# Patient Record
Sex: MALE | Race: WHITE | NOT HISPANIC OR LATINO
[De-identification: names, ages, dates, MRNs, and addresses within clinical notes are randomized per-mention and may not be internally consistent; named-entity substitution may affect disease eponyms.]

---

## 2017-01-23 ENCOUNTER — APPOINTMENT (OUTPATIENT)
Dept: SURGERY | Facility: CLINIC | Age: 82
End: 2017-01-23

## 2017-01-23 VITALS
BODY MASS INDEX: 26.07 KG/M2 | SYSTOLIC BLOOD PRESSURE: 163 MMHG | WEIGHT: 172 LBS | DIASTOLIC BLOOD PRESSURE: 80 MMHG | TEMPERATURE: 97.5 F | HEIGHT: 68 IN | HEART RATE: 51 BPM

## 2018-01-26 ENCOUNTER — APPOINTMENT (OUTPATIENT)
Dept: UROLOGY | Facility: CLINIC | Age: 83
End: 2018-01-26
Payer: MEDICARE

## 2018-01-26 PROCEDURE — 99213 OFFICE O/P EST LOW 20 MIN: CPT

## 2018-01-31 ENCOUNTER — APPOINTMENT (OUTPATIENT)
Dept: UROLOGY | Facility: CLINIC | Age: 83
End: 2018-01-31
Payer: MEDICARE

## 2018-01-31 ENCOUNTER — OUTPATIENT (OUTPATIENT)
Dept: OUTPATIENT SERVICES | Facility: HOSPITAL | Age: 83
LOS: 1 days | End: 2018-01-31
Payer: MEDICARE

## 2018-01-31 VITALS
HEART RATE: 46 BPM | DIASTOLIC BLOOD PRESSURE: 76 MMHG | TEMPERATURE: 97.7 F | RESPIRATION RATE: 16 BRPM | SYSTOLIC BLOOD PRESSURE: 156 MMHG

## 2018-01-31 DIAGNOSIS — Z95.828 PRESENCE OF OTHER VASCULAR IMPLANTS AND GRAFTS: Chronic | ICD-10-CM

## 2018-01-31 DIAGNOSIS — Z95.5 PRESENCE OF CORONARY ANGIOPLASTY IMPLANT AND GRAFT: Chronic | ICD-10-CM

## 2018-01-31 DIAGNOSIS — R35.0 FREQUENCY OF MICTURITION: ICD-10-CM

## 2018-01-31 DIAGNOSIS — Z98.89 OTHER SPECIFIED POSTPROCEDURAL STATES: Chronic | ICD-10-CM

## 2018-01-31 PROCEDURE — 52000 CYSTOURETHROSCOPY: CPT

## 2018-02-01 DIAGNOSIS — N32.0 BLADDER-NECK OBSTRUCTION: ICD-10-CM

## 2018-08-14 ENCOUNTER — INPATIENT (INPATIENT)
Facility: HOSPITAL | Age: 83
LOS: 2 days | Discharge: NURSING, REHAB OR LONG-TERM CARE | DRG: 563 | End: 2018-08-17
Attending: STUDENT IN AN ORGANIZED HEALTH CARE EDUCATION/TRAINING PROGRAM | Admitting: STUDENT IN AN ORGANIZED HEALTH CARE EDUCATION/TRAINING PROGRAM
Payer: MEDICARE

## 2018-08-14 VITALS
HEART RATE: 92 BPM | OXYGEN SATURATION: 98 % | DIASTOLIC BLOOD PRESSURE: 92 MMHG | SYSTOLIC BLOOD PRESSURE: 162 MMHG | WEIGHT: 173.94 LBS | HEIGHT: 68 IN | RESPIRATION RATE: 18 BRPM

## 2018-08-14 DIAGNOSIS — E78.5 HYPERLIPIDEMIA, UNSPECIFIED: ICD-10-CM

## 2018-08-14 DIAGNOSIS — S42.211A UNSPECIFIED DISPLACED FRACTURE OF SURGICAL NECK OF RIGHT HUMERUS, INITIAL ENCOUNTER FOR CLOSED FRACTURE: ICD-10-CM

## 2018-08-14 DIAGNOSIS — Z98.89 OTHER SPECIFIED POSTPROCEDURAL STATES: Chronic | ICD-10-CM

## 2018-08-14 DIAGNOSIS — Z29.9 ENCOUNTER FOR PROPHYLACTIC MEASURES, UNSPECIFIED: ICD-10-CM
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
## 2018-08-14 DIAGNOSIS — Z95.828 PRESENCE OF OTHER VASCULAR IMPLANTS AND GRAFTS: Chronic | ICD-10-CM

## 2018-08-14 DIAGNOSIS — G20 PARKINSON'S DISEASE: ICD-10-CM

## 2018-08-14 DIAGNOSIS — I25.10 ATHEROSCLEROTIC HEART DISEASE OF NATIVE CORONARY ARTERY WITHOUT ANGINA PECTORIS: ICD-10-CM

## 2018-08-14 DIAGNOSIS — I10 ESSENTIAL (PRIMARY) HYPERTENSION: ICD-10-CM

## 2018-08-14 DIAGNOSIS — Z95.5 PRESENCE OF CORONARY ANGIOPLASTY IMPLANT AND GRAFT: Chronic | ICD-10-CM

## 2018-08-14 DIAGNOSIS — N18.9 CHRONIC KIDNEY DISEASE, UNSPECIFIED: ICD-10-CM

## 2018-08-14 LAB
ALBUMIN SERPL ELPH-MCNC: 3.9 G/DL — SIGNIFICANT CHANGE UP (ref 3.3–5)
ALP SERPL-CCNC: 61 U/L — SIGNIFICANT CHANGE UP (ref 40–120)
ALT FLD-CCNC: 5 U/L — LOW (ref 10–45)
ANION GAP SERPL CALC-SCNC: 14 MMOL/L — SIGNIFICANT CHANGE UP (ref 5–17)
APTT BLD: 29.1 SEC — SIGNIFICANT CHANGE UP (ref 27.5–37.4)
AST SERPL-CCNC: 19 U/L — SIGNIFICANT CHANGE UP (ref 10–40)
BASOPHILS # BLD AUTO: 0 K/UL — SIGNIFICANT CHANGE UP (ref 0–0.2)
BASOPHILS NFR BLD AUTO: 0.1 % — SIGNIFICANT CHANGE UP (ref 0–2)
BILIRUB SERPL-MCNC: 0.7 MG/DL — SIGNIFICANT CHANGE UP (ref 0.2–1.2)
BUN SERPL-MCNC: 33 MG/DL — HIGH (ref 7–23)
CALCIUM SERPL-MCNC: 8.7 MG/DL — SIGNIFICANT CHANGE UP (ref 8.4–10.5)
CHLORIDE SERPL-SCNC: 100 MMOL/L — SIGNIFICANT CHANGE UP (ref 96–108)
CO2 SERPL-SCNC: 23 MMOL/L — SIGNIFICANT CHANGE UP (ref 22–31)
CREAT SERPL-MCNC: 1.9 MG/DL — HIGH (ref 0.5–1.3)
EOSINOPHIL # BLD AUTO: 0.1 K/UL — SIGNIFICANT CHANGE UP (ref 0–0.5)
EOSINOPHIL NFR BLD AUTO: 2.2 % — SIGNIFICANT CHANGE UP (ref 0–6)
GLUCOSE SERPL-MCNC: 144 MG/DL — HIGH (ref 70–99)
HCT VFR BLD CALC: 32.9 % — LOW (ref 39–50)
HGB BLD-MCNC: 10.7 G/DL — LOW (ref 13–17)
INR BLD: 1.22 RATIO — HIGH (ref 0.88–1.16)
LYMPHOCYTES # BLD AUTO: 1.1 K/UL — SIGNIFICANT CHANGE UP (ref 1–3.3)
LYMPHOCYTES # BLD AUTO: 17.7 % — SIGNIFICANT CHANGE UP (ref 13–44)
MCHC RBC-ENTMCNC: 28.9 PG — SIGNIFICANT CHANGE UP (ref 27–34)
MCHC RBC-ENTMCNC: 32.6 GM/DL — SIGNIFICANT CHANGE UP (ref 32–36)
MCV RBC AUTO: 88.9 FL — SIGNIFICANT CHANGE UP (ref 80–100)
MONOCYTES # BLD AUTO: 0.4 K/UL — SIGNIFICANT CHANGE UP (ref 0–0.9)
MONOCYTES NFR BLD AUTO: 6.4 % — SIGNIFICANT CHANGE UP (ref 2–14)
NEUTROPHILS # BLD AUTO: 4.4 K/UL — SIGNIFICANT CHANGE UP (ref 1.8–7.4)
NEUTROPHILS NFR BLD AUTO: 73.7 % — SIGNIFICANT CHANGE UP (ref 43–77)
PLATELET # BLD AUTO: 116 K/UL — LOW (ref 150–400)
POTASSIUM SERPL-MCNC: 4.1 MMOL/L — SIGNIFICANT CHANGE UP (ref 3.5–5.3)
POTASSIUM SERPL-SCNC: 4.1 MMOL/L — SIGNIFICANT CHANGE UP (ref 3.5–5.3)
PROT SERPL-MCNC: 7.4 G/DL — SIGNIFICANT CHANGE UP (ref 6–8.3)
PROTHROM AB SERPL-ACNC: 13.4 SEC — HIGH (ref 9.8–12.7)
RBC # BLD: 3.7 M/UL — LOW (ref 4.2–5.8)
RBC # FLD: 14.3 % — SIGNIFICANT CHANGE UP (ref 10.3–14.5)
SODIUM SERPL-SCNC: 137 MMOL/L — SIGNIFICANT CHANGE UP (ref 135–145)
TROPONIN T, HIGH SENSITIVITY RESULT: 30 NG/L — SIGNIFICANT CHANGE UP (ref 0–51)
TROPONIN T, HIGH SENSITIVITY RESULT: 32 NG/L — SIGNIFICANT CHANGE UP (ref 0–51)
WBC # BLD: 6 K/UL — SIGNIFICANT CHANGE UP (ref 3.8–10.5)
WBC # FLD AUTO: 6 K/UL — SIGNIFICANT CHANGE UP (ref 3.8–10.5)

## 2018-08-14 PROCEDURE — 72125 CT NECK SPINE W/O DYE: CPT | Mod: 26

## 2018-08-14 PROCEDURE — 99223 1ST HOSP IP/OBS HIGH 75: CPT

## 2018-08-14 PROCEDURE — 70450 CT HEAD/BRAIN W/O DYE: CPT | Mod: 26

## 2018-08-14 PROCEDURE — 73020 X-RAY EXAM OF SHOULDER: CPT | Mod: 26,RT,59

## 2018-08-14 PROCEDURE — 71045 X-RAY EXAM CHEST 1 VIEW: CPT | Mod: 26

## 2018-08-14 PROCEDURE — 73200 CT UPPER EXTREMITY W/O DYE: CPT | Mod: 26,RT

## 2018-08-14 PROCEDURE — 99285 EMERGENCY DEPT VISIT HI MDM: CPT | Mod: 25,GC

## 2018-08-14 PROCEDURE — 12013 RPR F/E/E/N/L/M 2.6-5.0 CM: CPT | Mod: GC

## 2018-08-14 PROCEDURE — 23600 CLTX PROX HUMRL FX W/O MNPJ: CPT | Mod: RT

## 2018-08-14 PROCEDURE — 99221 1ST HOSP IP/OBS SF/LOW 40: CPT | Mod: 57

## 2018-08-14 PROCEDURE — 93010 ELECTROCARDIOGRAM REPORT: CPT | Mod: 59,GC

## 2018-08-14 PROCEDURE — 76377 3D RENDER W/INTRP POSTPROCES: CPT | Mod: 26

## 2018-08-14 PROCEDURE — 73030 X-RAY EXAM OF SHOULDER: CPT | Mod: 26,RT

## 2018-08-14 PROCEDURE — 73060 X-RAY EXAM OF HUMERUS: CPT | Mod: 26,RT

## 2018-08-14 RX ORDER — HEPARIN SODIUM 5000 [USP'U]/ML
5000 INJECTION INTRAVENOUS; SUBCUTANEOUS EVERY 8 HOURS
Qty: 0 | Refills: 0 | Status: DISCONTINUED | OUTPATIENT
Start: 2018-08-14 | End: 2018-08-17

## 2018-08-14 RX ORDER — ASPIRIN/CALCIUM CARB/MAGNESIUM 324 MG
81 TABLET ORAL DAILY
Qty: 0 | Refills: 0 | Status: DISCONTINUED | OUTPATIENT
Start: 2018-08-14 | End: 2018-08-17

## 2018-08-14 RX ORDER — DOCUSATE SODIUM 100 MG
100 CAPSULE ORAL
Qty: 0 | Refills: 0 | Status: DISCONTINUED | OUTPATIENT
Start: 2018-08-14 | End: 2018-08-17

## 2018-08-14 RX ORDER — ACETAMINOPHEN 500 MG
500 TABLET ORAL
Qty: 0 | Refills: 0 | Status: DISCONTINUED | OUTPATIENT
Start: 2018-08-14 | End: 2018-08-17

## 2018-08-14 RX ORDER — FUROSEMIDE 40 MG
20 TABLET ORAL
Qty: 0 | Refills: 0 | Status: DISCONTINUED | OUTPATIENT
Start: 2018-08-14 | End: 2018-08-14

## 2018-08-14 RX ORDER — RANOLAZINE 500 MG/1
1000 TABLET, FILM COATED, EXTENDED RELEASE ORAL
Qty: 0 | Refills: 0 | Status: DISCONTINUED | OUTPATIENT
Start: 2018-08-14 | End: 2018-08-17

## 2018-08-14 RX ORDER — ISOSORBIDE MONONITRATE 60 MG/1
60 TABLET, EXTENDED RELEASE ORAL DAILY
Qty: 0 | Refills: 0 | Status: DISCONTINUED | OUTPATIENT
Start: 2018-08-14 | End: 2018-08-17

## 2018-08-14 RX ORDER — ACETAMINOPHEN 500 MG
1000 TABLET ORAL ONCE
Qty: 0 | Refills: 0 | Status: COMPLETED | OUTPATIENT
Start: 2018-08-14 | End: 2018-08-14

## 2018-08-14 RX ORDER — SODIUM CHLORIDE 9 MG/ML
500 INJECTION INTRAMUSCULAR; INTRAVENOUS; SUBCUTANEOUS ONCE
Qty: 0 | Refills: 0 | Status: COMPLETED | OUTPATIENT
Start: 2018-08-14 | End: 2018-08-14

## 2018-08-14 RX ORDER — SENNA PLUS 8.6 MG/1
1 TABLET ORAL DAILY
Qty: 0 | Refills: 0 | Status: DISCONTINUED | OUTPATIENT
Start: 2018-08-14 | End: 2018-08-17

## 2018-08-14 RX ORDER — CARBIDOPA, LEVODOPA, AND ENTACAPONE 50; 200; 200 MG/1; MG/1; MG/1
1 TABLET, FILM COATED ORAL THREE TIMES A DAY
Qty: 0 | Refills: 0 | Status: DISCONTINUED | OUTPATIENT
Start: 2018-08-14 | End: 2018-08-17

## 2018-08-14 RX ORDER — METOPROLOL TARTRATE 50 MG
50 TABLET ORAL
Qty: 0 | Refills: 0 | Status: DISCONTINUED | OUTPATIENT
Start: 2018-08-14 | End: 2018-08-15

## 2018-08-14 RX ORDER — ATORVASTATIN CALCIUM 80 MG/1
80 TABLET, FILM COATED ORAL AT BEDTIME
Qty: 0 | Refills: 0 | Status: DISCONTINUED | OUTPATIENT
Start: 2018-08-14 | End: 2018-08-17

## 2018-08-14 RX ORDER — TETANUS TOXOID, REDUCED DIPHTHERIA TOXOID AND ACELLULAR PERTUSSIS VACCINE, ADSORBED 5; 2.5; 8; 8; 2.5 [IU]/.5ML; [IU]/.5ML; UG/.5ML; UG/.5ML; UG/.5ML
0.5 SUSPENSION INTRAMUSCULAR ONCE
Qty: 0 | Refills: 0 | Status: COMPLETED | OUTPATIENT
Start: 2018-08-14 | End: 2018-08-14

## 2018-08-14 RX ORDER — ENTACAPONE 200 MG/1
200 TABLET, FILM COATED ORAL THREE TIMES A DAY
Qty: 0 | Refills: 0 | Status: DISCONTINUED | OUTPATIENT
Start: 2018-08-14 | End: 2018-08-14

## 2018-08-14 RX ADMIN — Medication 100 MILLIGRAM(S): at 23:45

## 2018-08-14 RX ADMIN — Medication 400 MILLIGRAM(S): at 23:44

## 2018-08-14 RX ADMIN — HEPARIN SODIUM 5000 UNIT(S): 5000 INJECTION INTRAVENOUS; SUBCUTANEOUS at 23:45

## 2018-08-14 RX ADMIN — CARBIDOPA, LEVODOPA, AND ENTACAPONE 1 TABLET(S): 50; 200; 200 TABLET, FILM COATED ORAL at 23:45

## 2018-08-14 RX ADMIN — RANOLAZINE 1000 MILLIGRAM(S): 500 TABLET, FILM COATED, EXTENDED RELEASE ORAL at 23:45

## 2018-08-14 RX ADMIN — SENNA PLUS 1 TABLET(S): 8.6 TABLET ORAL at 23:45

## 2018-08-14 RX ADMIN — ISOSORBIDE MONONITRATE 60 MILLIGRAM(S): 60 TABLET, EXTENDED RELEASE ORAL at 23:45

## 2018-08-14 RX ADMIN — SODIUM CHLORIDE 500 MILLILITER(S): 9 INJECTION INTRAMUSCULAR; INTRAVENOUS; SUBCUTANEOUS at 19:39

## 2018-08-14 RX ADMIN — TETANUS TOXOID, REDUCED DIPHTHERIA TOXOID AND ACELLULAR PERTUSSIS VACCINE, ADSORBED 0.5 MILLILITER(S): 5; 2.5; 8; 8; 2.5 SUSPENSION INTRAMUSCULAR at 17:02

## 2018-08-14 RX ADMIN — ATORVASTATIN CALCIUM 80 MILLIGRAM(S): 80 TABLET, FILM COATED ORAL at 23:45

## 2018-08-14 NOTE — ED PROVIDER NOTE - MEDICAL DECISION MAKING DETAILS
88M pmh Parkinsons, CABG on ASA with mechanical trip and fall with head trauma, no LOC. On floor for 1 hour. CT head and c-spine. Tdap. Labs with trop, EKG, CK. Xray chest and right shoulder, humerus Dr. Reid (Attending Physician)  88M pmh Parkinsons, CABG on ASA with mechanical trip and fall with head trauma, no LOC. On floor for 1 hour. CT head and c-spine. Tdap. Labs with trop, EKG, CK. Xray chest and right shoulder, humerus

## 2018-08-14 NOTE — H&P ADULT - ASSESSMENT
87 yo male with PMH of parkinsons disease, CAD, CABG, s/p stents, s/p umbrella stent, DVT, HTN, UGIB, CKD, HLD, presents here after a mechanical fall

## 2018-08-14 NOTE — ED PROVIDER NOTE - PRINCIPAL DIAGNOSIS
Closed displaced fracture of surgical neck of right humerus, unspecified fracture morphology, initial encounter

## 2018-08-14 NOTE — ED PROVIDER NOTE - PROGRESS NOTE DETAILS
Right humerus fracture. Orthopedics aware. Pending axillary view. Stellate forehead lac sutured (see procedure note). Will admit for rehab placement.

## 2018-08-14 NOTE — ED PROVIDER NOTE - FAMILY HISTORY
Mother  Still living? Unknown  Family history of breast cancer, Age at diagnosis: Age Unknown     Father  Still living? Unknown  Family history of coronary artery disease, Age at diagnosis: Age Unknown

## 2018-08-14 NOTE — ED PROVIDER NOTE - NEUROLOGICAL, MLM
Alert and oriented, no focal deficits, no motor or sensory deficits. Moving all extremities. Right shoulder movement limited 2/2 pain

## 2018-08-14 NOTE — ED PROVIDER NOTE - SECONDARY DIAGNOSIS.
Minor head injury, initial encounter Coronary artery disease involving native heart without angina pectoris, unspecified vessel or lesion type

## 2018-08-14 NOTE — ED ADULT NURSE NOTE - OBJECTIVE STATEMENT
88 year old male A&OX4 PMHX of Parkinson's presents s/p trip and fall. Patient states he was ambulating when he tripped hitting the anterior aspect of the head on pavement. EMS was called and found patient to have a large laceration to the middle of the forehead. Patient on arrival was still actively bleeding. Bleeding controlled by direct pressure. Patient's lung sounds are clear and equal bilaterally. Patient has equal strength and sensation bilaterally. Pupils PERRLA. Patient is currently on aspirin. Patient denies nausea, vomiting, headache, vision changes, chest pain, shortness of breath, dizziness, weakness, palpitations.

## 2018-08-14 NOTE — ED PROVIDER NOTE - CARE PLAN
Principal Discharge DX:	Closed displaced fracture of surgical neck of right humerus, unspecified fracture morphology, initial encounter Principal Discharge DX:	Closed displaced fracture of surgical neck of right humerus, unspecified fracture morphology, initial encounter  Secondary Diagnosis:	Minor head injury, initial encounter  Secondary Diagnosis:	Coronary artery disease involving native heart without angina pectoris, unspecified vessel or lesion type

## 2018-08-14 NOTE — ED PROVIDER NOTE - OBJECTIVE STATEMENT
89yo male pmh Parkinson's disease, CAD s/p CABG, stents on aspirin, DVT s/p umbrella stent, HTN p/w mechanical fall while walking to bathroom. Pt denies chest pain, dizziness, palpitations, dyspnea prior to falling. Hit head on ground with no LOC, vomiting or confusion. Also c/o right shoulder pain. Unknown last tetanus. Pt states he has shuffling unsteady gait at baseline. No other anticoagulation besides ASA. Denies fever, chills, chest pain, dyspnea, palpitations, dizziness, recent cough, nausea, vomiting, diarrhea, abdominal pain, bladder and bowel problems, back pain, leg swelling, sick contact, recent long travel.    Significant past medical hx/surgical hx/social hx and review of systems can be found above in the history of present illness.

## 2018-08-14 NOTE — H&P ADULT - HISTORY OF PRESENT ILLNESS
89 yo male with PMH of parkinsons disease, CAD, CABG, s/p stents, s/p umbrella stent, DVT, HTN, UGIB, CKD, HLD, presents here after a mechanical fall.  Patient states that usually he has shuffling gait and is unstable and therefore uses walker at home.  Today he walked a few steps to get medication without any assistance.  He thinks he was walking fast, but dragging his leg.  He tripped and fell face down on a wooden floor.  He extended his right arm for protection.  He had trauma to the forehead and started bleeding profusely.  He had no LOC.  He was also unable to move his arms.  He was on the floor for >1hr until his wife came and called the ambulance and brought him here.  Patient otherwise denies any chest pain, blurry vision, headache, dizziness, palpitation prior to the fall.  He had no urinary or bowel incontinence.

## 2018-08-14 NOTE — ED PROCEDURE NOTE - PROCEDURE ADDITIONAL DETAILS
Stellate laceration closed with 5 interrupted sutures followed by a stellate closure suture. Bacitracin applied, covered with gauze

## 2018-08-14 NOTE — H&P ADULT - PROBLEM SELECTOR PLAN 2
mechanical fall, likely due to underlying unsteady gait from parkinsons  PT evaluation  check B12, RPR, vit D level mechanical fall, likely due to underlying unsteady gait from parkinsons  PT evaluation  check B12, RPR, vit D level  right periorbital echymosis, no pain with occular movement; Will check CT orbital if worsens

## 2018-08-14 NOTE — ED ADULT NURSE NOTE - PMH
Aspiration pneumonia    BPH (benign prostatic hyperplasia)    CAD (Coronary Artery Disease)    Clostridium difficile colitis    HTN (Hypertension)    Hyperlipidemia    MI, old    Parkinsons Disease    Pneumonia    Unilateral inguinal hernia, with gangrene, not specified as recurrent    Upper GI bleed  [facility] admission 2015, EGD w/ gastric ulcer/visible vessel which was cauterized  UTI (urinary tract infection)

## 2018-08-14 NOTE — CONSULT NOTE ADULT - SUBJECTIVE AND OBJECTIVE BOX
88y Male presents c/o R shoulder pain sp mechanical fall.+ Headstrike/ neg LOC. Denies numbness, tingling paresthesias in affected extremity. Able to ambulate after fall. No other orthopedic injuries at this time    PAST MEDICAL & SURGICAL HISTORY:  Unilateral inguinal hernia, with gangrene, not specified as recurrent  Upper GI bleed: [facility] admission , EGD w/ gastric ulcer/visible vessel which was cauterized  Aspiration pneumonia  Clostridium difficile colitis  Pneumonia  MI, old  UTI (urinary tract infection)  BPH (benign prostatic hyperplasia)  Parkinsons Disease  CAD (Coronary Artery Disease)  HTN (Hypertension)  Hyperlipidemia  Presence of IVC filter: right upper arm placed last year   History of prostate surgery: 2016  Varicose veins of legs: h/o Vein stripping  S/P appendectomy  Stented coronary artery: cardiac stent x 2 - placement between  &amp; possible   Hx of CAB    MEDICATIONS  (STANDING):  acetaminophen  IVPB. 1000 milliGRAM(s) IV Intermittent once    Allergies    Cipro (Rash)    Intolerances                            10.7   6.0   )-----------( 116      ( 14 Aug 2018 16:16 )             32.9     14 Aug 2018 16:16    137    |  100    |  33     ----------------------------<  144    4.1     |  23     |  1.90     Ca    8.7        14 Aug 2018 16:16    TPro  7.4    /  Alb  3.9    /  TBili  0.7    /  DBili  x      /  AST  19     /  ALT  5      /  AlkPhos  61     14 Aug 2018 16:16    PT/INR - ( 14 Aug 2018 16:16 )   PT: 13.4 sec;   INR: 1.22 ratio         PTT - ( 14 Aug 2018 16:16 )  PTT:29.1 sec    Imaging: XR was personally reviewed and demonstates R proximal humerus fracture, GHJ reduced    Vital Signs Last 24 Hrs  T(C): 36.8 (18 @ 17:06), Max: 36.8 (18 @ 17:06)  T(F): 98.2 (18 @ 17:06), Max: 98.2 (18 @ 17:06)  HR: 67 (18 @ 19:40) (52 - 92)  BP: 142/67 (18 @ 19:40) (142/67 - 174/87)  BP(mean): --  RR: 17 (18 @ 19:40) (16 - 18)  SpO2: 96% (18 @ 19:40) (96% - 98%)    Gen: NAD  RUE: skin tear proximal forearm, otherwise Skin intact, + shoulder fullness, unable to range shoulder 2/2 pain, +ain/pin/m/r/u function, SILT C5-T1, radial pulse intact, compartments soft, brisk cap refill     Secondary Survey: Full ROM of unaffected extremities, SILT globally, compartments soft, no bony TTP over bony prominences, no calf TTP, able to SLR with bilateral LE, no TTP along axial spine, multiple abrasions scalp and neck, anterior knee abrasion      A/P: 88y Male w R proximal humerus fracture  Pain control  NWB RUE in sling, keep c/d/i  Ice  Active movement of fingers/wrist/elbow encouraged  Follow up with Dr. Cohn as outpatient, call office for appointment  Ortho stable  no acute ortho interventions needed at this time

## 2018-08-14 NOTE — ED ADULT NURSE NOTE - NSIMPLEMENTINTERV_GEN_ALL_ED
Implemented All Universal Safety Interventions:  [place] to call system. Call bell, personal items and telephone within reach. Instruct patient to call for assistance. Room bathroom lighting operational. Non-slip footwear when patient is off stretcher. Physically safe environment: no spills, clutter or unnecessary equipment. Stretcher in lowest position, wheels locked, appropriate side rails in place.

## 2018-08-14 NOTE — ED ADULT NURSE REASSESSMENT NOTE - NS ED NURSE REASSESS COMMENT FT1
Report received from        Weeks            ,RN  Pt resting comfortably with family at bedside.   MD at bedside suturing pts laceration  Safety maintained at all times, bed in lowest position, call bell in hand  Will continue to monitor closely.   pending delta trop result

## 2018-08-14 NOTE — ED PROVIDER NOTE - CONSTITUTIONAL APPEARANCE HYGIENE, MLM
Initially presented with bloody gauze wrap around head and face. In no acute distress./well appearing

## 2018-08-14 NOTE — H&P ADULT - NSHPPHYSICALEXAM_GEN_ALL_CORE
PHYSICAL EXAM:  Vital Signs Last 24 Hrs  T(C): 36.8 (08-14-18 @ 17:06)  T(F): 98.2 (08-14-18 @ 17:06), Max: 98.2 (08-14-18 @ 17:06)  HR: 67 (08-14-18 @ 19:40) (52 - 92)  BP: 142/67 (08-14-18 @ 19:40)  BP(mean): --  RR: 17 (08-14-18 @ 19:40) (16 - 18)  SpO2: 96% (08-14-18 @ 19:40) (96% - 98%)  Wt(kg): --    Constitutional: s/p laceration repair of forehead, +bandaged  EYES: EOMI; right periorbital hematoma; no pain with eye movement  ENT:  Normal Hearing, no tonsillar exudates   Neck: Soft and supple, No JVD  Lungs: Breath sounds are clear bilaterally, No wheezing, rales or rhonchi  Heart: S1 and S2, regular rate and rhythm, no Murmurs, gallops or rubs  Abdomen: Bowel Sounds present, soft, nontender, nondistended, no guarding, no rebound  Extremities: No cyanosis or clubbing; warm to touch; right arm sling present  Vascular: 2+ peripheral pulses lower ex  Neurological: A/O x 3, no focal deficits  Musculoskeletal: 5/5 strength left upper and b/l lower extremities;   Skin: No rashes  Psych: no depression or anhedonia  HEME: no bruises, no nose bleeds

## 2018-08-14 NOTE — ED PROVIDER NOTE - SKIN WOUND DESCRIPTION
large stellate laceration right forehead, actively bleeding, slows with direct pressure. No arterial bleeding.

## 2018-08-14 NOTE — H&P ADULT - PROBLEM SELECTOR PLAN 1
patient with fracture of right humerus, s/p sling  orthopedic evaluation appreciated  ice  pain control with tylenol for now  PT evaluation for possible rehab placement

## 2018-08-14 NOTE — ED PROVIDER NOTE - ATTENDING CONTRIBUTION TO CARE
Dr. Reid (Attending Physician)  I performed a history and physical exam of the patient and discussed their management with the resident. I reviewed the resident's note and agree with the documented findings and plan of care. My medical decision making and observations are found above.

## 2018-08-14 NOTE — ED PROCEDURE NOTE - ATTENDING CONTRIBUTION TO CARE
Dr. Reid (Attending Physician)  I supervised the above procedure and agree with the documented note.

## 2018-08-14 NOTE — H&P ADULT - PROBLEM SELECTOR PLAN 4
c/w home medication, carbidopa/levodopa/entacapone  ?has extra prescription for entacapone per record  will need to confirm meds in AM

## 2018-08-14 NOTE — ED PROVIDER NOTE - PSH
History of prostate surgery  8/2016  Hx of CABG  1995  Presence of IVC filter  right upper arm placed last year 2015  S/P appendectomy    Stented coronary artery  cardiac stent x 2 - placement between 2000 & possible 2005  Varicose veins of legs  h/o Vein stripping

## 2018-08-14 NOTE — H&P ADULT - NSHPREVIEWOFSYSTEMS_GEN_ALL_CORE
CONSTITUTIONAL: No weakness, fevers or chills  EYES/ENT: No visual changes;  No dysphagia  NECK: No pain or stiffness  RESPIRATORY: No cough, wheezing, hemoptysis; No shortness of breath  CARDIOVASCULAR: No chest pain or palpitations; No lower extremity edema  EXTREMITIES: no le edema, cyanosis, clubbing  MUSCULOSKELETAL: no joint pain, swelling  GASTROINTESTINAL: No abdominal or epigastric pain. No nausea, vomiting, or hematemesis; No diarrhea or constipation. No melena or hematochezia.  BACK: No back pain  GENITOURINARY: No dysuria, frequency or hematuria  NEUROLOGICAL: unsteady gait  SKIN: No itching, burning, rashes, or lesions   PSYCH: no agitation  All other review of systems is negative unless indicated above.

## 2018-08-14 NOTE — H&P ADULT - NSHPLABSRESULTS_GEN_ALL_CORE
Labs personally reviewed:                          10.7   6.0   )-----------( 116      ( 14 Aug 2018 16:16 )             32.9     08-14    137  |  100  |  33<H>  ----------------------------<  144<H>  4.1   |  23  |  1.90<H>    Ca    8.7      14 Aug 2018 16:16    TPro  7.4  /  Alb  3.9  /  TBili  0.7  /  DBili  x   /  AST  19  /  ALT  5<L>  /  AlkPhos  61  08-14    CARDIAC MARKERS ( 14 Aug 2018 16:16 )  x     / x     / 116 U/L / x     / x          LIVER FUNCTIONS - ( 14 Aug 2018 16:16 )  Alb: 3.9 g/dL / Pro: 7.4 g/dL / ALK PHOS: 61 U/L / ALT: 5 U/L / AST: 19 U/L / GGT: x           PT/INR - ( 14 Aug 2018 16:16 )   PT: 13.4 sec;   INR: 1.22 ratio         PTT - ( 14 Aug 2018 16:16 )  PTT:29.1 sec    CAPILLARY BLOOD GLUCOSE          Imaging:  CT head/cervical spine: no acute fractures  CXR/XR shoulder: acute transversely oriented fracture through surgical neck of right humerus; 6cm overriding.     EKG personally reviewed: LBBB
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
## 2018-08-15 DIAGNOSIS — R00.1 BRADYCARDIA, UNSPECIFIED: ICD-10-CM

## 2018-08-15 DIAGNOSIS — I25.10 ATHEROSCLEROTIC HEART DISEASE OF NATIVE CORONARY ARTERY WITHOUT ANGINA PECTORIS: ICD-10-CM

## 2018-08-15 LAB
24R-OH-CALCIDIOL SERPL-MCNC: 32.3 NG/ML — SIGNIFICANT CHANGE UP (ref 30–80)
ALBUMIN SERPL ELPH-MCNC: 3.6 G/DL — SIGNIFICANT CHANGE UP (ref 3.3–5)
ALP SERPL-CCNC: 43 U/L — SIGNIFICANT CHANGE UP (ref 40–120)
ALT FLD-CCNC: <5 U/L — LOW (ref 10–45)
ANION GAP SERPL CALC-SCNC: 11 MMOL/L — SIGNIFICANT CHANGE UP (ref 5–17)
AST SERPL-CCNC: 17 U/L — SIGNIFICANT CHANGE UP (ref 10–40)
BASOPHILS # BLD AUTO: 0.01 K/UL — SIGNIFICANT CHANGE UP (ref 0–0.2)
BASOPHILS NFR BLD AUTO: 0.2 % — SIGNIFICANT CHANGE UP (ref 0–2)
BILIRUB SERPL-MCNC: 0.8 MG/DL — SIGNIFICANT CHANGE UP (ref 0.2–1.2)
BUN SERPL-MCNC: 30 MG/DL — HIGH (ref 7–23)
CALCIUM SERPL-MCNC: 8.4 MG/DL — SIGNIFICANT CHANGE UP (ref 8.4–10.5)
CHLORIDE SERPL-SCNC: 103 MMOL/L — SIGNIFICANT CHANGE UP (ref 96–108)
CO2 SERPL-SCNC: 23 MMOL/L — SIGNIFICANT CHANGE UP (ref 22–31)
CREAT SERPL-MCNC: 1.73 MG/DL — HIGH (ref 0.5–1.3)
EOSINOPHIL # BLD AUTO: 0.06 K/UL — SIGNIFICANT CHANGE UP (ref 0–0.5)
EOSINOPHIL NFR BLD AUTO: 1 % — SIGNIFICANT CHANGE UP (ref 0–6)
GLUCOSE SERPL-MCNC: 107 MG/DL — HIGH (ref 70–99)
HCT VFR BLD CALC: 29.2 % — LOW (ref 39–50)
HGB BLD-MCNC: 9.3 G/DL — LOW (ref 13–17)
IMM GRANULOCYTES NFR BLD AUTO: 0.3 % — SIGNIFICANT CHANGE UP (ref 0–1.5)
LYMPHOCYTES # BLD AUTO: 1.31 K/UL — SIGNIFICANT CHANGE UP (ref 1–3.3)
LYMPHOCYTES # BLD AUTO: 22.7 % — SIGNIFICANT CHANGE UP (ref 13–44)
MAGNESIUM SERPL-MCNC: 2.2 MG/DL — SIGNIFICANT CHANGE UP (ref 1.6–2.6)
MCHC RBC-ENTMCNC: 27.7 PG — SIGNIFICANT CHANGE UP (ref 27–34)
MCHC RBC-ENTMCNC: 31.8 GM/DL — LOW (ref 32–36)
MCV RBC AUTO: 86.9 FL — SIGNIFICANT CHANGE UP (ref 80–100)
MONOCYTES # BLD AUTO: 0.8 K/UL — SIGNIFICANT CHANGE UP (ref 0–0.9)
MONOCYTES NFR BLD AUTO: 13.9 % — SIGNIFICANT CHANGE UP (ref 2–14)
NEUTROPHILS # BLD AUTO: 3.57 K/UL — SIGNIFICANT CHANGE UP (ref 1.8–7.4)
NEUTROPHILS NFR BLD AUTO: 61.9 % — SIGNIFICANT CHANGE UP (ref 43–77)
PHOSPHATE SERPL-MCNC: 3.6 MG/DL — SIGNIFICANT CHANGE UP (ref 2.5–4.5)
PLATELET # BLD AUTO: 108 K/UL — LOW (ref 150–400)
POTASSIUM SERPL-MCNC: 4.1 MMOL/L — SIGNIFICANT CHANGE UP (ref 3.5–5.3)
POTASSIUM SERPL-SCNC: 4.1 MMOL/L — SIGNIFICANT CHANGE UP (ref 3.5–5.3)
PROT SERPL-MCNC: 6.3 G/DL — SIGNIFICANT CHANGE UP (ref 6–8.3)
RBC # BLD: 3.36 M/UL — LOW (ref 4.2–5.8)
RBC # FLD: 15.1 % — HIGH (ref 10.3–14.5)
SODIUM SERPL-SCNC: 137 MMOL/L — SIGNIFICANT CHANGE UP (ref 135–145)
VIT B12 SERPL-MCNC: 335 PG/ML — SIGNIFICANT CHANGE UP (ref 232–1245)
WBC # BLD: 5.77 K/UL — SIGNIFICANT CHANGE UP (ref 3.8–10.5)
WBC # FLD AUTO: 5.77 K/UL — SIGNIFICANT CHANGE UP (ref 3.8–10.5)

## 2018-08-15 PROCEDURE — 93010 ELECTROCARDIOGRAM REPORT: CPT | Mod: 77,76

## 2018-08-15 RX ORDER — POLYETHYLENE GLYCOL 3350 17 G/17G
17 POWDER, FOR SOLUTION ORAL DAILY
Qty: 0 | Refills: 0 | Status: DISCONTINUED | OUTPATIENT
Start: 2018-08-15 | End: 2018-08-17

## 2018-08-15 RX ORDER — METOPROLOL TARTRATE 50 MG
50 TABLET ORAL
Qty: 0 | Refills: 0 | Status: DISCONTINUED | OUTPATIENT
Start: 2018-08-15 | End: 2018-08-17

## 2018-08-15 RX ADMIN — SENNA PLUS 1 TABLET(S): 8.6 TABLET ORAL at 12:01

## 2018-08-15 RX ADMIN — HEPARIN SODIUM 5000 UNIT(S): 5000 INJECTION INTRAVENOUS; SUBCUTANEOUS at 13:56

## 2018-08-15 RX ADMIN — Medication 500 MILLIGRAM(S): at 05:46

## 2018-08-15 RX ADMIN — CARBIDOPA, LEVODOPA, AND ENTACAPONE 1 TABLET(S): 50; 200; 200 TABLET, FILM COATED ORAL at 22:54

## 2018-08-15 RX ADMIN — HEPARIN SODIUM 5000 UNIT(S): 5000 INJECTION INTRAVENOUS; SUBCUTANEOUS at 05:46

## 2018-08-15 RX ADMIN — HEPARIN SODIUM 5000 UNIT(S): 5000 INJECTION INTRAVENOUS; SUBCUTANEOUS at 22:54

## 2018-08-15 RX ADMIN — Medication 100 MILLIGRAM(S): at 17:30

## 2018-08-15 RX ADMIN — ISOSORBIDE MONONITRATE 60 MILLIGRAM(S): 60 TABLET, EXTENDED RELEASE ORAL at 12:01

## 2018-08-15 RX ADMIN — Medication 81 MILLIGRAM(S): at 12:01

## 2018-08-15 RX ADMIN — RANOLAZINE 1000 MILLIGRAM(S): 500 TABLET, FILM COATED, EXTENDED RELEASE ORAL at 22:54

## 2018-08-15 RX ADMIN — CARBIDOPA, LEVODOPA, AND ENTACAPONE 1 TABLET(S): 50; 200; 200 TABLET, FILM COATED ORAL at 06:44

## 2018-08-15 RX ADMIN — Medication 100 MILLIGRAM(S): at 05:46

## 2018-08-15 RX ADMIN — Medication 50 MILLIGRAM(S): at 17:30

## 2018-08-15 RX ADMIN — ATORVASTATIN CALCIUM 80 MILLIGRAM(S): 80 TABLET, FILM COATED ORAL at 22:53

## 2018-08-15 RX ADMIN — RANOLAZINE 1000 MILLIGRAM(S): 500 TABLET, FILM COATED, EXTENDED RELEASE ORAL at 12:01

## 2018-08-15 RX ADMIN — Medication 1000 MILLIGRAM(S): at 00:40

## 2018-08-15 RX ADMIN — CARBIDOPA, LEVODOPA, AND ENTACAPONE 1 TABLET(S): 50; 200; 200 TABLET, FILM COATED ORAL at 13:56

## 2018-08-15 NOTE — CONSULT NOTE ADULT - PROBLEM SELECTOR RECOMMENDATION 3
-  - cont home cardiac meds: ASA, high intensity statin, bb with hold parameters.    will follow.    Luis Roper M.D., [facility]  [phone] -  - cont home cardiac meds: ASA, high intensity statin, bb with hold parameters.  - cont antianginal meds - imdur and ranexa.    will follow.    Luis Roper M.D., [facility]  [phone]

## 2018-08-15 NOTE — PHYSICAL THERAPY INITIAL EVALUATION ADULT - LIVES WITH, PROFILE
spouse Pt lives with spouse in apartment with 5 stairs to entrance +HR.  Prior to admission pt independent with functional mobility with difficulty, using rollator for ambulation and  receiving home PT services to address impairments related to Parkinson's Disease./spouse

## 2018-08-15 NOTE — PHYSICAL THERAPY INITIAL EVALUATION ADULT - ADDITIONAL COMMENTS
HEAD CT: Soft tissue contusion anterior to the right frontal sinus without an underlying displaced skull fracture. No intracranial hemorrhage or mass effect.    CERVICAL SPINE CT: No acute cervical spine fracture or traumatic vertebral subluxation. Degenerative changes, detailed above.    CXR/XR shoulder: acute transversely oriented fracture through surgical neck of right humerus; 6cm overriding.

## 2018-08-15 NOTE — PROGRESS NOTE ADULT - PROBLEM SELECTOR PLAN 4
c/w home medication, carbidopa/levodopa/entacapone  ?has extra prescription for entacapone per record  med rec

## 2018-08-15 NOTE — CONSULT NOTE ADULT - ASSESSMENT
88 M h/o parkinsons disease, CAD s/p CABG and subsequent PCI, DVT s/p IVC filter, HTN, UGIB/peptic ulcer disease, CKD, HLD, LBBB, who was admited after a mechanical fall. We are consulted for bradycardia noted on admission.

## 2018-08-15 NOTE — PROGRESS NOTE ADULT - PROBLEM SELECTOR PLAN 1
patient with fracture of right humerus, s/p sling, WBAT  orthopedic evaluation appreciated  pain control with tylenol for now  PT evaluation for possible rehab placement

## 2018-08-15 NOTE — PHYSICAL THERAPY INITIAL EVALUATION ADULT - GAIT DEVIATIONS NOTED, PT EVAL
decreased step length/Narrow ALEX, +retropulsion/decreased sonja/decreased stride length/decreased weight-shifting ability

## 2018-08-15 NOTE — PHYSICAL THERAPY INITIAL EVALUATION ADULT - PERTINENT HX OF CURRENT PROBLEM, REHAB EVAL
87 yo male with PMH of parkinsons disease, CAD, CABG, s/p stents, s/p umbrella stent, DVT, HTN, UGIB, CKD, HLD, presents s/p mechanical fall, he extended his right arm for protection.  He had trauma to the forehead and started bleeding profusely, no LOC, also c/o R shoulder pain. A/w Closed displaced fracture of surgical neck of right humerus.

## 2018-08-15 NOTE — PROGRESS NOTE ADULT - PROBLEM SELECTOR PLAN 2
mechanical fall, likely due to underlying unsteady gait from parkinsons  PT evaluation  check B12, RPR, vit D level  right periorbital echymosis, no pain with occular movement; Will check CT orbital if worsens mechanical fall, likely due to underlying unsteady gait from parkinsons  PT evaluation  check B12, RPR, vit D level  right periorbital echymosis, no pain with occular movement; Will check CT orbital if worsens  CT shoulder shows periosseous soft tissue edema and hemorrhage. Hemorrhage   is also noted in the axillary region along the course of the axillary vessels. Markedly comminuted and impacted four-part right humeral head and neck fracture without dislocation of the head from the glenoid.  trend h/h while on HSQ

## 2018-08-15 NOTE — PHYSICAL THERAPY INITIAL EVALUATION ADULT - BALANCE TRAINING, PT EVAL
GOAL: Pt will demonstrate improved static/dynamic standing balance to good-, in order to improve stability, decrease fall risk and increase independence with transfers & ambulation within 2 weeks.

## 2018-08-15 NOTE — CONSULT NOTE ADULT - PROBLEM SELECTOR RECOMMENDATION 9
-  - it is difficult to make out the p waves on his admission EKG but it likely shows sinus bradycardia with first degree AVB and LBBB.  - repeat EKG.  - will put hold parameters on his metoprolol.  - reviewed last echo report from 2015.

## 2018-08-15 NOTE — CONSULT NOTE ADULT - PROBLEM SELECTOR RECOMMENDATION 2
-   - fall seems mechanical by nature.  - pt denies any syncope.    will follow.    Luis Roper M.D., [facility]  [phone] -   - fall seems mechanical by nature.  - pt denies any syncope.

## 2018-08-15 NOTE — PHYSICAL THERAPY INITIAL EVALUATION ADULT - DIAGNOSIS, PT EVAL
decreased functional mobility secondary to decreased strength, impaired balance, impaired postural & motor control,

## 2018-08-15 NOTE — CHART NOTE - NSCHARTNOTEFT_GEN_A_CORE
MEDICINE PA     Notified by RN pt bradycardic. Asymptomatic. EKG showing sinus bradycardia with first degree block at 48 bpm.   - Hold AM metoprolol. Consider decreasing dosage   - Ranexa rescheduled   - R2 pads if HR <40    - EP eval if symptomatic, HR <40  - Continue close monitoring of clinical status and vital signs    - Endorsed to primary team in AM. Attending to follow.     CATIE WongC   Department of Medicine   Spectra [phone] MEDICINE PA     Notified by RN pt bradycardic. Asymptomatic. EKG showing sinus bradycardia with first degree block at 48 bpm.   - Hold AM metoprolol. Consider decreasing dosage   - Ranexa rescheduled   - R2 pads if HR <40    - EP eval if symptomatic, HR <40 (Pt s/p fall at home)  - Continue close monitoring of clinical status and vital signs    - Endorsed to primary team in AM. Attending to follow.     CATIE WongC   Department of Medicine   Spectra [phone] MEDICINE [facility]     Notified by RN pt bradycardic. Asymptomatic. EKG showing sinus bradycardia with first degree block at 48 bpm.     1. Bradycardia 2/2? chronic   - EKG from 2016 demonstrating bradycardia, 2015 demonstrates first degree AV block  - Hold AM metoprolol. Consider decreasing dosage   - Ranexa rescheduled   - R2 pads if HR <40    - EP eval if symptomatic, HR <40 (Pt s/p fall at home)  - Continue close monitoring of clinical status and vital signs    - Endorsed to primary team in AM. Attending to follow.     CATIE WongC   Department of Medicine   Spectra [phone]

## 2018-08-15 NOTE — CONSULT NOTE ADULT - SUBJECTIVE AND OBJECTIVE BOX
[facility]  _________________________    CC: "I fell" - pt      HPI:  88 M h/o parkinsons disease, CAD s/p CABG and subsequent PCI, DVT s/p IVC filter, HTN, UGIB/peptic ulcer disease, CKD, HLD, LBBB, who was admited after a mechanical fall.  He walked a few steps to to get his medication when tripped and fell face down on a wooden floor.  R arm broke his fall.  He had trauma to the forehead but no LOC. No palpitations, dizziness, lightheadedness or syncope.  He was also unable to move his arms.  He was on the floor for over an hour until his wife arrived home and called EMS. We are consulted for bradycardia.      PAST MEDICAL & SURGICAL HISTORY:  Unilateral inguinal hernia, with gangrene, not specified as recurrent  Upper GI bleed: MICU admission , EGD w/ gastric ulcer/visible vessel which was cauterized  Aspiration pneumonia  Clostridium difficile colitis  Pneumonia  MI, old  UTI (urinary tract infection)  BPH (benign prostatic hyperplasia)  Parkinsons Disease  CAD (Coronary Artery Disease)  HTN (Hypertension)  Hyperlipidemia  Presence of IVC filter: right upper arm placed last year   History of prostate surgery: 2016  Varicose veins of legs: h/o Vein stripping  S/P appendectomy  Stented coronary artery: cardiac stent x 2 - placement between  &amp; possible   Hx of CAB      MEDICATIONS  (STANDING):  aspirin  chewable 81 milliGRAM(s) Oral daily  atorvastatin 80 milliGRAM(s) Oral at bedtime  carbidopa/levodopa/entacapone 50/200/200 1 Tablet(s) Oral three times a day  docusate sodium 100 milliGRAM(s) Oral two times a day  heparin  Injectable 5000 Unit(s) SubCutaneous every 8 hours  isosorbide   mononitrate ER Tablet (IMDUR) 60 milliGRAM(s) Oral daily  metoprolol tartrate 50 milliGRAM(s) Oral two times a day  ranolazine 1000 milliGRAM(s) Oral two times a day  senna 1 Tablet(s) Oral daily    MEDICATIONS  (PRN):  acetaminophen   Tablet. 500 milliGRAM(s) Oral four times a day PRN pain      Allergies    Cipro (Rash)    Intolerances        Social Histroy: Tobacco- , ETOH-, Illicit Drugs-    T(C): 36.6 (08-15-18 @ 05:28), Max: 36.8 (18 @ 17:06)  HR: 47 (08-15-18 @ 05:28) (47 - 92)  BP: 141/72 (08-15-18 @ 05:28) (138/64 - 184/85)  RR: 16 (08-15-18 @ 05:28) (16 - 18)  SpO2: 96% (08-15-18 @ 05:28) (96% - 98%)  I&O's Summary    14 Aug 2018 07:01  -  15 Aug 2018 07:00  --------------------------------------------------------  IN: 0 mL / OUT: 350 mL / NET: -350 mL        Review of Systems:  Constitutional: [ ] Fever [ ] Chills [ ] Fatigue [ ] Weight change   HEENT: [ ] Blurred vision [ ] Eye Pain [ ] Headache [ ] Runny nose [ ] Sore Throat   Respiratory: [ ] Cough [ ] Wheezing [ ] Shortness of breath  Cardiovascular: [ ] Chest Pain [ ] Palpitations [ ] [place] [ ] PND [ ] Orthopnea  Gastrointestinal: [ ] Abdominal Pain [ ] Diarrhea [ ] Constipation [ ] Hemorrhoids [ ] Nausea [ ] Vomiting  Genitourinary: [ ] Nocturia [ ] Dysuria [ ] Incontinence  Extremities: [ ] Swelling [ ] Joint Pain  Neurologic: [ ] Focal deficit [ ] Paresthesias [ ] Syncope  Lymphatic: [ ] Swelling [ ] Lymphadenopathy   Skin: [ ] Rash [ ] Ecchymoses [ ] Wounds [ ] Lesions  Psychiatry: [ ] Depression [ ] Suicidal/Homicidal Ideation [ ] Anxiety [ ] Sleep Disturbances  [ x] 10 point review of systems is otherwise negative except as mentioned above            [ ]Unable to obtain    PHYSICAL EXAM:  GENERAL: Alert, NAD  NECK: Supple, No JVD, No carotid bruit.  CHEST/LUNG: Clear to auscultation bilaterally; No wheezes, rales, or rhonchi  HEART: S1 S2 normal, RRR,  No murmurs, rubs, or gallops  ABDOMEN: Soft, Nontender, Nondistended; Bowel sounds present  EXTREMITIES:  No LE edema.      LABS:                        9.3    5.77  )-----------( 108      ( 15 Aug 2018 07:11 )             29.2     08-15    137  |  103  |  30<H>  ----------------------------<  107<H>  4.1   |  23  |  1.73<H>    Ca    8.4      15 Aug 2018 06:57  Phos  3.6     08-15  Mg     2.2     -15    TPro  6.3  /  Alb  3.6  /  TBili  0.8  /  DBili  x   /  AST  17  /  ALT  <5<L>  /  AlkPhos  43  08-15    PT/INR - ( 14 Aug 2018 16:16 )   PT: 13.4 sec;   INR: 1.22 ratio         PTT - ( 14 Aug 2018 16:16 )  PTT:29.1 sec  CARDIAC MARKERS ( 14 Aug 2018 16:16 )  x     / x     / 116 U/L / x     / x                  MEDICATIONS  (STANDING):  aspirin  chewable 81 milliGRAM(s) Oral daily  atorvastatin 80 milliGRAM(s) Oral at bedtime  carbidopa/levodopa/entacapone 50/200/200 1 Tablet(s) Oral three times a day  docusate sodium 100 milliGRAM(s) Oral two times a day  heparin  Injectable 5000 Unit(s) SubCutaneous every 8 hours  isosorbide   mononitrate ER Tablet (IMDUR) 60 milliGRAM(s) Oral daily  metoprolol tartrate 50 milliGRAM(s) Oral two times a day  ranolazine 1000 milliGRAM(s) Oral two times a day  senna 1 Tablet(s) Oral daily    MEDICATIONS  (PRN):  acetaminophen   Tablet. 500 milliGRAM(s) Oral four times a day PRN pain      RADIOLOGY & ADDITIONAL TESTS:    Cardiology testing:  EKG: possibly sinus bradycardia with first degree AV block, LBBB.    Echo:  < from: Transthoracic Echocardiogram (10.29.15 @ 11:35) >  Patient name: RYAN FITZPATRICK  YOB: 1930   Age: 85 (M)   MR#: [id number]  Study Date: 10/29/2015  [id number]: Sneha Beckford JARON  Study quality: Technically good  Referring Physician: Freddie Hopkins  Blood Pressure: 122/66 mmHg  Height: 5ft 8in  Weight: 167 lb  BSA: 1.9 m2  ------------------------------------------------------------------------  PROCEDURE: Transthoracic echocardiogram with 2-D, M-Mode  and complete spectral and color flow Doppler.  INDICATION: Heart failure, unspecified (I50.9)  ------------------------------------------------------------------------  Dimensions:    Normal Values:  LA:     4.4    2.0 - 4.0 cm  Ao:     3.8    2.0 - 3.8 cm  SEPTUM: 1.0    0.6 - 1.2 cm  PWT:    0.9    0.6 - 1.1 cm  LVIDd:  5.3    3.0 - 5.6 cm  LVIDs:  4.4    1.8 - 4.0 cm  Derived variables:  LVMI: 99 g/m2  RWT: 0.33  EF (Visual Estimate): 45 %  Doppler Peak Velocity (m/sec): AoV=2.0  ------------------------------------------------------------------------  Observations:  Mitral Valve: Normal mitral valve. Mild mitral  regurgitation.  Aortic Valve/Aorta: Calcified trileaflet aortic valve with  normal opening. Peak left ventricular outflow tract  gradient equals 7 mm Hg, mean gradient is equal to 3mm Hg.  Aortic Root: 3.8 cm.  Left Atrium: Normal left atrium.  Left Ventricle: Mild segmental left ventricular systolic  dysfunction.  Hypokinesis of the inferolateral wall. Normal  left ventricular internal dimensions and wall thicknesses.  Right Heart: Normal right atrium. Normal right ventricular  size and function. Normal tricuspid valve. Mild tricuspid  regurgitation. Normal pulmonic valve. Minimal pulmonic  regurgitation.  Pericardium/Pleura: Normal pericardium with no pericardial  effusion.  Hemodynamic: Estimated right atrial pressure is 8 mm Hg.  Estimated right ventricular systolic pressure equals 36 mm  Hg, assuming right atrial pressure equals 8 mm Hg,  consistent with borderline pulmonary hypertension.  ------------------------------------------------------------------------  Conclusions:  1. Normal left ventricular internal dimensions and wall  thicknesses.  2. Mild segmental left ventricular systolic dysfunction.  Hypokinesis of the inferolateral wall.  ------------------------------------------------------------------------  Confirmed on  10/29/2015 - 15:52:52 by Luciano Gandhi M.D.  ------------------------------------------------------------------------    < end of copied text >    Telemetry: not on tele. [facility]  _________________________    CC: "I fell" - pt      HPI:  88 M h/o parkinsons disease, CAD s/p CABG and subsequent PCI, DVT s/p IVC filter, HTN, UGIB/peptic ulcer disease, CKD, HLD, LBBB, who was admited after a mechanical fall.  He walked a few steps to to get his medication when tripped and fell face down on a wooden floor.  R arm broke his fall.  He had trauma to the forehead but no LOC. No palpitations, dizziness, lightheadedness or syncope.  He was also unable to move his arms.  He was on the floor for over an hour until his wife arrived home and called EMS. We are consulted for bradycardia.      PAST MEDICAL & SURGICAL HISTORY:  Unilateral inguinal hernia, with gangrene, not specified as recurrent  Upper GI bleed: MICU admission , EGD w/ gastric ulcer/visible vessel which was cauterized  Aspiration pneumonia  Clostridium difficile colitis  Pneumonia  MI, old  UTI (urinary tract infection)  BPH (benign prostatic hyperplasia)  Parkinsons Disease  CAD (Coronary Artery Disease)  HTN (Hypertension)  Hyperlipidemia  Presence of IVC filter: right upper arm placed last year   History of prostate surgery: 2016  Varicose veins of legs: h/o Vein stripping  S/P appendectomy  Stented coronary artery: cardiac stent x 2 - placement between  &amp; possible   Hx of CAB      MEDICATIONS  (STANDING):  aspirin  chewable 81 milliGRAM(s) Oral daily  atorvastatin 80 milliGRAM(s) Oral at bedtime  carbidopa/levodopa/entacapone 50/200/200 1 Tablet(s) Oral three times a day  docusate sodium 100 milliGRAM(s) Oral two times a day  heparin  Injectable 5000 Unit(s) SubCutaneous every 8 hours  isosorbide   mononitrate ER Tablet (IMDUR) 60 milliGRAM(s) Oral daily  metoprolol tartrate 50 milliGRAM(s) Oral two times a day  ranolazine 1000 milliGRAM(s) Oral two times a day  senna 1 Tablet(s) Oral daily    MEDICATIONS  (PRN):  acetaminophen   Tablet. 500 milliGRAM(s) Oral four times a day PRN pain      Allergies    Cipro (Rash)    Intolerances        Social Histroy: Tobacco- , ETOH-, Illicit Drugs-    T(C): 36.6 (08-15-18 @ 05:28), Max: 36.8 (18 @ 17:06)  HR: 47 (08-15-18 @ 05:28) (47 - 92)  BP: 141/72 (08-15-18 @ 05:28) (138/64 - 184/85)  RR: 16 (08-15-18 @ 05:28) (16 - 18)  SpO2: 96% (08-15-18 @ 05:28) (96% - 98%)  I&O's Summary    14 Aug 2018 07:01  -  15 Aug 2018 07:00  --------------------------------------------------------  IN: 0 mL / OUT: 350 mL / NET: -350 mL        Review of Systems:  Constitutional: [ ] Fever [ ] Chills [ ] Fatigue [ ] Weight change   HEENT: [ ] Blurred vision [ ] Eye Pain [ ] Headache [ ] Runny nose [ ] Sore Throat   Respiratory: [ ] Cough [ ] Wheezing [ ] Shortness of breath  Cardiovascular: [ ] Chest Pain [ ] Palpitations [ ] [place] [ ] PND [ ] Orthopnea  Gastrointestinal: [ ] Abdominal Pain [ ] Diarrhea [ ] Constipation [ ] Hemorrhoids [ ] Nausea [ ] Vomiting  Genitourinary: [ ] Nocturia [ ] Dysuria [ ] Incontinence  Extremities: [ ] Swelling [ ] Joint Pain  Neurologic: [ ] Focal deficit [ ] Paresthesias [ ] Syncope  Lymphatic: [ ] Swelling [ ] Lymphadenopathy   Skin: [ ] Rash [ ] Ecchymoses [ ] Wounds [ ] Lesions  Psychiatry: [ ] Depression [ ] Suicidal/Homicidal Ideation [ ] Anxiety [ ] Sleep Disturbances  [ x] 10 point review of systems is otherwise negative except as mentioned above            [ ]Unable to obtain    PHYSICAL EXAM:  GENERAL: Alert. + masked facies and tremor.  NECK: Supple, No JVD, No carotid bruit.  CHEST/LUNG: Clear to auscultation bilaterally; No wheezes, rales, or rhonchi  HEART: S1 S2 normal, RRR,  3/6 MANA at the base.  ABDOMEN: Soft, Nontender, Nondistended; Bowel sounds present  EXTREMITIES:  1+ pitting LE edema b/l.      LABS:                        9.3    5.77  )-----------( 108      ( 15 Aug 2018 07:11 )             29.2     08-15    137  |  103  |  30<H>  ----------------------------<  107<H>  4.1   |  23  |  1.73<H>    Ca    8.4      15 Aug 2018 06:57  Phos  3.6     -15  Mg     2.2     08-15    TPro  6.3  /  Alb  3.6  /  TBili  0.8  /  DBili  x   /  AST  17  /  ALT  <5<L>  /  AlkPhos  43  08-15    PT/INR - ( 14 Aug 2018 16:16 )   PT: 13.4 sec;   INR: 1.22 ratio         PTT - ( 14 Aug 2018 16:16 )  PTT:29.1 sec  CARDIAC MARKERS ( 14 Aug 2018 16:16 )  x     / x     / 116 U/L / x     / x              MEDICATIONS  (STANDING):  aspirin  chewable 81 milliGRAM(s) Oral daily  atorvastatin 80 milliGRAM(s) Oral at bedtime  carbidopa/levodopa/entacapone 50/200/200 1 Tablet(s) Oral three times a day  docusate sodium 100 milliGRAM(s) Oral two times a day  heparin  Injectable 5000 Unit(s) SubCutaneous every 8 hours  isosorbide   mononitrate ER Tablet (IMDUR) 60 milliGRAM(s) Oral daily  metoprolol tartrate 50 milliGRAM(s) Oral two times a day  ranolazine 1000 milliGRAM(s) Oral two times a day  senna 1 Tablet(s) Oral daily    MEDICATIONS  (PRN):  acetaminophen   Tablet. 500 milliGRAM(s) Oral four times a day PRN pain      RADIOLOGY & ADDITIONAL TESTS:    Cardiology testing:  EKG: possibly sinus bradycardia with first degree AV block, LBBB.    Echo:  < from: Transthoracic Echocardiogram (10.29.15 @ 11:35) >  Patient name: RYAN FITZPATRICK  YOB: 1930   Age: 85 (M)   MR#: [id number]  Study Date: 10/29/2015  [id number]: Sneha Beckford RDCS  Study quality: Technically good  Referring Physician: Freddie Hopkins  Blood Pressure: 122/66 mmHg  Height: 5ft 8in  Weight: 167 lb  BSA: 1.9 m2  ------------------------------------------------------------------------  PROCEDURE: Transthoracic echocardiogram with 2-D, M-Mode  and complete spectral and color flow Doppler.  INDICATION: Heart failure, unspecified (I50.9)  ------------------------------------------------------------------------  Dimensions:    Normal Values:  LA:     4.4    2.0 - 4.0 cm  Ao:     3.8    2.0 - 3.8 cm  SEPTUM: 1.0    0.6 - 1.2 cm  PWT:    0.9    0.6 - 1.1 cm  LVIDd:  5.3    3.0 - 5.6 cm  LVIDs:  4.4    1.8 - 4.0 cm  Derived variables:  LVMI: 99 g/m2  RWT: 0.33  EF (Visual Estimate): 45 %  Doppler Peak Velocity (m/sec): AoV=2.0  ------------------------------------------------------------------------  Observations:  Mitral Valve: Normal mitral valve. Mild mitral  regurgitation.  Aortic Valve/Aorta: Calcified trileaflet aortic valve with  normal opening. Peak left ventricular outflow tract  gradient equals 7 mm Hg, mean gradient is equal to 3mm Hg.  Aortic Root: 3.8 cm.  Left Atrium: Normal left atrium.  Left Ventricle: Mild segmental left ventricular systolic  dysfunction.  Hypokinesis of the inferolateral wall. Normal  left ventricular internal dimensions and wall thicknesses.  Right Heart: Normal right atrium. Normal right ventricular  size and function. Normal tricuspid valve. Mild tricuspid  regurgitation. Normal pulmonic valve. Minimal pulmonic  regurgitation.  Pericardium/Pleura: Normal pericardium with no pericardial  effusion.  Hemodynamic: Estimated right atrial pressure is 8 mm Hg.  Estimated right ventricular systolic pressure equals 36 mm  Hg, assuming right atrial pressure equals 8 mm Hg,  consistent with borderline pulmonary hypertension.  ------------------------------------------------------------------------  Conclusions:  1. Normal left ventricular internal dimensions and wall  thicknesses.  2. Mild segmental left ventricular systolic dysfunction.  Hypokinesis of the inferolateral wall.  ------------------------------------------------------------------------  Confirmed on  10/29/2015 - 15:52:52 by Luciano Gandhi M.D.  ------------------------------------------------------------------------    < end of copied text >    Telemetry: not on tele.

## 2018-08-15 NOTE — PROGRESS NOTE ADULT - SUBJECTIVE AND OBJECTIVE BOX
Patient is a 88y old  Male who presents with a chief complaint of s/p fall (14 Aug 2018 22:18)      SUBJECTIVE / OVERNIGHT EVENTS:    Patient seen and examined. co right shoulder pain. denies cp sob. co right forehead pain only when touched.      Vital Signs Last 24 Hrs  T(C): 36.6 (15 Aug 2018 05:28), Max: 36.8 (14 Aug 2018 17:06)  T(F): 97.9 (15 Aug 2018 05:28), Max: 98.3 (14 Aug 2018 22:26)  HR: 53 (15 Aug 2018 09:46) (47 - 92)  BP: 151/66 (15 Aug 2018 09:46) (138/64 - 184/85)  BP(mean): --  RR: 16 (15 Aug 2018 05:28) (16 - 18)  SpO2: 96% (15 Aug 2018 05:28) (96% - 98%)  I&O's Summary    14 Aug 2018 07:01  -  15 Aug 2018 07:00  --------------------------------------------------------  IN: 0 mL / OUT: 350 mL / NET: -350 mL        PE:  GENERAL: NAD, AAOx2  HEAD:  right forehead laceration  EYES: EOMI, PERRLA, conjunctiva and sclera clear  NECK: Supple, No JVD  CHEST/LUNG: CTABL, No wheeze  HEART: Regular rate and rhythm; + murmur  ABDOMEN: Soft, Nontender, Nondistended; Bowel sounds present  EXTREMITIES:  2+ Peripheral Pulses, right shoulder TTP, no echymosis, no le edema, sling  SKIN: No rashes or lesions  NEURO: No focal deficits    LABS:                        9.3    5.77  )-----------( 108      ( 15 Aug 2018 07:11 )             29.2     08-15    137  |  103  |  30<H>  ----------------------------<  107<H>  4.1   |  23  |  1.73<H>    Ca    8.4      15 Aug 2018 06:57  Phos  3.6     08-15  Mg     2.2     08-15    TPro  6.3  /  Alb  3.6  /  TBili  0.8  /  DBili  x   /  AST  17  /  ALT  <5<L>  /  AlkPhos  43  08-15    PT/INR - ( 14 Aug 2018 16:16 )   PT: 13.4 sec;   INR: 1.22 ratio         PTT - ( 14 Aug 2018 16:16 )  PTT:29.1 sec  CAPILLARY BLOOD GLUCOSE        CARDIAC MARKERS ( 14 Aug 2018 16:16 )  x     / x     / 116 U/L / x     / x              RADIOLOGY & ADDITIONAL TESTS:    Imaging Personally Reviewed:  [x] YES  [ ] NO    Consultant(s) Notes Reviewed:  [x] YES  [ ] NO    MEDICATIONS  (STANDING):  aspirin  chewable 81 milliGRAM(s) Oral daily  atorvastatin 80 milliGRAM(s) Oral at bedtime  carbidopa/levodopa/entacapone 50/200/200 1 Tablet(s) Oral three times a day  docusate sodium 100 milliGRAM(s) Oral two times a day  heparin  Injectable 5000 Unit(s) SubCutaneous every 8 hours  isosorbide   mononitrate ER Tablet (IMDUR) 60 milliGRAM(s) Oral daily  metoprolol tartrate 50 milliGRAM(s) Oral two times a day  ranolazine 1000 milliGRAM(s) Oral two times a day  senna 1 Tablet(s) Oral daily    MEDICATIONS  (PRN):  acetaminophen   Tablet. 500 milliGRAM(s) Oral four times a day PRN pain      Care Discussed with Consultants/Other Providers [x] YES  [ ] NO    HEALTH ISSUES - PROBLEM Dx:  Coronary artery disease involving native coronary artery of native heart without angina pectoris: Coronary artery disease involving native coronary artery of native heart without angina pectoris  Fall, initial encounter: Fall, initial encounter  Bradycardia: Bradycardia  Prophylactic measure: Prophylactic measure  Hyperlipidemia: Hyperlipidemia  Hypertension: Hypertension  CAD (Coronary Artery Disease): CAD (Coronary Artery Disease)  Parkinsons Disease: Parkinsons Disease  CKD (chronic kidney disease): CKD (chronic kidney disease)  Fall: Fall  Closed displaced fracture of surgical neck of right humerus, unspecified fracture morphology, initial encounter: Closed displaced fracture of surgical neck of right humerus, unspecified fracture morphology, initial encounter

## 2018-08-15 NOTE — PHYSICAL THERAPY INITIAL EVALUATION ADULT - ACTIVE RANGE OF MOTION EXAMINATION, REHAB EVAL
RUE hand/wrist WFL/Left UE Active ROM was WFL (within functional limits)/bilateral  lower extremity Active ROM was WFL (within functional limits)

## 2018-08-15 NOTE — PHYSICAL THERAPY INITIAL EVALUATION ADULT - GAIT TRAINING, PT EVAL
GOAL: Pt will ambulate 75 feet w/contact guard assist, w/use of appropriate assistive device in 2 weeks

## 2018-08-15 NOTE — PROGRESS NOTE ADULT - PROBLEM SELECTOR PLAN 6
c/w metoprolol, isosorbide mononitrate c/w metoprolol, isosorbide mononitrate  appreciate cardio recs, first degree av block, holding parameters on BB

## 2018-08-15 NOTE — PHYSICAL THERAPY INITIAL EVALUATION ADULT - CRITERIA FOR SKILLED THERAPEUTIC INTERVENTIONS
rehab potential/risk reduction/prevention/anticipated discharge recommendation/functional limitations in following categories/impairments found/therapy frequency/predicted duration of therapy intervention

## 2018-08-15 NOTE — PHYSICAL THERAPY INITIAL EVALUATION ADULT - TRANSFER TRAINING, PT EVAL
GOAL: Pt will perform ALL transfers with supervision, w/use of appropriate assistive device as needed, in 2 weeks.

## 2018-08-16 ENCOUNTER — TRANSCRIPTION ENCOUNTER (OUTPATIENT)
Age: 83
End: 2018-08-16

## 2018-08-16 LAB
ANION GAP SERPL CALC-SCNC: 11 MMOL/L — SIGNIFICANT CHANGE UP (ref 5–17)
BUN SERPL-MCNC: 27 MG/DL — HIGH (ref 7–23)
CALCIUM SERPL-MCNC: 8.4 MG/DL — SIGNIFICANT CHANGE UP (ref 8.4–10.5)
CHLORIDE SERPL-SCNC: 104 MMOL/L — SIGNIFICANT CHANGE UP (ref 96–108)
CO2 SERPL-SCNC: 23 MMOL/L — SIGNIFICANT CHANGE UP (ref 22–31)
CREAT SERPL-MCNC: 1.46 MG/DL — HIGH (ref 0.5–1.3)
GLUCOSE SERPL-MCNC: 111 MG/DL — HIGH (ref 70–99)
HCT VFR BLD CALC: 27.8 % — LOW (ref 39–50)
HGB BLD-MCNC: 8.9 G/DL — LOW (ref 13–17)
MCHC RBC-ENTMCNC: 27.3 PG — SIGNIFICANT CHANGE UP (ref 27–34)
MCHC RBC-ENTMCNC: 32 GM/DL — SIGNIFICANT CHANGE UP (ref 32–36)
MCV RBC AUTO: 85.3 FL — SIGNIFICANT CHANGE UP (ref 80–100)
PLATELET # BLD AUTO: 123 K/UL — LOW (ref 150–400)
POTASSIUM SERPL-MCNC: 4.3 MMOL/L — SIGNIFICANT CHANGE UP (ref 3.5–5.3)
POTASSIUM SERPL-SCNC: 4.3 MMOL/L — SIGNIFICANT CHANGE UP (ref 3.5–5.3)
RBC # BLD: 3.26 M/UL — LOW (ref 4.2–5.8)
RBC # FLD: 14.9 % — HIGH (ref 10.3–14.5)
SODIUM SERPL-SCNC: 138 MMOL/L — SIGNIFICANT CHANGE UP (ref 135–145)
T PALLIDUM AB TITR SER: NEGATIVE — SIGNIFICANT CHANGE UP
WBC # BLD: 5.73 K/UL — SIGNIFICANT CHANGE UP (ref 3.8–10.5)
WBC # FLD AUTO: 5.73 K/UL — SIGNIFICANT CHANGE UP (ref 3.8–10.5)

## 2018-08-16 RX ORDER — AMLODIPINE BESYLATE 2.5 MG/1
10 TABLET ORAL DAILY
Qty: 0 | Refills: 0 | Status: DISCONTINUED | OUTPATIENT
Start: 2018-08-16 | End: 2018-08-17

## 2018-08-16 RX ADMIN — RANOLAZINE 1000 MILLIGRAM(S): 500 TABLET, FILM COATED, EXTENDED RELEASE ORAL at 06:02

## 2018-08-16 RX ADMIN — AMLODIPINE BESYLATE 10 MILLIGRAM(S): 2.5 TABLET ORAL at 19:24

## 2018-08-16 RX ADMIN — CARBIDOPA, LEVODOPA, AND ENTACAPONE 1 TABLET(S): 50; 200; 200 TABLET, FILM COATED ORAL at 06:02

## 2018-08-16 RX ADMIN — CARBIDOPA, LEVODOPA, AND ENTACAPONE 1 TABLET(S): 50; 200; 200 TABLET, FILM COATED ORAL at 15:17

## 2018-08-16 RX ADMIN — CARBIDOPA, LEVODOPA, AND ENTACAPONE 1 TABLET(S): 50; 200; 200 TABLET, FILM COATED ORAL at 21:12

## 2018-08-16 RX ADMIN — Medication 500 MILLIGRAM(S): at 12:51

## 2018-08-16 RX ADMIN — Medication 100 MILLIGRAM(S): at 06:01

## 2018-08-16 RX ADMIN — RANOLAZINE 1000 MILLIGRAM(S): 500 TABLET, FILM COATED, EXTENDED RELEASE ORAL at 17:44

## 2018-08-16 RX ADMIN — ISOSORBIDE MONONITRATE 60 MILLIGRAM(S): 60 TABLET, EXTENDED RELEASE ORAL at 11:51

## 2018-08-16 RX ADMIN — SENNA PLUS 1 TABLET(S): 8.6 TABLET ORAL at 11:49

## 2018-08-16 RX ADMIN — POLYETHYLENE GLYCOL 3350 17 GRAM(S): 17 POWDER, FOR SOLUTION ORAL at 11:52

## 2018-08-16 RX ADMIN — Medication 500 MILLIGRAM(S): at 11:49

## 2018-08-16 RX ADMIN — HEPARIN SODIUM 5000 UNIT(S): 5000 INJECTION INTRAVENOUS; SUBCUTANEOUS at 21:12

## 2018-08-16 RX ADMIN — HEPARIN SODIUM 5000 UNIT(S): 5000 INJECTION INTRAVENOUS; SUBCUTANEOUS at 06:02

## 2018-08-16 RX ADMIN — ATORVASTATIN CALCIUM 80 MILLIGRAM(S): 80 TABLET, FILM COATED ORAL at 21:12

## 2018-08-16 RX ADMIN — Medication 100 MILLIGRAM(S): at 17:44

## 2018-08-16 RX ADMIN — Medication 81 MILLIGRAM(S): at 11:49

## 2018-08-16 RX ADMIN — HEPARIN SODIUM 5000 UNIT(S): 5000 INJECTION INTRAVENOUS; SUBCUTANEOUS at 15:17

## 2018-08-16 RX ADMIN — Medication 50 MILLIGRAM(S): at 06:01

## 2018-08-16 NOTE — CHART NOTE - NSCHARTNOTEFT_GEN_A_CORE
Reported by RN on pt with HR 54 and /72. pt seen at bedside, no acute distress noted, denies head ache, dizziness, blurred vision, SOB, chest pain.  HR been 40s -50s today, BP fluctuate 110 to 160s. Discussed with Cardiologist Dr. Mi covering Dr. Roper.   -Will start Amlodipine 10 mg daily,   - will hold Lopressor now per holding parameter  -Lopressor dose may need to be adjusted- to be discussed with Dr. Roper tomorrow     Vital Signs Last 24 Hrs  T(C): 36.6 (16 Aug 2018 14:01), Max: 37.1 (15 Aug 2018 21:05)  T(F): 97.9 (16 Aug 2018 14:01), Max: 98.8 (15 Aug 2018 21:05)  HR: 54 (16 Aug 2018 17:47) (46 - 54)  BP: 167/82 (16 Aug 2018 17:47) (108/85 - 167/82)  BP(mean): --  RR: 18 (16 Aug 2018 14:01) (18 - 20)  SpO2: 98% (16 Aug 2018 14:01) (96% - 98%)    Adelita Guerra NP-C  [id number]

## 2018-08-16 NOTE — DISCHARGE NOTE ADULT - CARE PLAN
Principal Discharge DX:	Closed displaced fracture of surgical neck of right humerus, unspecified fracture morphology, initial encounter  Goal:	healing  Assessment and plan of treatment:	wear sling at all times; no weight bearing RUE; f/u with ortho  Secondary Diagnosis:	Parkinsons Disease  Assessment and plan of treatment:	take medication as directed; f/u with private neurologist  Secondary Diagnosis:	Laceration of face, initial encounter  Assessment and plan of treatment:	keep wound clean and dry; suture removal 2 weeks

## 2018-08-16 NOTE — DISCHARGE NOTE ADULT - CARE PROVIDER_API CALL
Alvarez Cohn), Orthopaedic Surgery  [address] [place] [address]  Phone: [phone]  Fax: [phone]

## 2018-08-16 NOTE — DISCHARGE NOTE ADULT - HOSPITAL COURSE
87 yo male with PMH of parkinsons disease, CAD, CABG, s/p stents, s/p umbrella stent, DVT, HTN, UGIB, CKD, HLD, presents here after a mechanical fall     Problem/Plan - 1:  ·  Problem: Closed displaced fracture of surgical neck of right humerus, unspecified fracture morphology, initial encounter.  Plan: patient with fracture of right humerus, s/p sling, WBAT  orthopedic evaluation appreciated  pain control with tylenol for now  CT shoulder shows periosseous soft tissue edema and hemorrhage. Hemorrhage   is also noted in the axillary region along the course of the axillary vessels. Markedly comminuted and impacted four-part right humeral head and neck fracture without dislocation of the head from the glenoid.  trend h/h while on HSQ  PT evaluation for possible rehab placement.      Problem/Plan - 2:  ·  Problem: Fall.  Plan: mechanical fall, likely due to underlying unsteady gait from Parkinson's  PT evaluation.      Problem/Plan - 3:  ·  Problem: CKD (chronic kidney disease).  Plan: at baseline  avoid nephrotoxic agents  holding lasix, BP fluctuates, but today on lower side.      Problem/Plan - 4:  ·  Problem: Parkinsons Disease.  Plan: c/w home medication, carbidopa/levodopa/entacapone.      Problem/Plan - 5:  ·  Problem: CAD (Coronary Artery Disease).  Plan: c/w ranolazine  aspirin statin  c/w metoprolol.      Problem/Plan - 6:  Problem: Hypertension. Plan: c/w metoprolol, isosorbide mononitrate  appreciate cardio recs, first degree av block, holding parameters on BB.     Problem/Plan - 7:  ·  Problem: Hyperlipidemia.  Plan: c/w statin.

## 2018-08-16 NOTE — DISCHARGE NOTE ADULT - MEDICATION SUMMARY - MEDICATIONS TO STOP TAKING
I will STOP taking the medications listed below when I get home from the hospital:    furosemide 20 mg oral tablet  -- 1 tab(s) by mouth 2 times a day    Percocet 5/325  -- 1 tab(s) by mouth every 6 hours, As Needed -for moderate pain MDD:6    acetaminophen-oxyCODONE 325 mg-5 mg oral tablet  -- 1 tab(s) by mouth every 6 hours MDD:4  -- Caution federal law prohibits the transfer of this drug to any person other  than the person for whom it was prescribed.  May cause drowsiness.  Alcohol may intensify this effect.  Use care when operating dangerous machinery.  This prescription cannot be refilled.  This product contains acetaminophen.  Do not use  with any other product containing acetaminophen to prevent possible liver damage.  Using more of this medication than prescribed may cause serious breathing problems.

## 2018-08-16 NOTE — DISCHARGE NOTE ADULT - MEDICATION SUMMARY - MEDICATIONS TO TAKE
I will START or STAY ON the medications listed below when I get home from the hospital:    aspirin 81 mg oral tablet, chewable  -- 1 tab(s) by mouth once a day in pm  -- Indication: For CAD (Coronary Artery Disease)    acetaminophen 500 mg oral tablet  -- 1 tab(s) by mouth 4 times a day, As needed, pain  -- Indication: For Pain    isosorbide mononitrate 60 mg oral tablet, extended release  -- 1 tab(s) by mouth once a day (in the morning)  -- Indication: For Hypertension    ranolazine 1000 mg oral tablet, extended release  -- 1 tab(s) by mouth 2 times a day  -- Indication: For Coronary artery disease involving native coronary artery of native heart without angina pectoris    atorvastatin 80 mg oral tablet  -- 1 tab(s) by mouth once a day (at bedtime)  -- Indication: For Hyperlipidemia    carbidopa/levodopa/entacapone 50 mg-200 mg-200 mg oral tablet  -- 1 tab(s) by mouth 3 times a day  -- Indication: For Parkinsons Disease    metoprolol tartrate 25 mg oral tablet  -- 1 tab(s) by mouth 2 times a day  -- Indication: For Hypertension    amLODIPine 10 mg oral tablet  -- 1 tab(s) by mouth once a day  -- Indication: For Hypertension    docusate sodium 100 mg oral tablet  -- 1 tab(s) by mouth 2 times a day  -- Indication: For Constipation    senna oral tablet  -- 1 tab(s) by mouth once a day in pm  -- Indication: For Constipation    MiraLax oral powder for reconstitution  --  by mouth once a day in pm  -- Indication: For Constipation

## 2018-08-16 NOTE — PROGRESS NOTE ADULT - SUBJECTIVE AND OBJECTIVE BOX
[facility] Cardiology Progress Note  _______________________________    Pt. seen and examined. No new cardiac-related complaints.      T(C): 36.6 (08-16-18 @ 04:25), Max: 37.1 (08-15-18 @ 21:05)  HR: 54 (08-16-18 @ 04:25) (50 - 59)  BP: 111/66 (08-16-18 @ 04:25) (108/85 - 177/80)  RR: 20 (08-16-18 @ 04:25) (18 - 20)  SpO2: 98% (08-16-18 @ 04:25) (96% - 98%)  I&O's Summary    15 Aug 2018 07:01  -  16 Aug 2018 07:00  --------------------------------------------------------  IN: 660 mL / OUT: 700 mL / NET: -40 mL        PHYSICAL EXAM:  GENERAL: Alert. + masked facies and tremor.  NECK: Supple, No JVD, No carotid bruit.  CHEST/LUNG: Clear to auscultation bilaterally; No wheezes, rales, or rhonchi  HEART: S1 S2 normal, RRR,  3/6 MANA at the base.  ABDOMEN: Soft, Nontender, Nondistended; Bowel sounds present  EXTREMITIES:  1+ pitting LE edema b/l.      LABS:                        9.3    5.77  )-----------( 108      ( 15 Aug 2018 07:11 )             29.2     08-16    138  |  104  |  27<H>  ----------------------------<  111<H>  4.3   |  23  |  1.46<H>    Ca    8.4      16 Aug 2018 07:30  Phos  3.6     08-15  Mg     2.2     08-15    TPro  6.3  /  Alb  3.6  /  TBili  0.8  /  DBili  x   /  AST  17  /  ALT  <5<L>  /  AlkPhos  43  08-15    PT/INR - ( 14 Aug 2018 16:16 )   PT: 13.4 sec;   INR: 1.22 ratio         PTT - ( 14 Aug 2018 16:16 )  PTT:29.1 sec  CARDIAC MARKERS ( 14 Aug 2018 16:16 )  x     / x     / 116 U/L / x     / x                  MEDICATIONS  (STANDING):  aspirin  chewable 81 milliGRAM(s) Oral daily  atorvastatin 80 milliGRAM(s) Oral at bedtime  carbidopa/levodopa/entacapone 50/200/200 1 Tablet(s) Oral three times a day  docusate sodium 100 milliGRAM(s) Oral two times a day  heparin  Injectable 5000 Unit(s) SubCutaneous every 8 hours  isosorbide   mononitrate ER Tablet (IMDUR) 60 milliGRAM(s) Oral daily  metoprolol tartrate 50 milliGRAM(s) Oral two times a day  ranolazine 1000 milliGRAM(s) Oral two times a day  senna 1 Tablet(s) Oral daily    MEDICATIONS  (PRN):  acetaminophen   Tablet. 500 milliGRAM(s) Oral four times a day PRN pain  polyethylene glycol 3350 17 Gram(s) Oral daily PRN Constipation      RADIOLOGY & ADDITIONAL TESTS:

## 2018-08-16 NOTE — DISCHARGE NOTE ADULT - PLAN OF CARE
healing wear sling at all times; no weight bearing RUE; f/u with ortho take medication as directed; f/u with private neurologist keep wound clean and dry; suture removal 2 weeks

## 2018-08-16 NOTE — PROGRESS NOTE ADULT - PROBLEM SELECTOR PLAN 3
-  - cont home cardiac meds: ASA, high intensity statin, bb with hold parameters.  - cont antianginal meds - imdur and ranexa.    Luis Roper M.D., [facility]  [phone]

## 2018-08-16 NOTE — DISCHARGE NOTE ADULT - PATIENT PORTAL LINK FT
You can access the [facility] Patient Portal, offered by [facility], by registering with the following website: [email or web address]

## 2018-08-16 NOTE — PROGRESS NOTE ADULT - ASSESSMENT
89 yo male with PMH of parkinsons disease, CAD, CABG, s/p stents, s/p umbrella stent, DVT, HTN, UGIB, CKD, HLD, presents here after a mechanical fall

## 2018-08-16 NOTE — PROGRESS NOTE ADULT - PROBLEM SELECTOR PLAN 1
patient with fracture of right humerus, s/p sling, WBAT  orthopedic evaluation appreciated  pain control with tylenol for now  CT shoulder shows periosseous soft tissue edema and hemorrhage. Hemorrhage   is also noted in the axillary region along the course of the axillary vessels. Markedly comminuted and impacted four-part right humeral head and neck fracture without dislocation of the head from the glenoid.  trend h/h while on HSQ  PT evaluation for possible rehab placement

## 2018-08-16 NOTE — PROGRESS NOTE ADULT - PROBLEM SELECTOR PLAN 6
c/w metoprolol, isosorbide mononitrate  appreciate cardio recs, first degree av block, holding parameters on BB

## 2018-08-16 NOTE — DISCHARGE NOTE ADULT - ADDITIONAL INSTRUCTIONS
Follow up with cardiology and orthopedic with 7 days of discharge  > keep wound clean and dry; suture removal 2 weeks  >Closed displaced fracture of surgical neck of right humerus- wear sling at all times; no weight bearing RUE; f/u with ortho

## 2018-08-16 NOTE — PROGRESS NOTE ADULT - PROBLEM SELECTOR PLAN 1
-  - repeat EKG confirms sinus bradycardia with first degree AVB and LBBB.  - cont hold parameters on the beta blocker.

## 2018-08-16 NOTE — PROGRESS NOTE ADULT - SUBJECTIVE AND OBJECTIVE BOX
Patient is a 88y old  Male who presents with a chief complaint of s/p fall (14 Aug 2018 22:18)      SUBJECTIVE / OVERNIGHT EVENTS:    Patient seen and examined. denies cp sob. excited for rehab. shoulder pain improved.      Vital Signs Last 24 Hrs  T(C): 36.6 (16 Aug 2018 04:25), Max: 37.1 (15 Aug 2018 21:05)  T(F): 97.9 (16 Aug 2018 04:25), Max: 98.8 (15 Aug 2018 21:05)  HR: 54 (16 Aug 2018 04:25) (50 - 59)  BP: 111/66 (16 Aug 2018 04:25) (108/85 - 177/80)  BP(mean): --  RR: 20 (16 Aug 2018 04:25) (18 - 20)  SpO2: 98% (16 Aug 2018 04:25) (96% - 98%)  I&O's Summary    15 Aug 2018 07:01  -  16 Aug 2018 07:00  --------------------------------------------------------  IN: 660 mL / OUT: 700 mL / NET: -40 mL        PE:  GENERAL: NAD, AAOx2  HEAD:  right forehead laceration sp stiches  EYES: EOMI, PERRLA, conjunctiva and sclera clear, right periorbital echymosis  NECK: Supple, No JVD  CHEST/LUNG: CTABL, No wheeze  HEART: Regular rate and rhythm; + murmur  ABDOMEN: Soft, Nontender, Nondistended; Bowel sounds present  EXTREMITIES:  2+ Peripheral Pulses, right shoulder TTP, no ecchymosis no le edema, sling  SKIN: No rashes or lesions  NEURO: No focal deficits    LABS:                        8.9    5.73  )-----------( 123      ( 16 Aug 2018 08:23 )             27.8     08-16    138  |  104  |  27<H>  ----------------------------<  111<H>  4.3   |  23  |  1.46<H>    Ca    8.4      16 Aug 2018 07:30  Phos  3.6     08-15  Mg     2.2     08-15    TPro  6.3  /  Alb  3.6  /  TBili  0.8  /  DBili  x   /  AST  17  /  ALT  <5<L>  /  AlkPhos  43  08-15    PT/INR - ( 14 Aug 2018 16:16 )   PT: 13.4 sec;   INR: 1.22 ratio         PTT - ( 14 Aug 2018 16:16 )  PTT:29.1 sec  CAPILLARY BLOOD GLUCOSE        CARDIAC MARKERS ( 14 Aug 2018 16:16 )  x     / x     / 116 U/L / x     / x              RADIOLOGY & ADDITIONAL TESTS:    Imaging Personally Reviewed:  [x] YES  [ ] NO    Consultant(s) Notes Reviewed:  [x] YES  [ ] NO    MEDICATIONS  (STANDING):  aspirin  chewable 81 milliGRAM(s) Oral daily  atorvastatin 80 milliGRAM(s) Oral at bedtime  carbidopa/levodopa/entacapone 50/200/200 1 Tablet(s) Oral three times a day  docusate sodium 100 milliGRAM(s) Oral two times a day  heparin  Injectable 5000 Unit(s) SubCutaneous every 8 hours  isosorbide   mononitrate ER Tablet (IMDUR) 60 milliGRAM(s) Oral daily  metoprolol tartrate 50 milliGRAM(s) Oral two times a day  ranolazine 1000 milliGRAM(s) Oral two times a day  senna 1 Tablet(s) Oral daily    MEDICATIONS  (PRN):  acetaminophen   Tablet. 500 milliGRAM(s) Oral four times a day PRN pain  polyethylene glycol 3350 17 Gram(s) Oral daily PRN Constipation      Care Discussed with Consultants/Other Providers [x] YES  [ ] NO    HEALTH ISSUES - PROBLEM Dx:  Coronary artery disease involving native coronary artery of native heart without angina pectoris: Coronary artery disease involving native coronary artery of native heart without angina pectoris  Fall, initial encounter: Fall, initial encounter  Bradycardia: Bradycardia  Prophylactic measure: Prophylactic measure  Hyperlipidemia: Hyperlipidemia  Hypertension: Hypertension  CAD (Coronary Artery Disease): CAD (Coronary Artery Disease)  Parkinsons Disease: Parkinsons Disease  CKD (chronic kidney disease): CKD (chronic kidney disease)  Fall: Fall  Closed displaced fracture of surgical neck of right humerus, unspecified fracture morphology, initial encounter: Closed displaced fracture of surgical neck of right humerus, unspecified fracture morphology, initial encounter

## 2018-08-17 VITALS — SYSTOLIC BLOOD PRESSURE: 160 MMHG | HEART RATE: 62 BPM | DIASTOLIC BLOOD PRESSURE: 64 MMHG

## 2018-08-17 LAB
ANION GAP SERPL CALC-SCNC: 11 MMOL/L — SIGNIFICANT CHANGE UP (ref 5–17)
BUN SERPL-MCNC: 26 MG/DL — HIGH (ref 7–23)
CALCIUM SERPL-MCNC: 8.6 MG/DL — SIGNIFICANT CHANGE UP (ref 8.4–10.5)
CHLORIDE SERPL-SCNC: 102 MMOL/L — SIGNIFICANT CHANGE UP (ref 96–108)
CO2 SERPL-SCNC: 24 MMOL/L — SIGNIFICANT CHANGE UP (ref 22–31)
CREAT SERPL-MCNC: 1.48 MG/DL — HIGH (ref 0.5–1.3)
GLUCOSE SERPL-MCNC: 110 MG/DL — HIGH (ref 70–99)
HCT VFR BLD CALC: 27.9 % — LOW (ref 39–50)
HGB BLD-MCNC: 9 G/DL — LOW (ref 13–17)
MCHC RBC-ENTMCNC: 27.4 PG — SIGNIFICANT CHANGE UP (ref 27–34)
MCHC RBC-ENTMCNC: 32.3 GM/DL — SIGNIFICANT CHANGE UP (ref 32–36)
MCV RBC AUTO: 85.1 FL — SIGNIFICANT CHANGE UP (ref 80–100)
PLATELET # BLD AUTO: 113 K/UL — LOW (ref 150–400)
POTASSIUM SERPL-MCNC: 4.1 MMOL/L — SIGNIFICANT CHANGE UP (ref 3.5–5.3)
POTASSIUM SERPL-SCNC: 4.1 MMOL/L — SIGNIFICANT CHANGE UP (ref 3.5–5.3)
RBC # BLD: 3.28 M/UL — LOW (ref 4.2–5.8)
RBC # FLD: 15.1 % — HIGH (ref 10.3–14.5)
SODIUM SERPL-SCNC: 137 MMOL/L — SIGNIFICANT CHANGE UP (ref 135–145)
WBC # BLD: 6.03 K/UL — SIGNIFICANT CHANGE UP (ref 3.8–10.5)
WBC # FLD AUTO: 6.03 K/UL — SIGNIFICANT CHANGE UP (ref 3.8–10.5)

## 2018-08-17 PROCEDURE — 70450 CT HEAD/BRAIN W/O DYE: CPT

## 2018-08-17 PROCEDURE — 76377 3D RENDER W/INTRP POSTPROCES: CPT

## 2018-08-17 PROCEDURE — 73200 CT UPPER EXTREMITY W/O DYE: CPT

## 2018-08-17 PROCEDURE — 93005 ELECTROCARDIOGRAM TRACING: CPT

## 2018-08-17 PROCEDURE — 73020 X-RAY EXAM OF SHOULDER: CPT

## 2018-08-17 PROCEDURE — 86780 TREPONEMA PALLIDUM: CPT

## 2018-08-17 PROCEDURE — 83735 ASSAY OF MAGNESIUM: CPT

## 2018-08-17 PROCEDURE — 73060 X-RAY EXAM OF HUMERUS: CPT

## 2018-08-17 PROCEDURE — 73030 X-RAY EXAM OF SHOULDER: CPT

## 2018-08-17 PROCEDURE — 84484 ASSAY OF TROPONIN QUANT: CPT

## 2018-08-17 PROCEDURE — 80048 BASIC METABOLIC PNL TOTAL CA: CPT

## 2018-08-17 PROCEDURE — 85610 PROTHROMBIN TIME: CPT

## 2018-08-17 PROCEDURE — 71045 X-RAY EXAM CHEST 1 VIEW: CPT

## 2018-08-17 PROCEDURE — 12013 RPR F/E/E/N/L/M 2.6-5.0 CM: CPT

## 2018-08-17 PROCEDURE — 80053 COMPREHEN METABOLIC PANEL: CPT

## 2018-08-17 PROCEDURE — 82607 VITAMIN B-12: CPT

## 2018-08-17 PROCEDURE — 97162 PT EVAL MOD COMPLEX 30 MIN: CPT

## 2018-08-17 PROCEDURE — 72125 CT NECK SPINE W/O DYE: CPT

## 2018-08-17 PROCEDURE — 82550 ASSAY OF CK (CPK): CPT

## 2018-08-17 PROCEDURE — 85730 THROMBOPLASTIN TIME PARTIAL: CPT

## 2018-08-17 PROCEDURE — 90715 TDAP VACCINE 7 YRS/> IM: CPT

## 2018-08-17 PROCEDURE — 85027 COMPLETE CBC AUTOMATED: CPT

## 2018-08-17 PROCEDURE — 90471 IMMUNIZATION ADMIN: CPT

## 2018-08-17 PROCEDURE — 84100 ASSAY OF PHOSPHORUS: CPT

## 2018-08-17 PROCEDURE — 99285 EMERGENCY DEPT VISIT HI MDM: CPT | Mod: 25

## 2018-08-17 PROCEDURE — 82306 VITAMIN D 25 HYDROXY: CPT

## 2018-08-17 RX ORDER — CARBIDOPA, LEVODOPA, AND ENTACAPONE 50; 200; 200 MG/1; MG/1; MG/1
1 TABLET, FILM COATED ORAL
Qty: 0 | Refills: 0 | COMMUNITY
Start: 2018-08-17

## 2018-08-17 RX ORDER — ACETAMINOPHEN 500 MG
1 TABLET ORAL
Qty: 0 | Refills: 0 | COMMUNITY
Start: 2018-08-17

## 2018-08-17 RX ORDER — AMLODIPINE BESYLATE 2.5 MG/1
1 TABLET ORAL
Qty: 0 | Refills: 0 | COMMUNITY
Start: 2018-08-17

## 2018-08-17 RX ORDER — METOPROLOL TARTRATE 50 MG
1 TABLET ORAL
Qty: 0 | Refills: 0 | COMMUNITY
Start: 2018-08-17

## 2018-08-17 RX ORDER — METOPROLOL TARTRATE 50 MG
25 TABLET ORAL
Qty: 0 | Refills: 0 | Status: DISCONTINUED | OUTPATIENT
Start: 2018-08-17 | End: 2018-08-17

## 2018-08-17 RX ORDER — AMLODIPINE BESYLATE 2.5 MG/1
5 TABLET ORAL ONCE
Qty: 0 | Refills: 0 | Status: DISCONTINUED | OUTPATIENT
Start: 2018-08-17 | End: 2018-08-17

## 2018-08-17 RX ADMIN — Medication 100 MILLIGRAM(S): at 05:03

## 2018-08-17 RX ADMIN — RANOLAZINE 1000 MILLIGRAM(S): 500 TABLET, FILM COATED, EXTENDED RELEASE ORAL at 05:03

## 2018-08-17 RX ADMIN — AMLODIPINE BESYLATE 10 MILLIGRAM(S): 2.5 TABLET ORAL at 05:02

## 2018-08-17 RX ADMIN — Medication 500 MILLIGRAM(S): at 05:03

## 2018-08-17 RX ADMIN — SENNA PLUS 1 TABLET(S): 8.6 TABLET ORAL at 14:45

## 2018-08-17 RX ADMIN — ISOSORBIDE MONONITRATE 60 MILLIGRAM(S): 60 TABLET, EXTENDED RELEASE ORAL at 14:44

## 2018-08-17 RX ADMIN — HEPARIN SODIUM 5000 UNIT(S): 5000 INJECTION INTRAVENOUS; SUBCUTANEOUS at 05:03

## 2018-08-17 RX ADMIN — HEPARIN SODIUM 5000 UNIT(S): 5000 INJECTION INTRAVENOUS; SUBCUTANEOUS at 14:51

## 2018-08-17 RX ADMIN — Medication 500 MILLIGRAM(S): at 06:02

## 2018-08-17 RX ADMIN — CARBIDOPA, LEVODOPA, AND ENTACAPONE 1 TABLET(S): 50; 200; 200 TABLET, FILM COATED ORAL at 05:03

## 2018-08-17 RX ADMIN — Medication 81 MILLIGRAM(S): at 14:45

## 2018-08-17 RX ADMIN — CARBIDOPA, LEVODOPA, AND ENTACAPONE 1 TABLET(S): 50; 200; 200 TABLET, FILM COATED ORAL at 14:46

## 2018-08-17 NOTE — CHART NOTE - NSCHARTNOTEFT_GEN_A_CORE
Spoke with Cardiology Judson Dawkins  regarding bradycardia while pt on Lopressor 50 mg BId and HTN.  -Lopressor decreased to 25 mg BID with holding parameter ( to hold if HR < 55 ) and continue Amlodipine 10 mg daily, monitor vital signs  -No objection for discharge today to rehab    Adelita Guerra NP-C  #[id number]

## 2018-08-17 NOTE — PROGRESS NOTE ADULT - SUBJECTIVE AND OBJECTIVE BOX
[facility] Cardiology Progress Note  _______________________________    Pt. seen and examined. Chart reviewed. Metoprolol held last night for bradycardia and norvasc started for elevated BP.      T(C): 36.4 (08-17-18 @ 04:53), Max: 36.7 (08-16-18 @ 21:29)  HR: 57 (08-17-18 @ 04:53) (46 - 57)  BP: 157/77 (08-17-18 @ 04:53) (113/69 - 167/82)  RR: 18 (08-17-18 @ 04:53) (18 - 18)  SpO2: 96% (08-17-18 @ 04:53) (96% - 98%)  I&O's Summary    16 Aug 2018 07:01  -  17 Aug 2018 07:00  --------------------------------------------------------  IN: 850 mL / OUT: 250 mL / NET: 600 mL        PHYSICAL EXAM:  GENERAL: Alert. + masked facies and tremor.  NECK: Supple, No JVD, No carotid bruit.  CHEST/LUNG: Clear to auscultation bilaterally; No wheezes, rales, or rhonchi  HEART: S1 S2 normal, RRR,  3/6 MANA at the base.  ABDOMEN: Soft, Nontender, Nondistended; Bowel sounds present  EXTREMITIES:  1+ pitting LE edema b/l.    LABS:                        8.9    5.73  )-----------( 123      ( 16 Aug 2018 08:23 )             27.8     08-17    137  |  102  |  26<H>  ----------------------------<  110<H>  4.1   |  24  |  1.48<H>    Ca    8.6      17 Aug 2018 06:21        CARDIAC MARKERS ( 14 Aug 2018 16:16 )  x     / x     / 116 U/L / x     / x                  MEDICATIONS  (STANDING):  amLODIPine   Tablet 10 milliGRAM(s) Oral daily  aspirin  chewable 81 milliGRAM(s) Oral daily  atorvastatin 80 milliGRAM(s) Oral at bedtime  carbidopa/levodopa/entacapone 50/200/200 1 Tablet(s) Oral three times a day  docusate sodium 100 milliGRAM(s) Oral two times a day  heparin  Injectable 5000 Unit(s) SubCutaneous every 8 hours  isosorbide   mononitrate ER Tablet (IMDUR) 60 milliGRAM(s) Oral daily  metoprolol tartrate 25 milliGRAM(s) Oral two times a day  ranolazine 1000 milliGRAM(s) Oral two times a day  senna 1 Tablet(s) Oral daily    MEDICATIONS  (PRN):  acetaminophen   Tablet. 500 milliGRAM(s) Oral four times a day PRN pain  polyethylene glycol 3350 17 Gram(s) Oral daily PRN Constipation      RADIOLOGY & ADDITIONAL TESTS: [facility] Cardiology Progress Note  _______________________________    Pt. seen and examined. Chart reviewed. Metoprolol held last night for bradycardia and norvasc started for elevated BP.    Telemetry- sinus matthew 45 to 60.    T(C): 36.4 (08-17-18 @ 04:53), Max: 36.7 (08-16-18 @ 21:29)  HR: 57 (08-17-18 @ 04:53) (46 - 57)  BP: 157/77 (08-17-18 @ 04:53) (113/69 - 167/82)  RR: 18 (08-17-18 @ 04:53) (18 - 18)  SpO2: 96% (08-17-18 @ 04:53) (96% - 98%)  I&O's Summary    16 Aug 2018 07:01  -  17 Aug 2018 07:00  --------------------------------------------------------  IN: 850 mL / OUT: 250 mL / NET: 600 mL        PHYSICAL EXAM:  GENERAL: Alert. + masked facies and tremor.  NECK: Supple, No JVD, No carotid bruit.  CHEST/LUNG: Clear to auscultation bilaterally; No wheezes, rales, or rhonchi  HEART: S1 S2 normal, RRR,  3/6 MANA at the base.  ABDOMEN: Soft, Nontender, Nondistended; Bowel sounds present  EXTREMITIES:  1+ pitting LE edema b/l.    LABS:                        8.9    5.73  )-----------( 123      ( 16 Aug 2018 08:23 )             27.8     08-17    137  |  102  |  26<H>  ----------------------------<  110<H>  4.1   |  24  |  1.48<H>    Ca    8.6      17 Aug 2018 06:21        CARDIAC MARKERS ( 14 Aug 2018 16:16 )  x     / x     / 116 U/L / x     / x                  MEDICATIONS  (STANDING):  amLODIPine   Tablet 10 milliGRAM(s) Oral daily  aspirin  chewable 81 milliGRAM(s) Oral daily  atorvastatin 80 milliGRAM(s) Oral at bedtime  carbidopa/levodopa/entacapone 50/200/200 1 Tablet(s) Oral three times a day  docusate sodium 100 milliGRAM(s) Oral two times a day  heparin  Injectable 5000 Unit(s) SubCutaneous every 8 hours  isosorbide   mononitrate ER Tablet (IMDUR) 60 milliGRAM(s) Oral daily  metoprolol tartrate 25 milliGRAM(s) Oral two times a day  ranolazine 1000 milliGRAM(s) Oral two times a day  senna 1 Tablet(s) Oral daily    MEDICATIONS  (PRN):  acetaminophen   Tablet. 500 milliGRAM(s) Oral four times a day PRN pain  polyethylene glycol 3350 17 Gram(s) Oral daily PRN Constipation      RADIOLOGY & ADDITIONAL TESTS:

## 2018-08-17 NOTE — PROGRESS NOTE ADULT - PROBLEM SELECTOR PLAN 3
-  - cont home cardiac meds: ASA, high intensity statin, bb with hold parameters.  - cont antianginal meds - imdur and ranexa.  - started on norvasc 10 mg last night for elevated BP.  - call with questions.    Luis Roper M.D., [facility]  [phone]

## 2018-09-06 ENCOUNTER — FORM ENCOUNTER (OUTPATIENT)
Age: 83
End: 2018-09-06

## 2018-09-20 ENCOUNTER — FORM ENCOUNTER (OUTPATIENT)
Age: 83
End: 2018-09-20

## 2018-09-23 ENCOUNTER — FORM ENCOUNTER (OUTPATIENT)
Age: 83
End: 2018-09-23

## 2018-10-02 ENCOUNTER — FORM ENCOUNTER (OUTPATIENT)
Age: 83
End: 2018-10-02

## 2018-10-11 ENCOUNTER — APPOINTMENT (OUTPATIENT)
Dept: ORTHOPEDIC SURGERY | Facility: CLINIC | Age: 83
End: 2018-10-11
Payer: MEDICARE

## 2018-10-11 VITALS
BODY MASS INDEX: 27.31 KG/M2 | SYSTOLIC BLOOD PRESSURE: 148 MMHG | HEART RATE: 77 BPM | HEIGHT: 67 IN | WEIGHT: 174 LBS | DIASTOLIC BLOOD PRESSURE: 85 MMHG

## 2018-10-11 DIAGNOSIS — S42.291A OTHER DISPLACED FRACTURE OF UPPER END OF RIGHT HUMERUS, INITIAL ENCOUNTER FOR CLOSED FRACTURE: ICD-10-CM

## 2018-10-11 DIAGNOSIS — G20 PARKINSON'S DISEASE: ICD-10-CM

## 2018-10-11 PROCEDURE — 99024 POSTOP FOLLOW-UP VISIT: CPT

## 2018-10-25 ENCOUNTER — FORM ENCOUNTER (OUTPATIENT)
Age: 83
End: 2018-10-25

## 2018-10-26 ENCOUNTER — OUTPATIENT (OUTPATIENT)
Dept: OUTPATIENT SERVICES | Facility: HOSPITAL | Age: 83
LOS: 1 days | End: 2018-10-26
Payer: MEDICARE

## 2018-10-26 DIAGNOSIS — Z98.89 OTHER SPECIFIED POSTPROCEDURAL STATES: Chronic | ICD-10-CM

## 2018-10-26 DIAGNOSIS — I82.409 ACUTE EMBOLISM AND THROMBOSIS OF UNSPECIFIED DEEP VEINS OF UNSPECIFIED LOWER EXTREMITY: ICD-10-CM

## 2018-10-26 DIAGNOSIS — Z95.828 PRESENCE OF OTHER VASCULAR IMPLANTS AND GRAFTS: Chronic | ICD-10-CM

## 2018-10-26 DIAGNOSIS — Z95.5 PRESENCE OF CORONARY ANGIOPLASTY IMPLANT AND GRAFT: Chronic | ICD-10-CM

## 2018-10-26 PROCEDURE — 93970 EXTREMITY STUDY: CPT

## 2018-10-26 PROCEDURE — 93970 EXTREMITY STUDY: CPT | Mod: 26

## 2018-12-26 ENCOUNTER — INPATIENT (INPATIENT)
Facility: HOSPITAL | Age: 83
LOS: 7 days | Discharge: HOME | DRG: 641 | End: 2019-01-03
Attending: INTERNAL MEDICINE | Admitting: INTERNAL MEDICINE
Payer: MEDICARE

## 2018-12-26 VITALS
SYSTOLIC BLOOD PRESSURE: 169 MMHG | WEIGHT: 169.98 LBS | OXYGEN SATURATION: 97 % | RESPIRATION RATE: 20 BRPM | HEART RATE: 58 BPM | DIASTOLIC BLOOD PRESSURE: 104 MMHG

## 2018-12-26 DIAGNOSIS — I25.10 ATHEROSCLEROTIC HEART DISEASE OF NATIVE CORONARY ARTERY WITHOUT ANGINA PECTORIS: ICD-10-CM

## 2018-12-26 DIAGNOSIS — G20 PARKINSON'S DISEASE: ICD-10-CM

## 2018-12-26 DIAGNOSIS — I10 ESSENTIAL (PRIMARY) HYPERTENSION: ICD-10-CM

## 2018-12-26 DIAGNOSIS — R26.9 UNSPECIFIED ABNORMALITIES OF GAIT AND MOBILITY: ICD-10-CM

## 2018-12-26 DIAGNOSIS — Z95.828 PRESENCE OF OTHER VASCULAR IMPLANTS AND GRAFTS: Chronic | ICD-10-CM

## 2018-12-26 DIAGNOSIS — E78.5 HYPERLIPIDEMIA, UNSPECIFIED: ICD-10-CM

## 2018-12-26 DIAGNOSIS — R53.1 WEAKNESS: ICD-10-CM

## 2018-12-26 DIAGNOSIS — Z95.5 PRESENCE OF CORONARY ANGIOPLASTY IMPLANT AND GRAFT: Chronic | ICD-10-CM

## 2018-12-26 DIAGNOSIS — R62.7 ADULT FAILURE TO THRIVE: ICD-10-CM

## 2018-12-26 DIAGNOSIS — Z29.9 ENCOUNTER FOR PROPHYLACTIC MEASURES, UNSPECIFIED: ICD-10-CM

## 2018-12-26 DIAGNOSIS — Z98.89 OTHER SPECIFIED POSTPROCEDURAL STATES: Chronic | ICD-10-CM

## 2018-12-26 LAB
ALBUMIN SERPL ELPH-MCNC: 3.3 G/DL — SIGNIFICANT CHANGE UP (ref 3.3–5)
ALP SERPL-CCNC: 61 U/L — SIGNIFICANT CHANGE UP (ref 40–120)
ALT FLD-CCNC: 5 U/L — LOW (ref 10–45)
ANION GAP SERPL CALC-SCNC: 11 MMOL/L — SIGNIFICANT CHANGE UP (ref 5–17)
APPEARANCE UR: CLEAR — SIGNIFICANT CHANGE UP
APTT BLD: 32.3 SEC — SIGNIFICANT CHANGE UP (ref 27.5–36.3)
AST SERPL-CCNC: 19 U/L — SIGNIFICANT CHANGE UP (ref 10–40)
BACTERIA # UR AUTO: NEGATIVE — SIGNIFICANT CHANGE UP
BASE EXCESS BLDV CALC-SCNC: 3.4 MMOL/L — HIGH (ref -2–2)
BASOPHILS # BLD AUTO: 0 K/UL — SIGNIFICANT CHANGE UP (ref 0–0.2)
BASOPHILS NFR BLD AUTO: 0.1 % — SIGNIFICANT CHANGE UP (ref 0–2)
BILIRUB SERPL-MCNC: 0.6 MG/DL — SIGNIFICANT CHANGE UP (ref 0.2–1.2)
BILIRUB UR-MCNC: NEGATIVE — SIGNIFICANT CHANGE UP
BUN SERPL-MCNC: 20 MG/DL — SIGNIFICANT CHANGE UP (ref 7–23)
CA-I SERPL-SCNC: 1.11 MMOL/L — LOW (ref 1.12–1.3)
CALCIUM SERPL-MCNC: 7.8 MG/DL — LOW (ref 8.4–10.5)
CHLORIDE BLDV-SCNC: 107 MMOL/L — SIGNIFICANT CHANGE UP (ref 96–108)
CHLORIDE SERPL-SCNC: 103 MMOL/L — SIGNIFICANT CHANGE UP (ref 96–108)
CO2 BLDV-SCNC: 31 MMOL/L — HIGH (ref 22–30)
CO2 SERPL-SCNC: 22 MMOL/L — SIGNIFICANT CHANGE UP (ref 22–31)
COLOR SPEC: YELLOW — SIGNIFICANT CHANGE UP
CREAT SERPL-MCNC: 1.29 MG/DL — SIGNIFICANT CHANGE UP (ref 0.5–1.3)
DIFF PNL FLD: NEGATIVE — SIGNIFICANT CHANGE UP
EOSINOPHIL # BLD AUTO: 0.1 K/UL — SIGNIFICANT CHANGE UP (ref 0–0.5)
EOSINOPHIL NFR BLD AUTO: 2.4 % — SIGNIFICANT CHANGE UP (ref 0–6)
EPI CELLS # UR: 0 /HPF — SIGNIFICANT CHANGE UP
GAS PNL BLDV: 133 MMOL/L — LOW (ref 136–145)
GAS PNL BLDV: SIGNIFICANT CHANGE UP
GAS PNL BLDV: SIGNIFICANT CHANGE UP
GLUCOSE BLDV-MCNC: 104 MG/DL — HIGH (ref 70–99)
GLUCOSE SERPL-MCNC: 100 MG/DL — HIGH (ref 70–99)
GLUCOSE UR QL: NEGATIVE — SIGNIFICANT CHANGE UP
HCO3 BLDV-SCNC: 29 MMOL/L — SIGNIFICANT CHANGE UP (ref 21–29)
HCT VFR BLD CALC: 34.7 % — LOW (ref 39–50)
HCT VFR BLDA CALC: 38 % — LOW (ref 39–50)
HGB BLD CALC-MCNC: 12.2 G/DL — LOW (ref 13–17)
HGB BLD-MCNC: 11.5 G/DL — LOW (ref 13–17)
HYALINE CASTS # UR AUTO: 1 /LPF — SIGNIFICANT CHANGE UP (ref 0–2)
INR BLD: 1.32 RATIO — HIGH (ref 0.88–1.16)
KETONES UR-MCNC: NEGATIVE — SIGNIFICANT CHANGE UP
LACTATE BLDV-MCNC: 1.3 MMOL/L — SIGNIFICANT CHANGE UP (ref 0.7–2)
LEUKOCYTE ESTERASE UR-ACNC: NEGATIVE — SIGNIFICANT CHANGE UP
LYMPHOCYTES # BLD AUTO: 1 K/UL — SIGNIFICANT CHANGE UP (ref 1–3.3)
LYMPHOCYTES # BLD AUTO: 21.1 % — SIGNIFICANT CHANGE UP (ref 13–44)
MCHC RBC-ENTMCNC: 28.7 PG — SIGNIFICANT CHANGE UP (ref 27–34)
MCHC RBC-ENTMCNC: 33.3 GM/DL — SIGNIFICANT CHANGE UP (ref 32–36)
MCV RBC AUTO: 86.2 FL — SIGNIFICANT CHANGE UP (ref 80–100)
MONOCYTES # BLD AUTO: 0.5 K/UL — SIGNIFICANT CHANGE UP (ref 0–0.9)
MONOCYTES NFR BLD AUTO: 9.6 % — SIGNIFICANT CHANGE UP (ref 2–14)
NEUTROPHILS # BLD AUTO: 3.2 K/UL — SIGNIFICANT CHANGE UP (ref 1.8–7.4)
NEUTROPHILS NFR BLD AUTO: 66.8 % — SIGNIFICANT CHANGE UP (ref 43–77)
NITRITE UR-MCNC: NEGATIVE — SIGNIFICANT CHANGE UP
OB PNL STL: NEGATIVE — SIGNIFICANT CHANGE UP
PCO2 BLDV: 52 MMHG — HIGH (ref 35–50)
PH BLDV: 7.37 — SIGNIFICANT CHANGE UP (ref 7.35–7.45)
PH UR: 6.5 — SIGNIFICANT CHANGE UP (ref 5–8)
PLATELET # BLD AUTO: 131 K/UL — LOW (ref 150–400)
PO2 BLDV: 24 MMHG — LOW (ref 25–45)
POTASSIUM BLDV-SCNC: 3.7 MMOL/L — SIGNIFICANT CHANGE UP (ref 3.5–5.3)
POTASSIUM SERPL-MCNC: 3.7 MMOL/L — SIGNIFICANT CHANGE UP (ref 3.5–5.3)
POTASSIUM SERPL-SCNC: 3.7 MMOL/L — SIGNIFICANT CHANGE UP (ref 3.5–5.3)
PROT SERPL-MCNC: 6.5 G/DL — SIGNIFICANT CHANGE UP (ref 6–8.3)
PROT UR-MCNC: NEGATIVE — SIGNIFICANT CHANGE UP
PROTHROM AB SERPL-ACNC: 15.3 SEC — HIGH (ref 10–12.9)
RAPID RVP RESULT: SIGNIFICANT CHANGE UP
RBC # BLD: 4.02 M/UL — LOW (ref 4.2–5.8)
RBC # FLD: 14.4 % — SIGNIFICANT CHANGE UP (ref 10.3–14.5)
RBC CASTS # UR COMP ASSIST: 4 /HPF — SIGNIFICANT CHANGE UP (ref 0–4)
SAO2 % BLDV: 31 % — LOW (ref 67–88)
SODIUM SERPL-SCNC: 136 MMOL/L — SIGNIFICANT CHANGE UP (ref 135–145)
SP GR SPEC: 1.01 — SIGNIFICANT CHANGE UP (ref 1.01–1.02)
UROBILINOGEN FLD QL: NEGATIVE — SIGNIFICANT CHANGE UP
WBC # BLD: 4.7 K/UL — SIGNIFICANT CHANGE UP (ref 3.8–10.5)
WBC # FLD AUTO: 4.7 K/UL — SIGNIFICANT CHANGE UP (ref 3.8–10.5)
WBC UR QL: 3 /HPF — SIGNIFICANT CHANGE UP (ref 0–5)

## 2018-12-26 PROCEDURE — 72125 CT NECK SPINE W/O DYE: CPT | Mod: 26

## 2018-12-26 PROCEDURE — 71045 X-RAY EXAM CHEST 1 VIEW: CPT | Mod: 26

## 2018-12-26 PROCEDURE — 70450 CT HEAD/BRAIN W/O DYE: CPT | Mod: 26

## 2018-12-26 PROCEDURE — 99223 1ST HOSP IP/OBS HIGH 75: CPT

## 2018-12-26 PROCEDURE — 93010 ELECTROCARDIOGRAM REPORT: CPT | Mod: GC

## 2018-12-26 PROCEDURE — 99285 EMERGENCY DEPT VISIT HI MDM: CPT | Mod: GC,25

## 2018-12-26 PROCEDURE — 72170 X-RAY EXAM OF PELVIS: CPT | Mod: 26

## 2018-12-26 RX ORDER — ISOSORBIDE MONONITRATE 60 MG/1
60 TABLET, EXTENDED RELEASE ORAL DAILY
Qty: 0 | Refills: 0 | Status: DISCONTINUED | OUTPATIENT
Start: 2018-12-26 | End: 2019-01-03

## 2018-12-26 RX ORDER — RANOLAZINE 500 MG/1
1000 TABLET, FILM COATED, EXTENDED RELEASE ORAL
Qty: 0 | Refills: 0 | Status: DISCONTINUED | OUTPATIENT
Start: 2018-12-26 | End: 2019-01-03

## 2018-12-26 RX ORDER — ASPIRIN/CALCIUM CARB/MAGNESIUM 324 MG
81 TABLET ORAL DAILY
Qty: 0 | Refills: 0 | Status: DISCONTINUED | OUTPATIENT
Start: 2018-12-26 | End: 2019-01-03

## 2018-12-26 RX ORDER — SENNA PLUS 8.6 MG/1
1 TABLET ORAL DAILY
Qty: 0 | Refills: 0 | Status: DISCONTINUED | OUTPATIENT
Start: 2018-12-26 | End: 2019-01-03

## 2018-12-26 RX ORDER — DOCUSATE SODIUM 100 MG
100 CAPSULE ORAL
Qty: 0 | Refills: 0 | Status: DISCONTINUED | OUTPATIENT
Start: 2018-12-26 | End: 2019-01-03

## 2018-12-26 RX ORDER — CARBIDOPA, LEVODOPA, AND ENTACAPONE 50; 200; 200 MG/1; MG/1; MG/1
1 TABLET, FILM COATED ORAL
Qty: 0 | Refills: 0 | Status: DISCONTINUED | OUTPATIENT
Start: 2018-12-26 | End: 2019-01-03

## 2018-12-26 RX ORDER — FUROSEMIDE 40 MG
40 TABLET ORAL DAILY
Qty: 0 | Refills: 0 | Status: DISCONTINUED | OUTPATIENT
Start: 2018-12-26 | End: 2019-01-03

## 2018-12-26 RX ORDER — ATORVASTATIN CALCIUM 80 MG/1
80 TABLET, FILM COATED ORAL AT BEDTIME
Qty: 0 | Refills: 0 | Status: DISCONTINUED | OUTPATIENT
Start: 2018-12-26 | End: 2019-01-03

## 2018-12-26 RX ORDER — ENOXAPARIN SODIUM 100 MG/ML
40 INJECTION SUBCUTANEOUS EVERY 24 HOURS
Qty: 0 | Refills: 0 | Status: DISCONTINUED | OUTPATIENT
Start: 2018-12-26 | End: 2019-01-03

## 2018-12-26 RX ORDER — POLYETHYLENE GLYCOL 3350 17 G/17G
17 POWDER, FOR SOLUTION ORAL AT BEDTIME
Qty: 0 | Refills: 0 | Status: DISCONTINUED | OUTPATIENT
Start: 2018-12-26 | End: 2018-12-29

## 2018-12-26 RX ORDER — CHOLECALCIFEROL (VITAMIN D3) 125 MCG
1000 CAPSULE ORAL DAILY
Qty: 0 | Refills: 0 | Status: DISCONTINUED | OUTPATIENT
Start: 2018-12-26 | End: 2019-01-03

## 2018-12-26 RX ORDER — METOPROLOL TARTRATE 50 MG
50 TABLET ORAL
Qty: 0 | Refills: 0 | Status: DISCONTINUED | OUTPATIENT
Start: 2018-12-26 | End: 2018-12-28

## 2018-12-26 RX ADMIN — SENNA PLUS 1 TABLET(S): 8.6 TABLET ORAL at 21:37

## 2018-12-26 RX ADMIN — CARBIDOPA, LEVODOPA, AND ENTACAPONE 1 TABLET(S): 50; 200; 200 TABLET, FILM COATED ORAL at 23:33

## 2018-12-26 RX ADMIN — POLYETHYLENE GLYCOL 3350 17 GRAM(S): 17 POWDER, FOR SOLUTION ORAL at 21:37

## 2018-12-26 RX ADMIN — Medication 100 MILLIGRAM(S): at 21:38

## 2018-12-26 RX ADMIN — ENOXAPARIN SODIUM 40 MILLIGRAM(S): 100 INJECTION SUBCUTANEOUS at 22:42

## 2018-12-26 RX ADMIN — ATORVASTATIN CALCIUM 80 MILLIGRAM(S): 80 TABLET, FILM COATED ORAL at 21:38

## 2018-12-26 NOTE — H&P ADULT - PROBLEM SELECTOR PLAN 3
--c/w home carbidopa/levodopa/entecapone   --home Nuplazid not available--will have to be brought in from home.

## 2018-12-26 NOTE — H&P ADULT - NSHPPHYSICALEXAM_GEN_ALL_CORE
Vital Signs Last 24 Hrs  T(C): 36.9 (12-26-18 @ 15:30)  T(F): 98.4 (12-26-18 @ 15:30), Max: 98.4 (12-26-18 @ 15:30)  HR: 60 (12-26-18 @ 18:36) (56 - 60)  BP: 150/80 (12-26-18 @ 18:36)  BP(mean): --  RR: 18 (12-26-18 @ 18:36) (17 - 20)  SpO2: 97% (12-26-18 @ 18:36) (97% - 100%)  Wt(kg): --    PHYSICAL EXAM:  GENERAL: NAD, well-developed  HEAD:  Atraumatic, Normocephalic  EYES: EOMI, PERRLA, conjunctiva and sclera clear  NECK: Supple, No JVD  CHEST/LUNG: Clear to auscultation bilaterally; No wheeze  HEART: Regular rate and rhythm; No murmurs, rubs, or gallops  ABDOMEN: Soft, Nontender, Nondistended; Bowel sounds present  EXTREMITIES:  2+ Peripheral Pulses, No clubbing, cyanosis, or edema  PSYCH: AAOx3  NEUROLOGY: non-focal  SKIN: No rashes or lesions Vital Signs Last 24 Hrs  T(C): 36.9 (12-26-18 @ 15:30)  T(F): 98.4 (12-26-18 @ 15:30), Max: 98.4 (12-26-18 @ 15:30)  HR: 60 (12-26-18 @ 18:36) (56 - 60)  BP: 150/80 (12-26-18 @ 18:36)  BP(mean): --  RR: 18 (12-26-18 @ 18:36) (17 - 20)  SpO2: 97% (12-26-18 @ 18:36) (97% - 100%)  Wt(kg): --    PHYSICAL EXAM:  GENERAL: NAD, well-developed, well appearing.   HEAD:  Atraumatic, Normocephalic  EYES: EOMI, PERRLA, conjunctiva and sclera clear  NECK: Supple, No JVD  CHEST/LUNG: Clear to auscultation bilaterally; No wheeze  HEART: Regular rate and rhythm, systolic murmur; No rubs, or gallops  ABDOMEN: Soft, Nontender, Nondistended; Bowel sounds present  EXTREMITIES:  2+ Peripheral Pulses, No clubbing, cyanosis, or edema  PSYCH: AAOx3  NEUROLOGY: non-focal  SKIN: stage 2 decubitus ulcer

## 2018-12-26 NOTE — H&P ADULT - NSHPSOCIALHISTORY_GEN_ALL_CORE
spent about 100 days in [facility] earlier this year, d/c 11/2018, lives with wife has VNS and home PT, no toxic habits.

## 2018-12-26 NOTE — ED PROVIDER NOTE - ATTENDING CONTRIBUTION TO CARE
Private Physician Radha Hernandez, PCP, Arya Francis. Cards  88y male Pna, BPH, Cad,C Diff, HTN, HLD, MI, Parkinsons Dz,Inguinale Hernia, UTI, Upper gi Bleed. IVC filter, Cabg 2v, SP appy, Sp ptca w stent, PT comes to ed complains of HTN, Pt had fallen with rt shoulder /arm fx in rehab dc home11/2018. Has been getting pt and Vns at home today and had eleveated bp with 180/90 Pt has been complains of multiple falls with dizzyness last night room spinning, with weakness. Last fall 10d ago with injuery to buttox, NCAT chest clear anterior & posterior abd soft +bs rth shoulder with tenderness. CV no rubs, gallops or murmurs, abd soft Stage 2 small wound superior buttox clevage, neuro gcs 15 speech fluent power 5.;5 all extr  Bowen Gonsalez MD, [facility]

## 2018-12-26 NOTE — H&P ADULT - PROBLEM SELECTOR PLAN 5
--c/w home metoprolol and imdur. Avoid aggressive BP goal in Parkinson disease due to autonomic instability.

## 2018-12-26 NOTE — H&P ADULT - FAMILY HISTORY
Family history of breast cancer, Mother     Father  Still living? Unknown  Family history of coronary artery disease, Age at diagnosis: Age Unknown

## 2018-12-26 NOTE — H&P ADULT - PROBLEM SELECTOR PLAN 1
--generalized weakness, likely in the setting of deconditioning, Parkinson disease, and upper respiratory infection  --well appearing  --work up negative for infection, UA, RVP, CXR negative. VSS.   --screening labs unremarkable  --PT consult  --will need to discuss with patient and family whether he should return to [facility] vs increased assistance at home such as 24hour care vs alternative option such as assisted living.

## 2018-12-26 NOTE — ED PROVIDER NOTE - MUSCULOSKELETAL, MLM
Spine appears normal, range of motion is not limited, no muscle or joint tenderness. Stage II sacral ulcer

## 2018-12-26 NOTE — ED ADULT NURSE NOTE - OBJECTIVE STATEMENT
88 y.o male arrived via EMS c c/o weakness for 1 week and dizziness since yesterday. Pt has hx of HTN. Physical therapist coming into home for R shoulder fx. Pt was in [facility] approx. 100 days from complications from a fall d/c on 11/17 as per wife. stage 2 sacral ulcer noted. No fever chills. recent URI pt completed course of antibiotics 2 days ago- still reports productive cough "but feeling much better". Denies dysuria/ hematuria. Weakness causing pt not to be able to get out of bed. No lower extremity edema noted. Currently on Lasix as per wife

## 2018-12-26 NOTE — ED PROVIDER NOTE - OBJECTIVE STATEMENT
88-year-old male presenting with generalized weakness, a sacral ulcer, falls over the last several weeks. Patient was recently discharged from rehabilitation facility one month ago. Patient has been having intermittent frequent falls over that time, generalized weakness. Patient being followed by home nursing and physical therapy. Discussion with home nursing manager as well as physical therapist revealed that they feel that the wife is unable to care for the patient's at this time and the patient is 24 hour care. Family reports cough with dark sputum over the last one to 2 weeks. Denies fever, nausea, vomiting, abdominal pain, urinary symptoms. Patient has not been able to wear over the last week due to generalized weakness. PMD: Radha Perales

## 2018-12-26 NOTE — ED PROVIDER NOTE - MEDICAL DECISION MAKING DETAILS
80-year-old male presenting with generalized weakness and frequent falls over the last month. Patient nonambulatory for the last week. Home health nurse and physical therapist feel that patient wife is unable to take care of the patient at home. Suspect possible infection versus deconditioning. We'll obtain lab work, chest x-ray, pelvic x-ray, CT head and cervical spine given frequent falls and, urinalysis, admit.

## 2018-12-26 NOTE — H&P ADULT - ASSESSMENT
88M PMH CAD/CABG/remote stents, Parkinson disease, HTN, HLD, prior DVT s/p IVCF, prior GIB, CKD III presents with multiple falls and lightheadedness, admitted with gait abnormality, failure to thrive, and an unsafe living situation.

## 2018-12-26 NOTE — H&P ADULT - NSHPREVIEWOFSYSTEMS_GEN_ALL_CORE
Review of Systems:   CONSTITUTIONAL: No fever, weight loss, or fatigue  EYES: No eye pain, visual disturbances, or discharge  ENMT:  No difficulty hearing, tinnitus, vertigo; No sinus or throat pain  NECK: No pain or stiffness  RESPIRATORY: No cough, wheezing, chills or hemoptysis; No shortness of breath  CARDIOVASCULAR: +chronic LE swelling; No chest pain, palpitations  GASTROINTESTINAL: No abdominal or epigastric pain. No nausea, vomiting, or hematemesis; No diarrhea or constipation. No melena or hematochezia.  GENITOURINARY: No dysuria, frequency, hematuria, or incontinence  NEUROLOGICAL: +difficulty ambulating, lightheadedness; No headaches, memory loss, loss of strength, numbness, or tremors  SKIN: No itching, burning, rashes, or lesions   LYMPH NODES: No enlarged glands  ENDOCRINE: No heat or cold intolerance; No hair loss  MUSCULOSKELETAL: No joint pain or swelling; No muscle, back, or extremity pain  PSYCHIATRIC: No depression, anxiety, mood swings, or difficulty sleeping  HEME/LYMPH: No easy bruising, or bleeding gums  ALLERY AND IMMUNOLOGIC: No hives or eczema  [X] all other systems negative or as per HPI

## 2018-12-26 NOTE — H&P ADULT - HISTORY OF PRESENT ILLNESS
88M PMH CAD/CABG/remote stents, Parkinson disease, HTN, HLD, prior DVT s/p IVCF, prior GIB, CKD III presents with multiple falls and lightheadedness. Of note, patient admitted earlier this year for R humerus fx after a mechanical fall, subsequently spent about 100 days in [facility], discharged home around 11/2018. 88M PMH CAD/CABG/remote stents, Parkinson disease, HTN, HLD, prior DVT s/p IVCF, prior GIB, CKD III presents with multiple falls and lightheadedness. Of note, patient admitted earlier this year for R humerus fx after a mechanical fall, subsequently spent about 100 days in [facility], discharged home around 11/2018. Since being home has had generalized weakness and numerous falls (tripping over things). The past few days, weakness has been worse and he has had difficulty getting out of bed. Intermittently, also feels dizzy and lightheaded. Occasionally with room spinning. Reports that his appetite and fluid intake are good. He had a URI for which he completed a course of antibiotics about 2 days ago, couldn't recall name. Still with mild cough with some dark sputum, but reports feeling much better overall. No fevers, chills, chest pain. SOB, orthopnea, syncope, NVD, abdominal pain, constipation, diarrhea, dysuria. Has chronic LE edema, but has been minimal since lasix dose increased to 40mg daily. 88M PMH CAD/CABG/remote stents, Parkinson disease, HTN, HLD, prior DVT s/p IVCF, prior GIB, CKD III presents with multiple falls and lightheadedness. Of note, patient admitted earlier this year for R humerus fx after a mechanical fall, subsequently spent about 100 days in [facility], discharged home around 11/2018. Since being home has had generalized weakness and numerous falls (tripping over things). The past few days, weakness has been worse and he has had difficulty getting out of bed. Intermittently, also feels dizzy and lightheaded. Occasionally with room spinning. Reports that his appetite and fluid intake are good. He had a URI for which he completed a course of antibiotics about 2 days ago, couldn't recall name. Still with mild cough with some dark sputum, but reports feeling much better overall. No fevers, chills, chest pain. SOB, orthopnea, syncope, NVD, abdominal pain, constipation, diarrhea, dysuria. Has chronic LE edema, but has been minimal since lasix dose increased to 40mg daily. Home PT and the visiting nurse service expressed concerns that the patient's elderly wife was having difficulty caring for him.

## 2018-12-26 NOTE — H&P ADULT - NSHPLABSRESULTS_GEN_ALL_CORE
EKG, labs, and imaging personally reviewed and interpreted.     EKG: sinus bradycardia with 1st degree AV block, LVF, QTc 477    LABS:                        11.5   4.7   )-----------( 131      ( 26 Dec 2018 17:02 )             34.7     136  |  103  |  20  ----------------------------<  100<H>  3.7   |  22  |  1.29    Ca    7.8<L>      26 Dec 2018 17:02    TPro  6.5  /  Alb  3.3  /  TBili  0.6  /  DBili  x   /  AST  19  /  ALT  5<L>  /  AlkPhos  61      PT/INR - ( 26 Dec 2018 17:02 )   PT: 15.3 sec;   INR: 1.32 ratio    PTT - ( 26 Dec 2018 17:02 )  PTT:32.3 sec    Urinalysis Basic - ( 26 Dec 2018 17:02 )    Color: Yellow / Appearance: Clear / S.012 / pH: x  Gluc: x / Ketone: Negative  / Bili: Negative / Urobili: Negative   Blood: x / Protein: Negative / Nitrite: Negative   Leuk Esterase: Negative / RBC: 4 /hpf / WBC 3 /hpf   Sq Epi: x / Non Sq Epi: 0 /hpf / Bacteria: Negative    RADIOLOGY & ADDITIONAL TESTS:  Imaging Personally Reviewed:    CT head/neck:  IMPRESSION:  Head CT: No evidence for intracranial hemorrhage, mass effect, or   displaced calvarial fracture.   Cervical spine CT: No evidence for acute displaced fracture or traumatic   malalignment. Cervical degenerative spondylosis, as described above.  Stable exam.    CXR:  EXAM:  XR CHEST PORTABLE URGENT 1V                        PROCEDURE DATE:  2018    ******PRELIMINARY REPORT******     INTERPRETATION:  no emergent finding  follow up official report    Xray pelvis:      EXAM:  PELVIS AP ONLY                        PROCEDURE DATE:  2018    ******PRELIMINARY REPORT******        INTERPRETATION:  no acute fracture or dislocation  Bilateral CAM type proximal femoral deformity.  Bilteral hip osteoarthrosis.    Consultant(s) Notes Reviewed:  Yes  Care Discussed with Consultants/Other Providers: Yes

## 2018-12-27 LAB
ANION GAP SERPL CALC-SCNC: 11 MMOL/L — SIGNIFICANT CHANGE UP (ref 5–17)
BUN SERPL-MCNC: 24 MG/DL — HIGH (ref 7–23)
CALCIUM SERPL-MCNC: 8.3 MG/DL — LOW (ref 8.4–10.5)
CHLORIDE SERPL-SCNC: 99 MMOL/L — SIGNIFICANT CHANGE UP (ref 96–108)
CO2 SERPL-SCNC: 25 MMOL/L — SIGNIFICANT CHANGE UP (ref 22–31)
CREAT SERPL-MCNC: 1.4 MG/DL — HIGH (ref 0.5–1.3)
GLUCOSE SERPL-MCNC: 98 MG/DL — SIGNIFICANT CHANGE UP (ref 70–99)
HCT VFR BLD CALC: 34.2 % — LOW (ref 39–50)
HGB BLD-MCNC: 11.2 G/DL — LOW (ref 13–17)
MCHC RBC-ENTMCNC: 27.4 PG — SIGNIFICANT CHANGE UP (ref 27–34)
MCHC RBC-ENTMCNC: 32.7 GM/DL — SIGNIFICANT CHANGE UP (ref 32–36)
MCV RBC AUTO: 83.6 FL — SIGNIFICANT CHANGE UP (ref 80–100)
PLATELET # BLD AUTO: 134 K/UL — LOW (ref 150–400)
POTASSIUM SERPL-MCNC: 3.7 MMOL/L — SIGNIFICANT CHANGE UP (ref 3.5–5.3)
POTASSIUM SERPL-SCNC: 3.7 MMOL/L — SIGNIFICANT CHANGE UP (ref 3.5–5.3)
RBC # BLD: 4.09 M/UL — LOW (ref 4.2–5.8)
RBC # FLD: 15.5 % — HIGH (ref 10.3–14.5)
SODIUM SERPL-SCNC: 135 MMOL/L — SIGNIFICANT CHANGE UP (ref 135–145)
WBC # BLD: 4.43 K/UL — SIGNIFICANT CHANGE UP (ref 3.8–10.5)
WBC # FLD AUTO: 4.43 K/UL — SIGNIFICANT CHANGE UP (ref 3.8–10.5)

## 2018-12-27 RX ADMIN — CARBIDOPA, LEVODOPA, AND ENTACAPONE 1 TABLET(S): 50; 200; 200 TABLET, FILM COATED ORAL at 05:51

## 2018-12-27 RX ADMIN — Medication 1000 UNIT(S): at 08:35

## 2018-12-27 RX ADMIN — RANOLAZINE 1000 MILLIGRAM(S): 500 TABLET, FILM COATED, EXTENDED RELEASE ORAL at 05:51

## 2018-12-27 RX ADMIN — ENOXAPARIN SODIUM 40 MILLIGRAM(S): 100 INJECTION SUBCUTANEOUS at 21:44

## 2018-12-27 RX ADMIN — ATORVASTATIN CALCIUM 80 MILLIGRAM(S): 80 TABLET, FILM COATED ORAL at 21:44

## 2018-12-27 RX ADMIN — POLYETHYLENE GLYCOL 3350 17 GRAM(S): 17 POWDER, FOR SOLUTION ORAL at 21:43

## 2018-12-27 RX ADMIN — Medication 50 MILLIGRAM(S): at 17:20

## 2018-12-27 RX ADMIN — RANOLAZINE 1000 MILLIGRAM(S): 500 TABLET, FILM COATED, EXTENDED RELEASE ORAL at 17:20

## 2018-12-27 RX ADMIN — Medication 100 MILLIGRAM(S): at 17:20

## 2018-12-27 RX ADMIN — CARBIDOPA, LEVODOPA, AND ENTACAPONE 1 TABLET(S): 50; 200; 200 TABLET, FILM COATED ORAL at 12:00

## 2018-12-27 RX ADMIN — CARBIDOPA, LEVODOPA, AND ENTACAPONE 1 TABLET(S): 50; 200; 200 TABLET, FILM COATED ORAL at 23:11

## 2018-12-27 RX ADMIN — Medication 40 MILLIGRAM(S): at 05:51

## 2018-12-27 RX ADMIN — Medication 81 MILLIGRAM(S): at 12:00

## 2018-12-27 RX ADMIN — Medication 100 MILLIGRAM(S): at 05:51

## 2018-12-27 RX ADMIN — SENNA PLUS 1 TABLET(S): 8.6 TABLET ORAL at 21:43

## 2018-12-27 RX ADMIN — CARBIDOPA, LEVODOPA, AND ENTACAPONE 1 TABLET(S): 50; 200; 200 TABLET, FILM COATED ORAL at 17:20

## 2018-12-27 RX ADMIN — Medication 50 MILLIGRAM(S): at 08:35

## 2018-12-27 RX ADMIN — ISOSORBIDE MONONITRATE 60 MILLIGRAM(S): 60 TABLET, EXTENDED RELEASE ORAL at 12:00

## 2018-12-27 NOTE — CONSULT NOTE ADULT - SUBJECTIVE AND OBJECTIVE BOX
Neurology Consult    Reason for consult: BLE weakness    HPI:  88M PMH CAD/CABG/remote stents, Parkinson disease, HTN, HLD, prior DVT s/p IVCF, prior GIB, CKD III presents with multiple falls and lightheadedness. Of note, patient admitted earlier this year for R humerus fx after a mechanical fall, subsequently spent about 100 days in [facility], discharged home around 2018. Since being home has had generalized weakness and numerous falls (tripping over things). The past few days, weakness has been worse and he has had difficulty getting out of bed. Intermittently, also feels dizzy and lightheaded. Occasionally with room spinning. Reports that his appetite and fluid intake are good. He had a URI for which he completed a course of antibiotics about 2 days ago, couldn't recall name. Still with mild cough with some dark sputum, but reports feeling much better overall. No fevers, chills, chest pain. SOB, orthopnea, syncope, NVD, abdominal pain, constipation, diarrhea, dysuria. Has chronic LE edema, but has been minimal since lasix dose increased to 40mg daily. Home PT and the visiting nurse service expressed concerns that the patient's elderly wife was having difficulty caring for him. (26 Dec 2018 20:39)    88M PMH CAD/CABG/remote stents, Parkinson disease, HTN, HLD, prior DVT s/p IVCF, prior GIB, CKD III p/w falls, lightheadedness. Neurology called for bilateral lower extremity weakness.     REVIEW OF SYSTEMS:  As above    MEDICATIONS  aspirin enteric coated 81 milliGRAM(s) Oral daily  atorvastatin 80 milliGRAM(s) Oral at bedtime  carbidopa/levodopa/entacapone 50/200/200 1 Tablet(s) Oral four times a day  cholecalciferol 1000 Unit(s) Oral daily  docusate sodium 100 milliGRAM(s) Oral two times a day  enoxaparin Injectable 40 milliGRAM(s) SubCutaneous every 24 hours  furosemide    Tablet 40 milliGRAM(s) Oral daily  isosorbide   mononitrate ER Tablet (IMDUR) 60 milliGRAM(s) Oral daily  metoprolol tartrate 50 milliGRAM(s) Oral two times a day  polyethylene glycol 3350 17 Gram(s) Oral at bedtime  ranolazine 1000 milliGRAM(s) Oral two times a day  senna 1 Tablet(s) Oral daily      PMH: Unilateral inguinal hernia, with gangrene, not specified as recurrent  Upper GI bleed  Aspiration pneumonia  Clostridium difficile colitis  Pneumonia  MI, old  UTI (urinary tract infection)  BPH (benign prostatic hyperplasia)  Parkinsons Disease  CAD (Coronary Artery Disease)  HTN (Hypertension)  Hyperlipidemia       PSH: Presence of IVC filter  History of prostate surgery  Varicose veins of legs  S/P appendectomy  Stented coronary artery  Hx of CABG  Hyperlipidemia      FAMILY HISTORY:  Family history of coronary artery disease (Father): Father,  of MI age 55  Family history of breast cancer: Mother    No history of dementia, strokes, or seizures     SOCIAL HISTORY:  No history of tobacco or alcohol use     Allergies    Cipro (Rash)    Intolerances    Vital Signs Last 24 Hrs  T(C): 36.3 (27 Dec 2018 15:37), Max: 36.9 (26 Dec 2018 21:00)  T(F): 97.3 (27 Dec 2018 15:37), Max: 98.4 (26 Dec 2018 21:00)  HR: 61 (27 Dec 2018 15:37) (50 - 61)  BP: 160/84 (27 Dec 2018 15:37) (128/73 - 161/85)  BP(mean): 8 (27 Dec 2018 08:34) (8 - 8)  RR: 17 (27 Dec 2018 15:37) (16 - 18)  SpO2: 95% (27 Dec 2018 15:37) (95% - 99%)    Neurological Examination:    Mental Status: Patient is alert, awake, oriented X3. Patient is fluent, no dysarthria, no aphasia. Follows commands well and able to answer questions appropriately. Mood and affect normal.    Cranial Nerves: PERRL, EOMI, visual field intact, V1-V3 intact, no gross facial asymmetry, tongue/uvula midline    Motor Exam: No drift  Right upper extremity: 5/5  Left upper extremity: 5/5  Right lower extremity: 5/5  Left lower extremity: 5/5    Normal bulk/tone    Sensory: Intact to light touch and pinprick bilaterally. No extinction    Coordination: Finger to nose intact bilaterally     Gait: Normal      Reflexes: Bilateral 2+ Biceps, Brachial, Patellar, Ankle  Plantar: Down bilateral    GENERAL: No acute distress  HEENT:  Normocephalic, atraumatic  EXTREMITIES: No edema, clubbing, cyanosis  MUSCULOSKELETAL: Normal range of motion  SKIN: No rashes    LABS:  CBC Full  -  ( 27 Dec 2018 08:13 )  WBC Count : 4.43 K/uL  Hemoglobin : 11.2 g/dL  Hematocrit : 34.2 %  Platelet Count - Automated : 134 K/uL  Mean Cell Volume : 83.6 fl  Mean Cell Hemoglobin : 27.4 pg  Mean Cell Hemoglobin Concentration : 32.7 gm/dL  Auto Neutrophil # : x  Auto Lymphocyte # : x  Auto Monocyte # : x  Auto Eosinophil # : x  Auto Basophil # : x  Auto Neutrophil % : x  Auto Lymphocyte % : x  Auto Monocyte % : x  Auto Eosinophil % : x  Auto Basophil % : x    Urinalysis Basic - ( 26 Dec 2018 17:02 )    Color: Yellow / Appearance: Clear / S.012 / pH: x  Gluc: x / Ketone: Negative  / Bili: Negative / Urobili: Negative   Blood: x / Protein: Negative / Nitrite: Negative   Leuk Esterase: Negative / RBC: 4 /hpf / WBC 3 /hpf   Sq Epi: x / Non Sq Epi: 0 /hpf / Bacteria: Negative          135  |  99  |  24<H>  ----------------------------<  98  3.7   |  25  |  1.40<H>    Ca    8.3<L>      27 Dec 2018 05:09    TPro  6.5  /  Alb  3.3  /  TBili  0.6  /  DBili  x   /  AST  19  /  ALT  5<L>  /  AlkPhos  61      LIVER FUNCTIONS - ( 26 Dec 2018 17:02 )  Alb: 3.3 g/dL / Pro: 6.5 g/dL / ALK PHOS: 61 U/L / ALT: 5 U/L / AST: 19 U/L / GGT: x           Hemoglobin A1C:       PT/INR - ( 26 Dec 2018 17:02 )   PT: 15.3 sec;   INR: 1.32 ratio         PTT - ( 26 Dec 2018 17:02 )  PTT:32.3 sec    RADIOLOGY:  CT head:  MRI:

## 2018-12-27 NOTE — CHART NOTE - NSCHARTNOTEFT_GEN_A_CORE
[facility] called for consultation, spoke with Dr Eason    88M [facility] CAD/CABG/remote stents, Parkinson disease, HTN, HLD, prior DVT s/p IVCF, prior GIB, CKD III presents with multiple falls and lightheadedness, admitted with gait abnormality, failure to thrive, and an unsafe living situation.     Consult called 2/2 worsening gait status and Parkinson disease      GUADALUPE Ridley [id number]

## 2018-12-27 NOTE — CONSULT NOTE ADULT - ASSESSMENT
88M PMH CAD/CABG/remote stents, Parkinson disease, HTN, HLD, prior DVT s/p IVCF, prior GIB, CKD III p/w falls, lightheadedness. Neurology called for bilateral lower extremity weakness.     Plan:

## 2018-12-27 NOTE — PROGRESS NOTE ADULT - ASSESSMENT
· Assessment      88M PMH CAD/CABG/remote stents, Parkinson disease, HTN, HLD, prior DVT s/p IVCF, prior GIB, CKD III presents with multiple falls and lightheadedness, admitted with gait abnormality, failure to thrive, and an unsafe living situation.     Gait abnormality --generalized weakness, likely in the setting of deconditioning, Parkinson disease, and upper respiratory infection  --PT consult    Failure to thrive in adult --PT consult    Parkinsons Disease.  --c/w home carbidopa/levodopa/entecapone   --home Nuplazid not available--will have to be brought in from home.   --Neurology evaluation.  --B12, Folate, TSH    CAD (Coronary Artery Disease). --c/w aspirin and statin.     HTN (Hypertension). --c/w home metoprolol and imdur. Avoid aggressive BP goal in Parkinson disease due to autonomic instability.     Hyperlipidemia.--c/w statin.    Need for prophylactic measure.--VTE PPX lovenox.

## 2018-12-28 LAB
ANION GAP SERPL CALC-SCNC: 12 MMOL/L — SIGNIFICANT CHANGE UP (ref 5–17)
BUN SERPL-MCNC: 29 MG/DL — HIGH (ref 7–23)
CALCIUM SERPL-MCNC: 8.5 MG/DL — SIGNIFICANT CHANGE UP (ref 8.4–10.5)
CHLORIDE SERPL-SCNC: 97 MMOL/L — SIGNIFICANT CHANGE UP (ref 96–108)
CO2 SERPL-SCNC: 26 MMOL/L — SIGNIFICANT CHANGE UP (ref 22–31)
CREAT SERPL-MCNC: 1.72 MG/DL — HIGH (ref 0.5–1.3)
FOLATE SERPL-MCNC: 19.3 NG/ML — SIGNIFICANT CHANGE UP
GLUCOSE SERPL-MCNC: 113 MG/DL — HIGH (ref 70–99)
POTASSIUM SERPL-MCNC: 3.8 MMOL/L — SIGNIFICANT CHANGE UP (ref 3.5–5.3)
POTASSIUM SERPL-SCNC: 3.8 MMOL/L — SIGNIFICANT CHANGE UP (ref 3.5–5.3)
SODIUM SERPL-SCNC: 135 MMOL/L — SIGNIFICANT CHANGE UP (ref 135–145)
TSH SERPL-MCNC: 6.18 UIU/ML — HIGH (ref 0.27–4.2)
VIT B12 SERPL-MCNC: 628 PG/ML — SIGNIFICANT CHANGE UP (ref 232–1245)

## 2018-12-28 RX ADMIN — Medication 81 MILLIGRAM(S): at 12:20

## 2018-12-28 RX ADMIN — CARBIDOPA, LEVODOPA, AND ENTACAPONE 1 TABLET(S): 50; 200; 200 TABLET, FILM COATED ORAL at 05:52

## 2018-12-28 RX ADMIN — Medication 1000 UNIT(S): at 12:20

## 2018-12-28 RX ADMIN — CARBIDOPA, LEVODOPA, AND ENTACAPONE 1 TABLET(S): 50; 200; 200 TABLET, FILM COATED ORAL at 12:20

## 2018-12-28 RX ADMIN — Medication 40 MILLIGRAM(S): at 05:53

## 2018-12-28 RX ADMIN — Medication 100 MILLIGRAM(S): at 17:46

## 2018-12-28 RX ADMIN — ENOXAPARIN SODIUM 40 MILLIGRAM(S): 100 INJECTION SUBCUTANEOUS at 22:51

## 2018-12-28 RX ADMIN — CARBIDOPA, LEVODOPA, AND ENTACAPONE 1 TABLET(S): 50; 200; 200 TABLET, FILM COATED ORAL at 23:00

## 2018-12-28 RX ADMIN — RANOLAZINE 1000 MILLIGRAM(S): 500 TABLET, FILM COATED, EXTENDED RELEASE ORAL at 17:46

## 2018-12-28 RX ADMIN — ISOSORBIDE MONONITRATE 60 MILLIGRAM(S): 60 TABLET, EXTENDED RELEASE ORAL at 12:20

## 2018-12-28 RX ADMIN — SENNA PLUS 1 TABLET(S): 8.6 TABLET ORAL at 22:51

## 2018-12-28 RX ADMIN — RANOLAZINE 1000 MILLIGRAM(S): 500 TABLET, FILM COATED, EXTENDED RELEASE ORAL at 05:52

## 2018-12-28 RX ADMIN — POLYETHYLENE GLYCOL 3350 17 GRAM(S): 17 POWDER, FOR SOLUTION ORAL at 22:51

## 2018-12-28 RX ADMIN — ATORVASTATIN CALCIUM 80 MILLIGRAM(S): 80 TABLET, FILM COATED ORAL at 22:51

## 2018-12-28 RX ADMIN — Medication 100 MILLIGRAM(S): at 05:52

## 2018-12-28 RX ADMIN — CARBIDOPA, LEVODOPA, AND ENTACAPONE 1 TABLET(S): 50; 200; 200 TABLET, FILM COATED ORAL at 17:46

## 2018-12-28 NOTE — PHYSICAL THERAPY INITIAL EVALUATION ADULT - IMPAIRMENTS CONTRIBUTING IMPAIRED BED MOBILITY, REHAB EVAL
impaired postural control/abnormal muscle tone/impaired motor control/decreased flexibility/decreased strength

## 2018-12-28 NOTE — CHART NOTE - NSCHARTNOTEFT_GEN_A_CORE
Was called to consult on pt for Parkinson's progression. On further inspection pt follows with the [facility] for his Parkinson's, for further medication management please call their office at [phone].

## 2018-12-28 NOTE — PHYSICAL THERAPY INITIAL EVALUATION ADULT - DIAGNOSIS, PT EVAL
Impaired mobility/function secondary to generalized weakness, decreased balance, poor safety awareness.

## 2018-12-28 NOTE — PHYSICAL THERAPY INITIAL EVALUATION ADULT - PERTINENT HX OF CURRENT PROBLEM, REHAB EVAL
88M PMH CAD/CABG/remote stents, Parkinson disease, HTN, HLD, prior DVT s/p IVCF, prior GIB, CKD III presents with multiple falls and lightheadedness. Of note, patient admitted earlier this year for R humerus fx after a mechanical fall, subsequently spent about 100 days in [facility], discharged home around 11/2018. Since being home has had generalized weakness and numerous falls (tripping over things).  Continued...

## 2018-12-28 NOTE — CONSULT NOTE ADULT - CONSULT REASON
hypotension     hx CAD/CABG/remote stents, Parkinson disease, HTN, HLD, prior DVT s/p IVCF, prior GIB, CKD III

## 2018-12-28 NOTE — PHYSICAL THERAPY INITIAL EVALUATION ADULT - PRECAUTIONS/LIMITATIONS, REHAB EVAL
pertinent history continued... pertinent history continued... . The past few days, weakness has been worse and he has had difficulty getting out of bed. Intermittently, also feels dizzy and lightheaded. Occasionally with room spinning. Reports that his appetite and fluid intake are good. He had a URI for which he completed a course of antibiotics about 2 days ago, couldn't recall name. Still with mild cough with some dark sputum, but reports feeling much better overall. No fevers, chills, chest pain. SOB, orthopnea, syncope, NVD, abdominal pain, constipation, diarrhea, dysuria. Has chronic LE edema, but has been minimal since lasix dose increased to 40mg daily. Home PT and the visiting nurse service expressed concerns that the patient's elderly wife was having difficulty caring for him. pertinent history continued... . The past few days, weakness has been worse and he has had difficulty getting out of bed. Intermittently, also feels dizzy and lightheaded. Occasionally with room spinning. Reports that his appetite and fluid intake are good. He had a URI for which he completed a course of antibiotics about 2 days ago, couldn't recall name. Still with mild cough with some dark sputum, but reports feeling much better overall. No fevers, chills, chest pain. SOB, orthopnea, syncope, NVD, abdominal pain, constipation, diarrhea, dysuria. Has chronic LE edema, but has been minimal since lasix dose increased to 40mg daily. Home PT and the visiting nurse service expressed concerns that the patient's elderly wife was having difficulty caring for him.   admitted with gait abnormality, failure to thrive, and an unsafe living situation. pertinent history continued... . The past few days, weakness has been worse and he has had difficulty getting out of bed. Intermittently, also feels dizzy and lightheaded. Occasionally with room spinning. Reports that his appetite and fluid intake are good. He had a URI for which he completed a course of antibiotics about 2 days ago, couldn't recall name. Still with mild cough with some dark sputum, but reports feeling much better overall. No fevers, chills, chest pain. SOB, orthopnea, syncope, NVD, abdominal pain, constipation, diarrhea, dysuria. Has chronic LE edema, but has been minimal since lasix dose increased to 40mg daily. Home PT and the visiting nurse service expressed concerns that the patient's elderly wife was having difficulty caring for him.   admitted with gait abnormality, failure to thrive, and an unsafe living situation./fall precautions/hearing precautions

## 2018-12-28 NOTE — PHYSICAL THERAPY INITIAL EVALUATION ADULT - IMPAIRMENTS CONTRIBUTING TO GAIT DEVIATIONS, PT EVAL
Pt required verbal cuing to stay within boundaries of walker and assist of PT to maneuver walker at times. Pt ambulated with (+)chair follow for safety./decreased strength/impaired balance/narrow base of support/impaired postural control

## 2018-12-28 NOTE — CONSULT NOTE ADULT - SUBJECTIVE AND OBJECTIVE BOX
CARDIOLOGY CONSULT - Dr. Washington     CHIEF COMPLAINT: hypotension     HPI:  88M PMH CAD/CABG/remote stents, Parkinson disease, HTN, HLD, prior DVT s/p IVCF, prior GIB, CKD III presents with multiple falls and lightheadedness. Of note, patient admitted earlier this year for R humerus fx after a mechanical fall, subsequently spent about 100 days in [facility], discharged home around 2018. Since being home has had generalized weakness and numerous falls (tripping over things). The past few days, weakness has been worse and he has had difficulty getting out of bed. Intermittently, also feels dizzy and lightheaded. Occasionally with room spinning. Reports that his appetite and fluid intake are good. He had a URI for which he completed a course of antibiotics about 2 days ago, couldn't recall name. Still with mild cough with some dark sputum, but reports feeling much better overall. No fevers, chills, chest pain. SOB, orthopnea, syncope, NVD, abdominal pain, constipation, diarrhea, dysuria. Has chronic LE edema, but has been minimal since lasix dose increased to 40mg daily. Home PT and the visiting nurse service expressed concerns that the patient's elderly wife was having difficulty caring for him. (26 Dec 2018 20:39)      PAST MEDICAL & SURGICAL HISTORY:  Unilateral inguinal hernia, with gangrene, not specified as recurrent  Upper GI bleed: MICU admission , EGD w/ gastric ulcer/visible vessel which was cauterized  Aspiration pneumonia  Clostridium difficile colitis  Pneumonia  MI, old  UTI (urinary tract infection)  BPH (benign prostatic hyperplasia)  Parkinsons Disease  CAD (Coronary Artery Disease)  HTN (Hypertension)  Hyperlipidemia  Presence of IVC filter: right upper arm placed last year   History of prostate surgery: 2016  Varicose veins of legs: h/o Vein stripping  S/P appendectomy  Stented coronary artery: cardiac stent x 2 - placement between  &amp; possible   Hx of CAB          PREVIOUS DIAGNOSTIC TESTING:    [ ] Echocardiogram: < from: Transthoracic Echocardiogram (10.29.15 @ 11:35) >  Conclusions:  1. Normal left ventricular internal dimensions and wall  thicknesses.  2. Mild segmental left ventricular systolic dysfunction.  Hypokinesis of the inferolateral wal    < end of copied text >    [ ]  Catheterization: < from: Cardiac Cath Lab (10.05.09 @ 15:45) >    [facility]  Department of Cardiology  [address] [place] [id number] [phone]  Cath Lab Report -- Comprehensive Report  Patient: RYAN FITZPATRICK  Study date: 10/05/2009  Account number: [id number]  MR number: [id number]  : 1930  Gender: Male  Race: W  Case Physician(s):  SHARRON Curtis M.D.  Fellow:  Martín Washington M.D.  Referring Physician:  Stu Diggs M.D.  Indications: Angina/MI: unstable angina, CCS class III, with onset more  than 7 days before admission. Coronary artery disease: suspected. Cardiac:  chest pain.  History: The patient has a history of coronary artery disease. The patient  has hypertension and medication-treated hypercholesterolemia. Prior  cardiovascular procedures: Stent of the 1st diagonal. Coronary bypass.  Procedure:  Left coronary angiography.  Right coronary angiography.  Left heart catheterization.  Bypass graft angiography.  Sheath Exchange for Intervention.  Intervention on SVG (distal anastomosis) from the aorta to RPLS: rotational  atherectomy, stent.  Technique: The risks and alternatives of the procedures and conscious  sedation were explained to the patient and informed consent was obtained.  Cardiac catheterization performed urgently. Coronary intervention  performed urgently. Cath lab room 3.  Right femoral artery access. A 5FR SHEATH PINNACLE was inserted in the  vessel. Left coronary artery angiography. The vessel was injected  utilizing a 5FR FL4.0 EXPO catheter. Right coronary artery angiography.  The vessel was injected utilizing a 5FR FR4.0 EXPO catheter. Left heart  catheterization. A 5FR  EXPO catheter was utilized. After recording  ascending aortic pressure, the catheter was advanced across the aortic  valve and left ventricular pressure was recorded. Graft angiography. The  vessel was injected utilizing a 5FR FR4.0 EXPO catheter. For proper  position, 4FR SRC catheter(s) were also used. Contrast given: Visipaque  116 ml. Visipaque 167 ml. Fluoroscopy time: 40 min.  A rotational atherectomy with stent was performed on the 90 % lesion in the  distal anastomosis of the saphenous vein graft from the aorta to the right  posterolateral segment. Following intervention there was an improved  angiographic appearance with a 5 % residual stenosis. According to the  ACC/AHA classification system, this lesion was a type C lesion. The  residual lesion was tubular. There was EDWIN 3 flow before the procedure  and EDWIN 3 flow after the procedure. Therewas no dissection. Vessel setup  was performed. A 6FR MB1 ZUMA GUIDE guiding catheter was used to intubate  the vessel. Vessel setup was performed. A PROWATER 180CM WIRE wire was  used to cross the lesion. Vessel setup was performed. A BMW 300CM WIRE  wire was used to cross the lesion. Vessel setup was performed. A PROWATER  180CM WIRE wire was used to cross the lesion. Balloon angioplasty was  performed, with a 3.0 X 15 OTW VOYAGER balloon and 1 inflations. Vessel  setup was performed. A COUGAR 300 WIRE wire was used to cross the lesion.  Vessel setup was performed. A COUGAR 190 WIRE wire was used to cross the  lesion. Balloon angioplasty was performed, with a 3.5 X 15 RX VOYAGER  balloon, 6 inflations, and a maximum inflation pressure of 12atm. A 3.00  X 15 ENDEAVOR SPRINT RX drug-eluting stent was placed across the lesion  and deployed with one inflation at a maximum inflation pressure of 14 lynn.  Balloon angioplasty was performed, with 1 inflations and a maximum  inflation pressure of 20 lynn. Balloon angioplasty was performed, with a  4.00 X 8 NC VOYAGER balloon, 1 inflations, and a maximum inflation   pressure of 28 lynn. Sheath Exchange for Intervention.  Medications given: Fentanyl, 25 mcg, IV. Nitroglycerin, 200 mcg,  intracoronary. Nicardipine (Cardene), 250 mcg, intracoronary. Bivalirudin  (Angiomax), 14.3 mg, IV. Lidocaine 1 percent, SC.  Ventricles: EF was not assessed.  Coronary vessels: The coronary circulation is right dominant.  LM:      Distal left main: There wasa 40 % stenosis.  LAD:      Proximal LAD: There was a diffuse 100 % stenosis. There was good  blood supply to the distal myocardium from a graft.  D1: There was a tubular 20 % stenosis at the site of a prior stent,  in-stent.  CX:      Circumflex: Angiography showed moderate atherosclerosis with no  clinical lesions appreciated.  OM1: Angiography showed minor luminal irregularities with no flow limiting  lesions.  RI:  Ramus intermedius: Angiography showed minor luminal irregularities with no  flow limiting lesions.  RCA:      Proximal RCA: There was a 100 % stenosis. There was good blood  supply to the distal myocardium from a graft.  RPLS: There was a tubular 70 % stenosis at the ostium of the vessel  segment.  GRAFTS:      Graft to the midLAD: The graft was a LIMA. It was poorly  visualized.  Graft to the right posterolateral segment: The graft was a very large sized  saphenous vein graft from the aorta. There was a tubular 90 % stenosis at  the distal anastomosis. The lesion was hazy. There was EDWIN grade 3 flow  through the graft (brisk flow).  Complications: There were no complications. Vessel closure (SVG-RPL)  occurred during the cath lab visit during an episode of no-reflow  successfully treated with IC nicardipine and stentplacement.  Summary:  Summary: Poorly visulaized LIMA (patent). Patent D1 stent. Severe distal  SVG-RPLS lesion. There is also a lesion the ostium of the RPL that  partially impedes retrofilling of the RPDA. No LV - gram was performed.  Addendum 09: The 8FR IABP on Attachment A was opened but not used in  this case.  Recommendations:  Aspirin and Plavix INDEFNITELY (Diffuse CAD, s/p DAKOTA).  Consideration for PCI on the RPL lesion should medical therapy not fully  treat patients angina.    < end of copied text >    [ ] Stress Test:  	    MEDICATIONS:  MEDICATIONS  (STANDING):  aspirin enteric coated 81 milliGRAM(s) Oral daily  atorvastatin 80 milliGRAM(s) Oral at bedtime  carbidopa/levodopa/entacapone 50/200/200 1 Tablet(s) Oral four times a day  cholecalciferol 1000 Unit(s) Oral daily  docusate sodium 100 milliGRAM(s) Oral two times a day  enoxaparin Injectable 40 milliGRAM(s) SubCutaneous every 24 hours  furosemide    Tablet 40 milliGRAM(s) Oral daily  isosorbide   mononitrate ER Tablet (IMDUR) 60 milliGRAM(s) Oral daily  polyethylene glycol 3350 17 Gram(s) Oral at bedtime  ranolazine 1000 milliGRAM(s) Oral two times a day  senna 1 Tablet(s) Oral daily      FAMILY HISTORY:  Family history of coronary artery disease (Father): Father,  of MI age 55  Family history of breast cancer: Mother      SOCIAL HISTORY:    [ ] Non-smoker  [ ] Smoker  [ ] Alcohol    Allergies    Cipro (Rash)    Intolerances    	    REVIEW OF SYSTEMS:  CONSTITUTIONAL: No fever, weight loss, or fatigue  EYES: No eye pain, visual disturbances, or discharge  ENMT:  No difficulty hearing, tinnitus, vertigo; No sinus or throat pain  NECK: No pain or stiffness  RESPIRATORY: No cough, wheezing, chills or hemoptysis; No Shortness of Breath  CARDIOVASCULAR: No chest pain, palpitations, passing out, dizziness, or leg swelling  GASTROINTESTINAL: No abdominal or epigastric pain. No nausea, vomiting, or hematemesis; No diarrhea or constipation. No melena or hematochezia.  GENITOURINARY: No dysuria, frequency, hematuria, or incontinence  NEUROLOGICAL: No headaches, memory loss, loss of strength, numbness, or tremors  SKIN: No itching, burning, rashes, or lesions   	    [ ] All others negative	  [ ] Unable to obtain    PHYSICAL EXAM:  T(C): 36.3 (18 @ 16:57), Max: 36.7 (18 @ 04:32)  HR: 61 (18 @ 16:57) (44 - 68)  BP: 146/72 (18 @ 16:57) (70/45 - 152/73)  RR: 18 (18 @ 16:57) (18 - 18)  SpO2: 97% (18 @ 16:57) (93% - 98%)  Wt(kg): --  I&O's Summary    27 Dec 2018 07:01  -  28 Dec 2018 07:00  --------------------------------------------------------  IN: 490 mL / OUT: 800 mL / NET: -310 mL        Appearance: Normal	  Psychiatry: A & O x 3, Mood & affect appropriate  HEENT:   Normal oral mucosa, PERRL, EOMI	  Lymphatic: No lymphadenopathy  Cardiovascular: Normal S1 S2,RRR, No JVD, No murmurs  Respiratory: Lungs clear to auscultation	  Gastrointestinal:  Soft, Non-tender, + BS	  Skin: No rashes, No ecchymoses, No cyanosis	  Neurologic: Non-focal  Extremities: Normal range of motion, No clubbing, cyanosis or edema  Vascular: Peripheral pulses palpable 2+ bilaterally    TELEMETRY: 	    ECG:  	  RADIOLOGY: < from: Xray Chest 1 View- PORTABLE-Urgent (18 @ 18:03) >  IMPRESSION:     Eft lower lobe pneumonia and/or atelectasis. Platelike atelectasis right   lower lobe. Status post sternotomy.    < end of copied text >    OTHER: 	  < from: CT Head No Cont (18 @ 17:37) >  IMPRESSION:  Head CT: No evidence for intracranial hemorrhage, mass effect, or   displaced calvarial fracture.     Cervical spine CT: No evidence for acute displaced fracture or traumatic   malalignment. Cervical degenerative spondylosis, as described above.    Stable exam.    < end of copied text >  	  LABS:	 	    CARDIAC MARKERS:                                  11.2   4.43  )-----------( 134      ( 27 Dec 2018 08:13 )             34.2         135  |  97  |  29<H>  ----------------------------<  113<H>  3.8   |  26  |  1.72<H>    Ca    8.5      28 Dec 2018 06:57        proBNP:   Lipid Profile:   HgA1c:   TSH: Thyroid Stimulating Hormone, Serum: 6.18 uIU/mL ( @ 08:34)

## 2018-12-29 LAB
ANION GAP SERPL CALC-SCNC: 13 MMOL/L — SIGNIFICANT CHANGE UP (ref 5–17)
BUN SERPL-MCNC: 33 MG/DL — HIGH (ref 7–23)
CALCIUM SERPL-MCNC: 8.2 MG/DL — LOW (ref 8.4–10.5)
CHLORIDE SERPL-SCNC: 97 MMOL/L — SIGNIFICANT CHANGE UP (ref 96–108)
CO2 SERPL-SCNC: 26 MMOL/L — SIGNIFICANT CHANGE UP (ref 22–31)
CREAT SERPL-MCNC: 1.72 MG/DL — HIGH (ref 0.5–1.3)
GLUCOSE SERPL-MCNC: 107 MG/DL — HIGH (ref 70–99)
POTASSIUM SERPL-MCNC: 4 MMOL/L — SIGNIFICANT CHANGE UP (ref 3.5–5.3)
POTASSIUM SERPL-SCNC: 4 MMOL/L — SIGNIFICANT CHANGE UP (ref 3.5–5.3)
SODIUM SERPL-SCNC: 136 MMOL/L — SIGNIFICANT CHANGE UP (ref 135–145)

## 2018-12-29 RX ORDER — POLYETHYLENE GLYCOL 3350 17 G/17G
17 POWDER, FOR SOLUTION ORAL DAILY
Qty: 0 | Refills: 0 | Status: DISCONTINUED | OUTPATIENT
Start: 2018-12-29 | End: 2019-01-03

## 2018-12-29 RX ADMIN — ENOXAPARIN SODIUM 40 MILLIGRAM(S): 100 INJECTION SUBCUTANEOUS at 22:43

## 2018-12-29 RX ADMIN — RANOLAZINE 1000 MILLIGRAM(S): 500 TABLET, FILM COATED, EXTENDED RELEASE ORAL at 17:41

## 2018-12-29 RX ADMIN — ISOSORBIDE MONONITRATE 60 MILLIGRAM(S): 60 TABLET, EXTENDED RELEASE ORAL at 12:00

## 2018-12-29 RX ADMIN — CARBIDOPA, LEVODOPA, AND ENTACAPONE 1 TABLET(S): 50; 200; 200 TABLET, FILM COATED ORAL at 17:41

## 2018-12-29 RX ADMIN — Medication 100 MILLIGRAM(S): at 05:28

## 2018-12-29 RX ADMIN — Medication 100 MILLIGRAM(S): at 17:41

## 2018-12-29 RX ADMIN — ATORVASTATIN CALCIUM 80 MILLIGRAM(S): 80 TABLET, FILM COATED ORAL at 22:43

## 2018-12-29 RX ADMIN — Medication 81 MILLIGRAM(S): at 11:59

## 2018-12-29 RX ADMIN — Medication 40 MILLIGRAM(S): at 05:28

## 2018-12-29 RX ADMIN — RANOLAZINE 1000 MILLIGRAM(S): 500 TABLET, FILM COATED, EXTENDED RELEASE ORAL at 05:28

## 2018-12-29 RX ADMIN — POLYETHYLENE GLYCOL 3350 17 GRAM(S): 17 POWDER, FOR SOLUTION ORAL at 11:59

## 2018-12-29 RX ADMIN — CARBIDOPA, LEVODOPA, AND ENTACAPONE 1 TABLET(S): 50; 200; 200 TABLET, FILM COATED ORAL at 11:59

## 2018-12-29 RX ADMIN — Medication 1000 UNIT(S): at 12:01

## 2018-12-29 RX ADMIN — SENNA PLUS 1 TABLET(S): 8.6 TABLET ORAL at 22:43

## 2018-12-29 RX ADMIN — CARBIDOPA, LEVODOPA, AND ENTACAPONE 1 TABLET(S): 50; 200; 200 TABLET, FILM COATED ORAL at 05:28

## 2018-12-29 NOTE — PROGRESS NOTE ADULT - ASSESSMENT
· Assessment		  88M PMH CAD/CABG/remote stents, Parkinson disease, HTN, HLD, prior DVT s/p IVCF, prior GIB, CKD III presents with multiple falls and lightheadedness, admitted with gait abnormality, failure to thrive, and an unsafe living situation.     Gait abnormality --generalized weakness, likely in the setting of deconditioning, Parkinson disease, and upper respiratory infection  --PT consult    Failure to thrive in adult --PT consult    Parkinsons Disease.  --c/w home carbidopa/levodopa/entecapone   --home Nuplazid not available--will have to be brought in from home.   --Neurology evaluation pending .  --B12, Folate, TSH    CAD (Coronary Artery Disease). --c/w aspirin and statin.  Cardiology evaluation Dr. Francis.    HTN (Hypertension). --c/w home metoprolol and imdur. Avoid aggressive BP goal in Parkinson disease due to autonomic instability.     Hyperlipidemia.--c/w statin.    Need for prophylactic measure.--VTE PPX lovenox. · Assessment		  88M PMH CAD/CABG/remote stents, Parkinson disease, HTN, HLD, prior DVT s/p IVCF, prior GIB, CKD III presents with multiple falls and lightheadedness, admitted with gait abnormality, failure to thrive, and an unsafe living situation.     Gait abnormality --generalized weakness, likely in the setting of deconditioning, Parkinson disease, and upper respiratory infection  --PT consult    Failure to thrive in adult --PT consult    Parkinsons Disease.  --c/w home carbidopa/levodopa/entecapone   --home Nuplazid not available--will have to be brought in from home.   --Neurology evaluation pending . Will recall.  --B12, Folate, TSH    CAD (Coronary Artery Disease). --c/w aspirin and statin.  Cardiology evaluation Dr. Francis.    HTN (Hypertension). --c/w home metoprolol and imdur. Avoid aggressive BP goal in Parkinson disease due to autonomic instability.     Hyperlipidemia.--c/w statin.    Need for prophylactic measure.--VTE PPX lovenox.

## 2018-12-30 PROCEDURE — 73030 X-RAY EXAM OF SHOULDER: CPT | Mod: 26,RT

## 2018-12-30 RX ADMIN — Medication 40 MILLIGRAM(S): at 05:19

## 2018-12-30 RX ADMIN — RANOLAZINE 1000 MILLIGRAM(S): 500 TABLET, FILM COATED, EXTENDED RELEASE ORAL at 17:47

## 2018-12-30 RX ADMIN — ISOSORBIDE MONONITRATE 60 MILLIGRAM(S): 60 TABLET, EXTENDED RELEASE ORAL at 17:47

## 2018-12-30 RX ADMIN — Medication 1000 UNIT(S): at 11:54

## 2018-12-30 RX ADMIN — CARBIDOPA, LEVODOPA, AND ENTACAPONE 1 TABLET(S): 50; 200; 200 TABLET, FILM COATED ORAL at 05:19

## 2018-12-30 RX ADMIN — CARBIDOPA, LEVODOPA, AND ENTACAPONE 1 TABLET(S): 50; 200; 200 TABLET, FILM COATED ORAL at 00:57

## 2018-12-30 RX ADMIN — RANOLAZINE 1000 MILLIGRAM(S): 500 TABLET, FILM COATED, EXTENDED RELEASE ORAL at 05:19

## 2018-12-30 RX ADMIN — CARBIDOPA, LEVODOPA, AND ENTACAPONE 1 TABLET(S): 50; 200; 200 TABLET, FILM COATED ORAL at 17:47

## 2018-12-30 RX ADMIN — POLYETHYLENE GLYCOL 3350 17 GRAM(S): 17 POWDER, FOR SOLUTION ORAL at 11:54

## 2018-12-30 RX ADMIN — Medication 100 MILLIGRAM(S): at 05:19

## 2018-12-30 RX ADMIN — ATORVASTATIN CALCIUM 80 MILLIGRAM(S): 80 TABLET, FILM COATED ORAL at 23:41

## 2018-12-30 RX ADMIN — CARBIDOPA, LEVODOPA, AND ENTACAPONE 1 TABLET(S): 50; 200; 200 TABLET, FILM COATED ORAL at 23:41

## 2018-12-30 RX ADMIN — Medication 81 MILLIGRAM(S): at 11:54

## 2018-12-30 RX ADMIN — CARBIDOPA, LEVODOPA, AND ENTACAPONE 1 TABLET(S): 50; 200; 200 TABLET, FILM COATED ORAL at 11:54

## 2018-12-30 RX ADMIN — ENOXAPARIN SODIUM 40 MILLIGRAM(S): 100 INJECTION SUBCUTANEOUS at 23:41

## 2018-12-30 RX ADMIN — Medication 100 MILLIGRAM(S): at 17:47

## 2018-12-30 RX ADMIN — SENNA PLUS 1 TABLET(S): 8.6 TABLET ORAL at 23:41

## 2018-12-30 NOTE — CONSULT NOTE ADULT - SUBJECTIVE AND OBJECTIVE BOX
Admitting Diagnosis:  Weakness (R53.1): WEAKNESS      HPI:  This is a 88y year old Male with the below past medical history significant for Parkinson's disease, HTN, HLD, CVT s/p IVC filter, GIB, CKD III, was recently hospitalized by fall about three months ago, sent to rehab and was discharged from rehab after 100 days. On return home suffered three falls. One in which he suddenly stood and began moving too quickly with his walker to get to a ringing phone. The walker rolled out in front of him and he fell forward to the ground suffering multiple fractures in his right shoulder and arm including the clavicle. Then suffered two more fall backwards while holding onto the walker. No neck or back pain. Reports no headaches. Also noted to be orthostatic on this admission    Past Medical History:  Unilateral inguinal hernia, with gangrene, not specified as recurrent (K40.40)  Upper GI bleed (K92.2): MICU admission , EGD w/ gastric ulcer/visible vessel which was cauterized  Aspiration pneumonia (J69.0)  Clostridium difficile colitis (A04.7)  Pneumonia (486)  MI, old (412)  UTI (urinary tract infection) (599.0)  BPH (benign prostatic hyperplasia) (600.00)  Parkinsons Disease (332.0)  CAD (Coronary Artery Disease) (414.00)  HTN (Hypertension) (401.9)  Hyperlipidemia (272.4)      Past Surgical History:  Presence of IVC filter (Z95.828): right upper arm placed last year   History of prostate surgery (Z98.89): 2016  Varicose veins of legs (454.9): h/o Vein stripping  S/P appendectomy (V45.89)  Stented coronary artery (V45.82): cardiac stent x 2 - placement between  &amp; possible   Hx of CABG (V45.81):   Hyperlipidemia (272.4)      Social History:  No toxic habits    Family History:  FAMILY HISTORY:  Family history of coronary artery disease (Father): Father,  of MI age 55  Family history of breast cancer: Mother      Allergies:  Cipro (Rash)      ROS:  Constitutional: Patient offers no complaints of fevers or significant weight loss  Ears, Nose, Mouth and Throat: The patient presents with no abnormalities of the head, ears, eyes, nose or throat  Skin: Patient offers no concerns of new rashes or lesions  Respiratory: The patient presents with no abnormalities of the respiratory tract  Cardiovascular: The patient presents with no cardiac abnormalities  Gastrointestinal: The patient presents with no abnormalities of the GI system  Genitourinary: The patient presents with no dysuria, hematuria or frequent urination  Neurological: See HPI  Endocrine: Patient offers no complaints of excessive thirst, urination, or heat/cold intolerance    Advanced care planning reviewed and noted in the chart.    Medications:  aspirin enteric coated 81 milliGRAM(s) Oral daily  atorvastatin 80 milliGRAM(s) Oral at bedtime  carbidopa/levodopa/entacapone 50/200/200 1 Tablet(s) Oral four times a day  cholecalciferol 1000 Unit(s) Oral daily  docusate sodium 100 milliGRAM(s) Oral two times a day  enoxaparin Injectable 40 milliGRAM(s) SubCutaneous every 24 hours  furosemide    Tablet 40 milliGRAM(s) Oral daily  isosorbide   mononitrate ER Tablet (IMDUR) 60 milliGRAM(s) Oral daily  polyethylene glycol 3350 17 Gram(s) Oral daily  ranolazine 1000 milliGRAM(s) Oral two times a day  senna 1 Tablet(s) Oral daily      Labs:        136  |  97  |  33<H>  ----------------------------<  107<H>  4.0   |  26  |  1.72<H>    Ca    8.2<L>      29 Dec 2018 06:48      CAPILLARY BLOOD GLUCOSE              Male    Vitals:  Vital Signs Last 24 Hrs  T(C): 36.3 (30 Dec 2018 05:04), Max: 37.3 (29 Dec 2018 23:37)  T(F): 97.4 (30 Dec 2018 05:04), Max: 99.1 (29 Dec 2018 23:37)  HR: 60 (30 Dec 2018 05:04) (60 - 74)  BP: 149/76 (30 Dec 2018 05:04) (127/75 - 155/70)  BP(mean): --  RR: 16 (30 Dec 2018 05:04) (16 - 18)  SpO2: 97% (30 Dec 2018 05:04) (97% - 99%)    NEUROLOGICAL EXAM:    Mental status: Awake, alert, and in no apparent distress. Oriented to person, place and time. Language function is normal. +Bradyphrenia.     Cranial Nerves: Pupils were equal, round, reactive to light. Extraocular movements were intact. Visual field were full. Fundoscopic exam was deferred. Facial sensation was intact to light touch. There was no facial asymmetry. The palate was upgoing symmetrically and tongue was midline. Hearing acuity was intact to finger rub AU. Shoulder shrug was full bilaterally  +Decreased blink rate  +Saccadic pursuits  + Hypophonic speech    Motor exam: Bulk was decreased throughout. Cogwheeling rigidity bilaterally, mild. +Bradykinesis.  Strength was 5/5 in all four extremities. Fine finger movements were symmetric and normal.     Reflexes: 1+ in the bilateral upper extremities. 1+ in the bilateral lower extremities. Toes were mute    Sensation: Intact to light touch    Coordination: Finger-nose-finger and heel-to-shin was without dysmetria.     Gait: Shuffles, unable to stand without assistance, kyphotic posture/captocormia.     < from: CT Cervical Spine No Cont (12.26.18 @ 17:37) >    EXAM:  CT CERVICAL SPINE                          EXAM:  CT BRAIN                            PROCEDURE DATE:  2018            INTERPRETATION:  CLINICAL INFORMATION: Status post fall. Question head   trauma.    TECHNIQUE: Noncontrast CT scan of the head and cervical spine was   performed on 2018. The head CT portion was performed with 5 mm   axial images. The cervical spine CT was performed with thin section axial   images with sagittal and coronal reformations.    COMPARISON: CT head and cervical spine from 2018..    FINDINGS:    HEAD:    There is no CT evidence of acute intracranial hemorrhage, extra-axial   collection, vasogenic edema, mass effect, midline shift, central   herniation, or hydrocephalus.     Prominent bilateral sulci and ventricles consistent with age-appropriate   cerebral volume loss. There is moderate patchy white matter   hypoattenuation which is nonspecific in etiology but likely related to   chronic microvascular ischemic disease.    The visualized paranasal sinuses are clear. Under pneumatization and   sclerotic changes of left mastoid air cells representing sequelae of   prior mastoiditis.    The soft tissues of the scalp are unremarkable. The calvarium is intact.   Ocular lens implants.    CERVICAL SPINE:    VERTEBRAL BODIES:  Normal in height. No fracture.    ALIGNMENT:  Mild anterolisthesis of C3 on C4, C4 on C5.  Straightening of   cervical lordosis.    INTERVERTEBRAL DISC SPACES: Multilevel disc space narrowing and facet   joint space narrowing.  Multilevel facet joint fusion involving C2, C3,   C4, and C5.    MISCELLANEOUS:  No prevertebral body soft tissue swelling.   Status post sternotomy. Right shoulder deformity compatible with changes   from prior proximal humerus fracture. Bilateral hearing aid device is   seen external ear cavity. A subcentimeter hypodense left thyroid nodule   with peripheral calcification (4:179).    IMPRESSION:  Head CT: No evidence for intracranial hemorrhage, mass effect, or   displaced calvarial fracture.     Cervical spine CT: No evidence for acute displaced fracture or traumatic   malalignment. Cervical degenerative spondylosis, as described above.    Stable exam.                KARLY CLEMENS M.D., RADIOLOGY RESIDENT  This document has been electronically signed.  BASIA DOWD M.D., ATTENDING RADIOLOGIST  This document has been electronically signed. Dec 26 2018  6:20PM                < from: CT Head No Cont (18 @ 17:37) >    EXAM:  CT CERVICAL SPINE                          EXAM:  CT BRAIN                            PROCEDURE DATE:  2018            INTERPRETATION:  CLINICAL INFORMATION: Status post fall. Question head   trauma.    TECHNIQUE: Noncontrast CT scan of the head and cervical spine was   performed on 2018. The head CT portion was performed with 5 mm   axial images. The cervical spine CT was performed with thin section axial   images with sagittal and coronal reformations.    COMPARISON: CT head and cervical spine from 2018..    FINDINGS:    HEAD:    There is no CT evidence of acute intracranial hemorrhage, extra-axial   collection, vasogenic edema, mass effect, midline shift, central   herniation, or hydrocephalus.     Prominent bilateral sulci and ventricles consistent with age-appropriate   cerebral volume loss. There is moderate patchy white matter   hypoattenuation which is nonspecific in etiology but likely related to   chronic microvascular ischemic disease.    The visualized paranasal sinuses are clear. Under pneumatization and   sclerotic changes of left mastoid air cells representing sequelae of   prior mastoiditis.    The soft tissues of the scalp are unremarkable. The calvarium is intact.   Ocular lens implants.    CERVICAL SPINE:    VERTEBRAL BODIES:  Normal in height. No fracture.    ALIGNMENT:  Mild anterolisthesis of C3 on C4, C4 on C5.  Straightening of   cervical lordosis.    INTERVERTEBRAL DISC SPACES: Multilevel disc space narrowing and facet   joint space narrowing.  Multilevel facet joint fusion involving C2, C3,   C4, and C5.    MISCELLANEOUS:  No prevertebral body soft tissue swelling.   Status post sternotomy. Right shoulder deformity compatible with changes   from prior proximal humerus fracture. Bilateral hearing aid device is   seen external ear cavity. A subcentimeter hypodense left thyroid nodule   with peripheral calcification (4:179).    IMPRESSION:  Head CT: No evidence for intracranial hemorrhage, mass effect, or   displaced calvarial fracture.     Cervical spine CT: No evidence for acute displaced fracture or traumatic   malalignment. Cervical degenerative spondylosis, as described above.    Stable exam.      KARLY CLEMENS M.D., RADIOLOGY RESIDENT  This document has been electronically signed.  BASIA DOWD M.D., ATTENDING RADIOLOGIST  This document has been electronically signed. Dec 26 2018  6:20PM

## 2018-12-30 NOTE — CONSULT NOTE ADULT - ASSESSMENT
Impression:    88M HTN, HLD, CAD, MI, DVT s/p IVC filter, CKD 3, advanced parkinson's disease with fall and humerous fracture about three months ago, discharged to rehab for 100 days, then sent home where he reports experiencing three more falls.     1. Falls mostly due to postural instability associated with PD. Needs to use walker at all times. If using rolling walker, needs model which has brakes engaged as default. More likely, needs to ambulate only with supervision.     2. PT    3. Continue Stalevo at home dose. No need to change dose. Rigidity is well controlled.     4. Continue nuplazid at home dose.     5. Fall precautions    6. By prior notes with "failure to thrive" but eating a steak when I entered the room and does not appear cachectic.     7. Avoid volume depletion as likely has some autonomic dysfunction related to PD    8. Avoid delirium provoking agents    9. Dispo planning. No further inpatient neurologic intervention at this time. Please call back with any questions. He has appointment to follow up with Dr. Jo as outpatient. Impression:    88M HTN, HLD, CAD, MI, DVT s/p IVC filter, CKD 3, advanced parkinson's disease with fall and humerous fracture about three months ago, discharged to rehab for 100 days, then sent home where he reports experiencing three more falls.     1. Falls mostly due to postural instability associated with PD. Needs to use walker at all times. If using rolling walker, needs model which has brakes engaged as default. More likely, needs to ambulate only with supervision.     2. PT    3. Continue Stalevo at home dose. No need to change dose. Rigidity is well controlled.     4. Continue nuplazid at home dose.     5. Fall precautions    6. By prior notes with "failure to thrive" but eating a steak when I entered the room and does not appear cachectic.     7. Avoid volume depletion as likely has some autonomic dysfunction related to PD    8. Avoid delirium provoking agents    9. TSH 6.28 - as per primary team    10. B12 : 628    11. Dispo planning. No further inpatient neurologic intervention at this time. Please call back with any questions. He has appointment to follow up with Dr. Jo as outpatient.

## 2018-12-30 NOTE — PROGRESS NOTE ADULT - ASSESSMENT
· Assessment		  88M PMH CAD/CABG/remote stents, Parkinson disease, HTN, HLD, prior DVT s/p IVCF, prior GIB, CKD III presents with multiple falls and lightheadedness, admitted with gait abnormality, failure to thrive, and an unsafe living situation.     Gait abnormality --generalized weakness, likely in the setting of deconditioning, Parkinson disease, and upper respiratory infection  --PT consult    Parkinsons Disease.  --c/w home carbidopa/levodopa/entecapone   --home Nuplazid not available--will have to be brought in from home.   --Neurology evaluation noted  .  -- Elevated TSH. Will repeat for confirmation.    CAD (Coronary Artery Disease). --c/w aspirin and statin.  Cardiology evaluation Dr. Francis.    HTN (Hypertension). --c/w home metoprolol and imdur. Avoid aggressive BP goal in Parkinson disease due to autonomic instability.     Hyperlipidemia.--c/w statin.    Need for prophylactic measure.--VTE PPX lovenox.     Sacral wound- wound care.    DCP in progress.  Tavares Hall MD pager [phone] · Assessment		  88M PMH CAD/CABG/remote stents, Parkinson disease, HTN, HLD, prior DVT s/p IVCF, prior GIB, CKD III presents with multiple falls and lightheadedness, admitted with gait abnormality, failure to thrive, and an unsafe living situation.     Gait abnormality --generalized weakness, likely in the setting of deconditioning, Parkinson disease, and upper respiratory infection  --PT consult    Parkinsons Disease.  --c/w home carbidopa/levodopa/entecapone   --home Nuplazid not available--will have to be brought in from home.   --Neurology evaluation noted  .  -- Elevated TSH. Will repeat for confirmation.    CAD (Coronary Artery Disease). --c/w aspirin and statin.  Cardiology evaluation Dr. Francis.    HTN (Hypertension). --c/w home metoprolol and imdur. Avoid aggressive BP goal in Parkinson disease due to autonomic instability.     Hyperlipidemia.--c/w statin.    Need for prophylactic measure.--VTE PPX lovenox.     Sacral wound- wound care.    R shoulder pain- will get Xray R shoulder.    DCP in progress.  Tavares Hall MD pager [phone]

## 2018-12-31 DIAGNOSIS — E03.9 HYPOTHYROIDISM, UNSPECIFIED: ICD-10-CM

## 2018-12-31 DIAGNOSIS — R53.1 WEAKNESS: ICD-10-CM

## 2018-12-31 LAB
ANION GAP SERPL CALC-SCNC: 11 MMOL/L — SIGNIFICANT CHANGE UP (ref 5–17)
BUN SERPL-MCNC: 32 MG/DL — HIGH (ref 7–23)
CALCIUM SERPL-MCNC: 8.5 MG/DL — SIGNIFICANT CHANGE UP (ref 8.4–10.5)
CHLORIDE SERPL-SCNC: 96 MMOL/L — SIGNIFICANT CHANGE UP (ref 96–108)
CO2 SERPL-SCNC: 26 MMOL/L — SIGNIFICANT CHANGE UP (ref 22–31)
CREAT SERPL-MCNC: 1.44 MG/DL — HIGH (ref 0.5–1.3)
GLUCOSE SERPL-MCNC: 103 MG/DL — HIGH (ref 70–99)
HCT VFR BLD CALC: 32.5 % — LOW (ref 39–50)
HGB BLD-MCNC: 10.5 G/DL — LOW (ref 13–17)
MCHC RBC-ENTMCNC: 27 PG — SIGNIFICANT CHANGE UP (ref 27–34)
MCHC RBC-ENTMCNC: 32.3 GM/DL — SIGNIFICANT CHANGE UP (ref 32–36)
MCV RBC AUTO: 83.5 FL — SIGNIFICANT CHANGE UP (ref 80–100)
PLATELET # BLD AUTO: 127 K/UL — LOW (ref 150–400)
POTASSIUM SERPL-MCNC: 4.2 MMOL/L — SIGNIFICANT CHANGE UP (ref 3.5–5.3)
POTASSIUM SERPL-SCNC: 4.2 MMOL/L — SIGNIFICANT CHANGE UP (ref 3.5–5.3)
RBC # BLD: 3.89 M/UL — LOW (ref 4.2–5.8)
RBC # FLD: 15.2 % — HIGH (ref 10.3–14.5)
SODIUM SERPL-SCNC: 133 MMOL/L — LOW (ref 135–145)
T3 SERPL-MCNC: 82 NG/DL — SIGNIFICANT CHANGE UP (ref 80–200)
T4 AB SER-ACNC: 7.6 UG/DL — SIGNIFICANT CHANGE UP (ref 4.6–12)
TSH SERPL-MCNC: 7.23 UIU/ML — HIGH (ref 0.27–4.2)
WBC # BLD: 5.34 K/UL — SIGNIFICANT CHANGE UP (ref 3.8–10.5)
WBC # FLD AUTO: 5.34 K/UL — SIGNIFICANT CHANGE UP (ref 3.8–10.5)

## 2018-12-31 RX ORDER — LEVOTHYROXINE SODIUM 125 MCG
25 TABLET ORAL DAILY
Qty: 0 | Refills: 0 | Status: DISCONTINUED | OUTPATIENT
Start: 2018-12-31 | End: 2019-01-03

## 2018-12-31 RX ADMIN — CARBIDOPA, LEVODOPA, AND ENTACAPONE 1 TABLET(S): 50; 200; 200 TABLET, FILM COATED ORAL at 17:12

## 2018-12-31 RX ADMIN — Medication 40 MILLIGRAM(S): at 05:05

## 2018-12-31 RX ADMIN — ISOSORBIDE MONONITRATE 60 MILLIGRAM(S): 60 TABLET, EXTENDED RELEASE ORAL at 12:47

## 2018-12-31 RX ADMIN — Medication 81 MILLIGRAM(S): at 12:47

## 2018-12-31 RX ADMIN — CARBIDOPA, LEVODOPA, AND ENTACAPONE 1 TABLET(S): 50; 200; 200 TABLET, FILM COATED ORAL at 15:50

## 2018-12-31 RX ADMIN — Medication 1000 UNIT(S): at 12:47

## 2018-12-31 RX ADMIN — ENOXAPARIN SODIUM 40 MILLIGRAM(S): 100 INJECTION SUBCUTANEOUS at 21:53

## 2018-12-31 RX ADMIN — RANOLAZINE 1000 MILLIGRAM(S): 500 TABLET, FILM COATED, EXTENDED RELEASE ORAL at 05:05

## 2018-12-31 RX ADMIN — SENNA PLUS 1 TABLET(S): 8.6 TABLET ORAL at 21:53

## 2018-12-31 RX ADMIN — Medication 100 MILLIGRAM(S): at 05:05

## 2018-12-31 RX ADMIN — CARBIDOPA, LEVODOPA, AND ENTACAPONE 1 TABLET(S): 50; 200; 200 TABLET, FILM COATED ORAL at 05:05

## 2018-12-31 RX ADMIN — ATORVASTATIN CALCIUM 80 MILLIGRAM(S): 80 TABLET, FILM COATED ORAL at 21:53

## 2018-12-31 RX ADMIN — CARBIDOPA, LEVODOPA, AND ENTACAPONE 1 TABLET(S): 50; 200; 200 TABLET, FILM COATED ORAL at 23:17

## 2018-12-31 RX ADMIN — POLYETHYLENE GLYCOL 3350 17 GRAM(S): 17 POWDER, FOR SOLUTION ORAL at 12:47

## 2018-12-31 RX ADMIN — RANOLAZINE 1000 MILLIGRAM(S): 500 TABLET, FILM COATED, EXTENDED RELEASE ORAL at 17:12

## 2018-12-31 RX ADMIN — Medication 100 MILLIGRAM(S): at 17:12

## 2018-12-31 NOTE — CONSULT NOTE ADULT - SUBJECTIVE AND OBJECTIVE BOX
HPI:  88M PMH CAD/CABG/remote stents, Parkinson disease, HTN, HLD, prior DVT s/p IVCF, prior GIB, CKD III presents with multiple falls and lightheadedness. Of note, patient admitted earlier this year for R humerus fx after a mechanical fall, subsequently spent about 100 days in [facility], discharged home around 2018. Since being home has had generalized weakness and numerous falls (tripping over things). The past few days, weakness has been worse and he has had difficulty getting out of bed. Intermittently, also feels dizzy and lightheaded. Occasionally with room spinning. Reports that his appetite and fluid intake are good. He had a URI for which he completed a course of antibiotics about 2 days ago, couldn't recall name. Still with mild cough with some dark sputum, but reports feeling much better overall. No fevers, chills, chest pain. SOB, orthopnea, syncope, NVD, abdominal pain, constipation, diarrhea, dysuria. Has chronic LE edema, but has been minimal since lasix dose increased to 40mg daily. Home PT and the visiting nurse service expressed concerns that the patient's elderly wife was having difficulty caring for him. (26 Dec 2018 20:39)  Patient has no history of thyroid disease, no neck surgeries, no neck radiation, no palpitations, feeling tired, no neck pain, no neck swelling.  PAST MEDICAL & SURGICAL HISTORY:  Unilateral inguinal hernia, with gangrene, not specified as recurrent  Upper GI bleed: MICU admission , EGD w/ gastric ulcer/visible vessel which was cauterized  Aspiration pneumonia  Clostridium difficile colitis  Pneumonia  MI, old  UTI (urinary tract infection)  BPH (benign prostatic hyperplasia)  Parkinsons Disease  CAD (Coronary Artery Disease)  HTN (Hypertension)  Hyperlipidemia  Presence of IVC filter: right upper arm placed last year   History of prostate surgery: 2016  Varicose veins of legs: h/o Vein stripping  S/P appendectomy  Stented coronary artery: cardiac stent x 2 - placement between  &amp; possible   Hx of CAB      FAMILY HISTORY:  Family history of coronary artery disease (Father): Father,  of MI age 55  Family history of breast cancer: Mother      Social History:    Outpatient Medications:    MEDICATIONS  (STANDING):  aspirin enteric coated 81 milliGRAM(s) Oral daily  atorvastatin 80 milliGRAM(s) Oral at bedtime  carbidopa/levodopa/entacapone 50/200/200 1 Tablet(s) Oral four times a day  cholecalciferol 1000 Unit(s) Oral daily  docusate sodium 100 milliGRAM(s) Oral two times a day  enoxaparin Injectable 40 milliGRAM(s) SubCutaneous every 24 hours  furosemide    Tablet 40 milliGRAM(s) Oral daily  isosorbide   mononitrate ER Tablet (IMDUR) 60 milliGRAM(s) Oral daily  levothyroxine 25 MICROGram(s) Oral daily  polyethylene glycol 3350 17 Gram(s) Oral daily  ranolazine 1000 milliGRAM(s) Oral two times a day  senna 1 Tablet(s) Oral daily    MEDICATIONS  (PRN):      Allergies    Cipro (Rash)    Intolerances      Review of Systems:  Constitutional: No fever, no chills  Eyes: No blurry vision  Neuro: No tremors  HEENT: No pain, no neck swelling  Cardiovascular: No chest pain, no palpitations  Respiratory: Has SOB, no cough  GI: No nausea, vomiting, abdominal pain  : No dysuria  Skin: no rash  MSK: Has leg swelling.  Psych: no depression  Endocrine: no polyuria, polydipsia    ALL OTHER SYSTEMS REVIEWED AND NEGATIVE    UNABLE TO OBTAIN    PHYSICAL EXAM:  VITALS: T(C): 36.6 (18 @ 12:19)  T(F): 97.9 (18 @ 12:19), Max: 98.3 (18 @ 19:36)  HR: 69 (18 @ 04:55) (62 - 69)  BP: 159/86 (18 @ 04:55) (111/62 - 159/86)  RR:  (18 - 18)  SpO2:  (98% - 100%)  Wt(kg): --  GENERAL: NAD, well-groomed, well-developed  EYES: No proptosis, no lid lag  HEENT:  Atraumatic, Normocephalic  THYROID: Normal size, no palpable nodules  RESPIRATORY: Clear to auscultation bilaterally; No rales, rhonchi, wheezing  CARDIOVASCULAR: Si S2, No murmurs;  GI: Soft, non distended, normal bowel sounds  SKIN: Dry, intact, No rashes or lesions  MUSCULOSKELETAL: Has BL lower extremity edema.  NEURO:  no tremor, sensation decreased in feet BL,                              10.5   5.34  )-----------( 127      ( 31 Dec 2018 09:13 )             32.5           133<L>  |  96  |  32<H>  ----------------------------<  103<H>  4.2   |  26  |  1.44<H>    EGFR if : 50<L>  EGFR if non : 43<L>    Ca    8.5              Thyroid Function Tests:   @ 09:11 TSH 7.23 FreeT4 -- T3 82 Anti TPO -- Anti Thyroglobulin Ab -- TSI --   @ 08:34 TSH 6.18 FreeT4 -- T3 -- Anti TPO -- Anti Thyroglobulin Ab -- TSI --              Radiology:

## 2018-12-31 NOTE — CONSULT NOTE ADULT - ASSESSMENT
Assessment  Hypothyroidism: 88y Male with no history of thyroid disease, not on any thyroid supplements, has weakness, recent TFTs show patient in subclinical hypothyroid state.    CAD: On medications, no chest pain, stable, monitored.  HTN: Controlled, no headaches, on medications.  CKD: Monitor labs/BMP,           Baldomero Tilley MD  Cell: [phone]  Office: [phone]

## 2018-12-31 NOTE — CONSULT NOTE ADULT - PROBLEM SELECTOR RECOMMENDATION 9
Started on synthroid 25 mcg PO daily, will get free T4, will continue current Synthroid dose for now, will FU

## 2018-12-31 NOTE — PROGRESS NOTE ADULT - ASSESSMENT
· Assessment		  88M PMH CAD/CABG/remote stents, Parkinson disease, HTN, HLD, prior DVT s/p IVCF, prior GIB, CKD III presents with multiple falls and lightheadedness, admitted with gait abnormality, failure to thrive, and an unsafe living situation.     Gait abnormality --generalized weakness, likely in the setting of deconditioning, Parkinson disease, and upper respiratory infection  --follow orthostatics.  --PT consult    Parkinsons Disease.  --c/w home carbidopa/levodopa/entecapone   --home Nuplazid not available--will have to be brought in from home.   --Neurology follow noted  .  -- Elevated TSH. Confirmed. Will start low dose Synthroid and follow TSH.    CAD (Coronary Artery Disease). --c/w aspirin and statin.     HTN (Hypertension). --c/w home metoprolol and imdur. Avoid aggressive BP goal in Parkinson disease due to autonomic instability.     Hyperlipidemia.--c/w statin.    Need for prophylactic measure.--VTE PPX lovenox.     Sacral wound- wound care.    R shoulder pain from old fracture. Ortho evaluation.    DCP in progress.    d/w wife at length. Patient does not have rehab days available.   Tavares Hall MD pager [phone]

## 2018-12-31 NOTE — PROGRESS NOTE ADULT - ASSESSMENT
Impression:    88M HTN, HLD, CAD, MI, DVT s/p IVC filter, CKD 3, advanced parkinson's disease with fall and humerous fracture about three months ago, discharged to rehab for 100 days, then sent home where he reports experiencing three more falls.     1. Falls mostly due to postural instability associated with PD. Needs to use walker at all times. If using rolling walker, needs model which has brakes engaged as default. More likely, needs to ambulate only with supervision.     2. PT    3. Continue Stalevo at home dose. No need to change dose. Rigidity is well controlled.     4. Continue nuplazid at home dose.     5. Fall precautions    6. By prior notes with "failure to thrive" but eating a steak when I entered the room and does not appear cachectic.     7. Avoid volume depletion as likely has some autonomic dysfunction related to PD    8. Avoid delirium provoking agents    9. TSH 6.28 - as per primary team    10. B12 : 628    11. Dispo planning. No further inpatient neurologic intervention at this time. Please call back with any questions. He has appointment to follow up with Dr. Jo as outpatient.   d/w rn to get oob to chair

## 2019-01-01 LAB
ANION GAP SERPL CALC-SCNC: 15 MMOL/L — SIGNIFICANT CHANGE UP (ref 5–17)
BUN SERPL-MCNC: 33 MG/DL — HIGH (ref 7–23)
CALCIUM SERPL-MCNC: 8.6 MG/DL — SIGNIFICANT CHANGE UP (ref 8.4–10.5)
CHLORIDE SERPL-SCNC: 95 MMOL/L — LOW (ref 96–108)
CO2 SERPL-SCNC: 23 MMOL/L — SIGNIFICANT CHANGE UP (ref 22–31)
CREAT SERPL-MCNC: 1.42 MG/DL — HIGH (ref 0.5–1.3)
GLUCOSE SERPL-MCNC: 106 MG/DL — HIGH (ref 70–99)
HCT VFR BLD CALC: 32.4 % — LOW (ref 39–50)
HGB BLD-MCNC: 10.5 G/DL — LOW (ref 13–17)
MCHC RBC-ENTMCNC: 27.1 PG — SIGNIFICANT CHANGE UP (ref 27–34)
MCHC RBC-ENTMCNC: 32.4 GM/DL — SIGNIFICANT CHANGE UP (ref 32–36)
MCV RBC AUTO: 83.5 FL — SIGNIFICANT CHANGE UP (ref 80–100)
PLATELET # BLD AUTO: 130 K/UL — LOW (ref 150–400)
POTASSIUM SERPL-MCNC: 4 MMOL/L — SIGNIFICANT CHANGE UP (ref 3.5–5.3)
POTASSIUM SERPL-SCNC: 4 MMOL/L — SIGNIFICANT CHANGE UP (ref 3.5–5.3)
RBC # BLD: 3.88 M/UL — LOW (ref 4.2–5.8)
RBC # FLD: 15.5 % — HIGH (ref 10.3–14.5)
SODIUM SERPL-SCNC: 133 MMOL/L — LOW (ref 135–145)
T4 FREE SERPL-MCNC: 1.2 NG/DL — SIGNIFICANT CHANGE UP (ref 0.9–1.8)
WBC # BLD: 4.83 K/UL — SIGNIFICANT CHANGE UP (ref 3.8–10.5)
WBC # FLD AUTO: 4.83 K/UL — SIGNIFICANT CHANGE UP (ref 3.8–10.5)

## 2019-01-01 RX ADMIN — Medication 81 MILLIGRAM(S): at 11:42

## 2019-01-01 RX ADMIN — CARBIDOPA, LEVODOPA, AND ENTACAPONE 1 TABLET(S): 50; 200; 200 TABLET, FILM COATED ORAL at 17:23

## 2019-01-01 RX ADMIN — CARBIDOPA, LEVODOPA, AND ENTACAPONE 1 TABLET(S): 50; 200; 200 TABLET, FILM COATED ORAL at 11:42

## 2019-01-01 RX ADMIN — CARBIDOPA, LEVODOPA, AND ENTACAPONE 1 TABLET(S): 50; 200; 200 TABLET, FILM COATED ORAL at 06:27

## 2019-01-01 RX ADMIN — POLYETHYLENE GLYCOL 3350 17 GRAM(S): 17 POWDER, FOR SOLUTION ORAL at 11:42

## 2019-01-01 RX ADMIN — Medication 25 MICROGRAM(S): at 06:27

## 2019-01-01 RX ADMIN — ISOSORBIDE MONONITRATE 60 MILLIGRAM(S): 60 TABLET, EXTENDED RELEASE ORAL at 11:42

## 2019-01-01 RX ADMIN — RANOLAZINE 1000 MILLIGRAM(S): 500 TABLET, FILM COATED, EXTENDED RELEASE ORAL at 06:27

## 2019-01-01 RX ADMIN — CARBIDOPA, LEVODOPA, AND ENTACAPONE 1 TABLET(S): 50; 200; 200 TABLET, FILM COATED ORAL at 23:26

## 2019-01-01 RX ADMIN — Medication 100 MILLIGRAM(S): at 06:27

## 2019-01-01 RX ADMIN — SENNA PLUS 1 TABLET(S): 8.6 TABLET ORAL at 23:26

## 2019-01-01 RX ADMIN — Medication 100 MILLIGRAM(S): at 17:23

## 2019-01-01 RX ADMIN — ATORVASTATIN CALCIUM 80 MILLIGRAM(S): 80 TABLET, FILM COATED ORAL at 23:26

## 2019-01-01 RX ADMIN — Medication 40 MILLIGRAM(S): at 06:27

## 2019-01-01 RX ADMIN — ENOXAPARIN SODIUM 40 MILLIGRAM(S): 100 INJECTION SUBCUTANEOUS at 23:26

## 2019-01-01 RX ADMIN — RANOLAZINE 1000 MILLIGRAM(S): 500 TABLET, FILM COATED, EXTENDED RELEASE ORAL at 17:23

## 2019-01-01 RX ADMIN — Medication 1000 UNIT(S): at 11:42

## 2019-01-01 NOTE — CONSULT NOTE ADULT - SUBJECTIVE AND OBJECTIVE BOX
88yMale a/w failure to thrive. Patient is s/p proximal humerus fracture on 9/14/18. Patient denies reinjury to this extremity. Patient denies numbness or tingling in the RUE. Patient denies any other injuries.    PMH:  Unilateral inguinal hernia, with gangrene, not specified as recurrent  Upper GI bleed  Aspiration pneumonia  Clostridium difficile colitis  Pneumonia  MI, old  UTI (urinary tract infection)  BPH (benign prostatic hyperplasia)  Parkinsons Disease  CAD (Coronary Artery Disease)  HTN (Hypertension)  Hyperlipidemia    PSH:  Presence of IVC filter  History of prostate surgery  Varicose veins of legs  S/P appendectomy  Stented coronary artery  Hx of CABG  Hyperlipidemia    AH:  Cipro (Rash)    Meds: See med rec    T(C): 36.7 (01-01-19 @ 04:44)  HR: 60 (01-01-19 @ 04:44)  BP: 132/80 (01-01-19 @ 04:44)  RR: 18 (01-01-19 @ 04:44)  SpO2: 96% (01-01-19 @ 04:44)  Wt(kg): --    PE RUE:  Skin intact, negative sulcus sign, AROM 0-30 degrees of abduction, PROM to 100 degrees of abduction, no pain with shoulder ROM, SILT C5-T1, +AIN/PIN/Ulnar/Radial/Musc/Median, +elbow/wrist ROM,  +radial pulse, soft compartments.    Secondary:  No TTP over bony landmarks, SILT BL, ROM intact BL, distal pulses palpable.    Imaging:  XR demonstrating healed right proximal humerus fracture

## 2019-01-01 NOTE — CONSULT NOTE ADULT - ASSESSMENT
88M s/p proximal humerus fracture 16 weeks ago  Patient is pain free with use of the shoulder  Fracture appears healed at this time  May be WBAT  Analgesia  DVT ppx  PT/OT  Incentive spirometry  Care per medicine  No acute surgical intervention  FU With Dr. Cohn 7-10 days from discharge of hospital or rehab  Orthopedic stable for DC  Will d/w attending any further recommendations

## 2019-01-01 NOTE — CONSULT NOTE ADULT - REASON FOR ADMISSION
fall, failure to thrive

## 2019-01-01 NOTE — PROGRESS NOTE ADULT - ASSESSMENT
· Assessment		  88M PMH CAD/CABG/remote stents, Parkinson disease, HTN, HLD, prior DVT s/p IVCF, prior GIB, CKD III presents with multiple falls and lightheadedness, admitted with gait abnormality, failure to thrive, and an unsafe living situation.     Gait abnormality --generalized weakness, likely in the setting of deconditioning, Parkinson disease, and upper respiratory infection  --follow orthostatics.  --PT consult    Parkinsons Disease.  --c/w home carbidopa/levodopa/entecapone   --home Nuplazid not available--will have to be brought in from home.   --Neurology follow noted  .  -- Elevated TSH. Confirmed. Continue low dose Synthroid and follow TSH. Endocrine evaluation noted.    CAD (Coronary Artery Disease). --c/w aspirin and statin.     HTN (Hypertension). --c/w home metoprolol and imdur. Avoid aggressive BP goal in Parkinson disease due to autonomic instability.     Hyperlipidemia.--c/w statin.    Need for prophylactic measure.--VTE PPX lovenox.     Sacral wound- wound care.    R shoulder pain from old fracture. Ortho evaluation.    DCP in progress.     Tavares Hall MD pager [phone]

## 2019-01-01 NOTE — PROGRESS NOTE ADULT - ASSESSMENT
Assessment  Hypothyroidism: 88y Male with no history of thyroid disease, has weakness, recent TFTs show patient in subclinical hypothyroid state, Free T4 1.2, started on Synthroid 25 mcg PO daily.  CAD: On medications, no chest pain, stable, monitored.  HTN: Controlled, no headaches, on medications.  CKD: Monitor labs/BMP,           Baldomero Tilley MD  Cell: [phone]  Office: [phone]

## 2019-01-02 ENCOUNTER — TRANSCRIPTION ENCOUNTER (OUTPATIENT)
Age: 84
End: 2019-01-02

## 2019-01-02 LAB
ANION GAP SERPL CALC-SCNC: 13 MMOL/L — SIGNIFICANT CHANGE UP (ref 5–17)
BUN SERPL-MCNC: 40 MG/DL — HIGH (ref 7–23)
CALCIUM SERPL-MCNC: 8.4 MG/DL — SIGNIFICANT CHANGE UP (ref 8.4–10.5)
CHLORIDE SERPL-SCNC: 95 MMOL/L — LOW (ref 96–108)
CO2 SERPL-SCNC: 25 MMOL/L — SIGNIFICANT CHANGE UP (ref 22–31)
CORTIS AM PEAK SERPL-MCNC: 10.2 UG/DL — SIGNIFICANT CHANGE UP (ref 6–18.4)
CREAT SERPL-MCNC: 1.53 MG/DL — HIGH (ref 0.5–1.3)
GLUCOSE SERPL-MCNC: 99 MG/DL — SIGNIFICANT CHANGE UP (ref 70–99)
POTASSIUM SERPL-MCNC: 4.5 MMOL/L — SIGNIFICANT CHANGE UP (ref 3.5–5.3)
POTASSIUM SERPL-SCNC: 4.5 MMOL/L — SIGNIFICANT CHANGE UP (ref 3.5–5.3)
SODIUM SERPL-SCNC: 133 MMOL/L — LOW (ref 135–145)

## 2019-01-02 RX ADMIN — Medication 100 MILLIGRAM(S): at 17:16

## 2019-01-02 RX ADMIN — POLYETHYLENE GLYCOL 3350 17 GRAM(S): 17 POWDER, FOR SOLUTION ORAL at 11:16

## 2019-01-02 RX ADMIN — RANOLAZINE 1000 MILLIGRAM(S): 500 TABLET, FILM COATED, EXTENDED RELEASE ORAL at 17:16

## 2019-01-02 RX ADMIN — RANOLAZINE 1000 MILLIGRAM(S): 500 TABLET, FILM COATED, EXTENDED RELEASE ORAL at 05:11

## 2019-01-02 RX ADMIN — CARBIDOPA, LEVODOPA, AND ENTACAPONE 1 TABLET(S): 50; 200; 200 TABLET, FILM COATED ORAL at 11:15

## 2019-01-02 RX ADMIN — Medication 100 MILLIGRAM(S): at 05:11

## 2019-01-02 RX ADMIN — SENNA PLUS 1 TABLET(S): 8.6 TABLET ORAL at 22:53

## 2019-01-02 RX ADMIN — CARBIDOPA, LEVODOPA, AND ENTACAPONE 1 TABLET(S): 50; 200; 200 TABLET, FILM COATED ORAL at 17:16

## 2019-01-02 RX ADMIN — Medication 40 MILLIGRAM(S): at 05:11

## 2019-01-02 RX ADMIN — Medication 1000 UNIT(S): at 17:16

## 2019-01-02 RX ADMIN — CARBIDOPA, LEVODOPA, AND ENTACAPONE 1 TABLET(S): 50; 200; 200 TABLET, FILM COATED ORAL at 05:11

## 2019-01-02 RX ADMIN — Medication 81 MILLIGRAM(S): at 11:16

## 2019-01-02 RX ADMIN — Medication 25 MICROGRAM(S): at 05:11

## 2019-01-02 RX ADMIN — ENOXAPARIN SODIUM 40 MILLIGRAM(S): 100 INJECTION SUBCUTANEOUS at 22:54

## 2019-01-02 RX ADMIN — CARBIDOPA, LEVODOPA, AND ENTACAPONE 1 TABLET(S): 50; 200; 200 TABLET, FILM COATED ORAL at 23:01

## 2019-01-02 RX ADMIN — ISOSORBIDE MONONITRATE 60 MILLIGRAM(S): 60 TABLET, EXTENDED RELEASE ORAL at 11:15

## 2019-01-02 RX ADMIN — ATORVASTATIN CALCIUM 80 MILLIGRAM(S): 80 TABLET, FILM COATED ORAL at 22:54

## 2019-01-02 NOTE — DISCHARGE NOTE ADULT - HOME CARE AGENCY
[facility] [phone] for RN visit to assess for home P/T for start of care the day after discharge

## 2019-01-02 NOTE — PROGRESS NOTE ADULT - ASSESSMENT
Assessment  Hypothyroidism: 88y Male with no history of thyroid disease, has weakness, recent TFTs show patient in subclinical hypothyroid state, Free T4 1.2, started on Synthroid 25 mcg PO daily.  CAD: On medications, no chest pain, stable, monitored.  HTN: Controlled, no headaches, on medications.  CKD: Monitor labs/BMP,           Baldomero Tilley MD  Cell: [phone]  Office: [phone]

## 2019-01-02 NOTE — DISCHARGE NOTE ADULT - PLAN OF CARE
ability to increase activity Home physical therapy  Small frequent meals for improving nutritional status  Sit in chair for meals continue synthroid  follow up with Dr. Garza in 2 weeks, call for appointment continue current medications

## 2019-01-02 NOTE — DISCHARGE NOTE ADULT - MEDICATION SUMMARY - MEDICATIONS TO TAKE
I will START or STAY ON the medications listed below when I get home from the hospital:    aspirin 81 mg oral tablet, chewable  -- 1 tab(s) by mouth once a day in pm  -- Indication: For blood thinner    isosorbide mononitrate 60 mg oral tablet, extended release  -- 1 tab(s) by mouth once a day (in the morning)  -- Indication: For treat chest pain    ranolazine 1000 mg oral tablet, extended release  -- 1 tab(s) by mouth 2 times a day  -- Indication: For treat chest pain    atorvastatin 80 mg oral tablet  -- 1 tab(s) by mouth once a day (at bedtime)  -- Indication: For decrease blood cholesterol    entacapone 200 mg oral tablet  -- 1 tab(s) by mouth 4 times a day  -- Indication: For treat parkinsons    carbidopa-levodopa 50 mg-200 mg oral tablet, extended release  -- 1 tab(s) by mouth 4 times a day  -- Indication: For treat parkinsons    Nuplazid 17 mg oral tablet  -- 1 tab(s) by mouth once a day  -- Indication: For treat agitation    Lasix 40 mg oral tablet  -- 1 tab(s) by mouth once a day  -- Indication: For Water pill    docusate sodium 100 mg oral tablet  -- 1 tab(s) by mouth 2 times a day  -- Indication: For stool softener    senna oral tablet  -- 1 tab(s) by mouth once a day in pm  -- Indication: For laxative    MiraLax oral powder for reconstitution  --  by mouth once a day in pm  -- Indication: For laxative    levothyroxine 25 mcg (0.025 mg) oral tablet  -- 1 tab(s) by mouth once a day  -- Indication: For treat hypothyroidism    Vitamin D3 1000 intl units oral capsule  -- 1 cap(s) by mouth once a day  -- Indication: For vitamin supplement

## 2019-01-02 NOTE — DISCHARGE NOTE ADULT - MEDICATION SUMMARY - MEDICATIONS TO STOP TAKING
I will STOP taking the medications listed below when I get home from the hospital:    Metoprolol Tartrate 50 mg oral tablet  -- 1 tab(s) by mouth 2 times a day

## 2019-01-02 NOTE — DISCHARGE NOTE ADULT - PATIENT PORTAL LINK FT
You can access the [facility] Patient Portal, offered by [facility], by registering with the following website: [email or web address]

## 2019-01-02 NOTE — DISCHARGE NOTE ADULT - CARE PROVIDER_API CALL
Alvarez Cohn), Orthopaedic Surgery  [address]  [address]  [place] [address]  Phone: [phone]  Fax: [phone]    Radha Mcmahon), Internal Medicine  [address] [id number]  Phone: [phone]  Fax: [phone]    Baldomero Tillye), EndocrinologyMetabDiabetes; Internal Medicine  [address] [place] [address]  Phone: [phone]  Fax: [phone]

## 2019-01-02 NOTE — DISCHARGE NOTE ADULT - HOSPITAL COURSE
Hypothyroidism: 88y Male with no history of thyroid disease, has weakness, recent TFTs show patient in subclinical hypothyroid state, Free T4 1.2, started on Synthroid 25 mcg PO daily.Cortisol >10,  adrenal insufficiency ruled out, Hypothyroidism: 88y Male with no history of thyroid disease, has weakness, recent TFTs show patient in subclinical hypothyroid state, Free T4 1.2, started on Synthroid 25 mcg PO daily.Cortisol >10,  adrenal insufficiency ruled out,   12/26   RVP neg  CTH neg, pelvis xray and CXR neg  PT [ ] SW [ ] - HYACINTH?, Parkinsons, FTT   12/27	* Neurology consult (p)  12/28 Neuro called will come and see patient, but recommended to reach out to private neurologist who takes care of patient's parkinson's problem.   12/28 Dr. Francis cardiologist paged [phone] rachel consult re: orthostatic VS. Toprol dcd  12/31	Per neuro falls likely due to postural instability associated with parkinson's disease. shoulder pain 2/2 humerous fx 3 mo ago, no need for ortho per attending   1/2/19	* Plan is for dc to home with homecare; PT recc HYACINTH but used up days  	* Medically cleared for discharge    Patient discharged home with home PT.  Lopressor dc'd due to episodes of bradycardia.

## 2019-01-02 NOTE — PROGRESS NOTE ADULT - ASSESSMENT
· Assessment		  88M PMH CAD/CABG/remote stents, Parkinson disease, HTN, HLD, prior DVT s/p IVCF, prior GIB, CKD III presents with multiple falls and lightheadedness, admitted with gait abnormality, failure to thrive, and an unsafe living situation.     Gait abnormality --generalized weakness, likely in the setting of deconditioning, Parkinson disease, and upper respiratory infection  --follow orthostatics.  --PT follow    Parkinsons Disease.  --c/w home carbidopa/levodopa/entecapone   --home Nuplazid not available--will have to be brought in from home.   --Neurology follow noted  .  -- Elevated TSH. Confirmed. Continue low dose Synthroid and follow TSH. Endocrine follow.    CAD (Coronary Artery Disease). --c/w aspirin and statin.     HTN (Hypertension). --c/w home metoprolol and imdur. Avoid aggressive BP goal in Parkinson disease due to autonomic instability.     Hyperlipidemia.--c/w statin.    Need for prophylactic measure.--VTE PPX lovenox.     Sacral wound- wound care.    R shoulder pain from old fracture. Ortho evaluation.    DCP in progress home with PT.     Tavares Hall MD pager [phone]

## 2019-01-02 NOTE — DISCHARGE NOTE ADULT - CARE PLAN
Principal Discharge DX:	Failure to thrive in adult  Secondary Diagnosis:	Hypothyroidism, unspecified type  Secondary Diagnosis:	CAD (Coronary Artery Disease) Principal Discharge DX:	Failure to thrive in adult  Goal:	ability to increase activity  Assessment and plan of treatment:	Home physical therapy  Small frequent meals for improving nutritional status  Sit in chair for meals  Secondary Diagnosis:	Hypothyroidism, unspecified type  Assessment and plan of treatment:	continue synthroid  follow up with Dr. Garza in 2 weeks, call for appointment  Secondary Diagnosis:	CAD (Coronary Artery Disease)  Assessment and plan of treatment:	continue current medications

## 2019-01-02 NOTE — DISCHARGE NOTE ADULT - ADDITIONAL INSTRUCTIONS
Follow up with your PCP Dr. Mcmahon within 2 weeks, call for appointment  FU With Dr. Cohn (orthopedics) 7-10 days from discharge of hospital or rehab Follow up with your PCP Dr. Mcmahon within 2 weeks, call for appointment  FU With Dr. Cohn (orthopedics) 7-10 days from discharge of hospital or rehab  Follow up with Dr. Tilley (endocrinology) in 2 weeks, call for appointment

## 2019-01-03 VITALS
OXYGEN SATURATION: 96 % | TEMPERATURE: 98 F | RESPIRATION RATE: 18 BRPM | HEART RATE: 66 BPM | SYSTOLIC BLOOD PRESSURE: 158 MMHG | DIASTOLIC BLOOD PRESSURE: 84 MMHG

## 2019-01-03 LAB
ANION GAP SERPL CALC-SCNC: 13 MMOL/L — SIGNIFICANT CHANGE UP (ref 5–17)
BUN SERPL-MCNC: 37 MG/DL — HIGH (ref 7–23)
CALCIUM SERPL-MCNC: 8.5 MG/DL — SIGNIFICANT CHANGE UP (ref 8.4–10.5)
CHLORIDE SERPL-SCNC: 94 MMOL/L — LOW (ref 96–108)
CO2 SERPL-SCNC: 26 MMOL/L — SIGNIFICANT CHANGE UP (ref 22–31)
CREAT SERPL-MCNC: 1.37 MG/DL — HIGH (ref 0.5–1.3)
GLUCOSE SERPL-MCNC: 104 MG/DL — HIGH (ref 70–99)
POTASSIUM SERPL-MCNC: 4.4 MMOL/L — SIGNIFICANT CHANGE UP (ref 3.5–5.3)
POTASSIUM SERPL-SCNC: 4.4 MMOL/L — SIGNIFICANT CHANGE UP (ref 3.5–5.3)
SODIUM SERPL-SCNC: 133 MMOL/L — LOW (ref 135–145)

## 2019-01-03 PROCEDURE — 84132 ASSAY OF SERUM POTASSIUM: CPT

## 2019-01-03 PROCEDURE — 82746 ASSAY OF FOLIC ACID SERUM: CPT

## 2019-01-03 PROCEDURE — 85610 PROTHROMBIN TIME: CPT

## 2019-01-03 PROCEDURE — 84480 ASSAY TRIIODOTHYRONINE (T3): CPT

## 2019-01-03 PROCEDURE — 85014 HEMATOCRIT: CPT

## 2019-01-03 PROCEDURE — 71045 X-RAY EXAM CHEST 1 VIEW: CPT

## 2019-01-03 PROCEDURE — 99285 EMERGENCY DEPT VISIT HI MDM: CPT

## 2019-01-03 PROCEDURE — 85730 THROMBOPLASTIN TIME PARTIAL: CPT

## 2019-01-03 PROCEDURE — 72170 X-RAY EXAM OF PELVIS: CPT

## 2019-01-03 PROCEDURE — 84439 ASSAY OF FREE THYROXINE: CPT

## 2019-01-03 PROCEDURE — 97162 PT EVAL MOD COMPLEX 30 MIN: CPT

## 2019-01-03 PROCEDURE — 87633 RESP VIRUS 12-25 TARGETS: CPT

## 2019-01-03 PROCEDURE — 83605 ASSAY OF LACTIC ACID: CPT

## 2019-01-03 PROCEDURE — 84443 ASSAY THYROID STIM HORMONE: CPT

## 2019-01-03 PROCEDURE — 87798 DETECT AGENT NOS DNA AMP: CPT

## 2019-01-03 PROCEDURE — 80048 BASIC METABOLIC PNL TOTAL CA: CPT

## 2019-01-03 PROCEDURE — 70450 CT HEAD/BRAIN W/O DYE: CPT

## 2019-01-03 PROCEDURE — 97530 THERAPEUTIC ACTIVITIES: CPT

## 2019-01-03 PROCEDURE — 72125 CT NECK SPINE W/O DYE: CPT

## 2019-01-03 PROCEDURE — 82435 ASSAY OF BLOOD CHLORIDE: CPT

## 2019-01-03 PROCEDURE — 84484 ASSAY OF TROPONIN QUANT: CPT

## 2019-01-03 PROCEDURE — 82803 BLOOD GASES ANY COMBINATION: CPT

## 2019-01-03 PROCEDURE — 87486 CHLMYD PNEUM DNA AMP PROBE: CPT

## 2019-01-03 PROCEDURE — 82272 OCCULT BLD FECES 1-3 TESTS: CPT

## 2019-01-03 PROCEDURE — 81001 URINALYSIS AUTO W/SCOPE: CPT

## 2019-01-03 PROCEDURE — 80053 COMPREHEN METABOLIC PANEL: CPT

## 2019-01-03 PROCEDURE — 84295 ASSAY OF SERUM SODIUM: CPT

## 2019-01-03 PROCEDURE — 82330 ASSAY OF CALCIUM: CPT

## 2019-01-03 PROCEDURE — 82533 TOTAL CORTISOL: CPT

## 2019-01-03 PROCEDURE — 87581 M.PNEUMON DNA AMP PROBE: CPT

## 2019-01-03 PROCEDURE — 84436 ASSAY OF TOTAL THYROXINE: CPT

## 2019-01-03 PROCEDURE — 93005 ELECTROCARDIOGRAM TRACING: CPT

## 2019-01-03 PROCEDURE — 97116 GAIT TRAINING THERAPY: CPT

## 2019-01-03 PROCEDURE — 82607 VITAMIN B-12: CPT

## 2019-01-03 PROCEDURE — 73030 X-RAY EXAM OF SHOULDER: CPT

## 2019-01-03 PROCEDURE — 85027 COMPLETE CBC AUTOMATED: CPT

## 2019-01-03 PROCEDURE — 82947 ASSAY GLUCOSE BLOOD QUANT: CPT

## 2019-01-03 RX ORDER — METOPROLOL TARTRATE 50 MG
1 TABLET ORAL
Qty: 0 | Refills: 0 | COMMUNITY

## 2019-01-03 RX ORDER — LEVOTHYROXINE SODIUM 125 MCG
1 TABLET ORAL
Qty: 30 | Refills: 0
Start: 2019-01-03

## 2019-01-03 RX ADMIN — CARBIDOPA, LEVODOPA, AND ENTACAPONE 1 TABLET(S): 50; 200; 200 TABLET, FILM COATED ORAL at 11:27

## 2019-01-03 RX ADMIN — Medication 100 MILLIGRAM(S): at 05:26

## 2019-01-03 RX ADMIN — ISOSORBIDE MONONITRATE 60 MILLIGRAM(S): 60 TABLET, EXTENDED RELEASE ORAL at 11:27

## 2019-01-03 RX ADMIN — RANOLAZINE 1000 MILLIGRAM(S): 500 TABLET, FILM COATED, EXTENDED RELEASE ORAL at 05:26

## 2019-01-03 RX ADMIN — Medication 25 MICROGRAM(S): at 05:26

## 2019-01-03 RX ADMIN — Medication 81 MILLIGRAM(S): at 11:27

## 2019-01-03 RX ADMIN — Medication 40 MILLIGRAM(S): at 05:26

## 2019-01-03 RX ADMIN — Medication 1000 UNIT(S): at 11:28

## 2019-01-03 RX ADMIN — POLYETHYLENE GLYCOL 3350 17 GRAM(S): 17 POWDER, FOR SOLUTION ORAL at 11:27

## 2019-01-03 RX ADMIN — CARBIDOPA, LEVODOPA, AND ENTACAPONE 1 TABLET(S): 50; 200; 200 TABLET, FILM COATED ORAL at 05:26

## 2019-01-03 NOTE — PROGRESS NOTE ADULT - SUBJECTIVE AND OBJECTIVE BOX
Patient is a 88y old  Male who presents with a chief complaint of fall, failure to thrive (26 Dec 2018 20:39)    Admitted overnight by hospitalist service   SUBJECTIVE / OVERNIGHT EVENTS: Comfortable without new complaints.   Review of Systems  chest pain no  palpitations no  sob no  nausea no  headache no    MEDICATIONS  (STANDING):  aspirin enteric coated 81 milliGRAM(s) Oral daily  atorvastatin 80 milliGRAM(s) Oral at bedtime  carbidopa/levodopa/entacapone 50/200/200 1 Tablet(s) Oral four times a day  cholecalciferol 1000 Unit(s) Oral daily  docusate sodium 100 milliGRAM(s) Oral two times a day  enoxaparin Injectable 40 milliGRAM(s) SubCutaneous every 24 hours  furosemide    Tablet 40 milliGRAM(s) Oral daily  isosorbide   mononitrate ER Tablet (IMDUR) 60 milliGRAM(s) Oral daily  metoprolol tartrate 50 milliGRAM(s) Oral two times a day  polyethylene glycol 3350 17 Gram(s) Oral at bedtime  ranolazine 1000 milliGRAM(s) Oral two times a day  senna 1 Tablet(s) Oral daily    MEDICATIONS  (PRN):      Vital Signs Last 24 Hrs  T(C): 36.3 (27 Dec 2018 15:37), Max: 36.9 (26 Dec 2018 21:00)  T(F): 97.3 (27 Dec 2018 15:37), Max: 98.4 (26 Dec 2018 21:00)  HR: 61 (27 Dec 2018 15:37) (50 - 61)  BP: 160/84 (27 Dec 2018 15:37) (128/73 - 161/85)  BP(mean): 8 (27 Dec 2018 08:34) (8 - 8)  RR: 17 (27 Dec 2018 15:37) (16 - 18)  SpO2: 95% (27 Dec 2018 15:37) (95% - 99%)    PHYSICAL EXAM:  GENERAL: NAD, well-developed  HEAD:  Atraumatic, Normocephalic  EYES: EOMI, PERRLA, conjunctiva and sclera clear  NECK: Supple, No JVD  CHEST/LUNG: Clear to auscultation bilaterally; No wheeze  HEART: Regular rate and rhythm; No murmurs, rubs, or gallops  ABDOMEN: Soft, Nontender, Nondistended; Bowel sounds present  EXTREMITIES:  2+ Peripheral Pulses, No clubbing, cyanosis, or edema  NEUROLOGY: non-focal  SKIN: No rashes or lesions    LABS:                        11.2   4.43  )-----------( 134      ( 27 Dec 2018 08:13 )             34.2         135  |  99  |  24<H>  ----------------------------<  98  3.7   |  25  |  1.40<H>    Ca    8.3<L>      27 Dec 2018 05:09    TPro  6.5  /  Alb  3.3  /  TBili  0.6  /  DBili  x   /  AST  19  /  ALT  5<L>  /  AlkPhos  61      PT/INR - ( 26 Dec 2018 17:02 )   PT: 15.3 sec;   INR: 1.32 ratio         PTT - ( 26 Dec 2018 17:02 )  PTT:32.3 sec      Urinalysis Basic - ( 26 Dec 2018 17:02 )    Color: Yellow / Appearance: Clear / S.012 / pH: x  Gluc: x / Ketone: Negative  / Bili: Negative / Urobili: Negative   Blood: x / Protein: Negative / Nitrite: Negative   Leuk Esterase: Negative / RBC: 4 /hpf / WBC 3 /hpf   Sq Epi: x / Non Sq Epi: 0 /hpf / Bacteria: Negative          RADIOLOGY & ADDITIONAL TESTS:    Imaging Personally Reviewed:    Consultant(s) Notes Reviewed:      Care Discussed with Consultants/Other Providers:
Chief complaint  Patient is a 88y old  Male who presents with a chief complaint of fall, failure to thrive (01 Jan 2019 06:24)   Review of systems  Patient in bed, looks comfortable, no fever, no hypoglycemia.    Labs and Fingersticks  CAPILLARY BLOOD GLUCOSE    Anion Gap, Serum: 15 (01-01 @ 07:07)  Anion Gap, Serum: 11 (12-31 @ 07:19)      Calcium, Total Serum: 8.6 (01-01 @ 07:07)  Calcium, Total Serum: 8.5 (12-31 @ 07:19)    01-01    133<L>  |  95<L>  |  33<H>  ----------------------------<  106<H>  4.0   |  23  |  1.42<H>    Ca    8.6      01 Jan 2019 07:07                          10.5   4.83  )-----------( 130      ( 01 Jan 2019 09:18 )             32.4     Medications  MEDICATIONS  (STANDING):  aspirin enteric coated 81 milliGRAM(s) Oral daily  atorvastatin 80 milliGRAM(s) Oral at bedtime  carbidopa/levodopa/entacapone 50/200/200 1 Tablet(s) Oral four times a day  cholecalciferol 1000 Unit(s) Oral daily  docusate sodium 100 milliGRAM(s) Oral two times a day  enoxaparin Injectable 40 milliGRAM(s) SubCutaneous every 24 hours  furosemide    Tablet 40 milliGRAM(s) Oral daily  isosorbide   mononitrate ER Tablet (IMDUR) 60 milliGRAM(s) Oral daily  levothyroxine 25 MICROGram(s) Oral daily  polyethylene glycol 3350 17 Gram(s) Oral daily  ranolazine 1000 milliGRAM(s) Oral two times a day  senna 1 Tablet(s) Oral daily      Physical Exam  General: Patient comfortable in bed  Vital Signs Last 12 Hrs  T(F): 97.8 (01-01-19 @ 16:09), Max: 97.8 (01-01-19 @ 16:09)  HR: 57 (01-01-19 @ 16:09) (57 - 61)  BP: 107/63 (01-01-19 @ 16:09) (107/63 - 120/65)  BP(mean): --  RR: 18 (01-01-19 @ 16:09) (18 - 18)  SpO2: 100% (01-01-19 @ 16:09) (98% - 100%)  Neck: No palpable thyroid nodules.  CVS: S1S2, No murmurs  Respiratory: No wheezing, no crepitations  GI: Abdomen soft, bowel sounds positive  Musculoskeletal:  edema lower extremities.   Skin: No skin rashes, no ecchymosis    Diagnostics    Free Thyroxine, Serum: AM Sched. Collection: 01-Jan-2019 06:00 (12-31 @ 15:58)
Chief complaint  Patient is a 88y old  Male who presents with a chief complaint of fall, failure to thrive (02 Jan 2019 19:26)   Review of systems  Patient in bed, looks comfortable, no fever, no hypoglycemia.    Labs and Fingersticks  CAPILLARY BLOOD GLUCOSE          Anion Gap, Serum: 13 (01-02 @ 06:30)  Anion Gap, Serum: 15 (01-01 @ 07:07)      Calcium, Total Serum: 8.4 (01-02 @ 06:30)  Calcium, Total Serum: 8.6 (01-01 @ 07:07)          01-02    133<L>  |  95<L>  |  40<H>  ----------------------------<  99  4.5   |  25  |  1.53<H>    Ca    8.4      02 Jan 2019 06:30                          10.5   4.83  )-----------( 130      ( 01 Jan 2019 09:18 )             32.4     Medications  MEDICATIONS  (STANDING):  aspirin enteric coated 81 milliGRAM(s) Oral daily  atorvastatin 80 milliGRAM(s) Oral at bedtime  carbidopa/levodopa/entacapone 50/200/200 1 Tablet(s) Oral four times a day  cholecalciferol 1000 Unit(s) Oral daily  docusate sodium 100 milliGRAM(s) Oral two times a day  enoxaparin Injectable 40 milliGRAM(s) SubCutaneous every 24 hours  furosemide    Tablet 40 milliGRAM(s) Oral daily  isosorbide   mononitrate ER Tablet (IMDUR) 60 milliGRAM(s) Oral daily  levothyroxine 25 MICROGram(s) Oral daily  polyethylene glycol 3350 17 Gram(s) Oral daily  ranolazine 1000 milliGRAM(s) Oral two times a day  senna 1 Tablet(s) Oral daily      Physical Exam  General: Patient comfortable in bed  Vital Signs Last 12 Hrs  T(F): 97.7 (01-02-19 @ 20:07), Max: 97.7 (01-02-19 @ 20:07)  HR: 67 (01-02-19 @ 20:07) (62 - 79)  BP: 147/76 (01-02-19 @ 20:07) (98/60 - 159/79)  BP(mean): --  RR: 18 (01-02-19 @ 20:07) (16 - 18)  SpO2: 100% (01-02-19 @ 20:07) (97% - 100%)  Neck: No palpable thyroid nodules.  CVS: S1S2, No murmurs  Respiratory: No wheezing, no crepitations  GI: Abdomen soft, bowel sounds positive  Musculoskeletal:  edema lower extremities.   Skin: No skin rashes, no ecchymosis    Diagnostics    Free Thyroxine, Serum: AM Sched. Collection: 01-Jan-2019 06:00 (12-31 @ 15:58)  Cortisol AM, Serum: AM Sched. Collection: 02-Jan-2019 06:00 (01-01 @ 18:17)
Chief complaint  Patient is a 88y old  Male who presents with a chief complaint of fall, failure to thrive (02 Jan 2019 21:49)   Review of systems  Patient in bed, looks comfortable, no fever, no hypoglycemia.    Labs and Fingersticks  CAPILLARY BLOOD GLUCOSE          Anion Gap, Serum: 13 (01-03 @ 06:42)  Anion Gap, Serum: 13 (01-02 @ 06:30)      Calcium, Total Serum: 8.5 (01-03 @ 06:42)  Calcium, Total Serum: 8.4 (01-02 @ 06:30)          01-03    133<L>  |  94<L>  |  37<H>  ----------------------------<  104<H>  4.4   |  26  |  1.37<H>    Ca    8.5      03 Jan 2019 06:42      Medications  MEDICATIONS  (STANDING):  aspirin enteric coated 81 milliGRAM(s) Oral daily  atorvastatin 80 milliGRAM(s) Oral at bedtime  carbidopa/levodopa/entacapone 50/200/200 1 Tablet(s) Oral four times a day  cholecalciferol 1000 Unit(s) Oral daily  docusate sodium 100 milliGRAM(s) Oral two times a day  enoxaparin Injectable 40 milliGRAM(s) SubCutaneous every 24 hours  furosemide    Tablet 40 milliGRAM(s) Oral daily  isosorbide   mononitrate ER Tablet (IMDUR) 60 milliGRAM(s) Oral daily  levothyroxine 25 MICROGram(s) Oral daily  polyethylene glycol 3350 17 Gram(s) Oral daily  ranolazine 1000 milliGRAM(s) Oral two times a day  senna 1 Tablet(s) Oral daily      Physical Exam  General: Patient comfortable in bed  Vital Signs Last 12 Hrs  T(F): 97.6 (01-03-19 @ 04:23), Max: 97.6 (01-03-19 @ 04:23)  HR: 66 (01-03-19 @ 04:23) (66 - 66)  BP: 158/84 (01-03-19 @ 04:23) (158/84 - 158/84)  BP(mean): --  RR: 18 (01-03-19 @ 04:23) (18 - 18)  SpO2: 96% (01-03-19 @ 04:23) (96% - 96%)  Neck: No palpable thyroid nodules.  CVS: S1S2, No murmurs  Respiratory: No wheezing, no crepitations  GI: Abdomen soft, bowel sounds positive  Musculoskeletal:  edema lower extremities.   Skin: No skin rashes, no ecchymosis    Diagnostics    Free Thyroxine, Serum: AM Sched. Collection: 01-Jan-2019 06:00 (12-31 @ 15:58)  Cortisol AM, Serum: AM Sched. Collection: 02-Jan-2019 06:00 (01-01 @ 18:17)
Patient is a 88y old  Male who presents with a chief complaint of fall, failure to thrive (01 Jan 2019 18:13)      SUBJECTIVE / OVERNIGHT EVENTS: No new complaints.   Review of Systems  chest pain no  palpitations no  sob no  nausea no  headache no    MEDICATIONS  (STANDING):  aspirin enteric coated 81 milliGRAM(s) Oral daily  atorvastatin 80 milliGRAM(s) Oral at bedtime  carbidopa/levodopa/entacapone 50/200/200 1 Tablet(s) Oral four times a day  cholecalciferol 1000 Unit(s) Oral daily  docusate sodium 100 milliGRAM(s) Oral two times a day  enoxaparin Injectable 40 milliGRAM(s) SubCutaneous every 24 hours  furosemide    Tablet 40 milliGRAM(s) Oral daily  isosorbide   mononitrate ER Tablet (IMDUR) 60 milliGRAM(s) Oral daily  levothyroxine 25 MICROGram(s) Oral daily  polyethylene glycol 3350 17 Gram(s) Oral daily  ranolazine 1000 milliGRAM(s) Oral two times a day  senna 1 Tablet(s) Oral daily    MEDICATIONS  (PRN):      Vital Signs Last 24 Hrs  T(C): 36.6 (01 Jan 2019 16:09), Max: 36.7 (01 Jan 2019 04:44)  T(F): 97.8 (01 Jan 2019 16:09), Max: 98 (01 Jan 2019 04:44)  HR: 57 (01 Jan 2019 16:09) (57 - 65)  BP: 107/63 (01 Jan 2019 16:09) (107/63 - 154/65)  BP(mean): --  RR: 18 (01 Jan 2019 16:09) (18 - 18)  SpO2: 100% (01 Jan 2019 16:09) (96% - 100%)    PHYSICAL EXAM:  GENERAL: NAD, well-developed  HEAD:  Atraumatic, Normocephalic  EYES: EOMI, PERRLA, conjunctiva and sclera clear  NECK: Supple, No JVD  CHEST/LUNG: Clear to auscultation bilaterally; No wheeze  HEART: Regular rate and rhythm; No murmurs, rubs, or gallops  ABDOMEN: Soft, Nontender, Nondistended; Bowel sounds present  EXTREMITIES:  2+ Peripheral Pulses, No clubbing, cyanosis, or edema  PSYCH: AAOx3  NEUROLOGY: non-focal, rigid   SKIN: No rashes or lesions    LABS:                        10.5   4.83  )-----------( 130      ( 01 Jan 2019 09:18 )             32.4     01-01    133<L>  |  95<L>  |  33<H>  ----------------------------<  106<H>  4.0   |  23  |  1.42<H>    Ca    8.6      01 Jan 2019 07:07                  RADIOLOGY & ADDITIONAL TESTS:    Imaging Personally Reviewed:    Consultant(s) Notes Reviewed:      Care Discussed with Consultants/Other Providers:
Patient is a 88y old  Male who presents with a chief complaint of fall, failure to thrive (02 Jan 2019 14:26)      SUBJECTIVE / OVERNIGHT EVENTS: No new complaints.   Review of Systems  chest pain no  palpitations no  sob no  nausea no  headache no    MEDICATIONS  (STANDING):  aspirin enteric coated 81 milliGRAM(s) Oral daily  atorvastatin 80 milliGRAM(s) Oral at bedtime  carbidopa/levodopa/entacapone 50/200/200 1 Tablet(s) Oral four times a day  cholecalciferol 1000 Unit(s) Oral daily  docusate sodium 100 milliGRAM(s) Oral two times a day  enoxaparin Injectable 40 milliGRAM(s) SubCutaneous every 24 hours  furosemide    Tablet 40 milliGRAM(s) Oral daily  isosorbide   mononitrate ER Tablet (IMDUR) 60 milliGRAM(s) Oral daily  levothyroxine 25 MICROGram(s) Oral daily  polyethylene glycol 3350 17 Gram(s) Oral daily  ranolazine 1000 milliGRAM(s) Oral two times a day  senna 1 Tablet(s) Oral daily    MEDICATIONS  (PRN):      Vital Signs Last 24 Hrs  T(C): 36.3 (02 Jan 2019 14:42), Max: 36.4 (01 Jan 2019 20:31)  T(F): 97.3 (02 Jan 2019 14:42), Max: 97.6 (01 Jan 2019 20:31)  HR: 79 (02 Jan 2019 14:42) (62 - 79)  BP: 159/79 (02 Jan 2019 14:42) (98/60 - 159/79)  BP(mean): --  RR: 18 (02 Jan 2019 14:42) (16 - 18)  SpO2: 99% (02 Jan 2019 14:42) (97% - 100%)    PHYSICAL EXAM:  GENERAL: NAD, well-developed  HEAD:  Atraumatic, Normocephalic  EYES: EOMI, PERRLA, conjunctiva and sclera clear  NECK: Supple, No JVD  CHEST/LUNG: Clear to auscultation bilaterally; No wheeze  HEART: Regular rate and rhythm; No murmurs, rubs, or gallops  ABDOMEN: Soft, Nontender, Nondistended; Bowel sounds present  EXTREMITIES:  2+ Peripheral Pulses, No clubbing, cyanosis, or edema  PSYCH: AAOx3  NEUROLOGY: non-focal some rigidity  SKIN: No rashes or lesions    LABS:                        10.5   4.83  )-----------( 130      ( 01 Jan 2019 09:18 )             32.4     01-02    133<L>  |  95<L>  |  40<H>  ----------------------------<  99  4.5   |  25  |  1.53<H>    Ca    8.4      02 Jan 2019 06:30                  RADIOLOGY & ADDITIONAL TESTS:    Imaging Personally Reviewed:    Consultant(s) Notes Reviewed:      Care Discussed with Consultants/Other Providers:
Patient is a 88y old  Male who presents with a chief complaint of fall, failure to thrive (03 Jan 2019 10:59)      SUBJECTIVE / OVERNIGHT EVENTS: Comfortable without new complaints. Wife at bedside.  Review of Systems  chest pain no  palpitations no  sob no  nausea no  headache no    MEDICATIONS  (STANDING):  aspirin enteric coated 81 milliGRAM(s) Oral daily  atorvastatin 80 milliGRAM(s) Oral at bedtime  carbidopa/levodopa/entacapone 50/200/200 1 Tablet(s) Oral four times a day  cholecalciferol 1000 Unit(s) Oral daily  docusate sodium 100 milliGRAM(s) Oral two times a day  enoxaparin Injectable 40 milliGRAM(s) SubCutaneous every 24 hours  furosemide    Tablet 40 milliGRAM(s) Oral daily  isosorbide   mononitrate ER Tablet (IMDUR) 60 milliGRAM(s) Oral daily  levothyroxine 25 MICROGram(s) Oral daily  polyethylene glycol 3350 17 Gram(s) Oral daily  ranolazine 1000 milliGRAM(s) Oral two times a day  senna 1 Tablet(s) Oral daily    MEDICATIONS  (PRN):      Vital Signs Last 24 Hrs  T(C): 36.4 (03 Jan 2019 04:23), Max: 36.5 (02 Jan 2019 20:07)  T(F): 97.6 (03 Jan 2019 04:23), Max: 97.7 (02 Jan 2019 20:07)  HR: 66 (03 Jan 2019 04:23) (66 - 79)  BP: 158/84 (03 Jan 2019 04:23) (147/76 - 159/79)  BP(mean): --  RR: 18 (03 Jan 2019 04:23) (18 - 18)  SpO2: 96% (03 Jan 2019 04:23) (96% - 100%)    PHYSICAL EXAM:  GENERAL: NAD, well-developed  HEAD:  Atraumatic, Normocephalic  EYES: EOMI, PERRLA, conjunctiva and sclera clear  NECK: Supple, No JVD  CHEST/LUNG: Clear to auscultation bilaterally; No wheeze  HEART: Regular rate and rhythm; No murmurs, rubs, or gallops  ABDOMEN: Soft, Nontender, Nondistended; Bowel sounds present  EXTREMITIES:  2+ Peripheral Pulses, No clubbing, cyanosis, or edema  PSYCH: AAOx3  NEUROLOGY: non-focal  SKIN: No rashes or lesions    LABS:    01-03    133<L>  |  94<L>  |  37<H>  ----------------------------<  104<H>  4.4   |  26  |  1.37<H>    Ca    8.5      03 Jan 2019 06:42                  RADIOLOGY & ADDITIONAL TESTS:    Imaging Personally Reviewed:    Consultant(s) Notes Reviewed:      Care Discussed with Consultants/Other Providers:
Patient is a 88y old  Male who presents with a chief complaint of fall, failure to thrive (27 Dec 2018 16:00)      SUBJECTIVE / OVERNIGHT EVENTS: No new complaints.   Review of Systems  chest pain no  palpitations no  sob no  nausea no  headache no    MEDICATIONS  (STANDING):  aspirin enteric coated 81 milliGRAM(s) Oral daily  atorvastatin 80 milliGRAM(s) Oral at bedtime  carbidopa/levodopa/entacapone 50/200/200 1 Tablet(s) Oral four times a day  cholecalciferol 1000 Unit(s) Oral daily  docusate sodium 100 milliGRAM(s) Oral two times a day  enoxaparin Injectable 40 milliGRAM(s) SubCutaneous every 24 hours  furosemide    Tablet 40 milliGRAM(s) Oral daily  isosorbide   mononitrate ER Tablet (IMDUR) 60 milliGRAM(s) Oral daily  polyethylene glycol 3350 17 Gram(s) Oral at bedtime  ranolazine 1000 milliGRAM(s) Oral two times a day  senna 1 Tablet(s) Oral daily    MEDICATIONS  (PRN):      Vital Signs Last 24 Hrs  T(C): 36.6 (28 Dec 2018 20:23), Max: 36.7 (28 Dec 2018 04:32)  T(F): 97.8 (28 Dec 2018 20:23), Max: 98 (28 Dec 2018 04:32)  HR: 61 (28 Dec 2018 20:23) (44 - 68)  BP: 112/60 (28 Dec 2018 20:23) (70/45 - 152/73)  BP(mean): --  RR: 18 (28 Dec 2018 20:23) (18 - 18)  SpO2: 98% (28 Dec 2018 20:23) (93% - 98%)    PHYSICAL EXAM:  GENERAL: NAD, well-developed  HEAD:  Atraumatic, Normocephalic  EYES: EOMI, PERRLA, conjunctiva and sclera clear  NECK: Supple, No JVD  CHEST/LUNG: Clear to auscultation bilaterally; No wheeze  HEART: Regular rate and rhythm; No murmurs, rubs, or gallops  ABDOMEN: Soft, Nontender, Nondistended; Bowel sounds present  EXTREMITIES:  2+ Peripheral Pulses, No clubbing, cyanosis, or edema  NEUROLOGY: non-focal, rigid  SKIN: No rashes or lesions    LABS:                        11.2   4.43  )-----------( 134      ( 27 Dec 2018 08:13 )             34.2     12-28    135  |  97  |  29<H>  ----------------------------<  113<H>  3.8   |  26  |  1.72<H>    Ca    8.5      28 Dec 2018 06:57                  RADIOLOGY & ADDITIONAL TESTS:    Imaging Personally Reviewed:    Consultant(s) Notes Reviewed:      Care Discussed with Consultants/Other Providers:
Patient is a 88y old  Male who presents with a chief complaint of fall, failure to thrive (28 Dec 2018 16:07)      SUBJECTIVE / OVERNIGHT EVENTS: Comfortable without new complaints.   Review of Systems  chest pain no  palpitations no  sob no  nausea no  headache no    MEDICATIONS  (STANDING):  aspirin enteric coated 81 milliGRAM(s) Oral daily  atorvastatin 80 milliGRAM(s) Oral at bedtime  carbidopa/levodopa/entacapone 50/200/200 1 Tablet(s) Oral four times a day  cholecalciferol 1000 Unit(s) Oral daily  docusate sodium 100 milliGRAM(s) Oral two times a day  enoxaparin Injectable 40 milliGRAM(s) SubCutaneous every 24 hours  furosemide    Tablet 40 milliGRAM(s) Oral daily  isosorbide   mononitrate ER Tablet (IMDUR) 60 milliGRAM(s) Oral daily  polyethylene glycol 3350 17 Gram(s) Oral daily  ranolazine 1000 milliGRAM(s) Oral two times a day  senna 1 Tablet(s) Oral daily    MEDICATIONS  (PRN):      Vital Signs Last 24 Hrs  T(C): 36.6 (29 Dec 2018 14:21), Max: 36.8 (28 Dec 2018 23:50)  T(F): 97.9 (29 Dec 2018 14:21), Max: 98.2 (28 Dec 2018 23:50)  HR: 74 (29 Dec 2018 14:21) (61 - 74)  BP: 127/75 (29 Dec 2018 14:21) (111/66 - 158/88)  BP(mean): --  RR: 18 (29 Dec 2018 14:21) (16 - 18)  SpO2: 98% (29 Dec 2018 14:21) (95% - 98%)    PHYSICAL EXAM:  GENERAL: NAD  HEAD:  Atraumatic, Normocephalic  EYES: EOMI, PERRLA, conjunctiva and sclera clear  NECK: Supple, No JVD  CHEST/LUNG: Clear to auscultation bilaterally; No wheeze  HEART: Regular rate and rhythm; No murmurs, rubs, or gallops  ABDOMEN: Soft, Nontender, Nondistended; Bowel sounds present  EXTREMITIES:  2+ Peripheral Pulses, No clubbing, cyanosis, or edema  NEUROLOGY: non-focal, rigid  SKIN: No rashes or lesions    LABS:    12-29    136  |  97  |  33<H>  ----------------------------<  107<H>  4.0   |  26  |  1.72<H>    Ca    8.2<L>      29 Dec 2018 06:48                  RADIOLOGY & ADDITIONAL TESTS:    Imaging Personally Reviewed:    Consultant(s) Notes Reviewed:      Care Discussed with Consultants/Other Providers:
Patient is a 88y old  Male who presents with a chief complaint of fall, failure to thrive (30 Dec 2018 13:12)      SUBJECTIVE / OVERNIGHT EVENTS: c/o R shoulder discomfort.   Review of Systems  chest pain no  palpitations no  sob no  nausea no  headache no    MEDICATIONS  (STANDING):  aspirin enteric coated 81 milliGRAM(s) Oral daily  atorvastatin 80 milliGRAM(s) Oral at bedtime  carbidopa/levodopa/entacapone 50/200/200 1 Tablet(s) Oral four times a day  cholecalciferol 1000 Unit(s) Oral daily  docusate sodium 100 milliGRAM(s) Oral two times a day  enoxaparin Injectable 40 milliGRAM(s) SubCutaneous every 24 hours  furosemide    Tablet 40 milliGRAM(s) Oral daily  isosorbide   mononitrate ER Tablet (IMDUR) 60 milliGRAM(s) Oral daily  polyethylene glycol 3350 17 Gram(s) Oral daily  ranolazine 1000 milliGRAM(s) Oral two times a day  senna 1 Tablet(s) Oral daily    MEDICATIONS  (PRN):      Vital Signs Last 24 Hrs  T(C): 36.8 (30 Dec 2018 15:39), Max: 37.3 (29 Dec 2018 23:37)  T(F): 98.2 (30 Dec 2018 15:39), Max: 99.1 (29 Dec 2018 23:37)  HR: 55 (30 Dec 2018 15:39) (53 - 74)  BP: 134/69 (30 Dec 2018 15:39) (114/67 - 155/70)  BP(mean): --  RR: 18 (30 Dec 2018 15:39) (16 - 18)  SpO2: 100% (30 Dec 2018 15:39) (97% - 100%)    PHYSICAL EXAM:  GENERAL: NAD, well-developed  HEAD:  Atraumatic, Normocephalic  EYES: EOMI, PERRLA, conjunctiva and sclera clear  NECK: Supple, No JVD  CHEST/LUNG: Clear to auscultation bilaterally; No wheeze  HEART: Regular rate and rhythm; No murmurs, rubs, or gallops  ABDOMEN: Soft, Nontender, Nondistended; Bowel sounds present Sacral erythema   EXTREMITIES:  2+ Peripheral Pulses, No clubbing, cyanosis, or edema  PSYCH: AAOx3  NEUROLOGY: non-focal, rigid. Unsteady gait.  SKIN: No rashes or lesions    LABS:    12-29    136  |  97  |  33<H>  ----------------------------<  107<H>  4.0   |  26  |  1.72<H>    Ca    8.2<L>      29 Dec 2018 06:48                  RADIOLOGY & ADDITIONAL TESTS:    Imaging Personally Reviewed:    Consultant(s) Notes Reviewed:      Care Discussed with Consultants/Other Providers:
Patient is a 88y old  Male who presents with a chief complaint of fall, failure to thrive (31 Dec 2018 09:09)      SUBJECTIVE / OVERNIGHT EVENTS: Comfortable without new complaints. Wife at bedside.  Review of Systems  chest pain no  palpitations no  sob no  nausea no  headache no    MEDICATIONS  (STANDING):  aspirin enteric coated 81 milliGRAM(s) Oral daily  atorvastatin 80 milliGRAM(s) Oral at bedtime  carbidopa/levodopa/entacapone 50/200/200 1 Tablet(s) Oral four times a day  cholecalciferol 1000 Unit(s) Oral daily  docusate sodium 100 milliGRAM(s) Oral two times a day  enoxaparin Injectable 40 milliGRAM(s) SubCutaneous every 24 hours  furosemide    Tablet 40 milliGRAM(s) Oral daily  isosorbide   mononitrate ER Tablet (IMDUR) 60 milliGRAM(s) Oral daily  polyethylene glycol 3350 17 Gram(s) Oral daily  ranolazine 1000 milliGRAM(s) Oral two times a day  senna 1 Tablet(s) Oral daily    MEDICATIONS  (PRN):      Vital Signs Last 24 Hrs  T(C): 36.6 (31 Dec 2018 12:19), Max: 36.8 (30 Dec 2018 15:39)  T(F): 97.9 (31 Dec 2018 12:19), Max: 98.3 (30 Dec 2018 19:36)  HR: 69 (31 Dec 2018 04:55) (53 - 69)  BP: 159/86 (31 Dec 2018 04:55) (111/62 - 159/86)  BP(mean): --  RR: 18 (31 Dec 2018 12:19) (17 - 18)  SpO2: 98% (31 Dec 2018 12:19) (98% - 100%)    PHYSICAL EXAM:  GENERAL: NAD, well-developed  HEAD:  Atraumatic, Normocephalic  EYES: EOMI, PERRLA, conjunctiva and sclera clear  NECK: Supple, No JVD  CHEST/LUNG: Clear to auscultation bilaterally; No wheeze  HEART: Regular rate and rhythm; No murmurs, rubs, or gallops  ABDOMEN: Soft, Nontender, Nondistended; Bowel sounds present  EXTREMITIES:  2+ Peripheral Pulses, No clubbing, cyanosis, or edema  PSYCH: AAOx3  NEUROLOGY: non-focal  SKIN: No rashes or lesions    LABS:                        10.5   5.34  )-----------( 127      ( 31 Dec 2018 09:13 )             32.5     12-31    133<L>  |  96  |  32<H>  ----------------------------<  103<H>  4.2   |  26  |  1.44<H>    Ca    8.5      31 Dec 2018 07:19    Thyroid Stimulating Hormone, Serum (12.31.18 @ 09:11)    Thyroid Stimulating Hormone, Serum: 7.23 uIU/mL                  RADIOLOGY & ADDITIONAL TESTS:    Imaging Personally Reviewed:  < from: Xray Shoulder 2 Views, Right (12.30.18 @ 20:47) >  IMPRESSION:    Redemonstration of comminuted, and displaced right proximal humeral head   and neck fracture. There is interval increase in proximal migration of   the humeral shaft, which appears to abut the undersurface of the acromion   displacing it superiorly and tenting the overlying muscle and skin.   Increased interval callus formation. Redemonstration of old rib   fractures. Sternotomy wires. No focal consolidation within the visualized   lungs.       < end of copied text >    Consultant(s) Notes Reviewed:      Care Discussed with Consultants/Other Providers:
Admitting Diagnosis:  Weakness (R53.1): WEAKNESS      HPI:  This is a 88y year old Male with the below past medical history significant for Parkinson's disease, HTN, HLD, CVT s/p IVC filter, GIB, CKD III, was recently hospitalized by fall about three months ago, sent to rehab and was discharged from rehab after 100 days. On return home suffered three falls. One in which he suddenly stood and began moving too quickly with his walker to get to a ringing phone. The walker rolled out in front of him and he fell forward to the ground suffering multiple fractures in his right shoulder and arm including the clavicle. Then suffered two more fall backwards while holding onto the walker. No neck or back pain. Reports no headaches. Also noted to be orthostatic on this admission    pt says was oob to chair    Past Medical History:  Unilateral inguinal hernia, with gangrene, not specified as recurrent (K40.40)  Upper GI bleed (K92.2): MICU admission , EGD w/ gastric ulcer/visible vessel which was cauterized  Aspiration pneumonia (J69.0)  Clostridium difficile colitis (A04.7)  Pneumonia (486)  MI, old (412)  UTI (urinary tract infection) (599.0)  BPH (benign prostatic hyperplasia) (600.00)  Parkinsons Disease (332.0)  CAD (Coronary Artery Disease) (414.00)  HTN (Hypertension) (401.9)  Hyperlipidemia (272.4)      Past Surgical History:  Presence of IVC filter (Z95.828): right upper arm placed last year   History of prostate surgery (Z98.89): 2016  Varicose veins of legs (454.9): h/o Vein stripping  S/P appendectomy (V45.89)  Stented coronary artery (V45.82): cardiac stent x 2 - placement between  &amp; possible   Hx of CABG (V45.81):   Hyperlipidemia (272.4)      Social History:  No toxic habits    Family History:  FAMILY HISTORY:  Family history of coronary artery disease (Father): Father,  of MI age 55  Family history of breast cancer: Mother      Allergies:  Cipro (Rash)      ROS:  Constitutional: Patient offers no complaints of fevers or significant weight loss  Ears, Nose, Mouth and Throat: The patient presents with no abnormalities of the head, ears, eyes, nose or throat  Skin: Patient offers no concerns of new rashes or lesions  Respiratory: The patient presents with no abnormalities of the respiratory tract  Cardiovascular: The patient presents with no cardiac abnormalities  Gastrointestinal: The patient presents with no abnormalities of the GI system  Genitourinary: The patient presents with no dysuria, hematuria or frequent urination  Neurological: See HPI  Endocrine: Patient offers no complaints of excessive thirst, urination, or heat/cold intolerance    Advanced care planning reviewed and noted in the chart.    Medications:  aspirin enteric coated 81 milliGRAM(s) Oral daily  atorvastatin 80 milliGRAM(s) Oral at bedtime  carbidopa/levodopa/entacapone 50/200/200 1 Tablet(s) Oral four times a day  cholecalciferol 1000 Unit(s) Oral daily  docusate sodium 100 milliGRAM(s) Oral two times a day  enoxaparin Injectable 40 milliGRAM(s) SubCutaneous every 24 hours  furosemide    Tablet 40 milliGRAM(s) Oral daily  isosorbide   mononitrate ER Tablet (IMDUR) 60 milliGRAM(s) Oral daily  polyethylene glycol 3350 17 Gram(s) Oral daily  ranolazine 1000 milliGRAM(s) Oral two times a day  senna 1 Tablet(s) Oral daily      Labs:        136  |  97  |  33<H>  ----------------------------<  107<H>  4.0   |  26  |  1.72<H>    Ca    8.2<L>      29 Dec 2018 06:48      CAPILLARY BLOOD GLUCOSE              Male    Vitals:  Vital Signs Last 24 Hrs  T(C): 36.3 (30 Dec 2018 05:04), Max: 37.3 (29 Dec 2018 23:37)  T(F): 97.4 (30 Dec 2018 05:04), Max: 99.1 (29 Dec 2018 23:37)  HR: 60 (30 Dec 2018 05:04) (60 - 74)  BP: 149/76 (30 Dec 2018 05:04) (127/75 - 155/70)  BP(mean): --  RR: 16 (30 Dec 2018 05:04) (16 - 18)  SpO2: 97% (30 Dec 2018 05:04) (97% - 99%)    NEUROLOGICAL EXAM:    Mental status: Awake, alert, and in no apparent distress. Oriented to person, place and time. Language function is normal. +Bradyphrenia.     Cranial Nerves: Pupils were equal, round, reactive to light. Extraocular movements were intact. Visual field were full. Fundoscopic exam was deferred. Facial sensation was intact to light touch. There was no facial asymmetry. The palate was upgoing symmetrically and tongue was midline. Hearing acuity was intact to finger rub AU. Shoulder shrug was full bilaterally  says diplopia in right lateral gaze is old  +Decreased blink rate  +Saccadic pursuits  Hypophonic speech    Motor exam: Bulk was decreased throughout. Cogwheeling rigidity very mild.  Strength was 5/5 in all four extremities except the right arm as pain proximalluy. Fine finger movements were symmetric and normal.     Reflexes: 1+ in the bilateral upper extremities. 1+ in the bilateral lower extremities. Toes were mute    Sensation: Intact to light touch    Coordination: Finger-nose-finger and heel-to-shin was without dysmetria.     Gait: Shuffles, unable to stand without assistance, kyphotic posture/captocormia.     < from: CT Cervical Spine No Cont (.26.18 @ 17:37) >    EXAM:  CT CERVICAL SPINE                          EXAM:  CT BRAIN                            PROCEDURE DATE:  2018            INTERPRETATION:  CLINICAL INFORMATION: Status post fall. Question head   trauma.    TECHNIQUE: Noncontrast CT scan of the head and cervical spine was   performed on 2018. The head CT portion was performed with 5 mm   axial images. The cervical spine CT was performed with thin section axial   images with sagittal and coronal reformations.    COMPARISON: CT head and cervical spine from 2018..    FINDINGS:    HEAD:    There is no CT evidence of acute intracranial hemorrhage, extra-axial   collection, vasogenic edema, mass effect, midline shift, central   herniation, or hydrocephalus.     Prominent bilateral sulci and ventricles consistent with age-appropriate   cerebral volume loss. There is moderate patchy white matter   hypoattenuation which is nonspecific in etiology but likely related to   chronic microvascular ischemic disease.    The visualized paranasal sinuses are clear. Under pneumatization and   sclerotic changes of left mastoid air cells representing sequelae of   prior mastoiditis.    The soft tissues of the scalp are unremarkable. The calvarium is intact.   Ocular lens implants.    CERVICAL SPINE:    VERTEBRAL BODIES:  Normal in height. No fracture.    ALIGNMENT:  Mild anterolisthesis of C3 on C4, C4 on C5.  Straightening of   cervical lordosis.    INTERVERTEBRAL DISC SPACES: Multilevel disc space narrowing and facet   joint space narrowing.  Multilevel facet joint fusion involving C2, C3,   C4, and C5.    MISCELLANEOUS:  No prevertebral body soft tissue swelling.   Status post sternotomy. Right shoulder deformity compatible with changes   from prior proximal humerus fracture. Bilateral hearing aid device is   seen external ear cavity. A subcentimeter hypodense left thyroid nodule   with peripheral calcification (4:179).    IMPRESSION:  Head CT: No evidence for intracranial hemorrhage, mass effect, or   displaced calvarial fracture.     Cervical spine CT: No evidence for acute displaced fracture or traumatic   malalignment. Cervical degenerative spondylosis, as described above.    Stable exam.                KARLY CLEMENS M.D., RADIOLOGY RESIDENT  This document has been electronically signed.  BASIA DOWD M.D., ATTENDING RADIOLOGIST  This document has been electronically signed. Dec 26 2018  6:20PM                < from: CT Head No Cont (18 @ 17:37) >    EXAM:  CT CERVICAL SPINE                          EXAM:  CT BRAIN                            PROCEDURE DATE:  2018            INTERPRETATION:  CLINICAL INFORMATION: Status post fall. Question head   trauma.    TECHNIQUE: Noncontrast CT scan of the head and cervical spine was   performed on 2018. The head CT portion was performed with 5 mm   axial images. The cervical spine CT was performed with thin section axial   images with sagittal and coronal reformations.    COMPARISON: CT head and cervical spine from 2018..    FINDINGS:    HEAD:    There is no CT evidence of acute intracranial hemorrhage, extra-axial   collection, vasogenic edema, mass effect, midline shift, central   herniation, or hydrocephalus.     Prominent bilateral sulci and ventricles consistent with age-appropriate   cerebral volume loss. There is moderate patchy white matter   hypoattenuation which is nonspecific in etiology but likely related to   chronic microvascular ischemic disease.    The visualized paranasal sinuses are clear. Under pneumatization and   sclerotic changes of left mastoid air cells representing sequelae of   prior mastoiditis.    The soft tissues of the scalp are unremarkable. The calvarium is intact.   Ocular lens implants.    CERVICAL SPINE:    VERTEBRAL BODIES:  Normal in height. No fracture.    ALIGNMENT:  Mild anterolisthesis of C3 on C4, C4 on C5.  Straightening of   cervical lordosis.    INTERVERTEBRAL DISC SPACES: Multilevel disc space narrowing and facet   joint space narrowing.  Multilevel facet joint fusion involving C2, C3,   C4, and C5.    MISCELLANEOUS:  No prevertebral body soft tissue swelling.   Status post sternotomy. Right shoulder deformity compatible with changes   from prior proximal humerus fracture. Bilateral hearing aid device is   seen external ear cavity. A subcentimeter hypodense left thyroid nodule   with peripheral calcification (4:179).    IMPRESSION:  Head CT: No evidence for intracranial hemorrhage, mass effect, or   displaced calvarial fracture.     Cervical spine CT: No evidence for acute displaced fracture or traumatic   malalignment. Cervical degenerative spondylosis, as described above.    Stable exam.      KARLY CLEMENS M.D., RADIOLOGY RESIDENT  This document has been electronically signed.  BASIA DOWD M.D., ATTENDING RADIOLOGIST  This document has been electronically signed. Dec 26 2018  6:20PM

## 2019-01-03 NOTE — PROGRESS NOTE ADULT - REASON FOR ADMISSION
fall, failure to thrive

## 2019-01-03 NOTE — PROGRESS NOTE ADULT - ASSESSMENT
· Assessment		  88M PMH CAD/CABG/remote stents, Parkinson disease, HTN, HLD, prior DVT s/p IVCF, prior GIB, CKD III presents with multiple falls and lightheadedness, admitted with gait abnormality, failure to thrive, and an unsafe living situation.     Gait abnormality --generalized weakness, likely in the setting of deconditioning, Parkinson disease, and upper respiratory infection  --PT follow    Parkinsons Disease.  --c/w home carbidopa/levodopa/entecapone   --home Nuplazid not available--will have to be brought in from home.   --Neurology follow noted  .  -- Elevated TSH. Confirmed. Continue low dose Synthroid and follow TSH. Endocrine follow.    CAD (Coronary Artery Disease). --c/w aspirin and statin.     HTN (Hypertension). -- imdur. Avoid aggressive BP goal in Parkinson disease due to autonomic instability.     Hyperlipidemia.--c/w statin.    Need for prophylactic measure.--VTE PPX lovenox.     Sacral wound- wound care.    R shoulder pain from old fracture. Ortho evaluation.    DC home with PT and home care. Close follow with PMD/ Neurology in 2-3 days. QA     Tavares Hall MD pager [phone]

## 2019-01-03 NOTE — PROGRESS NOTE ADULT - ASSESSMENT
Assessment  Hypothyroidism: 88y Male with no history of thyroid disease, has weakness, recent TFTs show patient in subclinical hypothyroid state, Free T4 1.2, started on Synthroid 25 mcg PO daily.  CAD: On medications, no chest pain, stable, monitored.  HTN: Controlled, no headaches, on medications.  CKD: Monitor labs/BMP,           Baldomero Tilley MD  Cell: [phone]  Office: [phone]

## 2019-01-03 NOTE — PROGRESS NOTE ADULT - PROBLEM SELECTOR PLAN 1
Will continue current Synthroid dose, may get DC home on current Synthroid dose, FU 4-6 weeks for repeat TFTs.

## 2019-01-03 NOTE — PROGRESS NOTE ADULT - PROVIDER SPECIALTY LIST ADULT
Endocrinology
Internal Medicine
Neurology
Internal Medicine

## 2019-03-07 ENCOUNTER — INPATIENT (INPATIENT)
Facility: HOSPITAL | Age: 84
LOS: 5 days | Discharge: NURSING, REHAB OR LONG-TERM CARE | DRG: 605 | End: 2019-03-13
Attending: INTERNAL MEDICINE | Admitting: INTERNAL MEDICINE
Payer: MEDICARE

## 2019-03-07 VITALS
RESPIRATION RATE: 19 BRPM | HEART RATE: 76 BPM | OXYGEN SATURATION: 100 % | DIASTOLIC BLOOD PRESSURE: 100 MMHG | SYSTOLIC BLOOD PRESSURE: 180 MMHG | WEIGHT: 164.91 LBS

## 2019-03-07 DIAGNOSIS — I10 ESSENTIAL (PRIMARY) HYPERTENSION: ICD-10-CM

## 2019-03-07 DIAGNOSIS — D69.6 THROMBOCYTOPENIA, UNSPECIFIED: ICD-10-CM

## 2019-03-07 DIAGNOSIS — N18.3 CHRONIC KIDNEY DISEASE, STAGE 3 (MODERATE): ICD-10-CM

## 2019-03-07 DIAGNOSIS — E78.2 MIXED HYPERLIPIDEMIA: ICD-10-CM

## 2019-03-07 DIAGNOSIS — I25.810 ATHEROSCLEROSIS OF CORONARY ARTERY BYPASS GRAFT(S) WITHOUT ANGINA PECTORIS: ICD-10-CM

## 2019-03-07 DIAGNOSIS — Z98.89 OTHER SPECIFIED POSTPROCEDURAL STATES: Chronic | ICD-10-CM

## 2019-03-07 DIAGNOSIS — I48.91 UNSPECIFIED ATRIAL FIBRILLATION: ICD-10-CM
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
## 2019-03-07 DIAGNOSIS — G20 PARKINSON'S DISEASE: ICD-10-CM

## 2019-03-07 DIAGNOSIS — R29.6 REPEATED FALLS: ICD-10-CM

## 2019-03-07 DIAGNOSIS — Z95.5 PRESENCE OF CORONARY ANGIOPLASTY IMPLANT AND GRAFT: Chronic | ICD-10-CM

## 2019-03-07 DIAGNOSIS — T14.8XXA OTHER INJURY OF UNSPECIFIED BODY REGION, INITIAL ENCOUNTER: ICD-10-CM

## 2019-03-07 DIAGNOSIS — Z95.828 PRESENCE OF OTHER VASCULAR IMPLANTS AND GRAFTS: Chronic | ICD-10-CM

## 2019-03-07 LAB
ALBUMIN SERPL ELPH-MCNC: 4.2 G/DL — SIGNIFICANT CHANGE UP (ref 3.3–5)
ALP SERPL-CCNC: 68 U/L — SIGNIFICANT CHANGE UP (ref 40–120)
ALT FLD-CCNC: <5 U/L — LOW (ref 10–45)
ANION GAP SERPL CALC-SCNC: 14 MMOL/L — SIGNIFICANT CHANGE UP (ref 5–17)
APPEARANCE UR: ABNORMAL
APTT BLD: 32 SEC — SIGNIFICANT CHANGE UP (ref 27.5–36.3)
AST SERPL-CCNC: 11 U/L — SIGNIFICANT CHANGE UP (ref 10–40)
BACTERIA # UR AUTO: ABNORMAL
BASOPHILS # BLD AUTO: 0 K/UL — SIGNIFICANT CHANGE UP (ref 0–0.2)
BASOPHILS NFR BLD AUTO: 0.2 % — SIGNIFICANT CHANGE UP (ref 0–2)
BILIRUB SERPL-MCNC: 0.8 MG/DL — SIGNIFICANT CHANGE UP (ref 0.2–1.2)
BILIRUB UR-MCNC: NEGATIVE — SIGNIFICANT CHANGE UP
BUN SERPL-MCNC: 27 MG/DL — HIGH (ref 7–23)
CALCIUM SERPL-MCNC: 9.2 MG/DL — SIGNIFICANT CHANGE UP (ref 8.4–10.5)
CHLORIDE SERPL-SCNC: 95 MMOL/L — LOW (ref 96–108)
CO2 SERPL-SCNC: 27 MMOL/L — SIGNIFICANT CHANGE UP (ref 22–31)
COLOR SPEC: YELLOW — SIGNIFICANT CHANGE UP
CREAT SERPL-MCNC: 1.33 MG/DL — HIGH (ref 0.5–1.3)
DIFF PNL FLD: NEGATIVE — SIGNIFICANT CHANGE UP
EOSINOPHIL # BLD AUTO: 0.1 K/UL — SIGNIFICANT CHANGE UP (ref 0–0.5)
EOSINOPHIL NFR BLD AUTO: 1.8 % — SIGNIFICANT CHANGE UP (ref 0–6)
EPI CELLS # UR: 0 /HPF — SIGNIFICANT CHANGE UP
GLUCOSE SERPL-MCNC: 102 MG/DL — HIGH (ref 70–99)
GLUCOSE UR QL: NEGATIVE — SIGNIFICANT CHANGE UP
HCT VFR BLD CALC: 27.4 % — LOW (ref 39–50)
HCT VFR BLD CALC: 31.6 % — LOW (ref 39–50)
HGB BLD-MCNC: 10.8 G/DL — LOW (ref 13–17)
HGB BLD-MCNC: 9.2 G/DL — LOW (ref 13–17)
HYALINE CASTS # UR AUTO: 0 /LPF — SIGNIFICANT CHANGE UP (ref 0–2)
INR BLD: 1.14 RATIO — SIGNIFICANT CHANGE UP (ref 0.88–1.16)
KETONES UR-MCNC: NEGATIVE — SIGNIFICANT CHANGE UP
LEUKOCYTE ESTERASE UR-ACNC: ABNORMAL
LYMPHOCYTES # BLD AUTO: 1 K/UL — SIGNIFICANT CHANGE UP (ref 1–3.3)
LYMPHOCYTES # BLD AUTO: 16.4 % — SIGNIFICANT CHANGE UP (ref 13–44)
MCHC RBC-ENTMCNC: 29.6 PG — SIGNIFICANT CHANGE UP (ref 27–34)
MCHC RBC-ENTMCNC: 30.4 PG — SIGNIFICANT CHANGE UP (ref 27–34)
MCHC RBC-ENTMCNC: 33.5 GM/DL — SIGNIFICANT CHANGE UP (ref 32–36)
MCHC RBC-ENTMCNC: 34.1 GM/DL — SIGNIFICANT CHANGE UP (ref 32–36)
MCV RBC AUTO: 88.2 FL — SIGNIFICANT CHANGE UP (ref 80–100)
MCV RBC AUTO: 88.9 FL — SIGNIFICANT CHANGE UP (ref 80–100)
MONOCYTES # BLD AUTO: 0.4 K/UL — SIGNIFICANT CHANGE UP (ref 0–0.9)
MONOCYTES NFR BLD AUTO: 6.1 % — SIGNIFICANT CHANGE UP (ref 2–14)
NEUTROPHILS # BLD AUTO: 4.7 K/UL — SIGNIFICANT CHANGE UP (ref 1.8–7.4)
NEUTROPHILS NFR BLD AUTO: 75.5 % — SIGNIFICANT CHANGE UP (ref 43–77)
NITRITE UR-MCNC: NEGATIVE — SIGNIFICANT CHANGE UP
PH UR: 6.5 — SIGNIFICANT CHANGE UP (ref 5–8)
PLATELET # BLD AUTO: 135 K/UL — LOW (ref 150–400)
PLATELET # BLD AUTO: 136 K/UL — LOW (ref 150–400)
POTASSIUM SERPL-MCNC: 3.8 MMOL/L — SIGNIFICANT CHANGE UP (ref 3.5–5.3)
POTASSIUM SERPL-SCNC: 3.8 MMOL/L — SIGNIFICANT CHANGE UP (ref 3.5–5.3)
PROT SERPL-MCNC: 7.9 G/DL — SIGNIFICANT CHANGE UP (ref 6–8.3)
PROT UR-MCNC: ABNORMAL
PROTHROM AB SERPL-ACNC: 13.1 SEC — HIGH (ref 10–12.9)
RBC # BLD: 3.1 M/UL — LOW (ref 4.2–5.8)
RBC # BLD: 3.56 M/UL — LOW (ref 4.2–5.8)
RBC # FLD: 14.5 % — SIGNIFICANT CHANGE UP (ref 10.3–14.5)
RBC # FLD: 14.6 % — HIGH (ref 10.3–14.5)
RBC CASTS # UR COMP ASSIST: 0 /HPF — SIGNIFICANT CHANGE UP (ref 0–4)
SODIUM SERPL-SCNC: 136 MMOL/L — SIGNIFICANT CHANGE UP (ref 135–145)
SP GR SPEC: 1.02 — SIGNIFICANT CHANGE UP (ref 1.01–1.02)
TROPONIN T, HIGH SENSITIVITY RESULT: 64 NG/L — HIGH (ref 0–51)
UROBILINOGEN FLD QL: NEGATIVE — SIGNIFICANT CHANGE UP
WBC # BLD: 6.2 K/UL — SIGNIFICANT CHANGE UP (ref 3.8–10.5)
WBC # BLD: 6.3 K/UL — SIGNIFICANT CHANGE UP (ref 3.8–10.5)
WBC # FLD AUTO: 6.2 K/UL — SIGNIFICANT CHANGE UP (ref 3.8–10.5)
WBC # FLD AUTO: 6.3 K/UL — SIGNIFICANT CHANGE UP (ref 3.8–10.5)
WBC UR QL: >50 /HPF — HIGH (ref 0–5)

## 2019-03-07 PROCEDURE — 99223 1ST HOSP IP/OBS HIGH 75: CPT

## 2019-03-07 PROCEDURE — 73502 X-RAY EXAM HIP UNI 2-3 VIEWS: CPT | Mod: 26,RT

## 2019-03-07 PROCEDURE — 99223 1ST HOSP IP/OBS HIGH 75: CPT | Mod: GC

## 2019-03-07 PROCEDURE — 71045 X-RAY EXAM CHEST 1 VIEW: CPT | Mod: 26

## 2019-03-07 PROCEDURE — 76377 3D RENDER W/INTRP POSTPROCES: CPT | Mod: 26

## 2019-03-07 PROCEDURE — 72192 CT PELVIS W/O DYE: CPT | Mod: 26

## 2019-03-07 PROCEDURE — 72125 CT NECK SPINE W/O DYE: CPT | Mod: 26

## 2019-03-07 PROCEDURE — 70450 CT HEAD/BRAIN W/O DYE: CPT | Mod: 26

## 2019-03-07 PROCEDURE — 99285 EMERGENCY DEPT VISIT HI MDM: CPT | Mod: GC

## 2019-03-07 RX ORDER — LEVOTHYROXINE SODIUM 125 MCG
25 TABLET ORAL DAILY
Qty: 0 | Refills: 0 | Status: DISCONTINUED | OUTPATIENT
Start: 2019-03-07 | End: 2019-03-13

## 2019-03-07 RX ORDER — DOCUSATE SODIUM 100 MG
100 CAPSULE ORAL
Qty: 0 | Refills: 0 | Status: DISCONTINUED | OUTPATIENT
Start: 2019-03-07 | End: 2019-03-13

## 2019-03-07 RX ORDER — CARBIDOPA AND LEVODOPA 25; 100 MG/1; MG/1
1 TABLET ORAL
Qty: 0 | Refills: 0 | Status: DISCONTINUED | OUTPATIENT
Start: 2019-03-07 | End: 2019-03-13

## 2019-03-07 RX ORDER — ENTACAPONE 200 MG/1
200 TABLET, FILM COATED ORAL
Qty: 0 | Refills: 0 | Status: DISCONTINUED | OUTPATIENT
Start: 2019-03-07 | End: 2019-03-13

## 2019-03-07 RX ORDER — POLYETHYLENE GLYCOL 3350 17 G/17G
17 POWDER, FOR SOLUTION ORAL DAILY
Qty: 0 | Refills: 0 | Status: DISCONTINUED | OUTPATIENT
Start: 2019-03-07 | End: 2019-03-13

## 2019-03-07 RX ORDER — CHOLECALCIFEROL (VITAMIN D3) 125 MCG
1000 CAPSULE ORAL DAILY
Qty: 0 | Refills: 0 | Status: DISCONTINUED | OUTPATIENT
Start: 2019-03-07 | End: 2019-03-13

## 2019-03-07 RX ORDER — RANOLAZINE 500 MG/1
1000 TABLET, FILM COATED, EXTENDED RELEASE ORAL
Qty: 0 | Refills: 0 | Status: DISCONTINUED | OUTPATIENT
Start: 2019-03-07 | End: 2019-03-13

## 2019-03-07 RX ORDER — ACETAMINOPHEN 500 MG
650 TABLET ORAL ONCE
Qty: 0 | Refills: 0 | Status: COMPLETED | OUTPATIENT
Start: 2019-03-07 | End: 2019-03-07

## 2019-03-07 RX ORDER — ISOSORBIDE MONONITRATE 60 MG/1
60 TABLET, EXTENDED RELEASE ORAL DAILY
Qty: 0 | Refills: 0 | Status: DISCONTINUED | OUTPATIENT
Start: 2019-03-07 | End: 2019-03-13

## 2019-03-07 RX ORDER — ATORVASTATIN CALCIUM 80 MG/1
80 TABLET, FILM COATED ORAL AT BEDTIME
Qty: 0 | Refills: 0 | Status: DISCONTINUED | OUTPATIENT
Start: 2019-03-07 | End: 2019-03-13

## 2019-03-07 RX ORDER — ASPIRIN/CALCIUM CARB/MAGNESIUM 324 MG
81 TABLET ORAL DAILY
Qty: 0 | Refills: 0 | Status: DISCONTINUED | OUTPATIENT
Start: 2019-03-07 | End: 2019-03-07

## 2019-03-07 RX ORDER — SENNA PLUS 8.6 MG/1
1 TABLET ORAL DAILY
Qty: 0 | Refills: 0 | Status: DISCONTINUED | OUTPATIENT
Start: 2019-03-07 | End: 2019-03-13

## 2019-03-07 RX ORDER — FUROSEMIDE 40 MG
40 TABLET ORAL DAILY
Qty: 0 | Refills: 0 | Status: DISCONTINUED | OUTPATIENT
Start: 2019-03-07 | End: 2019-03-13

## 2019-03-07 RX ADMIN — ATORVASTATIN CALCIUM 80 MILLIGRAM(S): 80 TABLET, FILM COATED ORAL at 22:48

## 2019-03-07 RX ADMIN — CARBIDOPA AND LEVODOPA 1 TABLET(S): 25; 100 TABLET ORAL at 19:05

## 2019-03-07 RX ADMIN — ENTACAPONE 200 MILLIGRAM(S): 200 TABLET, FILM COATED ORAL at 19:05

## 2019-03-07 RX ADMIN — CARBIDOPA AND LEVODOPA 1 TABLET(S): 25; 100 TABLET ORAL at 23:34

## 2019-03-07 RX ADMIN — Medication 100 MILLIGRAM(S): at 19:05

## 2019-03-07 RX ADMIN — ENTACAPONE 200 MILLIGRAM(S): 200 TABLET, FILM COATED ORAL at 23:34

## 2019-03-07 RX ADMIN — Medication 650 MILLIGRAM(S): at 12:49

## 2019-03-07 RX ADMIN — SENNA PLUS 1 TABLET(S): 8.6 TABLET ORAL at 22:48

## 2019-03-07 RX ADMIN — RANOLAZINE 1000 MILLIGRAM(S): 500 TABLET, FILM COATED, EXTENDED RELEASE ORAL at 19:04

## 2019-03-07 NOTE — ED PROVIDER NOTE - CLINICAL SUMMARY MEDICAL DECISION MAKING FREE TEXT BOX
87 y/o male with PMHx Hld, HTT, PE, CAD, Parkinsons BIBA du to fall. pt notes he lost his balance due to Parkinson's and fell. pt notes he hit the front of his head with minor bleed. pt admits to taking aspirin. pt notes minor pain to right shoulder (with hx of shoulder surgery) and right hip pain.  Plan includes imaging including CT head/cervical, x-ray shoulder/pelvis.

## 2019-03-07 NOTE — H&P ADULT - ASSESSMENT
88 yr old M w/a PMH of CAD s/p CABG, Parkinson's disease, HTN, HLD, prior DVT s/p IVC filter prior GIB with CKD III presents with fall.

## 2019-03-07 NOTE — CONSULT NOTE ADULT - SUBJECTIVE AND OBJECTIVE BOX
Patient seen and evaluated @   Chief Complaint:     HPI:  88 yr old M w/a PMH of CAD s/p CABG, Parkinson's disease, HTN, HLD, prior DVT s/p IVC filter prior GIB,CKD  and multiple falls presents with fall after he lost his balance stepping backwards.  He reports he has had 3 falls in the last week at home,  all of which were precede by unsteadiness/ failure to use his walker and w/o antecedent dizziness or palps.  No HT    s/p remote CABG. cath(4/10( revealed patent LIMA-LAD and R-SVG w/ 99% D2 stenosis.  Class II angina had been remarkably well controlled after the addition of Ranexa and has been quiescent for some time despite discontinuing this agent and all cardiac meds w/ the exception of  low dose ASA. Did well during recent stint in rehab afe a fall and R-humeral fx.    No chest, throat, jaw. arm or upper back pain.  No dyspnea, orthopnea, PND.  No edema. No palpitations, dizziness or syncope.     PMH:   Unilateral inguinal hernia, with gangrene, not specified as recurrent  Upper GI bleed  Aspiration pneumonia  Clostridium difficile colitis  Pneumonia  MI, old  UTI (urinary tract infection)  BPH (benign prostatic hyperplasia)/Prostat CA  Parkinsons Disease  CAD (Coronary Artery Disease)  Mild AS/MR  HTN (Hypertension)  Hyperlipidemia    PSH:   Presence of IVC filter  History of prostate surgery  Varicose veins of legs  S/P appendectomy  Stented coronary artery  Hx of CABG  Hyperlipidemia    Medications:   ASA  anti Parkinsonians    Allergies:  Cipro (Rash)    FAMILY HISTORY:  Family history of coronary artery disease: Father,  of MI age 55  Family history of breast cancer: Mother    Social History:  Smoking: no  Alcohol: social => none  Drugs: none    Review of Systems:  Constitutional: No Fever, Chills. + Fatigue, Weight loss.  No snoring.  HEENT:  No blurred vision, epistaxis  Respiratory: No cough, wheezing, hemoptysis  Cardiovascular: see HPI  Gastrointestinal: No abdominal pain,  diarrhea, constipation, nausea, vomiting  Genitourinary:  Nocturia X 3.  No dysuria. + incontinence  Extremities: No swelling.+ joint Pain.  Right upper rib pain anteriorly.  Neurologic: No focal deficit or speech disturbance  Lymphatic: No swollen glands  Skin: No rash, easy bruising. + ecchymoses  Psychiatry: No depression, suicidal ideation.  No anxiety.  No sleep disturbance.    Physical Exam:  T(C): 36.5 (19 @ 15:02), Max: 36.5 (19 @ 15:02)  HR: 60 (19 @ 15:02) (60 - 76)  BP: 110/69 (19 @ 15:02) (110/69 - 186/87)  RR: 20 (19 @ 15:02) (19 - 20)  SpO2: 97% (19 @ 15:02) (97% - 100%)  Wt(kg): --    Daily     Daily     GENERAL: NAD. No ictrerus or pallor. Parkinsonian.  Eyes: PERRL.  EOMI  HENT:  NC/AT.  Normal oral muscosa.  Cardiovascular: No JVD.  PMI not palpable.  S1, S2 normal. Soft II/VI early basal MANA w/o radiation.  II/VI ap decr SM. No gallop/click/ rub.  Respiratory:  Clear to percussion and auscultation.  Gastrointestinal: Soft, non-tender.  No hepatomegally. Normal BS.  No bruits.  Extremities: No cyanosis, clubbing or edema   Neurologic: A & O X 3. No speech disturbance.  No focal weakness.  Psychiatry:  Mood & affect appropriate  Skin:  No rash. No ecchymoses.    Cardiovascular Diagnostic Testing:  ECG: AF W/ VR ~70.  mLAD.  LVH    Echo:    Stress Testing:    Cath:    Interpretation of Telemetry:    Imaging:    Labs:                        10.8   6.2   )-----------( 135      ( 07 Mar 2019 13:45 )             31.6         136  |  95<L>  |  27<H>  ----------------------------<  102<H>  3.8   |  27  |  1.33<H>    Ca    9.2      07 Mar 2019 13:45    TPro  7.9  /  Alb  4.2  /  TBili  0.8  /  DBili  x   /  AST  11  /  ALT  <5<L>  /  AlkPhos  68      PT/INR - ( 07 Mar 2019 13:45 )   PT: 13.1 sec;   INR: 1.14 ratio         PTT - ( 07 Mar 2019 13:45 )  PTT:32.0 sec

## 2019-03-07 NOTE — ED ADULT NURSE NOTE - ED STAT RN HANDOFF DETAILS
hand off given to oncoming RN Ptatie Cantu. TBA. awaiting bed. c/o right rib and right hip pain, less than earlier. Pt's wife at [facility]. Fall risk precautions maintained A&Ox4. ate lunch. Voiding well

## 2019-03-07 NOTE — H&P ADULT - HISTORY OF PRESENT ILLNESS
88 yr old M w/a PMH of CAD s/p CABG, Parkinson's disease, HTN, HLD, prior DVT s/p IVC filter prior GIB with CKD III presents with fall.  Patient states that he went to the refrigerator without his stroller which he is supposed to use at all times.  He states he took out the applesauce and then started falling backwards and tried to hold on to a chair but was unsuccessful and landed on his back.  He reports he has had 3 falls in the last week at home.  All co+nsidered "mechanical" in nature.  He denies LOC remembers the fall and hitting his head.

## 2019-03-07 NOTE — ED PROVIDER NOTE - CHPI ED SYMPTOMS NEG
no vomiting/no tingling/no weakness/no confusion/no deformity/no loss of consciousness/no numbness/no fever

## 2019-03-07 NOTE — H&P ADULT - NSHPLABSRESULTS_GEN_ALL_CORE
LABS:                        10.8   6.2   )-----------( 135      ( 07 Mar 2019 13:45 )             31.6         136  |  95<L>  |  27<H>  ----------------------------<  102<H>  3.8   |  27  |  1.33<H>    Ca    9.2      07 Mar 2019 13:45    TPro  7.9  /  Alb  4.2  /  TBili  0.8  /  DBili  x   /  AST  11  /  ALT  <5<L>  /  AlkPhos  68      PT/INR - ( 07 Mar 2019 13:45 )   PT: 13.1 sec;   INR: 1.14 ratio         PTT - ( 07 Mar 2019 13:45 )  PTT:32.0 sec  Urinalysis Basic - ( 07 Mar 2019 15:13 )    Color: Yellow / Appearance: Slightly Turbid / S.017 / pH: x  Gluc: x / Ketone: Negative  / Bili: Negative / Urobili: Negative   Blood: x / Protein: Trace / Nitrite: Negative   Leuk Esterase: Large / RBC: 0 /hpf / WBC >50 /HPF   Sq Epi: x / Non Sq Epi: 0 /hpf / Bacteria: Moderate

## 2019-03-07 NOTE — ED ADULT NURSE REASSESSMENT NOTE - NS ED NURSE REASSESS COMMENT FT1
Report taken from Tiffany SHULTZ in purple. Pt resting in stretcher. Cardiac monitor applied. Pt admitted to tele. Repeat troponin sent to lab.

## 2019-03-07 NOTE — ED PROVIDER NOTE - OBJECTIVE STATEMENT
87 y/o male with PMHx Hld, HTT, PE, CAD, Parkinsons BIBA du to fall. pt notes he lost his balance due to Parkinson's and fell. pt notes he hit the front of his head with minor bleed. pt admits to taking aspirin. pt notes minor pain to right shoulder (with hx of shoulder surgery) and right hip pain. pt denies back pain, prodromal symptoms, CP, SOB, N/V, Headache, LOC, numbness/weakness, or any other complaints.    EMS notes patient was unable to stand. pt declined pain medication currently.

## 2019-03-07 NOTE — PROVIDER CONTACT NOTE (OTHER) - ASSESSMENT
patient asymptomatic. denies cp/sob. Patient A&O x4, /76, hr 60, pox 97% on RA. Afib 50-60 on tele monitor.

## 2019-03-07 NOTE — ED PROVIDER NOTE - SKIN, MLM
Skin normal color for race, warm, dry and intact.  (+) left elbow abrasion without TTP. no active bleed.

## 2019-03-07 NOTE — ED PROVIDER NOTE - MUSCULOSKELETAL, MLM
Spine appears normal, range of motion is not limited, (+) minor right hip TTP, Flexion of hip intact. no right shoulder TTP, (+) decreased right shoulder ROM (pt notes chronic)

## 2019-03-07 NOTE — ED PROVIDER NOTE - ENMT, MLM
Airway patent, Nasal mucosa clear. Mouth with normal mucosa. Throat has no vesicles, no oropharyngeal exudates and uvula is midline. (+) abrasion to frontal scalp, no lacerations, no active bleed or gaping wounds.

## 2019-03-07 NOTE — H&P ADULT - PROBLEM SELECTOR PLAN 3
No A/C as history of large GI bleed in the past.    Rate controlled currently  No further cardio workup  Was taken off rate control on last hospitalization

## 2019-03-07 NOTE — CONSULT NOTE ADULT - ASSESSMENT
Multiple mechanical falls 2/2 PD  ASHD - s/p CABG. No sxs, albeit in the setting of limited activity,  AF - interval onset.  VR moderate absent AVN blockers  AS/MR/ prob mild-mod  HTN - HTHD    ADVISE:  No A/C give h/o GIB and significant fall hx  No additional cardiac w/u warranted.    Arya Francis MD, [facility]  Office: [phone]  Cell: [phone]

## 2019-03-07 NOTE — ED ADULT NURSE NOTE - OBJECTIVE STATEMENT
1030 88 yr old WM brought to ER via ambulance on stretcher for further eval and tx.  s/p fall this morning and a few days ago. PMH Parkinsons. ambulates with walker. Lost balance and fell striking forehead 1030 88 yr old WM brought to ER via ambulance on stretcher for further eval and tx.  s/p fall this morning and a few days ago. PMH Parkinsons. ambulates with walker. Lost balance and fell striking forehead. c/o right hip pain. MAEx4. Pain in right hip with ROM right leg. Takes daily baby aspirin. Denies LOC, HA, palp, chest pain, SOB, dizziness, fever or chills. Abrasion at forehead and left elbow. A&Ox4. Wearing diaper. Pt states his wife takes care of him at home. Does not have any home health aide or visiting nurse

## 2019-03-07 NOTE — H&P ADULT - FAMILY HISTORY
Family history of coronary artery disease, Father,  of MI age 55     Mother  Still living? Unknown  Family history of breast cancer, Age at diagnosis: Age Unknown

## 2019-03-07 NOTE — H&P ADULT - PROBLEM SELECTOR PLAN 2
CT brain:  No acute intracranial hemorrhage or mass effect. No displaced calvarial   fracture.  --Will order Orthostatics, likely from underlying parkinson's disease and overall weakness.    -PT consult

## 2019-03-07 NOTE — ED PROVIDER NOTE - PROGRESS NOTE DETAILS
[facility] paged. pending response. pt unable to ambulate. EKG noted Afib rate controlled. as per patient, no hx of afib. Cardiologist Dr. audra Francis. Paged cardio. pending response. Discussed with Dr. Breen, no acute intervention requested currently. pt accepting for admission Discussed with cardiology fellow, advised of case, elevated troponin. pending repeat troponin. no acute intervention requested currently.

## 2019-03-07 NOTE — ED PROVIDER NOTE - ATTENDING CONTRIBUTION TO CARE
I have seen and evaluated this patient with the advance practice clinician.   I agree with the findings  unless other wise stated. I have amended notes where needed.  After my face to face bedside evaluation, I am notin87 y/o male with PMHx Hld, HTT, PE, CAD, Parkinsons BIBA due to fall .when reached out to get something in kitchen this am,  pt notes he lost his balance due to Parkinson's and fell. pt notes he hit the front of his head with minor bleed. 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 pt notes minor pain to right shoulder (with hx of shoulder surgery) and right hip pain.  Plan includes imaging including CT head/cervical, x-ray shoulder/pelvis. --Castaneda

## 2019-03-07 NOTE — ED PROVIDER NOTE - CARE PLAN
Principal Discharge DX:	Frequent falls  Secondary Diagnosis:	Inability to ambulate due to hip  Secondary Diagnosis:	Atrial fibrillation, unspecified type

## 2019-03-08 LAB
ANION GAP SERPL CALC-SCNC: 13 MMOL/L — SIGNIFICANT CHANGE UP (ref 5–17)
BUN SERPL-MCNC: 28 MG/DL — HIGH (ref 7–23)
CALCIUM SERPL-MCNC: 8.5 MG/DL — SIGNIFICANT CHANGE UP (ref 8.4–10.5)
CHLORIDE SERPL-SCNC: 101 MMOL/L — SIGNIFICANT CHANGE UP (ref 96–108)
CO2 SERPL-SCNC: 23 MMOL/L — SIGNIFICANT CHANGE UP (ref 22–31)
CREAT SERPL-MCNC: 1.28 MG/DL — SIGNIFICANT CHANGE UP (ref 0.5–1.3)
CULTURE RESULTS: SIGNIFICANT CHANGE UP
GLUCOSE SERPL-MCNC: 108 MG/DL — HIGH (ref 70–99)
HCT VFR BLD CALC: 27 % — LOW (ref 39–50)
HGB BLD-MCNC: 9.4 G/DL — LOW (ref 13–17)
MCHC RBC-ENTMCNC: 30.2 PG — SIGNIFICANT CHANGE UP (ref 27–34)
MCHC RBC-ENTMCNC: 34.6 GM/DL — SIGNIFICANT CHANGE UP (ref 32–36)
MCV RBC AUTO: 87.2 FL — SIGNIFICANT CHANGE UP (ref 80–100)
PLATELET # BLD AUTO: 119 K/UL — LOW (ref 150–400)
POTASSIUM SERPL-MCNC: 3.5 MMOL/L — SIGNIFICANT CHANGE UP (ref 3.5–5.3)
POTASSIUM SERPL-SCNC: 3.5 MMOL/L — SIGNIFICANT CHANGE UP (ref 3.5–5.3)
RBC # BLD: 3.1 M/UL — LOW (ref 4.2–5.8)
RBC # FLD: 14.4 % — SIGNIFICANT CHANGE UP (ref 10.3–14.5)
SODIUM SERPL-SCNC: 137 MMOL/L — SIGNIFICANT CHANGE UP (ref 135–145)
SPECIMEN SOURCE: SIGNIFICANT CHANGE UP
WBC # BLD: 6 K/UL — SIGNIFICANT CHANGE UP (ref 3.8–10.5)
WBC # FLD AUTO: 6 K/UL — SIGNIFICANT CHANGE UP (ref 3.8–10.5)

## 2019-03-08 PROCEDURE — 93010 ELECTROCARDIOGRAM REPORT: CPT

## 2019-03-08 PROCEDURE — 93010 ELECTROCARDIOGRAM REPORT: CPT | Mod: 77

## 2019-03-08 RX ORDER — MULTIVIT WITH MIN/MFOLATE/K2 340-15/3 G
1 POWDER (GRAM) ORAL ONCE
Qty: 0 | Refills: 0 | Status: COMPLETED | OUTPATIENT
Start: 2019-03-08 | End: 2019-03-08

## 2019-03-08 RX ADMIN — ENTACAPONE 200 MILLIGRAM(S): 200 TABLET, FILM COATED ORAL at 06:15

## 2019-03-08 RX ADMIN — CARBIDOPA AND LEVODOPA 1 TABLET(S): 25; 100 TABLET ORAL at 18:19

## 2019-03-08 RX ADMIN — Medication 25 MICROGRAM(S): at 06:16

## 2019-03-08 RX ADMIN — Medication 40 MILLIGRAM(S): at 06:16

## 2019-03-08 RX ADMIN — Medication 100 MILLIGRAM(S): at 18:19

## 2019-03-08 RX ADMIN — CARBIDOPA AND LEVODOPA 1 TABLET(S): 25; 100 TABLET ORAL at 23:30

## 2019-03-08 RX ADMIN — ENTACAPONE 200 MILLIGRAM(S): 200 TABLET, FILM COATED ORAL at 23:30

## 2019-03-08 RX ADMIN — ATORVASTATIN CALCIUM 80 MILLIGRAM(S): 80 TABLET, FILM COATED ORAL at 21:08

## 2019-03-08 RX ADMIN — Medication 1 BOTTLE: at 13:34

## 2019-03-08 RX ADMIN — RANOLAZINE 1000 MILLIGRAM(S): 500 TABLET, FILM COATED, EXTENDED RELEASE ORAL at 06:15

## 2019-03-08 RX ADMIN — CARBIDOPA AND LEVODOPA 1 TABLET(S): 25; 100 TABLET ORAL at 06:44

## 2019-03-08 RX ADMIN — CARBIDOPA AND LEVODOPA 1 TABLET(S): 25; 100 TABLET ORAL at 13:34

## 2019-03-08 RX ADMIN — RANOLAZINE 1000 MILLIGRAM(S): 500 TABLET, FILM COATED, EXTENDED RELEASE ORAL at 18:19

## 2019-03-08 RX ADMIN — ENTACAPONE 200 MILLIGRAM(S): 200 TABLET, FILM COATED ORAL at 18:19

## 2019-03-08 RX ADMIN — Medication 100 MILLIGRAM(S): at 06:15

## 2019-03-08 RX ADMIN — POLYETHYLENE GLYCOL 3350 17 GRAM(S): 17 POWDER, FOR SOLUTION ORAL at 06:44

## 2019-03-08 RX ADMIN — ISOSORBIDE MONONITRATE 60 MILLIGRAM(S): 60 TABLET, EXTENDED RELEASE ORAL at 13:34

## 2019-03-08 RX ADMIN — SENNA PLUS 1 TABLET(S): 8.6 TABLET ORAL at 21:08

## 2019-03-08 RX ADMIN — ENTACAPONE 200 MILLIGRAM(S): 200 TABLET, FILM COATED ORAL at 13:34

## 2019-03-08 RX ADMIN — Medication 1000 UNIT(S): at 13:34

## 2019-03-08 NOTE — PHYSICAL THERAPY INITIAL EVALUATION ADULT - IMPAIRMENTS CONTRIBUTING TO GAIT DEVIATIONS, PT EVAL
narrow base of support/impaired postural control/impaired balance/decreased strength/decreased flexibility

## 2019-03-08 NOTE — PROGRESS NOTE ADULT - ATTENDING COMMENTS
Patient interviewed and examined. I was physically present for the essential portions of the E/M service provided.  Chart reviewed and note edited where appropriate.  Case discussed with fellow.  Agree w/ Assessment and Plan as outlined.    Arya Francis MD [facility]  Spectra:  [phone]  Office: [phone]

## 2019-03-08 NOTE — PROGRESS NOTE ADULT - ASSESSMENT
Multiple mechanical falls 2/2 PD  ASHD - s/p CABG. No sxs, albeit in the setting of limited activity,  AF - interval onset.  VR moderate absent AVN blockers w/ short-lived periods of VR in 30s while sleeping.  AS/MR/ prob mild-mod  HTN - HTHD    ADVISE:  No A/C give h/o GIB and significant fall hx  Continue to monitor on tele    Arya Francis MD, FACC  Office: [phone]  Cell: [phone]

## 2019-03-08 NOTE — PHYSICAL THERAPY INITIAL EVALUATION ADULT - PLANNED THERAPY INTERVENTIONS, PT EVAL
gait training/strengthening/GOAL: Stair Negotiation Training: Patient will be able to negotiate up & down 1 flight of stairs with bilateral rails, step to gait pattern, in 2 weeks./balance training/bed mobility training/transfer training

## 2019-03-08 NOTE — DIETITIAN INITIAL EVALUATION ADULT. - ORAL INTAKE PTA
good/pt reports good po intake PTA. 3 meals daily. Wife does the cooking at home. NFKA reported. Pt endorses taking a vitamin D3 supplement PTA.

## 2019-03-08 NOTE — PHYSICAL THERAPY INITIAL EVALUATION ADULT - PERTINENT HX OF CURRENT PROBLEM, REHAB EVAL
Pt is a 88 y.o. male with PMH of CAD s/p CABG, Parkinson's disease, HTN, HLD, prior DVT s/p IVC filter prior GIB with CKD III presents with fall. (-) CXR 3/7/19. (-) Head CT 3/7/19. (-) C-spine CT 3/7/19. Continued below.

## 2019-03-08 NOTE — PHYSICAL THERAPY INITIAL EVALUATION ADULT - PRECAUTIONS/LIMITATIONS, REHAB EVAL
Pelvis x-ray 3/7/19: No acute displaced fractures are visualized. No right hip dislocation. Arthritic changes of bilateral hips with osteophyte formation at bilateral femoral necks redemonstrated. Lower lumbar spondylosis partially imaged. IVC filter. Vascular calcifications. Surgical clip in the right proximal thigh. The bones appear demineralized. Pelvis Bony CT 3/7/19: Large ill-defined hematoma within the right gluteus minimus and medius musculature. No evidence of acute fracture, however evaluation is limited by diffuse osseous demineralization. Moderate to severe lower lumbar spondylosis./cardiac precautions/fall precautions

## 2019-03-08 NOTE — DIETITIAN INITIAL EVALUATION ADULT. - OTHER INFO
Pt seen for nutrition consult for pressure injury stage II or greater. Pt reports good po intake x 2 days in-patient. Currently ordered for regular texture diet, however would prefer soft diet at this time. Defer to SLP. Pt has no c/o nausea, vomiting, diarrhea, or constipation. Reports UBW of 160-163 lbs with no recent wt changes that he is aware of. Reports clothing fitting the same. Dosing wt noted as 159 lbs indicating some possible wt loss PTA, likely in setting of advanced age. Pt noted with some fluid retention, which may be masking additional wt loss. Pt declines all nutrition supplementation at this time. Nutrition education not indicated at this time, pt with good adherence and wife does the cooking.

## 2019-03-08 NOTE — DIETITIAN INITIAL EVALUATION ADULT. - PHYSICAL APPEARANCE
BMI 24.1, pt appears overall well nourished with some visible muscle wasting to temporal likely in setting of age related changes./other (specify)

## 2019-03-08 NOTE — DIETITIAN INITIAL EVALUATION ADULT. - NS AS NUTRI INTERV MEALS SNACK
Recommend change diet to low sodium therapeutic diet as indicated. Recommend soft diet to promote tolerance for now and defer diet texture to SLP. Continue to obtain/provide food preferences as feasible./General/healthful diet Recommend change diet to low sodium therapeutic diet as indicated. Recommend soft diet to promote tolerance for now and defer diet texture to SLP. Continue to obtain/provide food preferences as feasible. Pt encouraged to consume adequate protein and calories at meals to promote wound healing and wt maintenance./General/healthful diet

## 2019-03-08 NOTE — PROGRESS NOTE ADULT - SUBJECTIVE AND OBJECTIVE BOX
Patient is a 88y old  Male who presents with a chief complaint of Fall (07 Mar 2019 15:55)      SUBJECTIVE / OVERNIGHT EVENTS:    constipated x 5 days  no palptiations, dizziness, CP or SOb currently        Vital Signs Last 24 Hrs  T(C): 36.3 (08 Mar 2019 04:39), Max: 36.7 (07 Mar 2019 18:04)  T(F): 97.4 (08 Mar 2019 04:39), Max: 98.1 (07 Mar 2019 18:04)  HR: 60 (08 Mar 2019 10:03) (56 - 65)  BP: 146/81 (08 Mar 2019 10:03) (110/69 - 164/80)  BP(mean): --  RR: 18 (08 Mar 2019 04:39) (18 - 20)  SpO2: 98% (08 Mar 2019 04:39) (96% - 98%)  I&O's Summary    07 Mar 2019 07:01  -  08 Mar 2019 07:00  --------------------------------------------------------  IN: 0 mL / OUT: 200 mL / NET: -200 mL    08 Mar 2019 07:01  -  08 Mar 2019 11:19  --------------------------------------------------------  IN: 360 mL / OUT: 400 mL / NET: -40 mL        GENERAL: NAD, AAOx3  HEAD:  Atraumatic, Normocephalic, dry buccal mucosa  EYES: EOMI, PERRLA, conjunctiva and sclera clear  NECK: Supple, No JVD, No LAD  CHEST/LUNG: Clear to auscultation bilaterally; No wheeze  HEART: Irregular rate and rhythm; No murmurs, rubs, or gallops  ABDOMEN: Soft, Nontender, Nondistended; Bowel sounds present  EXTREMITIES:  2+ Peripheral Pulses, No clubbing, cyanosis, or edema  SKIN: No rashes or lesions  NEURO: nonfocal CN/motor/sensory/reflexes    LABS:                        9.4    6.0   )-----------( 119      ( 08 Mar 2019 06:31 )             27.0     03-08    137  |  101  |  28<H>  ----------------------------<  108<H>  3.5   |  23  |  1.28    Ca    8.5      08 Mar 2019 06:31    TPro  7.9  /  Alb  4.2  /  TBili  0.8  /  DBili  x   /  AST  11  /  ALT  <5<L>  /  AlkPhos  68  03-07    PT/INR - ( 07 Mar 2019 13:45 )   PT: 13.1 sec;   INR: 1.14 ratio         PTT - ( 07 Mar 2019 13:45 )  PTT:32.0 sec  CAPILLARY BLOOD GLUCOSE            Urinalysis Basic - ( 07 Mar 2019 15:13 )    Color: Yellow / Appearance: Slightly Turbid / S.017 / pH: x  Gluc: x / Ketone: Negative  / Bili: Negative / Urobili: Negative   Blood: x / Protein: Trace / Nitrite: Negative   Leuk Esterase: Large / RBC: 0 /hpf / WBC >50 /HPF   Sq Epi: x / Non Sq Epi: 0 /hpf / Bacteria: Moderate        RADIOLOGY & ADDITIONAL TESTS:    Imaging Personally Reviewed:  [x] YES  [ ] NO    Consultant(s) Notes Reviewed:  [x] YES  [ ] NO      MEDICATIONS  (STANDING):  atorvastatin 80 milliGRAM(s) Oral at bedtime  carbidopa/levodopa CR 50/200 1 Tablet(s) Oral <User Schedule>  cholecalciferol 1000 Unit(s) Oral daily  docusate sodium 100 milliGRAM(s) Oral two times a day  entacapone 200 milliGRAM(s) Oral four times a day  furosemide    Tablet 40 milliGRAM(s) Oral daily  isosorbide   mononitrate ER Tablet (IMDUR) 60 milliGRAM(s) Oral daily  levothyroxine 25 MICROGram(s) Oral daily  magnesium citrate Oral Solution 1 Bottle Oral once  ranolazine 1000 milliGRAM(s) Oral two times a day  senna 1 Tablet(s) Oral daily    MEDICATIONS  (PRN):  polyethylene glycol 3350 17 Gram(s) Oral daily PRN Constipation      Care Discussed with Consultants/Other Providers [x] YES  [ ] NO    HEALTH ISSUES - PROBLEM Dx:  Mixed hyperlipidemia: Mixed hyperlipidemia  Essential hypertension: Essential hypertension  Parkinsons disease: Parkinsons disease  Coronary artery disease involving coronary bypass graft of native heart without angina pectoris: Coronary artery disease involving coronary bypass graft of native heart without angina pectoris  CKD (chronic kidney disease) stage 3, GFR 30-59 ml/min: CKD (chronic kidney disease) stage 3, GFR 30-59 ml/min  Thrombocytopenia: Thrombocytopenia  Atrial fibrillation, unspecified type: Atrial fibrillation, unspecified type  Fall, initial encounter: Fall, initial encounter  Hematoma: Hematoma

## 2019-03-08 NOTE — PHYSICAL THERAPY INITIAL EVALUATION ADULT - IMPAIRED TRANSFERS: SIT/STAND, REHAB EVAL
impaired balance/decreased flexibility/decreased strength/impaired postural control/narrow base of support

## 2019-03-08 NOTE — PROGRESS NOTE ADULT - SUBJECTIVE AND OBJECTIVE BOX
********Cardiology Progress Note***************    CC: "I'm better."      INTERVAL HISTORY:    Feels well.  Sleeping at time of transient decreased rate to 30s.  No chest, throat, jaw. arm or upper back pain.  No dyspnea, orthopnea, PND.  No edema. No palpitations, dizziness or syncope.         Allergies    Cipro (Rash)    Intolerances    	    MEDICATIONS:  furosemide    Tablet 40 milliGRAM(s) Oral daily  isosorbide   mononitrate ER Tablet (IMDUR) 60 milliGRAM(s) Oral daily  ranolazine 1000 milliGRAM(s) Oral two times a day        carbidopa/levodopa CR 50/200 1 Tablet(s) Oral <User Schedule>  entacapone 200 milliGRAM(s) Oral four times a day    docusate sodium 100 milliGRAM(s) Oral two times a day  polyethylene glycol 3350 17 Gram(s) Oral daily PRN  senna 1 Tablet(s) Oral daily    atorvastatin 80 milliGRAM(s) Oral at bedtime  levothyroxine 25 MICROGram(s) Oral daily    cholecalciferol 1000 Unit(s) Oral daily      PAST MEDICAL & SURGICAL HISTORY:  Unilateral inguinal hernia, with gangrene, not specified as recurrent  Upper GI bleed: MICU admission , EGD w/ gastric ulcer/visible vessel which was cauterized  Aspiration pneumonia  Clostridium difficile colitis  Pneumonia  MI, old  UTI (urinary tract infection)  BPH (benign prostatic hyperplasia)  Parkinsons Disease  CAD (Coronary Artery Disease)  HTN (Hypertension)  Hyperlipidemia  Presence of IVC filter: right upper arm placed last year   History of prostate surgery: 2016  Varicose veins of legs: h/o Vein stripping  S/P appendectomy  Stented coronary artery: cardiac stent x 2 - placement between  &amp; possible   Hx of CAB      FAMILY HISTORY:  Family history of coronary artery disease: Father,  of MI age 55  Family history of breast cancer (Mother): Mother      SOCIAL HISTORY:  unchanged    REVIEW OF SYSTEMS:    CONSTITUTIONAL: No fever or chills.  appetite is normal  EYES: no visual disturbances  ENMT:  No epistaxis  RESPIRATORY: No cough, wheezing  or hemoptysis  CARDIOVASCULAR: see HPI  GASTROINTESTINAL: No abdominal pain. No nausea, vomiting, or hematemesis; No diarrhea or constipation. No melena or hematochezia.  GENITOURINARY: No dysuria, frequency, hematuria  NEUROLOGICAL: No headache.  No amaurosis.  No new focal motor or sensory disturbance  SKIN: No pruritis or rash   MUSCULOSKELETAL: No joint pain or swelling; No muscle, back, or extremity pain  PSYCHIATRIC: No depression, anxiety or difficulty sleeping  HEME/LYMPH: +easy bruising.  No bleeding gums    PHYSICAL EXAM:  T(C): 36.8 (19 @ 20:45), Max: 36.8 (19 @ 12:07)  HR: 49 (19 @ 20:45) (46 - 63)  BP: 128/65 (19 @ 20:45) (128/65 - 164/80)  RR: 18 (19 @ 20:45) (18 - 20)  SpO2: 100% (19 @ 20:45) (96% - 100%)  Wt(kg): --  I&O's Summary    07 Mar 2019 07:  -  08 Mar 2019 07:00  --------------------------------------------------------  IN: 0 mL / OUT: 200 mL / NET: -200 mL    08 Mar 2019 07:  -  08 Mar 2019 23:51  --------------------------------------------------------  IN: 600 mL / OUT: 900 mL / NET: -300 mL      GENERAL: NAD. No ictrerus or pallor. Parkinsonian.  Eyes: PERRL.  EOMI  HENT:  NC/AT.  Normal oral muscosa.  Cardiovascular: No JVD.  PMI not palpable.  S1, S2 normal. Soft II/VI early basal MANA w/o radiation.  II/VI ap decr SM. No gallop/click/ rub.  Respiratory:  Clear to percussion and auscultation.  Gastrointestinal: Soft, non-tender.  No hepatomegally. Normal BS.  No bruits.  Extremities: No cyanosis, clubbing or edema   Neurologic: A & O X 3. No speech disturbance.  No focal weakness.  Psychiatry:  Mood & affect appropriate  Skin:  No rash. No ecchymoses.    LABS:	 	    CBC Full  -  ( 08 Mar 2019 06:31 )  WBC Count : 6.0 K/uL  Hemoglobin : 9.4 g/dL  Hematocrit : 27.0 %  Platelet Count - Automated : 119 K/uL  Mean Cell Volume : 87.2 fl  Mean Cell Hemoglobin : 30.2 pg  Mean Cell Hemoglobin Concentration : 34.6 gm/dL  Auto Neutrophil # : x  Auto Lymphocyte # : x  Auto Monocyte # : x  Auto Eosinophil # : x  Auto Basophil # : x  Auto Neutrophil % : x  Auto Lymphocyte % : x  Auto Monocyte % : x  Auto Eosinophil % : x  Auto Basophil % : x        137  |  101  |  28<H>  ----------------------------<  108<H>  3.5   |  23  |  1.28      136  |  95<L>  |  27<H>  ----------------------------<  102<H>  3.8   |  27  |  1.33<H>    Ca    8.5      08 Mar 2019 06:31  Ca    9.2      07 Mar 2019 13:45    TPro  7.9  /  Alb  4.2  /  TBili  0.8  /  DBili  x   /  AST  11  /  ALT  <5<L>  /  AlkPhos  68  03-07      proBNP:   Lipid Profile:   HgA1c:   TSH:       CARDIAC MARKERS:            TELEMETRY: 	    ECG:  	  RADIOLOGY:

## 2019-03-08 NOTE — DIETITIAN INITIAL EVALUATION ADULT. - PROBLEM SELECTOR PLAN 1
Large ill-defined hematoma within the right gluteus minimus and medius   musculature. No fracture.    Hemodynamically stable currently.    Repeat CBC q12 for now and continue to monitor.  If drops will order CTA

## 2019-03-08 NOTE — PHYSICAL THERAPY INITIAL EVALUATION ADULT - ADDITIONAL COMMENTS
Pt states he lives with his wife in a apt with 2 steps to enter from the back entrance, +HR and 4 steps to enter from the front entrance, +B HR. Pt uses back entrance to enter the apt. Pt's wife assists pt with cooking, cleaning, and driving him to doctor appt. Pt uses a RW and stroller for ambulation. Pt wears reading glasses and hearing aid.

## 2019-03-08 NOTE — PHYSICAL THERAPY INITIAL EVALUATION ADULT - ACTIVE RANGE OF MOTION EXAMINATION, REHAB EVAL
bilateral  lower extremity Active ROM was WFL (within functional limits)/R shoulder AROM limited/bilateral upper extremity Active ROM was WFL (within functional limits)

## 2019-03-08 NOTE — DIETITIAN INITIAL EVALUATION ADULT. - ENERGY NEEDS
Height: 68 inches, Weight: 159 pounds (dosing)  BMI: 24.1 kg/m2 IBW: 154 pounds (+/-10%), %IBW: 103%  Pertinent Info: 1+ edema to R/L legs, ankles noted, stage II pressure injury to sacrum noted at this time in nursing flow sheet.  Other pertinent info: Pt is 88yoM with PMH significant for Parkinson's disease, CAD s/p CABG, HTN, HLD, DVT s/p IVC filter, prior GIB, CKD, presenting s/p multiple falls at home. +hematoma to R gluteus minimus and medius. Also with Afib, no further cardiac work up planned and no A/C given h/o GIB.

## 2019-03-08 NOTE — PROGRESS NOTE ADULT - PROBLEM SELECTOR PLAN 1
Large ill-defined hematoma within the right gluteus minimus and medius   musculature. No fracture.    Hemodynamically stable currently.    Daily CBC

## 2019-03-08 NOTE — CHART NOTE - NSCHARTNOTEFT_GEN_A_CORE
CC: Hg 10.8 -> 9.2    Event Summary:  Patient is 88M with PMH of CAD s/p CABG, Parkinson's disease, HTN, HLD, prior DVT s/p IVC filter prior GIB with CKD III presents with fall. On CT pelvis, patient noted to have a large ill-defined hematoma in the right gluteal musculatute. Notified by RN that repeat CBC tonight with drop in H/H from 10.8/31.6 to 9.2/27.4. Patient seen and examined at bedside, in NAD. Vital signs re-checked, hemodynamically stable. No superficial hematoma or swelling noted to gluteal area, no tenderness, LE WWP, no focal neuro deficits. Patient without any acute complaints.    Vital Signs Last 24 Hrs  T(C): 36.4 (07 Mar 2019 23:51), Max: 36.7 (07 Mar 2019 18:04)  T(F): 97.6 (07 Mar 2019 23:51), Max: 98.1 (07 Mar 2019 18:04)  HR: 56 (08 Mar 2019 01:24) (56 - 76)  BP: 164/76 (08 Mar 2019 01:24) (110/69 - 186/87)  BP(mean): --  RR: 18 (08 Mar 2019 01:24) (18 - 20)  SpO2: 96% (08 Mar 2019 01:24) (96% - 100%)      A/P:  88M with PMH of CAD s/p CABG, Parkinson's disease, HTN, HLD, prior DVT s/p IVC filter prior GIB with CKD III presents with fall.    # Hematoma of R gluteus  - VS hemodynamically stable.  - Continue to trend H/H in AM.  - Consider CTA if Hg continues to drop, or if patient decompensates.  Case discussed with [facility] coverage (Dr. Overton). Will endorse to primary team in AM.      Julee Bland PA-C  Department of Medicine  #[id number]

## 2019-03-08 NOTE — DIETITIAN INITIAL EVALUATION ADULT. - ADHERENCE
pt follows a low sodium, heart healthy diet PTA. Notes he prefers soft foods given some swallowing difficulty secondary to Parkinson's and recent shoulder injury making it difficult for him to cut tough foods small enough./good

## 2019-03-09 LAB
ANION GAP SERPL CALC-SCNC: 12 MMOL/L — SIGNIFICANT CHANGE UP (ref 5–17)
BUN SERPL-MCNC: 28 MG/DL — HIGH (ref 7–23)
CALCIUM SERPL-MCNC: 9.1 MG/DL — SIGNIFICANT CHANGE UP (ref 8.4–10.5)
CHLORIDE SERPL-SCNC: 97 MMOL/L — SIGNIFICANT CHANGE UP (ref 96–108)
CO2 SERPL-SCNC: 28 MMOL/L — SIGNIFICANT CHANGE UP (ref 22–31)
CREAT SERPL-MCNC: 1.28 MG/DL — SIGNIFICANT CHANGE UP (ref 0.5–1.3)
GLUCOSE SERPL-MCNC: 115 MG/DL — HIGH (ref 70–99)
HCT VFR BLD CALC: 30.9 % — LOW (ref 39–50)
HGB BLD-MCNC: 10.7 G/DL — LOW (ref 13–17)
MAGNESIUM SERPL-MCNC: 2.8 MG/DL — HIGH (ref 1.6–2.6)
MCHC RBC-ENTMCNC: 30.2 PG — SIGNIFICANT CHANGE UP (ref 27–34)
MCHC RBC-ENTMCNC: 34.5 GM/DL — SIGNIFICANT CHANGE UP (ref 32–36)
MCV RBC AUTO: 87.5 FL — SIGNIFICANT CHANGE UP (ref 80–100)
PHOSPHATE SERPL-MCNC: 2.7 MG/DL — SIGNIFICANT CHANGE UP (ref 2.5–4.5)
PLATELET # BLD AUTO: 154 K/UL — SIGNIFICANT CHANGE UP (ref 150–400)
POTASSIUM SERPL-MCNC: 4 MMOL/L — SIGNIFICANT CHANGE UP (ref 3.5–5.3)
POTASSIUM SERPL-SCNC: 4 MMOL/L — SIGNIFICANT CHANGE UP (ref 3.5–5.3)
RBC # BLD: 3.53 M/UL — LOW (ref 4.2–5.8)
RBC # FLD: 14.2 % — SIGNIFICANT CHANGE UP (ref 10.3–14.5)
SODIUM SERPL-SCNC: 137 MMOL/L — SIGNIFICANT CHANGE UP (ref 135–145)
WBC # BLD: 8.9 K/UL — SIGNIFICANT CHANGE UP (ref 3.8–10.5)
WBC # FLD AUTO: 8.9 K/UL — SIGNIFICANT CHANGE UP (ref 3.8–10.5)

## 2019-03-09 PROCEDURE — 99232 SBSQ HOSP IP/OBS MODERATE 35: CPT

## 2019-03-09 RX ADMIN — Medication 25 MICROGRAM(S): at 05:24

## 2019-03-09 RX ADMIN — CARBIDOPA AND LEVODOPA 1 TABLET(S): 25; 100 TABLET ORAL at 13:01

## 2019-03-09 RX ADMIN — CARBIDOPA AND LEVODOPA 1 TABLET(S): 25; 100 TABLET ORAL at 06:01

## 2019-03-09 RX ADMIN — SENNA PLUS 1 TABLET(S): 8.6 TABLET ORAL at 21:27

## 2019-03-09 RX ADMIN — ENTACAPONE 200 MILLIGRAM(S): 200 TABLET, FILM COATED ORAL at 13:01

## 2019-03-09 RX ADMIN — RANOLAZINE 1000 MILLIGRAM(S): 500 TABLET, FILM COATED, EXTENDED RELEASE ORAL at 17:20

## 2019-03-09 RX ADMIN — CARBIDOPA AND LEVODOPA 1 TABLET(S): 25; 100 TABLET ORAL at 23:24

## 2019-03-09 RX ADMIN — ENTACAPONE 200 MILLIGRAM(S): 200 TABLET, FILM COATED ORAL at 17:20

## 2019-03-09 RX ADMIN — ISOSORBIDE MONONITRATE 60 MILLIGRAM(S): 60 TABLET, EXTENDED RELEASE ORAL at 17:20

## 2019-03-09 RX ADMIN — Medication 1 ENEMA: at 04:43

## 2019-03-09 RX ADMIN — ENTACAPONE 200 MILLIGRAM(S): 200 TABLET, FILM COATED ORAL at 05:23

## 2019-03-09 RX ADMIN — Medication 40 MILLIGRAM(S): at 05:24

## 2019-03-09 RX ADMIN — ATORVASTATIN CALCIUM 80 MILLIGRAM(S): 80 TABLET, FILM COATED ORAL at 21:27

## 2019-03-09 RX ADMIN — RANOLAZINE 1000 MILLIGRAM(S): 500 TABLET, FILM COATED, EXTENDED RELEASE ORAL at 05:24

## 2019-03-09 RX ADMIN — Medication 100 MILLIGRAM(S): at 17:20

## 2019-03-09 RX ADMIN — POLYETHYLENE GLYCOL 3350 17 GRAM(S): 17 POWDER, FOR SOLUTION ORAL at 05:23

## 2019-03-09 RX ADMIN — ENTACAPONE 200 MILLIGRAM(S): 200 TABLET, FILM COATED ORAL at 23:24

## 2019-03-09 RX ADMIN — Medication 100 MILLIGRAM(S): at 05:24

## 2019-03-09 RX ADMIN — CARBIDOPA AND LEVODOPA 1 TABLET(S): 25; 100 TABLET ORAL at 17:20

## 2019-03-09 RX ADMIN — Medication 1000 UNIT(S): at 13:01

## 2019-03-09 NOTE — CHART NOTE - NSCHARTNOTEFT_GEN_A_CORE
Called by RN for patient with brief episode of bradycardia down to 38bpm on telemetry - lasted approximately 1 second.  Patient asymptomatic.  VSS.  Will continue to monitor on telemetry.      Shavon Hughes NP  [phone]

## 2019-03-09 NOTE — PROGRESS NOTE ADULT - ASSESSMENT
Multiple mechanical falls 2/2 PD  ASHD - s/p CABG. No sxs, albeit in the setting of limited activity,  AF - interval onset.  VR moderate absent AVN blockers  AS/MR/ prob mild-mod  HTN - HTHD    ADVISE:  No A/C give h/o GIB and significant fall hx  No additional cardiac w/u warranted.    Jose Dhaliwal for Arya Francis MD, FAC  Office: [phone]  Cell: [phone]

## 2019-03-09 NOTE — PROGRESS NOTE ADULT - SUBJECTIVE AND OBJECTIVE BOX
Patient seen and evaluated @   Chief Complaint:     HPI:  88 yr old M w/a PMH of CAD s/p CABG, Parkinson's disease, HTN, HLD, prior DVT s/p IVC filter prior GIB,CKD  and multiple falls presents with fall after he lost his balance stepping backwards.  He reports he has had 3 falls in the last week at home,  all of which were precede by unsteadiness/ failure to use his walker and w/o antecedent dizziness or palps.  No HT    s/p remote CABG. cath(4/10( revealed patent LIMA-LAD and R-SVG w/ 99% D2 stenosis.  Class II angina had been remarkably well controlled after the addition of Ranexa and has been quiescent for some time despite discontinuing this agent and all cardiac meds w/ the exception of  low dose ASA. Did well during recent stint in rehab afe a fall and R-humeral fx.    No chest, throat, jaw. arm or upper back pain.  No dyspnea, orthopnea, PND.  No edema. No palpitations, dizziness or syncope.     PMH:   Unilateral inguinal hernia, with gangrene, not specified as recurrent  Upper GI bleed  Aspiration pneumonia  Clostridium difficile colitis  Pneumonia  MI, old  UTI (urinary tract infection)  BPH (benign prostatic hyperplasia)/Prostat CA  Parkinsons Disease  CAD (Coronary Artery Disease)  Mild AS/MR  HTN (Hypertension)  Hyperlipidemia    PSH:   Presence of IVC filter  History of prostate surgery  Varicose veins of legs  S/P appendectomy  Stented coronary artery  Hx of CABG  Hyperlipidemia    Medications:   atorvastatin 80 milliGRAM(s) Oral at bedtime  carbidopa/levodopa CR 50/200 1 Tablet(s) Oral <User Schedule>  cholecalciferol 1000 Unit(s) Oral daily  docusate sodium 100 milliGRAM(s) Oral two times a day  entacapone 200 milliGRAM(s) Oral four times a day  furosemide    Tablet 40 milliGRAM(s) Oral daily  isosorbide   mononitrate ER Tablet (IMDUR) 60 milliGRAM(s) Oral daily  levothyroxine 25 MICROGram(s) Oral daily  polyethylene glycol 3350 17 Gram(s) Oral daily PRN  ranolazine 1000 milliGRAM(s) Oral two times a day  senna 1 Tablet(s) Oral daily    Allergies:  Cipro (Rash)    FAMILY HISTORY:  Family history of coronary artery disease: Father,  of MI age 55  Family history of breast cancer: Mother    Social History:  Smoking: no  Alcohol: social => none  Drugs: none    Review of Systems:  Constitutional: No Fever, Chills. + Fatigue, Weight loss.  No snoring.  HEENT:  No blurred vision, epistaxis  Respiratory: No cough, wheezing, hemoptysis  Cardiovascular: see HPI  Gastrointestinal: No abdominal pain,  diarrhea, constipation, nausea, vomiting  Genitourinary:  Nocturia X 3.  No dysuria. + incontinence  Extremities: No swelling.+ joint Pain.  Right upper rib pain anteriorly.  Neurologic: No focal deficit or speech disturbance  Lymphatic: No swollen glands  Skin: No rash, easy bruising. + ecchymoses  Psychiatry: No depression, suicidal ideation.  No anxiety.  No sleep disturbance.    Physical Exam:  Vital Signs Last 24 Hrs  T(C): 36.5 (09 Mar 2019 12:04), Max: 36.8 (08 Mar 2019 20:45)  T(F): 97.7 (09 Mar 2019 12:04), Max: 98.2 (08 Mar 2019 20:45)  HR: 58 (09 Mar 2019 12:04) (49 - 69)  BP: 108/52 (09 Mar 2019 12:04) (108/52 - 177/85)  BP(mean): --  RR: 18 (09 Mar 2019 12:04) (18 - 18)  SpO2: 98% (09 Mar 2019 12:04) (98% - 100%)  Daily     Daily     GENERAL: NAD. No ictrerus or pallor. Parkinsonian.  Eyes: PERRL.  EOMI  HENT:  NC/AT.  Normal oral muscosa.  Cardiovascular: No JVD.  PMI not palpable.  S1, S2 normal. Soft II/VI early basal MANA w/o radiation.  II/VI ap decr SM. No gallop/click/ rub.  Respiratory:  Clear to percussion and auscultation.  Gastrointestinal: Soft, non-tender.  No hepatomegally. Normal BS.  No bruits.  Extremities: No cyanosis, clubbing or edema   Neurologic: A & O X 3. No speech disturbance.  No focal weakness.  Psychiatry:  Mood & affect appropriate  Skin:  No rash. No ecchymoses.    Cardiovascular Diagnostic Testing:  ECG: AF W/ VR ~70.  mLAD.  LVH    Echo:    Stress Testing:    Cath:    Interpretation of Telemetry:    Imaging:    Labs:                                 10.7   8.9   )-----------( 154      ( 09 Mar 2019 07:07 )             30.9     03-09    137  |  97  |  28<H>  ----------------------------<  115<H>  4.0   |  28  |  1.28    Ca    9.1      09 Mar 2019 06:26  Phos  2.7     -  Mg     2.8      Patient seen and evaluated @   Chief Complaint:     HPI:  88 yr old M w/a PMH of CAD s/p CABG, Parkinson's disease, HTN, HLD, prior DVT s/p IVC filter prior GIB,CKD  and multiple falls presents with fall after he lost his balance stepping backwards.  He reports he has had 3 falls in the last week at home,  all of which were precede by unsteadiness/ failure to use his walker and w/o antecedent dizziness or palps.  No HT    s/p remote CABG. cath(4/10( revealed patent LIMA-LAD and R-SVG w/ 99% D2 stenosis.  Class II angina had been remarkably well controlled after the addition of Ranexa and has been quiescent for some time despite discontinuing this agent and all cardiac meds w/ the exception of  low dose ASA. Did well during recent stint in rehab afe a fall and R-humeral fx.    No chest, throat, jaw. arm or upper back pain.  No dyspnea, orthopnea, PND.  No edema. No palpitations, dizziness or syncope.     PMH:   Unilateral inguinal hernia, with gangrene, not specified as recurrent  Upper GI bleed  Aspiration pneumonia  Clostridium difficile colitis  Pneumonia  MI, old  UTI (urinary tract infection)  BPH (benign prostatic hyperplasia)/Prostat CA  Parkinsons Disease  CAD (Coronary Artery Disease)  Mild AS/MR  HTN (Hypertension)  Hyperlipidemia    PSH:   Presence of IVC filter  History of prostate surgery  Varicose veins of legs  S/P appendectomy  Stented coronary artery  Hx of CABG  Hyperlipidemia    Medications:   atorvastatin 80 milliGRAM(s) Oral at bedtime  carbidopa/levodopa CR 50/200 1 Tablet(s) Oral <User Schedule>  cholecalciferol 1000 Unit(s) Oral daily  docusate sodium 100 milliGRAM(s) Oral two times a day  entacapone 200 milliGRAM(s) Oral four times a day  furosemide    Tablet 40 milliGRAM(s) Oral daily  isosorbide   mononitrate ER Tablet (IMDUR) 60 milliGRAM(s) Oral daily  levothyroxine 25 MICROGram(s) Oral daily  polyethylene glycol 3350 17 Gram(s) Oral daily PRN  ranolazine 1000 milliGRAM(s) Oral two times a day  senna 1 Tablet(s) Oral daily    Allergies:  Cipro (Rash)    FAMILY HISTORY:  Family history of coronary artery disease: Father,  of MI age 55  Family history of breast cancer: Mother    Social History:  Smoking: no  Alcohol: social => none  Drugs: none    Review of Systems:  Constitutional: No Fever, Chills. + Fatigue, Weight loss.  No snoring.  HEENT:  No blurred vision, epistaxis  Respiratory: No cough, wheezing, hemoptysis  Cardiovascular: see HPI  Gastrointestinal: No abdominal pain,  diarrhea, constipation, nausea, vomiting  Genitourinary:  Nocturia X 3.  No dysuria. + incontinence  Extremities: No swelling.+ joint Pain.  Right upper rib pain anteriorly.  Neurologic: No focal deficit or speech disturbance  Lymphatic: No swollen glands  Skin: No rash, easy bruising. + ecchymoses  Psychiatry: No depression, suicidal ideation.  No anxiety.  No sleep disturbance.    Physical Exam:  Vital Signs Last 24 Hrs  T(C): 36.5 (09 Mar 2019 12:04), Max: 36.8 (08 Mar 2019 20:45)  T(F): 97.7 (09 Mar 2019 12:04), Max: 98.2 (08 Mar 2019 20:45)  HR: 58 (09 Mar 2019 12:04) (49 - 69)  BP: 108/52 (09 Mar 2019 12:04) (108/52 - 177/85)  BP(mean): --  RR: 18 (09 Mar 2019 12:04) (18 - 18)  SpO2: 98% (09 Mar 2019 12:04) (98% - 100%)  Daily     Daily     GENERAL: NAD. No ictrerus or pallor. Parkinsonian. c/o some constipation.  Eyes: PERRL.  EOMI  HENT:  NC/AT.  Normal oral muscosa.  Cardiovascular: No JVD.  PMI not palpable.  S1, S2 normal. Soft II/VI early basal MANA w/o radiation.  II/VI ap decr SM. No gallop/click/ rub.  Respiratory:  Clear to percussion and auscultation.  Gastrointestinal: Soft, non-tender.  No hepatomegally. Normal BS.  No bruits.  Extremities: No cyanosis, clubbing or edema   Neurologic: A & O X 3. No speech disturbance.  No focal weakness.  Psychiatry:  Mood & affect appropriate  Skin:  No rash. No ecchymoses.    Cardiovascular Diagnostic Testing:  ECG: AF W/ VR ~70.  mLAD.  LVH    Echo:    Stress Testing:    Cath:    Interpretation of Telemetry: briefly to mid-to-high 30s during sleep. No long pauses.    Imaging:    Labs:                                 10.7   8.9   )-----------( 154      ( 09 Mar 2019 07:07 )             30.9     -    137  |  97  |  28<H>  ----------------------------<  115<H>  4.0   |  28  |  1.28    Ca    9.1      09 Mar 2019 06:26  Phos  2.7       Mg     2.8

## 2019-03-09 NOTE — PROGRESS NOTE ADULT - PROBLEM SELECTOR PLAN 2
CT brain:  No acute intracranial hemorrhage or mass effect. No displaced calvarial   fracture.  --likely from underlying parkinson's disease and diminished postural reflexes  -PT consult

## 2019-03-09 NOTE — PROGRESS NOTE ADULT - SUBJECTIVE AND OBJECTIVE BOX
BM yesterday  denies CP, palpitations, dizziness or SOB    Vital Signs Last 24 Hrs  T(C): 36.4 (09 Mar 2019 04:36), Max: 36.8 (08 Mar 2019 12:07)  T(F): 97.6 (09 Mar 2019 04:36), Max: 98.2 (08 Mar 2019 12:07)  HR: 69 (09 Mar 2019 04:36) (46 - 69)  BP: 177/85 (09 Mar 2019 04:36) (128/65 - 177/85)  BP(mean): --  RR: 18 (09 Mar 2019 04:36) (18 - 20)  SpO2: 99% (09 Mar 2019 04:36) (98% - 100%)    GENERAL: NAD, AAOx3  HEAD:  Atraumatic, Normocephalic, dry buccal mucosa  EYES: EOMI, PERRLA, conjunctiva and sclera clear  NECK: Supple, No JVD, No LAD  CHEST/LUNG: Clear to auscultation bilaterally; No wheeze  HEART: Irregular rate and rhythm; No murmurs, rubs, or gallops  ABDOMEN: Soft, Nontender, Nondistended; Bowel sounds present  EXTREMITIES:  2+ Peripheral Pulses, No clubbing, cyanosis, or edema  SKIN: No rashes or lesions  NEURO: nonfocal CN/motor/sensory/reflexes    LABS:                        10.7   8.9   )-----------( 154      ( 09 Mar 2019 07:07 )             30.9     03-09    137  |  97  |  28<H>  ----------------------------<  115<H>  4.0   |  28  |  1.28    Ca    9.1      09 Mar 2019 06:26  Phos  2.7     03-09  Mg     2.8     03-09    TPro  7.9  /  Alb  4.2  /  TBili  0.8  /  DBili  x   /  AST  11  /  ALT  <5<L>  /  AlkPhos  68  03-07    PT/INR - ( 07 Mar 2019 13:45 )   PT: 13.1 sec;   INR: 1.14 ratio         PTT - ( 07 Mar 2019 13:45 )  PTT:32.0 sec  CAPILLARY BLOOD GLUCOSE            Urinalysis Basic - ( 07 Mar 2019 15:13 )    Color: Yellow / Appearance: Slightly Turbid / S.017 / pH: x  Gluc: x / Ketone: Negative  / Bili: Negative / Urobili: Negative   Blood: x / Protein: Trace / Nitrite: Negative   Leuk Esterase: Large / RBC: 0 /hpf / WBC >50 /HPF   Sq Epi: x / Non Sq Epi: 0 /hpf / Bacteria: Moderate

## 2019-03-09 NOTE — PROVIDER CONTACT NOTE (OTHER) - ACTION/TREATMENT ORDERED:
as per NP cont to ruperto, cardiology on veronika,.
will continue to monitor the patient. R2 pad at bedside.

## 2019-03-10 LAB
ANION GAP SERPL CALC-SCNC: 11 MMOL/L — SIGNIFICANT CHANGE UP (ref 5–17)
BUN SERPL-MCNC: 33 MG/DL — HIGH (ref 7–23)
CALCIUM SERPL-MCNC: 8.6 MG/DL — SIGNIFICANT CHANGE UP (ref 8.4–10.5)
CHLORIDE SERPL-SCNC: 99 MMOL/L — SIGNIFICANT CHANGE UP (ref 96–108)
CO2 SERPL-SCNC: 26 MMOL/L — SIGNIFICANT CHANGE UP (ref 22–31)
CREAT SERPL-MCNC: 1.39 MG/DL — HIGH (ref 0.5–1.3)
GLUCOSE SERPL-MCNC: 112 MG/DL — HIGH (ref 70–99)
HCT VFR BLD CALC: 27.3 % — LOW (ref 39–50)
HGB BLD-MCNC: 9.2 G/DL — LOW (ref 13–17)
MAGNESIUM SERPL-MCNC: 2.6 MG/DL — SIGNIFICANT CHANGE UP (ref 1.6–2.6)
MCHC RBC-ENTMCNC: 29.7 PG — SIGNIFICANT CHANGE UP (ref 27–34)
MCHC RBC-ENTMCNC: 33.6 GM/DL — SIGNIFICANT CHANGE UP (ref 32–36)
MCV RBC AUTO: 88.2 FL — SIGNIFICANT CHANGE UP (ref 80–100)
PHOSPHATE SERPL-MCNC: 3.3 MG/DL — SIGNIFICANT CHANGE UP (ref 2.5–4.5)
PLATELET # BLD AUTO: 130 K/UL — LOW (ref 150–400)
POTASSIUM SERPL-MCNC: 4 MMOL/L — SIGNIFICANT CHANGE UP (ref 3.5–5.3)
POTASSIUM SERPL-SCNC: 4 MMOL/L — SIGNIFICANT CHANGE UP (ref 3.5–5.3)
RBC # BLD: 3.09 M/UL — LOW (ref 4.2–5.8)
RBC # FLD: 14.7 % — HIGH (ref 10.3–14.5)
SODIUM SERPL-SCNC: 136 MMOL/L — SIGNIFICANT CHANGE UP (ref 135–145)
WBC # BLD: 7.4 K/UL — SIGNIFICANT CHANGE UP (ref 3.8–10.5)
WBC # FLD AUTO: 7.4 K/UL — SIGNIFICANT CHANGE UP (ref 3.8–10.5)

## 2019-03-10 PROCEDURE — 99232 SBSQ HOSP IP/OBS MODERATE 35: CPT

## 2019-03-10 RX ADMIN — RANOLAZINE 1000 MILLIGRAM(S): 500 TABLET, FILM COATED, EXTENDED RELEASE ORAL at 17:31

## 2019-03-10 RX ADMIN — ENTACAPONE 200 MILLIGRAM(S): 200 TABLET, FILM COATED ORAL at 06:18

## 2019-03-10 RX ADMIN — CARBIDOPA AND LEVODOPA 1 TABLET(S): 25; 100 TABLET ORAL at 23:11

## 2019-03-10 RX ADMIN — CARBIDOPA AND LEVODOPA 1 TABLET(S): 25; 100 TABLET ORAL at 17:32

## 2019-03-10 RX ADMIN — POLYETHYLENE GLYCOL 3350 17 GRAM(S): 17 POWDER, FOR SOLUTION ORAL at 06:18

## 2019-03-10 RX ADMIN — ISOSORBIDE MONONITRATE 60 MILLIGRAM(S): 60 TABLET, EXTENDED RELEASE ORAL at 11:26

## 2019-03-10 RX ADMIN — ENTACAPONE 200 MILLIGRAM(S): 200 TABLET, FILM COATED ORAL at 17:31

## 2019-03-10 RX ADMIN — ENTACAPONE 200 MILLIGRAM(S): 200 TABLET, FILM COATED ORAL at 23:11

## 2019-03-10 RX ADMIN — Medication 40 MILLIGRAM(S): at 06:19

## 2019-03-10 RX ADMIN — Medication 1000 UNIT(S): at 11:26

## 2019-03-10 RX ADMIN — SENNA PLUS 1 TABLET(S): 8.6 TABLET ORAL at 11:26

## 2019-03-10 RX ADMIN — ATORVASTATIN CALCIUM 80 MILLIGRAM(S): 80 TABLET, FILM COATED ORAL at 21:32

## 2019-03-10 RX ADMIN — ENTACAPONE 200 MILLIGRAM(S): 200 TABLET, FILM COATED ORAL at 11:26

## 2019-03-10 RX ADMIN — Medication 100 MILLIGRAM(S): at 17:32

## 2019-03-10 RX ADMIN — RANOLAZINE 1000 MILLIGRAM(S): 500 TABLET, FILM COATED, EXTENDED RELEASE ORAL at 06:18

## 2019-03-10 RX ADMIN — CARBIDOPA AND LEVODOPA 1 TABLET(S): 25; 100 TABLET ORAL at 11:26

## 2019-03-10 RX ADMIN — CARBIDOPA AND LEVODOPA 1 TABLET(S): 25; 100 TABLET ORAL at 06:18

## 2019-03-10 RX ADMIN — Medication 25 MICROGRAM(S): at 06:19

## 2019-03-10 RX ADMIN — Medication 100 MILLIGRAM(S): at 06:19

## 2019-03-10 NOTE — PROGRESS NOTE ADULT - ASSESSMENT
Multiple mechanical falls 2/2 PD  ASHD - s/p CABG. No sxs, albeit in the setting of limited activity,  AF - interval onset.  VR moderate absent AVN blockers  AS/MR/ prob mild-mod  HTN - HTHD  BUN/Cr slightly up. If continues, decrease lasix.    ADVISE:  No A/C give h/o GIB and significant fall hx  No additional cardiac w/u warranted.    Jose Dhaliwal for Arya Francis MD, FACC  Office: [phone]  Cell: [phone]

## 2019-03-10 NOTE — PROGRESS NOTE ADULT - SUBJECTIVE AND OBJECTIVE BOX
Patient seen and evaluated @ 1200  Chief Complaint:     HPI:  88 yr old M w/a PMH of CAD s/p CABG, Parkinson's disease, HTN, HLD, prior DVT s/p IVC filter prior GIB,CKD  and multiple falls presents with fall after he lost his balance stepping backwards.  He reports he has had 3 falls in the last week at home,  all of which were precede by unsteadiness/ failure to use his walker and w/o antecedent dizziness or palps.  No HT    s/p remote CABG. cath(4/10( revealed patent LIMA-LAD and R-SVG w/ 99% D2 stenosis.  Class II angina had been remarkably well controlled after the addition of Ranexa and has been quiescent for some time despite discontinuing this agent and all cardiac meds w/ the exception of  low dose ASA. Did well during recent stint in rehab afe a fall and R-humeral fx.    No chest, throat, jaw. arm or upper back pain.  No dyspnea, orthopnea, PND.  No edema. No palpitations, dizziness or syncope.     PMH:   Unilateral inguinal hernia, with gangrene, not specified as recurrent  Upper GI bleed  Aspiration pneumonia  Clostridium difficile colitis  Pneumonia  MI, old  UTI (urinary tract infection)  BPH (benign prostatic hyperplasia)/Prostat CA  Parkinsons Disease  CAD (Coronary Artery Disease)  Mild AS/MR  HTN (Hypertension)  Hyperlipidemia    PSH:   Presence of IVC filter  History of prostate surgery  Varicose veins of legs  S/P appendectomy  Stented coronary artery  Hx of CABG  Hyperlipidemia    Medications:   atorvastatin 80 milliGRAM(s) Oral at bedtime  carbidopa/levodopa CR 50/200 1 Tablet(s) Oral <User Schedule>  cholecalciferol 1000 Unit(s) Oral daily  docusate sodium 100 milliGRAM(s) Oral two times a day  entacapone 200 milliGRAM(s) Oral four times a day  furosemide    Tablet 40 milliGRAM(s) Oral daily  isosorbide   mononitrate ER Tablet (IMDUR) 60 milliGRAM(s) Oral daily  levothyroxine 25 MICROGram(s) Oral daily  polyethylene glycol 3350 17 Gram(s) Oral daily PRN  ranolazine 1000 milliGRAM(s) Oral two times a day  senna 1 Tablet(s) Oral daily    Allergies:  Cipro (Rash)    FAMILY HISTORY:  Family history of coronary artery disease: Father,  of MI age 55  Family history of breast cancer: Mother    Social History:  Smoking: no  Alcohol: social => none  Drugs: none    Review of Systems:  Constitutional: No Fever, Chills. + Fatigue, Weight loss.  No snoring.  HEENT:  No blurred vision, epistaxis  Respiratory: No cough, wheezing, hemoptysis  Cardiovascular: see HPI  Gastrointestinal: No abdominal pain,  diarrhea, constipation, nausea, vomiting  Genitourinary:  Nocturia X 3.  No dysuria. + incontinence  Extremities: No swelling.+ joint Pain.  Right upper rib pain anteriorly.  Neurologic: No focal deficit or speech disturbance  Lymphatic: No swollen glands  Skin: No rash, easy bruising. + ecchymoses  Psychiatry: No depression, suicidal ideation.  No anxiety.  No sleep disturbance.    Physical Exam:  Vital Signs Last 24 Hrs  T(C): 36.7 (10 Mar 2019 04:55), Max: 36.7 (10 Mar 2019 04:55)  T(F): 98 (10 Mar 2019 04:55), Max: 98 (10 Mar 2019 04:55)  HR: 52 (10 Mar 2019 11:24) (52 - 65)  BP: 117/67 (10 Mar 2019 11:24) (108/52 - 132/68)  BP(mean): --  RR: 18 (10 Mar 2019 04:55) (18 - 18)  SpO2: 99% (10 Mar 2019 04:55) (97% - 99%)Daily     GENERAL: NAD. No ictrerus or pallor. Parkinsonian. c/o some constipation.  Eyes: PERRL.  EOMI  HENT:  NC/AT.  Normal oral muscosa.  Cardiovascular: No JVD.  PMI not palpable.  S1, S2 normal. Soft II/VI early basal MANA w/o radiation.  II/VI ap decr SM. No gallop/click/ rub.  Respiratory:  Clear to percussion and auscultation.  Gastrointestinal: Soft, non-tender.  No hepatomegally. Normal BS.  No bruits.  Extremities: No cyanosis, clubbing or edema   Neurologic: A & O X 3. No speech disturbance.  No focal weakness.  Psychiatry:  Mood & affect appropriate  Skin:  No rash. No ecchymoses.    Cardiovascular Diagnostic Testing:  ECG: AF W/ VR ~70.  mLAD.  LVH    Echo:    Stress Testing:    Cath:    Interpretation of Telemetry: briefly to mid-to-high 30s during sleep. No long pauses.  Last PM lowest 40s.    Imaging:    Labs:                                                       9.2    7.4   )-----------( 130      ( 10 Mar 2019 06:50 )             27.3     03-10    136  |  99  |  33<H>  ----------------------------<  112<H>  4.0   |  26  |  1.39<H>    Ca    8.6      10 Mar 2019 06:50  Phos  3.3     03-10  Mg     2.6     03-10 Patient seen and evaluated @ 1200  Chief Complaint:     HPI:  88 yr old M w/a PMH of CAD s/p CABG, Parkinson's disease, HTN, HLD, prior DVT s/p IVC filter prior GIB,CKD  and multiple falls presents with fall after he lost his balance stepping backwards.  He reports he has had 3 falls in the last week at home,  all of which were precede by unsteadiness/ failure to use his walker and w/o antecedent dizziness or palps.  No HT    s/p remote CABG. cath(4/10( revealed patent LIMA-LAD and R-SVG w/ 99% D2 stenosis.  Class II angina had been remarkably well controlled after the addition of Ranexa and has been quiescent for some time despite discontinuing this agent and all cardiac meds w/ the exception of  low dose ASA. Did well during recent stint in rehab afe a fall and R-humeral fx.    No chest, throat, jaw. arm or upper back pain.  No dyspnea, orthopnea, PND.  No edema. No palpitations, dizziness or syncope.     PMH:   Unilateral inguinal hernia, with gangrene, not specified as recurrent  Upper GI bleed  Aspiration pneumonia  Clostridium difficile colitis  Pneumonia  MI, old  UTI (urinary tract infection)  BPH (benign prostatic hyperplasia)/Prostat CA  Parkinsons Disease  CAD (Coronary Artery Disease)  Mild AS/MR  HTN (Hypertension)  Hyperlipidemia    PSH:   Presence of IVC filter  History of prostate surgery  Varicose veins of legs  S/P appendectomy  Stented coronary artery  Hx of CABG  Hyperlipidemia    Medications:   atorvastatin 80 milliGRAM(s) Oral at bedtime  carbidopa/levodopa CR 50/200 1 Tablet(s) Oral <User Schedule>  cholecalciferol 1000 Unit(s) Oral daily  docusate sodium 100 milliGRAM(s) Oral two times a day  entacapone 200 milliGRAM(s) Oral four times a day  furosemide    Tablet 40 milliGRAM(s) Oral daily  isosorbide   mononitrate ER Tablet (IMDUR) 60 milliGRAM(s) Oral daily  levothyroxine 25 MICROGram(s) Oral daily  polyethylene glycol 3350 17 Gram(s) Oral daily PRN  ranolazine 1000 milliGRAM(s) Oral two times a day  senna 1 Tablet(s) Oral daily    Allergies:  Cipro (Rash)    FAMILY HISTORY:  Family history of coronary artery disease: Father,  of MI age 55  Family history of breast cancer: Mother    Social History:  Smoking: no  Alcohol: social => none  Drugs: none    Review of Systems:  Constitutional: No Fever, Chills. + Fatigue, Weight loss.  No snoring.  HEENT:  No blurred vision, epistaxis  Respiratory: No cough, wheezing, hemoptysis  Cardiovascular: see HPI  Gastrointestinal: No abdominal pain,  diarrhea, constipation, nausea, vomiting  Genitourinary:  Nocturia X 3.  No dysuria. + incontinence  Extremities: No swelling.+ joint Pain.  Right upper rib pain anteriorly.  Neurologic: No focal deficit or speech disturbance  Lymphatic: No swollen glands  Skin: No rash, easy bruising. + ecchymoses  Psychiatry: No depression, suicidal ideation.  No anxiety.  No sleep disturbance.    Physical Exam:  Vital Signs Last 24 Hrs  T(C): 36.7 (10 Mar 2019 04:55), Max: 36.7 (10 Mar 2019 04:55)  T(F): 98 (10 Mar 2019 04:55), Max: 98 (10 Mar 2019 04:55)  HR: 52 (10 Mar 2019 11:24) (52 - 65)  BP: 117/67 (10 Mar 2019 11:24) (108/52 - 132/68)  BP(mean): --  RR: 18 (10 Mar 2019 04:55) (18 - 18)  SpO2: 99% (10 Mar 2019 04:55) (97% - 99%)Daily     GENERAL: NAD. No ictrerus or pallor. Parkinsonian. c/o some constipation but bm yesterday.  Eyes: PERRL.  EOMI  HENT:  NC/AT.  Normal oral muscosa.  Cardiovascular: No JVD.  PMI not palpable.  S1, S2 normal. Soft II/VI early basal MANA w/o radiation.  II/VI ap decr SM. No gallop/click/ rub.  Respiratory:  Clear to percussion and auscultation.  Gastrointestinal: Soft, non-tender.  No hepatomegally. Normal BS.  No bruits.  Extremities: No cyanosis, clubbing or edema   Neurologic: A & O X 3. No speech disturbance.  No focal weakness.  Psychiatry:  Mood & affect appropriate  Skin:  No rash. No ecchymoses.    Cardiovascular Diagnostic Testing:  ECG: AF W/ VR ~70.  mLAD.  LVH    Echo:    Stress Testing:    Cath:    Interpretation of Telemetry: briefly to mid-to-high 30s during sleep. No long pauses.  Last PM lowest 40s.    Imaging:    Labs:                                                       9.2    7.4   )-----------( 130      ( 10 Mar 2019 06:50 )             27.3     03-10    136  |  99  |  33<H>  ----------------------------<  112<H>  4.0   |  26  |  1.39<H>    Ca    8.6      10 Mar 2019 06:50  Phos  3.3     03-10  Mg     2.6     03-10

## 2019-03-11 LAB
ANION GAP SERPL CALC-SCNC: 11 MMOL/L — SIGNIFICANT CHANGE UP (ref 5–17)
BUN SERPL-MCNC: 34 MG/DL — HIGH (ref 7–23)
CALCIUM SERPL-MCNC: 8.4 MG/DL — SIGNIFICANT CHANGE UP (ref 8.4–10.5)
CHLORIDE SERPL-SCNC: 99 MMOL/L — SIGNIFICANT CHANGE UP (ref 96–108)
CO2 SERPL-SCNC: 26 MMOL/L — SIGNIFICANT CHANGE UP (ref 22–31)
CREAT SERPL-MCNC: 1.46 MG/DL — HIGH (ref 0.5–1.3)
GLUCOSE SERPL-MCNC: 104 MG/DL — HIGH (ref 70–99)
HCT VFR BLD CALC: 24.4 % — LOW (ref 39–50)
HCT VFR BLD CALC: 28.1 % — LOW (ref 39–50)
HGB BLD-MCNC: 8.3 G/DL — LOW (ref 13–17)
HGB BLD-MCNC: 9.5 G/DL — LOW (ref 13–17)
MAGNESIUM SERPL-MCNC: 2.5 MG/DL — SIGNIFICANT CHANGE UP (ref 1.6–2.6)
MCHC RBC-ENTMCNC: 30 PG — SIGNIFICANT CHANGE UP (ref 27–34)
MCHC RBC-ENTMCNC: 30.3 PG — SIGNIFICANT CHANGE UP (ref 27–34)
MCHC RBC-ENTMCNC: 33.8 GM/DL — SIGNIFICANT CHANGE UP (ref 32–36)
MCHC RBC-ENTMCNC: 34 GM/DL — SIGNIFICANT CHANGE UP (ref 32–36)
MCV RBC AUTO: 88.8 FL — SIGNIFICANT CHANGE UP (ref 80–100)
MCV RBC AUTO: 89.1 FL — SIGNIFICANT CHANGE UP (ref 80–100)
PHOSPHATE SERPL-MCNC: 3.5 MG/DL — SIGNIFICANT CHANGE UP (ref 2.5–4.5)
PLATELET # BLD AUTO: 125 K/UL — LOW (ref 150–400)
PLATELET # BLD AUTO: 133 K/UL — LOW (ref 150–400)
POTASSIUM SERPL-MCNC: 3.9 MMOL/L — SIGNIFICANT CHANGE UP (ref 3.5–5.3)
POTASSIUM SERPL-SCNC: 3.9 MMOL/L — SIGNIFICANT CHANGE UP (ref 3.5–5.3)
RBC # BLD: 2.74 M/UL — LOW (ref 4.2–5.8)
RBC # BLD: 3.16 M/UL — LOW (ref 4.2–5.8)
RBC # FLD: 14.4 % — SIGNIFICANT CHANGE UP (ref 10.3–14.5)
RBC # FLD: 14.6 % — HIGH (ref 10.3–14.5)
SODIUM SERPL-SCNC: 136 MMOL/L — SIGNIFICANT CHANGE UP (ref 135–145)
WBC # BLD: 6.3 K/UL — SIGNIFICANT CHANGE UP (ref 3.8–10.5)
WBC # BLD: 6.4 K/UL — SIGNIFICANT CHANGE UP (ref 3.8–10.5)
WBC # FLD AUTO: 6.3 K/UL — SIGNIFICANT CHANGE UP (ref 3.8–10.5)
WBC # FLD AUTO: 6.4 K/UL — SIGNIFICANT CHANGE UP (ref 3.8–10.5)

## 2019-03-11 PROCEDURE — 99233 SBSQ HOSP IP/OBS HIGH 50: CPT

## 2019-03-11 PROCEDURE — 73030 X-RAY EXAM OF SHOULDER: CPT | Mod: 26,RT

## 2019-03-11 RX ADMIN — ISOSORBIDE MONONITRATE 60 MILLIGRAM(S): 60 TABLET, EXTENDED RELEASE ORAL at 13:16

## 2019-03-11 RX ADMIN — ENTACAPONE 200 MILLIGRAM(S): 200 TABLET, FILM COATED ORAL at 05:34

## 2019-03-11 RX ADMIN — CARBIDOPA AND LEVODOPA 1 TABLET(S): 25; 100 TABLET ORAL at 05:34

## 2019-03-11 RX ADMIN — CARBIDOPA AND LEVODOPA 1 TABLET(S): 25; 100 TABLET ORAL at 17:32

## 2019-03-11 RX ADMIN — RANOLAZINE 1000 MILLIGRAM(S): 500 TABLET, FILM COATED, EXTENDED RELEASE ORAL at 05:34

## 2019-03-11 RX ADMIN — CARBIDOPA AND LEVODOPA 1 TABLET(S): 25; 100 TABLET ORAL at 23:05

## 2019-03-11 RX ADMIN — ENTACAPONE 200 MILLIGRAM(S): 200 TABLET, FILM COATED ORAL at 17:32

## 2019-03-11 RX ADMIN — Medication 40 MILLIGRAM(S): at 05:34

## 2019-03-11 RX ADMIN — Medication 25 MICROGRAM(S): at 05:34

## 2019-03-11 RX ADMIN — CARBIDOPA AND LEVODOPA 1 TABLET(S): 25; 100 TABLET ORAL at 13:16

## 2019-03-11 RX ADMIN — Medication 100 MILLIGRAM(S): at 17:32

## 2019-03-11 RX ADMIN — ATORVASTATIN CALCIUM 80 MILLIGRAM(S): 80 TABLET, FILM COATED ORAL at 23:05

## 2019-03-11 RX ADMIN — Medication 1000 UNIT(S): at 13:16

## 2019-03-11 RX ADMIN — RANOLAZINE 1000 MILLIGRAM(S): 500 TABLET, FILM COATED, EXTENDED RELEASE ORAL at 17:32

## 2019-03-11 RX ADMIN — ENTACAPONE 200 MILLIGRAM(S): 200 TABLET, FILM COATED ORAL at 13:16

## 2019-03-11 RX ADMIN — ENTACAPONE 200 MILLIGRAM(S): 200 TABLET, FILM COATED ORAL at 23:05

## 2019-03-11 NOTE — PROGRESS NOTE ADULT - SUBJECTIVE AND OBJECTIVE BOX
HPI:  Out of bed in chair, symptomatically improved.    Medications:  atorvastatin 80 milliGRAM(s) Oral at bedtime  carbidopa/levodopa CR 50/200 1 Tablet(s) Oral <User Schedule>  cholecalciferol 1000 Unit(s) Oral daily  docusate sodium 100 milliGRAM(s) Oral two times a day  entacapone 200 milliGRAM(s) Oral four times a day  furosemide    Tablet 40 milliGRAM(s) Oral daily  isosorbide   mononitrate ER Tablet (IMDUR) 60 milliGRAM(s) Oral daily  levothyroxine 25 MICROGram(s) Oral daily  polyethylene glycol 3350 17 Gram(s) Oral daily PRN  ranolazine 1000 milliGRAM(s) Oral two times a day  senna 1 Tablet(s) Oral daily    PMH/PSH/FH/SH:  Unchanged    ROS:  Constitutional: No Fever, Chills. + Fatigue, Weight loss.  No snoring.  HEENT:  No blurred vision, epistaxis  Respiratory: No cough, wheezing, hemoptysis  Cardiovascular: see HPI  Gastrointestinal: No abdominal pain,  diarrhea, constipation, nausea, vomiting  Genitourinary:  Nocturia X 3.  No dysuria. + incontinence  Extremities: No swelling.+ joint Pain.  Right upper rib pain anteriorly.  Neurologic: No focal deficit or speech disturbance  Lymphatic: No swollen glands  Skin: No rash, easy bruising. + ecchymoses  Psychiatry: No depression, suicidal ideation.  No anxiety.  No sleep disturbance.      Vitals:  T(C): 36.4 (19 @ 12:37), Max: 36.7 (03-10-19 @ 20:43)  HR: 47 (19 @ 12:37) (47 - 50)  BP: 108/55 (19 @ 12:37) (101/54 - 132/67)  BP(mean): --  RR: 18 (19 @ 12:37) (18 - 18)  SpO2: 99% (19 @ 12:37) (96% - 99%)  Wt(kg): --  Daily     Daily Weight in k.8 (11 Mar 2019 04:31)    Physical exam:  GENERAL: NAD. No ictrerus or pallor. Parkinsonian. c/o some constipation but bm yesterday.  Eyes: PERRL.  EOMI  HENT:  NC/AT.  Normal oral muscosa.  Cardiovascular: No JVD.  PMI not palpable.  S1, S2 normal. Soft II/VI early basal MANA w/o radiation.  II/VI ap decr SM. No gallop/click/ rub.  Respiratory:  Clear to percussion and auscultation.  Gastrointestinal: Soft, non-tender.  No hepatomegally. Normal BS.  No bruits.  Extremities: No cyanosis, clubbing or edema   Neurologic: A & O X 3. No speech disturbance.  No focal weakness.  Psychiatry:  Mood & affect appropriate  Skin:  No rash. No ecchymoses.    I&O's Summary    10 Mar 2019 07:  -  11 Mar 2019 07:00  --------------------------------------------------------  IN: 1120 mL / OUT: 575 mL / NET: 545 mL    11 Mar 2019 07:01  -  11 Mar 2019 14:12  --------------------------------------------------------  IN: 180 mL / OUT: 200 mL / NET: -20 mL                              9.5    6.3   )-----------( 133      ( 11 Mar 2019 11:00 )             28.1     03-11    136  |  99  |  34<H>  ----------------------------<  104<H>  3.9   |  26  |  1.46<H>    Ca    8.4      11 Mar 2019 05:33  Phos  3.5     03-11  Mg     2.5     11      ECG:    Imaging:    Interpretation of Telemetry: atrial fibrillation, rates satisfactory when awake, slow at times during sleep

## 2019-03-11 NOTE — PROGRESS NOTE ADULT - SUBJECTIVE AND OBJECTIVE BOX
Rt should feels "frozen"    Vital Signs Last 24 Hrs  T(C): 36.4 (11 Mar 2019 04:31), Max: 36.8 (10 Mar 2019 13:05)  T(F): 97.5 (11 Mar 2019 04:31), Max: 98.3 (10 Mar 2019 13:05)  HR: 50 (11 Mar 2019 04:31) (50 - 55)  BP: 132/67 (11 Mar 2019 04:31) (101/54 - 132/67)  BP(mean): --  RR: 18 (11 Mar 2019 04:31) (17 - 18)  SpO2: 96% (11 Mar 2019 04:31) (96% - 100%)    GENERAL: NAD, AAOx3  HEAD:  Atraumatic, Normocephalic, dry buccal mucosa  EYES: EOMI, PERRLA, conjunctiva and sclera clear  NECK: Supple, No JVD, No LAD  CHEST/LUNG: Clear to auscultation bilaterally; No wheeze  HEART: Irregular rate and rhythm; No murmurs, rubs, or gallops  ABDOMEN: Soft, Nontender, Nondistended; Bowel sounds present  EXTREMITIES:  2+ Peripheral Pulses, No clubbing, cyanosis, or edema  SKIN: No rashes or lesions  NEURO: nonfocal CN/motor/sensory/reflexes    LABS:                        9.5    6.3   )-----------( 133      ( 11 Mar 2019 11:00 )             28.1     03-11    136  |  99  |  34<H>  ----------------------------<  104<H>  3.9   |  26  |  1.46<H>    Ca    8.4      11 Mar 2019 05:33  Phos  3.5     03-11  Mg     2.5     03-11        CAPILLARY BLOOD GLUCOSE

## 2019-03-11 NOTE — PROGRESS NOTE ADULT - ASSESSMENT
Multiple mechanical falls 2/2 PD  ASHD - s/p CABG. No symptoms, with limited activity,  AF - interval onset.  VR moderate absent AVN blockers  AS/MR/ prob mild-mod  HTN - HTHD  BUN/Cr slightly up. If continues, stop furosemide    ADVISE:  No A/C with history of GIB and significant fall hx  No additional cardiac w/u warranted.

## 2019-03-11 NOTE — PROGRESS NOTE ADULT - PROBLEM SELECTOR PLAN 2
CT brain:  No acute intracranial hemorrhage or mass effect. No displaced calvarial   fracture.  --likely from underlying parkinson's disease and diminished postural reflexes  -PT consult underway  Ordered rt shoulder Xray

## 2019-03-12 DIAGNOSIS — T14.8XXA OTHER INJURY OF UNSPECIFIED BODY REGION, INITIAL ENCOUNTER: ICD-10-CM

## 2019-03-12 LAB
ANION GAP SERPL CALC-SCNC: 12 MMOL/L — SIGNIFICANT CHANGE UP (ref 5–17)
BUN SERPL-MCNC: 34 MG/DL — HIGH (ref 7–23)
CALCIUM SERPL-MCNC: 8.5 MG/DL — SIGNIFICANT CHANGE UP (ref 8.4–10.5)
CHLORIDE SERPL-SCNC: 97 MMOL/L — SIGNIFICANT CHANGE UP (ref 96–108)
CO2 SERPL-SCNC: 26 MMOL/L — SIGNIFICANT CHANGE UP (ref 22–31)
CREAT SERPL-MCNC: 1.36 MG/DL — HIGH (ref 0.5–1.3)
GLUCOSE SERPL-MCNC: 96 MG/DL — SIGNIFICANT CHANGE UP (ref 70–99)
HCT VFR BLD CALC: 24.9 % — LOW (ref 39–50)
HGB BLD-MCNC: 8.9 G/DL — LOW (ref 13–17)
MCHC RBC-ENTMCNC: 31.4 PG — SIGNIFICANT CHANGE UP (ref 27–34)
MCHC RBC-ENTMCNC: 35.6 GM/DL — SIGNIFICANT CHANGE UP (ref 32–36)
MCV RBC AUTO: 88.3 FL — SIGNIFICANT CHANGE UP (ref 80–100)
PLATELET # BLD AUTO: 140 K/UL — LOW (ref 150–400)
POTASSIUM SERPL-MCNC: 4 MMOL/L — SIGNIFICANT CHANGE UP (ref 3.5–5.3)
POTASSIUM SERPL-SCNC: 4 MMOL/L — SIGNIFICANT CHANGE UP (ref 3.5–5.3)
RBC # BLD: 2.82 M/UL — LOW (ref 4.2–5.8)
RBC # FLD: 14.3 % — SIGNIFICANT CHANGE UP (ref 10.3–14.5)
SODIUM SERPL-SCNC: 135 MMOL/L — SIGNIFICANT CHANGE UP (ref 135–145)
WBC # BLD: 6.7 K/UL — SIGNIFICANT CHANGE UP (ref 3.8–10.5)
WBC # FLD AUTO: 6.7 K/UL — SIGNIFICANT CHANGE UP (ref 3.8–10.5)

## 2019-03-12 PROCEDURE — 99233 SBSQ HOSP IP/OBS HIGH 50: CPT | Mod: GC

## 2019-03-12 RX ADMIN — CARBIDOPA AND LEVODOPA 1 TABLET(S): 25; 100 TABLET ORAL at 23:50

## 2019-03-12 RX ADMIN — Medication 40 MILLIGRAM(S): at 05:54

## 2019-03-12 RX ADMIN — ENTACAPONE 200 MILLIGRAM(S): 200 TABLET, FILM COATED ORAL at 23:50

## 2019-03-12 RX ADMIN — RANOLAZINE 1000 MILLIGRAM(S): 500 TABLET, FILM COATED, EXTENDED RELEASE ORAL at 19:07

## 2019-03-12 RX ADMIN — ATORVASTATIN CALCIUM 80 MILLIGRAM(S): 80 TABLET, FILM COATED ORAL at 23:50

## 2019-03-12 RX ADMIN — CARBIDOPA AND LEVODOPA 1 TABLET(S): 25; 100 TABLET ORAL at 05:54

## 2019-03-12 RX ADMIN — CARBIDOPA AND LEVODOPA 1 TABLET(S): 25; 100 TABLET ORAL at 19:07

## 2019-03-12 RX ADMIN — ENTACAPONE 200 MILLIGRAM(S): 200 TABLET, FILM COATED ORAL at 05:54

## 2019-03-12 RX ADMIN — Medication 100 MILLIGRAM(S): at 19:07

## 2019-03-12 RX ADMIN — Medication 1000 UNIT(S): at 12:09

## 2019-03-12 RX ADMIN — ENTACAPONE 200 MILLIGRAM(S): 200 TABLET, FILM COATED ORAL at 12:09

## 2019-03-12 RX ADMIN — SENNA PLUS 1 TABLET(S): 8.6 TABLET ORAL at 12:09

## 2019-03-12 RX ADMIN — Medication 25 MICROGRAM(S): at 05:54

## 2019-03-12 RX ADMIN — RANOLAZINE 1000 MILLIGRAM(S): 500 TABLET, FILM COATED, EXTENDED RELEASE ORAL at 05:54

## 2019-03-12 RX ADMIN — ENTACAPONE 200 MILLIGRAM(S): 200 TABLET, FILM COATED ORAL at 19:07

## 2019-03-12 RX ADMIN — ISOSORBIDE MONONITRATE 60 MILLIGRAM(S): 60 TABLET, EXTENDED RELEASE ORAL at 12:09

## 2019-03-12 RX ADMIN — Medication 100 MILLIGRAM(S): at 05:54

## 2019-03-12 RX ADMIN — CARBIDOPA AND LEVODOPA 1 TABLET(S): 25; 100 TABLET ORAL at 12:09

## 2019-03-12 NOTE — PROGRESS NOTE ADULT - ASSESSMENT
88 yr old M w/a PMH of CAD s/p CABG, Parkinson's disease, HTN, HLD, prior DVT s/p IVC filter prior GIB with CKD III presents with fall.      Problem/Plan - 1:  ·  Problem: Hematoma.  Plan: Large ill-defined hematoma within the right gluteus minimus and medius   musculature. No fracture.    Hemodynamically stable currently.    Daily CBC.     Problem/Plan - 2:  ·  Problem: Fall, initial encounter.  Plan: CT brain:  No acute intracranial hemorrhage or mass effect. No displaced calvarial   fracture.  --likely from underlying parkinson's disease and diminished postural reflexes  -PT consult underway  Ordered rt shoulder Xray.     Problem/Plan - 3:  ·  Problem: Atrial fibrillation, unspecified type.  Plan: No A/C as history of large GI bleed in the past.    Rate controlled currently  No further cardio workup  Was taken off rate control on last hospitalization.     Problem/Plan - 4:  ·  Problem: Thrombocytopenia.  Plan: Unclear etiology.  Chronic in nature close to patients baseline. Continue to monitor.     Problem/Plan - 5:  ·  Problem: CKD (chronic kidney disease) stage 3, GFR 30-59 ml/min.  Plan: Avoid nephrotoxic meds.     Problem/Plan - 6:  Problem: Coronary artery disease involving coronary bypass graft of native heart without angina pectoris. Plan: Hx of CAD s/p CABG   Hold ASA for now as has hematoma.    Problem/Plan - 7:  ·  Problem: Parkinsons disease.  Plan: Continue home medications.     Problem/Plan - 8:  ·  Problem: Essential hypertension.  Plan: Continue isosorbide and Lasix.     Problem/Plan - 9:  ·  Problem: Mixed hyperlipidemia.  Plan: atorvastatin 80mg. 88 yr old M w/a PMH of CAD s/p CABG, Parkinson's disease, HTN, HLD, prior DVT s/p IVC filter prior GIB with CKD III presents with fall.      Problem/Plan - 1:  ·  Problem: Hematoma.  Plan: Large ill-defined hematoma within the right gluteus minimus and medius   musculature. No fracture.    Hemodynamically stable currently.    Repeat CBC in AM    Problem/Plan - 2:  ·  Problem: Fall, initial encounter.  Plan: CT brain:  No acute intracranial hemorrhage or mass effect. No displaced calvarial   fracture.  --likely from underlying parkinson's disease and diminished postural reflexes  -PT consult underway  -rt shoulder Xray showing chronic fracture; no acute surgical intervention indicated     Problem/Plan - 3:  ·  Problem: Atrial fibrillation, unspecified type.  Plan: No A/C as history of large GI bleed in the past.    Rate controlled currently  No further cardio workup  Was taken off rate control on last hospitalization.     Problem/Plan - 4:  ·  Problem: Thrombocytopenia.  Plan: Unclear etiology.  Chronic in nature close to patients baseline. Continue to monitor.     Problem/Plan - 5:  ·  Problem: CKD (chronic kidney disease) stage 3, GFR 30-59 ml/min.  Plan: Avoid nephrotoxic meds.     Problem/Plan - 6:  Problem: Coronary artery disease involving coronary bypass graft of native heart without angina pectoris. Plan: Hx of CAD s/p CABG   Hold ASA for now as has hematoma.    Problem/Plan - 7:  ·  Problem: Parkinsons disease.  Plan: Continue home medications.     Problem/Plan - 8:  ·  Problem: Essential hypertension.  Plan: Continue isosorbide and Lasix.     Problem/Plan - 9:  ·  Problem: Mixed hyperlipidemia.  Plan: atorvastatin 80mg.

## 2019-03-12 NOTE — PROGRESS NOTE ADULT - ASSESSMENT
Multiple mechanical falls 2/2 PD  ASHD - s/p CABG. No symptoms, with limited activity,  AF - interval onset.  VR moderate absent AVN blockers  AS/MR/ prob mild-mod  HTN - HTHD  BUN/Cr slightly up. If continues, stop furosemide    ADVISE:  No A/C with history of GIB and significant fall hx  No additional cardiac w/u warranted.  Based on observations thus far no indication for pacemaker.

## 2019-03-12 NOTE — PROGRESS NOTE ADULT - NSICDXPROBLEM_GEN_ALL_CORE_FT
PROBLEM DIAGNOSES  Problem: Hematoma of muscle  Assessment and Plan: Hemodynamically stable.  Repeat CBC tomorrow morning

## 2019-03-12 NOTE — PROGRESS NOTE ADULT - SUBJECTIVE AND OBJECTIVE BOX
No acute CP or SOB    Vital Signs Last 24 Hrs  T(C): 36.4 (12 Mar 2019 12:40), Max: 36.8 (11 Mar 2019 21:25)  T(F): 97.5 (12 Mar 2019 12:40), Max: 98.3 (11 Mar 2019 21:25)  HR: 49 (12 Mar 2019 12:40) (49 - 65)  BP: 108/54 (12 Mar 2019 12:40) (108/54 - 155/79)  BP(mean): --  RR: 18 (12 Mar 2019 12:40) (18 - 18)  SpO2: 100% (12 Mar 2019 12:40) (96% - 100%)    GENERAL: NAD, AAOx3  HEAD:  Atraumatic, Normocephalic, dry buccal mucosa  EYES: EOMI, PERRLA, conjunctiva and sclera clear  NECK: Supple, No JVD, No LAD  CHEST/LUNG: Clear to auscultation bilaterally; No wheeze  HEART: Irregular rate and rhythm; No murmurs, rubs, or gallops  ABDOMEN: Soft, Nontender, Nondistended; Bowel sounds present  EXTREMITIES:  2+ Peripheral Pulses, No clubbing, cyanosis, or edema  SKIN: No rashes or lesions  NEURO: nonfocal CN/motor/sensory/reflexes    LABS:                        8.9    6.7   )-----------( 140      ( 12 Mar 2019 06:29 )             24.9     03-12    135  |  97  |  34<H>  ----------------------------<  96  4.0   |  26  |  1.36<H>    Ca    8.5      12 Mar 2019 06:29  Phos  3.5     03-11  Mg     2.5     03-11        CAPILLARY BLOOD GLUCOSE

## 2019-03-13 ENCOUNTER — TRANSCRIPTION ENCOUNTER (OUTPATIENT)
Age: 84
End: 2019-03-13

## 2019-03-13 VITALS
DIASTOLIC BLOOD PRESSURE: 75 MMHG | HEART RATE: 59 BPM | OXYGEN SATURATION: 100 % | SYSTOLIC BLOOD PRESSURE: 146 MMHG | RESPIRATION RATE: 18 BRPM | TEMPERATURE: 98 F

## 2019-03-13 LAB
ANION GAP SERPL CALC-SCNC: 11 MMOL/L — SIGNIFICANT CHANGE UP (ref 5–17)
BUN SERPL-MCNC: 36 MG/DL — HIGH (ref 7–23)
CALCIUM SERPL-MCNC: 8.6 MG/DL — SIGNIFICANT CHANGE UP (ref 8.4–10.5)
CHLORIDE SERPL-SCNC: 99 MMOL/L — SIGNIFICANT CHANGE UP (ref 96–108)
CO2 SERPL-SCNC: 25 MMOL/L — SIGNIFICANT CHANGE UP (ref 22–31)
CREAT SERPL-MCNC: 1.38 MG/DL — HIGH (ref 0.5–1.3)
GLUCOSE SERPL-MCNC: 111 MG/DL — HIGH (ref 70–99)
HCT VFR BLD CALC: 27.1 % — LOW (ref 39–50)
HGB BLD-MCNC: 9.2 G/DL — LOW (ref 13–17)
MCHC RBC-ENTMCNC: 30.3 PG — SIGNIFICANT CHANGE UP (ref 27–34)
MCHC RBC-ENTMCNC: 34.1 GM/DL — SIGNIFICANT CHANGE UP (ref 32–36)
MCV RBC AUTO: 88.8 FL — SIGNIFICANT CHANGE UP (ref 80–100)
PLATELET # BLD AUTO: 141 K/UL — LOW (ref 150–400)
POTASSIUM SERPL-MCNC: 4.1 MMOL/L — SIGNIFICANT CHANGE UP (ref 3.5–5.3)
POTASSIUM SERPL-SCNC: 4.1 MMOL/L — SIGNIFICANT CHANGE UP (ref 3.5–5.3)
RBC # BLD: 3.06 M/UL — LOW (ref 4.2–5.8)
RBC # FLD: 14.5 % — SIGNIFICANT CHANGE UP (ref 10.3–14.5)
SODIUM SERPL-SCNC: 135 MMOL/L — SIGNIFICANT CHANGE UP (ref 135–145)
WBC # BLD: 5.7 K/UL — SIGNIFICANT CHANGE UP (ref 3.8–10.5)
WBC # FLD AUTO: 5.7 K/UL — SIGNIFICANT CHANGE UP (ref 3.8–10.5)

## 2019-03-13 PROCEDURE — 70450 CT HEAD/BRAIN W/O DYE: CPT

## 2019-03-13 PROCEDURE — 93005 ELECTROCARDIOGRAM TRACING: CPT

## 2019-03-13 PROCEDURE — 97530 THERAPEUTIC ACTIVITIES: CPT

## 2019-03-13 PROCEDURE — 76377 3D RENDER W/INTRP POSTPROCES: CPT

## 2019-03-13 PROCEDURE — 87086 URINE CULTURE/COLONY COUNT: CPT

## 2019-03-13 PROCEDURE — 99233 SBSQ HOSP IP/OBS HIGH 50: CPT

## 2019-03-13 PROCEDURE — 73502 X-RAY EXAM HIP UNI 2-3 VIEWS: CPT

## 2019-03-13 PROCEDURE — 97162 PT EVAL MOD COMPLEX 30 MIN: CPT

## 2019-03-13 PROCEDURE — 85730 THROMBOPLASTIN TIME PARTIAL: CPT

## 2019-03-13 PROCEDURE — 80053 COMPREHEN METABOLIC PANEL: CPT

## 2019-03-13 PROCEDURE — 72192 CT PELVIS W/O DYE: CPT

## 2019-03-13 PROCEDURE — 72170 X-RAY EXAM OF PELVIS: CPT

## 2019-03-13 PROCEDURE — 71045 X-RAY EXAM CHEST 1 VIEW: CPT

## 2019-03-13 PROCEDURE — 72125 CT NECK SPINE W/O DYE: CPT

## 2019-03-13 PROCEDURE — 85610 PROTHROMBIN TIME: CPT

## 2019-03-13 PROCEDURE — 81001 URINALYSIS AUTO W/SCOPE: CPT

## 2019-03-13 PROCEDURE — 84100 ASSAY OF PHOSPHORUS: CPT

## 2019-03-13 PROCEDURE — 73030 X-RAY EXAM OF SHOULDER: CPT

## 2019-03-13 PROCEDURE — 83735 ASSAY OF MAGNESIUM: CPT

## 2019-03-13 PROCEDURE — 99285 EMERGENCY DEPT VISIT HI MDM: CPT | Mod: 25

## 2019-03-13 PROCEDURE — 97116 GAIT TRAINING THERAPY: CPT

## 2019-03-13 PROCEDURE — 80048 BASIC METABOLIC PNL TOTAL CA: CPT

## 2019-03-13 PROCEDURE — 84484 ASSAY OF TROPONIN QUANT: CPT

## 2019-03-13 PROCEDURE — 85027 COMPLETE CBC AUTOMATED: CPT

## 2019-03-13 PROCEDURE — 97110 THERAPEUTIC EXERCISES: CPT

## 2019-03-13 RX ADMIN — ENTACAPONE 200 MILLIGRAM(S): 200 TABLET, FILM COATED ORAL at 14:22

## 2019-03-13 RX ADMIN — CARBIDOPA AND LEVODOPA 1 TABLET(S): 25; 100 TABLET ORAL at 14:22

## 2019-03-13 RX ADMIN — ISOSORBIDE MONONITRATE 60 MILLIGRAM(S): 60 TABLET, EXTENDED RELEASE ORAL at 14:22

## 2019-03-13 RX ADMIN — Medication 25 MICROGRAM(S): at 05:56

## 2019-03-13 RX ADMIN — Medication 1000 UNIT(S): at 14:23

## 2019-03-13 RX ADMIN — Medication 100 MILLIGRAM(S): at 05:56

## 2019-03-13 RX ADMIN — CARBIDOPA AND LEVODOPA 1 TABLET(S): 25; 100 TABLET ORAL at 05:56

## 2019-03-13 RX ADMIN — Medication 40 MILLIGRAM(S): at 05:56

## 2019-03-13 RX ADMIN — RANOLAZINE 1000 MILLIGRAM(S): 500 TABLET, FILM COATED, EXTENDED RELEASE ORAL at 05:56

## 2019-03-13 RX ADMIN — SENNA PLUS 1 TABLET(S): 8.6 TABLET ORAL at 14:23

## 2019-03-13 RX ADMIN — ENTACAPONE 200 MILLIGRAM(S): 200 TABLET, FILM COATED ORAL at 05:56

## 2019-03-13 NOTE — PROGRESS NOTE ADULT - SUBJECTIVE AND OBJECTIVE BOX
HPI:  No complaints, denies dyspnea. Worries about his unsteadiness and kidney function.    Medications:  atorvastatin 80 milliGRAM(s) Oral at bedtime  carbidopa/levodopa CR 50/200 1 Tablet(s) Oral <User Schedule>  cholecalciferol 1000 Unit(s) Oral daily  docusate sodium 100 milliGRAM(s) Oral two times a day  entacapone 200 milliGRAM(s) Oral four times a day  furosemide    Tablet 40 milliGRAM(s) Oral daily  isosorbide   mononitrate ER Tablet (IMDUR) 60 milliGRAM(s) Oral daily  levothyroxine 25 MICROGram(s) Oral daily  polyethylene glycol 3350 17 Gram(s) Oral daily PRN  ranolazine 1000 milliGRAM(s) Oral two times a day  senna 1 Tablet(s) Oral daily    PMH/PSH/FH/SH:  Unchanged    ROS:  Constitutional: No Fever, Chills. + Fatigue, Weight loss.  No snoring.  HEENT:  No blurred vision, epistaxis  Respiratory: No cough, wheezing, hemoptysis  Cardiovascular: see HPI  Gastrointestinal: No abdominal pain,  diarrhea, constipation, nausea, vomiting  Genitourinary:  Nocturia X 3.  No dysuria. + incontinence  Extremities: No swelling.+ joint Pain.  Right upper rib pain anteriorly.  Neurologic: No focal deficit or speech disturbance  Lymphatic: No swollen glands  Skin: No rash, easy bruising. + ecchymoses  Psychiatry: No depression, suicidal ideation.  No anxiety.  No sleep disturbance.    Vitals:  T(C): 36.7 (19 @ 04:24), Max: 36.8 (19 @ 21:08)  HR: 67 (19 @ 04:24) (49 - 67)  BP: 134/66 (19 @ 04:24) (108/54 - 145/74)  BP(mean): --  RR: 18 (19 @ 04:24) (18 - 18)  SpO2: 99% (19 @ 04:24) (99% - 100%)  Wt(kg): --  Daily     Daily Weight in k.3 (13 Mar 2019 04:24)    Physical exam:  GENERAL: NAD. No ictrerus or pallor. Parkinsonian. c/o some constipation but bm yesterday.  Eyes: PERRL.  EOMI  HENT:  NC/AT.  Normal oral mus[place]a.  Cardiovascular: No JVD.  PMI not palpable.  S1, S2 normal. Soft II/VI early basal MANA w/o radiation.  II/VI ap decr SM. No gallop/click/ rub.  Respiratory:  Clear to percussion and auscultation.  Gastrointestinal: Soft, non-tender.  No hepatomegally. Normal BS.  No bruits.  Extremities: No cyanosis, clubbing or edema   Neurologic: A & O X 3. No speech disturbance.  No focal weakness.  Psychiatry:  Mood & affect appropriate  Skin:  No rash. No ecchymoses.    I&O's Summary    12 Mar 2019 07:01  -  13 Mar 2019 07:00  --------------------------------------------------------  IN: 940 mL / OUT: 750 mL / NET: 190 mL    13 Mar 2019 07:01  -  13 Mar 2019 11:06  --------------------------------------------------------  IN: 120 mL / OUT: 0 mL / NET: 120 mL                              9.2    5.7   )-----------( 141      ( 13 Mar 2019 06:37 )             27.1     -    135  |  99  |  36<H>  ----------------------------<  111<H>  4.1   |  25  |  1.38<H>    Ca    8.6      13 Mar 2019 06:37      Interpretation of Telemetry: atrial fibrillation, appropriate rates.

## 2019-03-13 NOTE — CHART NOTE - NSCHARTNOTEFT_GEN_A_CORE
Nutrition Follow Up Note  Patient seen for: follow up    88 yr old M w/a PMH of CAD s/p CABG, Parkinson's disease, HTN, HLD, prior DVT s/p IVC filter prior GIB with CKD III presents with fall.    · Reason for Admission:	Fall      Patient cleared for discharge to [facility]    Source: chart,pt    Diet : LOW SODIUM, SOFT    Patient reports: [place], no issues related to food for nutrition     PO intake : >75% of meals     Source for PO intake: pt          Daily Weight in k.3 (), Weight in k.3 (), Weight in k.8 (), Weight in k.8 (03-10), Weight in k.1 (), Weight in k.3 ()  % Weight Change: 3% gain since 3/8    Pertinent Medications: MEDICATIONS  (STANDING):  atorvastatin 80 milliGRAM(s) Oral at bedtime  carbidopa/levodopa CR 50/200 1 Tablet(s) Oral <User Schedule>  cholecalciferol 1000 Unit(s) Oral daily  docusate sodium 100 milliGRAM(s) Oral two times a day  entacapone 200 milliGRAM(s) Oral four times a day  furosemide    Tablet 40 milliGRAM(s) Oral daily  isosorbide   mononitrate ER Tablet (IMDUR) 60 milliGRAM(s) Oral daily  levothyroxine 25 MICROGram(s) Oral daily  ranolazine 1000 milliGRAM(s) Oral two times a day  senna 1 Tablet(s) Oral daily    MEDICATIONS  (PRN):  polyethylene glycol 3350 17 Gram(s) Oral daily PRN Constipation    Pertinent Labs:  @ 06:37: Na 135, BUN 36<H>, Cr 1.38<H>, <H>, K+ 4.1            Skin per nursing documentation: stage 2 sacrum  Edema: no edema    Estimated Needs:   [x ] no change since previous assessment  [ ] recalculated:     Previous Nutrition Diagnosis:  Increased nutrient needs (specify); protein  Nutrition Diagnosis is: ongoing-will continue to monitor for pt preferences    Interventions:  1)continue to allow faulkner 1-2 x week    Recommend  1)Change diet to low sodium,60gram protein, soft, defer diet texture to SLP.   2)Consider swallow evaluation for appropriate texture.   3)Continue to obtain/provide food preferences as feasible.  4)Discussed nutrition recommendations with NP  5)Nephro-Arnie and VitC daily        Monitoring and Evaluation:     Continue to monitor Nutritional intake, Tolerance to diet prescription, weights, labs, skin integrity    RD remains available upon request and will follow up per protocol  lEiz Salazar MA, RD, CDN #[phone]

## 2019-03-13 NOTE — DISCHARGE NOTE PROVIDER - HOSPITAL COURSE
88 yr old M w/a PMH of CAD s/p CABG, Parkinson's disease, HTN, HLD, prior DVT s/p IVC filter prior GIB with CKD III presents with fall.  Patient states that he went to the refrigerator without his stroller which he is supposed to use at all times.  He states he took out the applesauce and then started falling backwards and tried to hold on to a chair but was unsuccessful and landed on his back.  He reports he has had 3 falls in the last week at home.  All considered "mechanical" in nature.  He denies LOC remembers the fall and hitting his head.  CT Brain and Spine- no acute fracture/ dislocation. Trop negative. CT pelvis: Large ill-defined hematoma within the right gluteus minimus and medius musculature. Moderate to severe lower lumbar spondylosis.  Regardless of large hematoma H&H remains stable. X ray of the shoulder was ordered. There is a chronic displaced fracture at the surgical neck of the humerus with 4.3 cm of overriding of the shaft fracture fragment, unchanged. The humeral head is rotated but is not grossly changed in position since 8/14/2018. Falls likely from underlying parkinson's disease and diminished postural reflexes. Pt to be discharged to [facility]. Pt with afib but no A/C as history of large GI bleed in the past. Rate controlled currently. Was taken off rate control on last hospitalization. Pt is stable and cleared for discharge. Pt to follow up closely with primary provider upon discharge from rehab. 88 yr old M w/a PMH of CAD s/p CABG, Parkinson's disease, HTN, HLD, prior DVT s/p IVC filter prior GIB with CKD III presents with fall.  Patient states that he went to the refrigerator without his stroller which he is supposed to use at all times.  He states he took out the applesauce and then started falling backwards and tried to hold on to a chair but was unsuccessful and landed on his back.  He reports he has had 3 falls in the last week at home.  All considered "mechanical" in nature.  He denies LOC remembers the fall and hitting his head.  CT Brain and Spine- no acute fracture/ dislocation. Trop negative. CT pelvis: Large ill-defined hematoma within the right gluteus minimus and medius musculature. Moderate to severe lower lumbar spondylosis.  Regardless of large hematoma H&H remains stable. X ray of the shoulder was ordered. There is a chronic displaced fracture at the surgical neck of the humerus with 4.3 cm of overriding of the shaft fracture fragment, unchanged. The humeral head is rotated but is not grossly changed in position since 8/14/2018. Falls likely from underlying parkinson's disease and diminished postural reflexes. Pt to be discharged to [facility]. Pt with afib but no A/C as history of large GI bleed in the past. Rate controlled currently. Was taken off rate control on last hospitalization. Pt is stable and cleared for discharge. Pt to follow up closely with primary provider upon discharge from rehab.         Attending Addendum:     Patient seen and examined by me on the discharge day. Feels well. right shoulder limited ROM due to chronic changes.  no acute fracture/edema/erythema.    Plan for rehab today. overall feeling well. no cp, no sob, no n/v/d. no abdominal pain. no headache, no dizziness.    More than 30 mins were spent evaluating patient and coordinating care for discharge.     All questions answered in details. Follow up plan explained. d/w NP.

## 2019-03-13 NOTE — DISCHARGE NOTE PROVIDER - CARE PROVIDER_API CALL
Radha Mcmahon)  Internal Medicine  [address] [place] [address]  Phone: [phone]  Fax: [phone]  Follow Up Time:     Baldomero Tilley)  EndocrinologyMetabDiabetes; Internal Medicine  [address] [place] [address]  Phone: [phone]  Fax: [phone]  Follow Up Time:

## 2019-03-13 NOTE — DISCHARGE NOTE PROVIDER - NSDCCPCAREPLAN_GEN_ALL_CORE_FT
PRINCIPAL DISCHARGE DIAGNOSIS  Problem: Frequent falls  Assessment and Plan of Treatment: Likely secondary to Parkinson's Disease.   Causes of fall may be due to illness, change in the medicines you take, unsafe or unfamiliar setting and conditions that affect eyesight, hearing, muscle strength, or balance.                                                            Certain medicines used for sleep, anxiety, or depression can cause falls. Adding new medicines, or changing doses of some medicines, can also affect your risk of falling. Your doctor might switch you to a different medicine.                                        Prevent falls by make your home safer – To avoid falling at home, get rid of things that might make you trip or slip. This might include furniture, electrical cords, clutter, and loose rugs. Keep your home well lit to avoid falls or accidents. Avoid storing things in high places so you don't have to reach or climb.                             Wear sturdy shoes that fit well – Wearing shoes with high heels or slippery soles, or shoes that are too loose, can lead to falls.                                                                                                                       Take vitamin D pills which might lower the risk of falls in older people. This is because vitamin D helps make bones and muscles stronger.                                                                                                                   Use a cane, walker, and other safety devices as these devices help you avoid falling, too. These include grab bars or a sturdy seat for the shower, non-slip bath mats, and hand rails or treads for the stairs (to prevent slipping). If you worry that you could fall, there are also alarm buttons that let you call for help if you fall and can't get up.   It is important to tell your doctor about any times you have fallen or almost fallen.  Seek immediate help for pain or injury after a fall.        SECONDARY DISCHARGE DIAGNOSES  Problem: Inability to ambulate due to hip  Assessment and Plan of Treatment: Large ill-defined hematoma within the right gluteus minimus and medius musculature. No fracture.  Pt to follow up gerry with primary provider.       Problem: Atrial fibrillation, unspecified type  Assessment and Plan of Treatment: Pt is currently rate controlled off meds. No anticoagulant secondary to history of lagre GI Bleed.   Atrial fibrillation is the most common heart rhythm problem.  The condition puts you at risk for has stroke and heart attack  It helps if you control your blood pressure, not drink more than 1-2 alcohol drinks per day, cut down on caffeine, getting treatment for over active thyroid gland, and get regular exercise  Call your doctor if you feel your heart racing or beating unusually, chest tightness or pain, lightheaded, faint, shortness of breath especially with exercise  It is important to take your heart medication as prescribed.

## 2019-03-13 NOTE — PROGRESS NOTE ADULT - PROVIDER SPECIALTY LIST ADULT
Burn
Cardiology
Internal Medicine
Cardiology

## 2019-03-13 NOTE — DISCHARGE NOTE NURSING/CASE MANAGEMENT/SOCIAL WORK - NSDCDPATPORTLINK_GEN_ALL_CORE
You can access the [facility] Portal, offered by [facility], by registering with the following website: [email or web address]

## 2019-03-13 NOTE — PROGRESS NOTE ADULT - ASSESSMENT
Multiple mechanical falls related to Parkinson's Disease.  ASHD - s/p CABG. No symptoms, with limited activity,  AF - interval onset.  VR moderate absent AVN blockers  AS/MR/ prob mild-mod  HTN - HTHD  BUN/Cr slightly up. If continues, stop furosemide    ADVISE:  No A/C with history of GIB and significant fall hx  No additional cardiac w/u warranted.  Based on continued observations no indication for pacemaker.

## 2019-05-16 NOTE — PROGRESS NOTE ADULT - PROBLEM SELECTOR PLAN 1
Panel Management Review      Patient has the following on her problem list:     Hypertension   Last three blood pressure readings:  BP Readings from Last 3 Encounters:   09/13/18 144/85   08/09/18 153/88   08/03/18 (!) 141/92     Blood pressure: FAILED    HTN Guidelines:  Less than 140/90      Composite cancer screening  Chart review shows that this patient is due/due soon for the following None  Summary:    Patient is due/failing the following:   BP CHECK    Action needed:   Patient needs nurse only appointment.    Type of outreach:    Sent letter.    Questions for provider review:    None                                                                                                                                    Denise Bustillo        Large ill-defined hematoma within the right gluteus minimus and medius   musculature. No fracture.    Hemodynamically stable currently.    Daily CBC

## 2019-07-12 ENCOUNTER — INPATIENT (INPATIENT)
Facility: HOSPITAL | Age: 84
LOS: 2 days | Discharge: NURSING, REHAB OR LONG-TERM CARE | DRG: 563 | End: 2019-07-15
Attending: STUDENT IN AN ORGANIZED HEALTH CARE EDUCATION/TRAINING PROGRAM | Admitting: STUDENT IN AN ORGANIZED HEALTH CARE EDUCATION/TRAINING PROGRAM
Payer: MEDICARE

## 2019-07-12 VITALS
HEIGHT: 67 IN | WEIGHT: 162.04 LBS | DIASTOLIC BLOOD PRESSURE: 62 MMHG | TEMPERATURE: 99 F | OXYGEN SATURATION: 99 % | SYSTOLIC BLOOD PRESSURE: 98 MMHG | RESPIRATION RATE: 16 BRPM | HEART RATE: 68 BPM

## 2019-07-12 DIAGNOSIS — Z95.828 PRESENCE OF OTHER VASCULAR IMPLANTS AND GRAFTS: Chronic | ICD-10-CM

## 2019-07-12 DIAGNOSIS — Z98.89 OTHER SPECIFIED POSTPROCEDURAL STATES: Chronic | ICD-10-CM

## 2019-07-12 DIAGNOSIS — M25.511 PAIN IN RIGHT SHOULDER: ICD-10-CM

## 2019-07-12 DIAGNOSIS — I10 ESSENTIAL (PRIMARY) HYPERTENSION: ICD-10-CM

## 2019-07-12 DIAGNOSIS — E03.9 HYPOTHYROIDISM, UNSPECIFIED: ICD-10-CM

## 2019-07-12 DIAGNOSIS — Z29.9 ENCOUNTER FOR PROPHYLACTIC MEASURES, UNSPECIFIED: ICD-10-CM

## 2019-07-12 DIAGNOSIS — G20 PARKINSON'S DISEASE: ICD-10-CM

## 2019-07-12 DIAGNOSIS — N18.3 CHRONIC KIDNEY DISEASE, STAGE 3 (MODERATE): ICD-10-CM

## 2019-07-12 DIAGNOSIS — I25.10 ATHEROSCLEROTIC HEART DISEASE OF NATIVE CORONARY ARTERY WITHOUT ANGINA PECTORIS: ICD-10-CM

## 2019-07-12 DIAGNOSIS — Z95.5 PRESENCE OF CORONARY ANGIOPLASTY IMPLANT AND GRAFT: Chronic | ICD-10-CM

## 2019-07-12 LAB
ALBUMIN SERPL ELPH-MCNC: 4 G/DL — SIGNIFICANT CHANGE UP (ref 3.3–5)
ALP SERPL-CCNC: 67 U/L — SIGNIFICANT CHANGE UP (ref 40–120)
ALT FLD-CCNC: 5 U/L — LOW (ref 10–45)
ANION GAP SERPL CALC-SCNC: 14 MMOL/L — SIGNIFICANT CHANGE UP (ref 5–17)
AST SERPL-CCNC: 9 U/L — LOW (ref 10–40)
BASOPHILS # BLD AUTO: 0 K/UL — SIGNIFICANT CHANGE UP (ref 0–0.2)
BASOPHILS NFR BLD AUTO: 0.1 % — SIGNIFICANT CHANGE UP (ref 0–2)
BILIRUB SERPL-MCNC: 0.8 MG/DL — SIGNIFICANT CHANGE UP (ref 0.2–1.2)
BUN SERPL-MCNC: 32 MG/DL — HIGH (ref 7–23)
CALCIUM SERPL-MCNC: 8.5 MG/DL — SIGNIFICANT CHANGE UP (ref 8.4–10.5)
CHLORIDE SERPL-SCNC: 97 MMOL/L — SIGNIFICANT CHANGE UP (ref 96–108)
CO2 SERPL-SCNC: 26 MMOL/L — SIGNIFICANT CHANGE UP (ref 22–31)
CREAT SERPL-MCNC: 1.78 MG/DL — HIGH (ref 0.5–1.3)
EOSINOPHIL # BLD AUTO: 0.1 K/UL — SIGNIFICANT CHANGE UP (ref 0–0.5)
EOSINOPHIL NFR BLD AUTO: 1.2 % — SIGNIFICANT CHANGE UP (ref 0–6)
GLUCOSE SERPL-MCNC: 114 MG/DL — HIGH (ref 70–99)
HCT VFR BLD CALC: 32.6 % — LOW (ref 39–50)
HGB BLD-MCNC: 11 G/DL — LOW (ref 13–17)
LYMPHOCYTES # BLD AUTO: 1.2 K/UL — SIGNIFICANT CHANGE UP (ref 1–3.3)
LYMPHOCYTES # BLD AUTO: 17.7 % — SIGNIFICANT CHANGE UP (ref 13–44)
MCHC RBC-ENTMCNC: 30.1 PG — SIGNIFICANT CHANGE UP (ref 27–34)
MCHC RBC-ENTMCNC: 33.6 GM/DL — SIGNIFICANT CHANGE UP (ref 32–36)
MCV RBC AUTO: 89.6 FL — SIGNIFICANT CHANGE UP (ref 80–100)
MONOCYTES # BLD AUTO: 0.6 K/UL — SIGNIFICANT CHANGE UP (ref 0–0.9)
MONOCYTES NFR BLD AUTO: 8.8 % — SIGNIFICANT CHANGE UP (ref 2–14)
NEUTROPHILS # BLD AUTO: 4.8 K/UL — SIGNIFICANT CHANGE UP (ref 1.8–7.4)
NEUTROPHILS NFR BLD AUTO: 72.2 % — SIGNIFICANT CHANGE UP (ref 43–77)
PLATELET # BLD AUTO: 110 K/UL — LOW (ref 150–400)
POTASSIUM SERPL-MCNC: 4.1 MMOL/L — SIGNIFICANT CHANGE UP (ref 3.5–5.3)
POTASSIUM SERPL-SCNC: 4.1 MMOL/L — SIGNIFICANT CHANGE UP (ref 3.5–5.3)
PROT SERPL-MCNC: 7.4 G/DL — SIGNIFICANT CHANGE UP (ref 6–8.3)
RBC # BLD: 3.64 M/UL — LOW (ref 4.2–5.8)
RBC # FLD: 13.4 % — SIGNIFICANT CHANGE UP (ref 10.3–14.5)
SODIUM SERPL-SCNC: 137 MMOL/L — SIGNIFICANT CHANGE UP (ref 135–145)
WBC # BLD: 6.6 K/UL — SIGNIFICANT CHANGE UP (ref 3.8–10.5)
WBC # FLD AUTO: 6.6 K/UL — SIGNIFICANT CHANGE UP (ref 3.8–10.5)

## 2019-07-12 PROCEDURE — 73030 X-RAY EXAM OF SHOULDER: CPT | Mod: 26,RT

## 2019-07-12 PROCEDURE — 70450 CT HEAD/BRAIN W/O DYE: CPT | Mod: 26

## 2019-07-12 PROCEDURE — 73060 X-RAY EXAM OF HUMERUS: CPT | Mod: 26,RT

## 2019-07-12 PROCEDURE — 73070 X-RAY EXAM OF ELBOW: CPT | Mod: 26,RT

## 2019-07-12 PROCEDURE — 73200 CT UPPER EXTREMITY W/O DYE: CPT | Mod: 26,RT

## 2019-07-12 PROCEDURE — 76376 3D RENDER W/INTRP POSTPROCES: CPT | Mod: 26

## 2019-07-12 PROCEDURE — 99223 1ST HOSP IP/OBS HIGH 75: CPT

## 2019-07-12 PROCEDURE — 99285 EMERGENCY DEPT VISIT HI MDM: CPT | Mod: GC

## 2019-07-12 RX ORDER — ACETAMINOPHEN 500 MG
650 TABLET ORAL ONCE
Refills: 0 | Status: COMPLETED | OUTPATIENT
Start: 2019-07-12 | End: 2019-07-12

## 2019-07-12 RX ORDER — CHOLECALCIFEROL (VITAMIN D3) 125 MCG
1000 CAPSULE ORAL
Refills: 0 | Status: DISCONTINUED | OUTPATIENT
Start: 2019-07-12 | End: 2019-07-15

## 2019-07-12 RX ORDER — HEPARIN SODIUM 5000 [USP'U]/ML
5000 INJECTION INTRAVENOUS; SUBCUTANEOUS EVERY 8 HOURS
Refills: 0 | Status: DISCONTINUED | OUTPATIENT
Start: 2019-07-12 | End: 2019-07-15

## 2019-07-12 RX ORDER — PIMAVANSERIN TARTRATE 10 MG/1
1 TABLET, COATED ORAL
Qty: 0 | Refills: 0 | DISCHARGE

## 2019-07-12 RX ORDER — DOCUSATE SODIUM 100 MG
100 CAPSULE ORAL
Refills: 0 | Status: DISCONTINUED | OUTPATIENT
Start: 2019-07-12 | End: 2019-07-15

## 2019-07-12 RX ORDER — SENNA PLUS 8.6 MG/1
1 TABLET ORAL
Refills: 0 | Status: DISCONTINUED | OUTPATIENT
Start: 2019-07-12 | End: 2019-07-15

## 2019-07-12 RX ORDER — CARBIDOPA AND LEVODOPA 25; 100 MG/1; MG/1
2 TABLET ORAL ONCE
Refills: 0 | Status: COMPLETED | OUTPATIENT
Start: 2019-07-12 | End: 2019-07-12

## 2019-07-12 RX ORDER — ATORVASTATIN CALCIUM 80 MG/1
80 TABLET, FILM COATED ORAL AT BEDTIME
Refills: 0 | Status: DISCONTINUED | OUTPATIENT
Start: 2019-07-12 | End: 2019-07-15

## 2019-07-12 RX ORDER — FUROSEMIDE 40 MG
1 TABLET ORAL
Qty: 0 | Refills: 0 | DISCHARGE

## 2019-07-12 RX ORDER — POLYETHYLENE GLYCOL 3350 17 G/17G
17 POWDER, FOR SOLUTION ORAL DAILY
Refills: 0 | Status: DISCONTINUED | OUTPATIENT
Start: 2019-07-12 | End: 2019-07-15

## 2019-07-12 RX ORDER — LEVOTHYROXINE SODIUM 125 MCG
25 TABLET ORAL DAILY
Refills: 0 | Status: DISCONTINUED | OUTPATIENT
Start: 2019-07-12 | End: 2019-07-15

## 2019-07-12 RX ORDER — ENTACAPONE 200 MG/1
200 TABLET, FILM COATED ORAL
Refills: 0 | Status: DISCONTINUED | OUTPATIENT
Start: 2019-07-12 | End: 2019-07-15

## 2019-07-12 RX ORDER — ASPIRIN/CALCIUM CARB/MAGNESIUM 324 MG
81 TABLET ORAL DAILY
Refills: 0 | Status: DISCONTINUED | OUTPATIENT
Start: 2019-07-12 | End: 2019-07-15

## 2019-07-12 RX ORDER — ENTACAPONE 200 MG/1
200 TABLET, FILM COATED ORAL ONCE
Refills: 0 | Status: COMPLETED | OUTPATIENT
Start: 2019-07-12 | End: 2019-07-12

## 2019-07-12 RX ORDER — CHOLECALCIFEROL (VITAMIN D3) 125 MCG
1 CAPSULE ORAL
Qty: 0 | Refills: 0 | DISCHARGE

## 2019-07-12 RX ORDER — RANOLAZINE 500 MG/1
1000 TABLET, FILM COATED, EXTENDED RELEASE ORAL
Refills: 0 | Status: DISCONTINUED | OUTPATIENT
Start: 2019-07-12 | End: 2019-07-15

## 2019-07-12 RX ORDER — ACETAMINOPHEN 500 MG
975 TABLET ORAL ONCE
Refills: 0 | Status: COMPLETED | OUTPATIENT
Start: 2019-07-12 | End: 2019-07-12

## 2019-07-12 RX ORDER — ISOSORBIDE MONONITRATE 60 MG/1
60 TABLET, EXTENDED RELEASE ORAL DAILY
Refills: 0 | Status: DISCONTINUED | OUTPATIENT
Start: 2019-07-12 | End: 2019-07-15

## 2019-07-12 RX ORDER — ACETAMINOPHEN 500 MG
650 TABLET ORAL EVERY 6 HOURS
Refills: 0 | Status: DISCONTINUED | OUTPATIENT
Start: 2019-07-12 | End: 2019-07-15

## 2019-07-12 RX ADMIN — Medication 650 MILLIGRAM(S): at 17:10

## 2019-07-12 RX ADMIN — Medication 975 MILLIGRAM(S): at 15:07

## 2019-07-12 RX ADMIN — ENTACAPONE 200 MILLIGRAM(S): 200 TABLET, FILM COATED ORAL at 22:29

## 2019-07-12 RX ADMIN — CARBIDOPA AND LEVODOPA 2 TABLET(S): 25; 100 TABLET ORAL at 22:29

## 2019-07-12 NOTE — H&P ADULT - NSHPPHYSICALEXAM_GEN_ALL_CORE
Vital Signs Last 24 Hrs  T(C): 36.3 (12 Jul 2019 20:52), Max: 37.2 (12 Jul 2019 14:26)  T(F): 97.3 (12 Jul 2019 20:52), Max: 98.9 (12 Jul 2019 14:26)  HR: 64 (12 Jul 2019 20:52) (64 - 74)  BP: 160/87 (12 Jul 2019 20:52) (98/62 - 183/89)  BP(mean): --  RR: 18 (12 Jul 2019 20:52) (16 - 18)  SpO2: 97% (12 Jul 2019 20:52) (97% - 99%)    PHYSICAL EXAM:  GENERAL: NAD, well-groomed, well-developed, lying comfortably in stretcher  HEAD:  Atraumatic, Normocephalic  EYES: EOMI, PERRLA, conjunctiva and sclera clear  ENMT: No oropharyngeal exudates, erythema or lesions,  Moist mucous membranes, good dentition  NECK: Supple, no cervical lymphadenopathy  NERVOUS SYSTEM:  Alert & Oriented X3, CN II-XII intact, full sensation to light touch, able to elevate legs against gravity, 5/5 bilateral hand  strength- 5/5 LUE strength, cannot asses RUE strength due to pain  CHEST/LUNG: Clear to auscultation bilaterally; No rales, no  rhonchi, no wheezing  HEART: Regular rate and rhythm; + systolic murmur  ABDOMEN: Soft, Nontender, Nondistended  EXTREMITIES:  2+ radial and DP Pulses, No clubbing, cyanosis, or edema  LYMPH: No lymphadenopathy noted  SKIN: + skin tears at forearm and elbow with dried blood and ecchymosis, R shoulder swelling  MSK: R shoulder tenderness to palpation and swelling-- mobility limited by sling. Can flex R elbow with assistance from L hand, full range of motion of R wrist

## 2019-07-12 NOTE — H&P ADULT - NSHPLABSRESULTS_GEN_ALL_CORE
Labs, imaging and EKG personally reviewed by me.     CBC found anemia with Hgb of 11 (improved from 4/2019). He had thrombocytopenia- platelet count of 110.   CMP found elevated SCr of 1.78- eleated from 4/2019/.    LABS:                        11.0   6.6   )-----------( 110      ( 12 Jul 2019 19:06 )             32.6     Hgb Trend: 11.0<--  07-12    137  |  97  |  32<H>  ----------------------------<  114<H>  4.1   |  26  |  1.78<H>    Ca    8.5      12 Jul 2019 19:06    TPro  7.4  /  Alb  4.0  /  TBili  0.8  /  DBili  x   /  AST  9<L>  /  ALT  5<L>  /  AlkPhos  67  07-12    Creatinine Trend: 1.78<--      < from: CT Head No Cont (07.12.19 @ 17:59) > < end of copied text >    FINDINGS:   There is no evidence of an acute intracranial hemorrhage, extra-axial collection, mass effect, or midline shift. The basal cisterns are patent. No evidence of downward herniation. No hydrocephalus.    There is age-appropriate cerebral volume loss with proportional prominence of the sulci and ventricles. Nonspecific white matter hypoattenuation, likely related to chronic microvascular changes.    The visualized paranasal sinuses and mastoid air cells are clear.    No displaced calvarial fracture.     IMPRESSION:  No evidence of acute intracranial hemorrhage, midline shift, or displaced calvarial fracture.    < from: Xray Humerus, Right (07.12.19 @ 15:44) >    FINDINGS/  IMPRESSION:  Diffuse osseous demineralization limits evaluation. Redemonstration of displaced fracture at the surgical neck of the humerus with overriding of fragments and rotation of humeral head fragment. There is apparent lucency extending through the cortex in the proximal humeral shaft which is suspicious for superimposed acute traumatic change/fracture. If clinically indicated, correlation with CT scan can be performed. No acute displaced fracture or dislocation is seen at the right elbow. Evaluation for elbow joint effusion is limited due to positioning and overlapping structures.  Vascular calcifications are noted.    < end of copied text >

## 2019-07-12 NOTE — ED PROVIDER NOTE - PROGRESS NOTE DETAILS
Luis Abdullahi, PGY 2: spoke with PT and ortho and will eval s/p ct. spoke with the fx member and states feeling uncomfortable sending patient home due inability to use the walker due to pain. Luis Abdullahi, PGY 2: spoke with PT and will require rehab and ortho will not perform surgery at this time.

## 2019-07-12 NOTE — ED PROVIDER NOTE - CLINICAL SUMMARY MEDICAL DECISION MAKING FREE TEXT BOX
parkinson no ac meds fell 2 hrs ago backwards p.w right shoulder pain and limited ROM due to pain and elbow abrasion, atraumatic head or midline cervcial tenderness, will xray the extremities, no sign of ICH, or acute dislocation on physical exam. will imagine and pain control .

## 2019-07-12 NOTE — ED PROVIDER NOTE - CARE PLAN
Principal Discharge DX:	Acute pain of right shoulder Principal Discharge DX:	Closed displaced fracture of surgical neck of right humerus, unspecified fracture morphology, initial encounter

## 2019-07-12 NOTE — CONSULT NOTE ADULT - SUBJECTIVE AND OBJECTIVE BOX
89y Male RHD presents c/o R shoulder pain sp mechanical fall earlier today. Denies HS/LOC. Denies numbness/tingling. Denies fever/chills. Denies pain/injury elsewhere. No other complaints. Has skin tears throughout body, fail risk, history of falls.   Patient has chronic right proximal humerus fx. May have refractured. Initial fx was 9/14/18.     HEALTH ISSUES - PROBLEM Dx:  Unilateral inguinal hernia, with gangrene, not specified as recurrent  Upper GI bleed  Aspiration pneumonia  Clostridium difficile colitis  Pneumonia  MI, old  UTI (urinary tract infection)  BPH (benign prostatic hyperplasia)  Parkinsons Disease  CAD (Coronary Artery Disease)  HTN (Hypertension)  Hyperlipidemia      MEDICATIONS  (STANDING): see med rec    Allergies    Cipro (Rash)    Intolerances    Vital Signs Last 24 Hrs  T(C): 37.2 (07-12-19 @ 15:14), Max: 37.2 (07-12-19 @ 14:26)  T(F): 98.9 (07-12-19 @ 15:14), Max: 98.9 (07-12-19 @ 14:26)  HR: 74 (07-12-19 @ 15:14) (68 - 74)  BP: 183/89 (07-12-19 @ 15:14) (98/62 - 183/89)  BP(mean): --  RR: 18 (07-12-19 @ 15:14) (16 - 18)  SpO2: 98% (07-12-19 @ 15:14) (98% - 99%)  Imaging: XR demonstrates R/L proximal humerus fracture    Physical Exam  Gen: NAD, Alert and Awake, Follows Commands  R UE: Skin intact, negative sulcus sign, mild Swelling to shoulder noted. No ecchymosis. Mild effusion. Cannot AROM shoulder due to pain. r/u/m/ain/pin function intact. SILT over deltoid. SILT digits 1-5, warm to touch. 2+ radial pulse. Compartments soft and compressible.     Secondary:  No TTP over bony landmarks, SILT BL, ROM intact BL, distal pulses palpable. Several skin tears through out body     A/P: 89y Male with R possible acute on chronic proximal humerus fracture without dislocation     Pain control  NWB R UE   Sling at all times  Ice plenty  PT/OT  would consider rehab placement   wife at bedside. concerned as  has been recently weak on legs   Active movement of fingers/elbow encouraged  Ca/Vit D, outpt osteoporosis workup  no acute orthopedic intervention at this time  ortho stable   All question answered  Follow up with Dr. Cohn as outpatient within 1 week, call office for appointment   Ortho stable  for DC

## 2019-07-12 NOTE — PHYSICAL THERAPY INITIAL EVALUATION ADULT - ADDITIONAL COMMENTS
As per pt, pt lives in an apartment w/ spouse and 4 steps to enter w/ UHR. PTA pt was independent w/ all mobility w/ RW and required assistance w/ some ADLs.

## 2019-07-12 NOTE — ED PROVIDER NOTE - NS ED ROS FT
GENERAL: No fever or chills, weight changes, nightsweats  EYES: no change in vision  HEENT: no dysplasia, odynophagia, ear pain, rhinorrhea, epistasis   CARDIAC: no chest pain, palpitation   PULMONARY: no cough or SOB  GI: no abdominal pain, no nausea or no vomiting, no diarrhea or constipation  : No changes in urination for pain/freq.   SKIN: no rashes   NEURO: no headache, numbness/tingling, extremity weakness   MSK: HPI

## 2019-07-12 NOTE — H&P ADULT - NSICDXPASTMEDICALHX_GEN_ALL_CORE_FT
PAST MEDICAL HISTORY:  Aspiration pneumonia     BPH (benign prostatic hyperplasia)     CAD (Coronary Artery Disease) s/p CABG and stents    Clostridium difficile colitis     HTN (Hypertension)     Hyperlipidemia     MI, old age 55    Parkinsons Disease     Pneumonia     Unilateral inguinal hernia, with gangrene, not specified as recurrent     Upper GI bleed MICU admission 2015, EGD w/ gastric ulcer/visible vessel which was cauterized    UTI (urinary tract infection)

## 2019-07-12 NOTE — PHYSICAL THERAPY INITIAL EVALUATION ADULT - PLANNED THERAPY INTERVENTIONS, PT EVAL
transfer training/balance training/bed mobility training/GOAL: Pt will perform 4 stairs with or without U HR as needed within 3-4weeks./gait training

## 2019-07-12 NOTE — H&P ADULT - HISTORY OF PRESENT ILLNESS
This patient is an 89yoM with PMH of CAD s/p CABG, Parkinson's disease, htn, hLd, hx DVT s/p IVC filter, CKD stage III, hx GIB, R shoulder fracture who presents to the ED with complaint of right shoulder pain. At about 2pm on day of admission, patient was at home and thought he heard someone at his door. He found nobody outside and backed up to close the door when he fell, hitting the back of his head and his back against the floor. He denied any loss of consciousness, CP, palpitation, SOB, tongue biting, bladder or bowel incontinence. The patient was alone and he called for help using his fall alert button and within 20-30 minutes EMS helped him up and brought him to the ED. The patient complains of severe R shoulder pain which is worsened with movement. He states "it feels just like when I broke my shoulder the last time." He reports "balance is my enemy" and states he walks with a stroller or walker because he has had falls in the past.    In the ED, T 98.9F, HR 68, BP 98/62, RR 16, SpO2 99%RA  Patient was given tylenol 650mg PO, 975mg PO.

## 2019-07-12 NOTE — H&P ADULT - NSHPSOCIALHISTORY_GEN_ALL_CORE
The patient lives with his wife. He denies tobacco use. He drinks a glass of wine nightly.   He walks with a walker or stroller.

## 2019-07-12 NOTE — ED ADULT NURSE NOTE - OBJECTIVE STATEMENT
Pt is an ambulatory 89 yr old male a/oX 3 c/o mechanical fall this morning.  Pt ambulates with walker, and fell backward.  Hit head, no loc or wound on head.  No midline c spine tenderness.  Denies being on ground for long time.  Pain and tenderness to right elbow and right shoulder with no ROM in right elbow and shoulder.  Palpable brachial and radial pulse.  No deformity

## 2019-07-12 NOTE — H&P ADULT - ASSESSMENT
This patient is an 89yoM with PMH of CAD s/p CABG, Parkinson's disease, htn, hLd, hx DVT s/p IVC filter, CKD stage III, hx GIB, R shoulder fracture who presents to the ED with complaint of right shoulder pain. At about 2pm on day of admission, patient was at home and thought he heard someone at his door. He found nobody outside and backed up to close the door when he fell, hitting the back of his head and his back against the floor. He denied any loss of consciousness, CP, palpitation, SOB, tongue biting, bladder or bowel incontinence. The patient was alone and he called for help using his fall alert button and within 20-30 minutes EMS helped him up and brought him to the ED. The patient complains of severe R shoulder pain which is worsened with movement. He states "it feels just like when I broke my shoulder the last time." He reports "balance is my enemy" and states he walks with a stroller or walker because he has had falls in the past.    In the ED, T 98.9F, HR 68, BP 98/62, RR 16, SpO2 99%RA  Patient was given tylenol 650mg PO, 975mg PO. This patient is an 89yoM with PMH of CAD s/p CABG, Parkinson's disease, htn, hLd, hx DVT s/p IVC filter, CKD stage III, hx GIB, R shoulder fracture who presents to the ED with complaint of right shoulder pain s/p mechanical fall, with concern for acute on chronic R proximal humeral fracture.

## 2019-07-12 NOTE — PHYSICAL THERAPY INITIAL EVALUATION ADULT - MANUAL MUSCLE TESTING RESULTS, REHAB EVAL
grossly assessed due to/Strength is grossly at least 3/5 throughout except RUE not tested secondary to fx

## 2019-07-12 NOTE — ED PROVIDER NOTE - PRINCIPAL DIAGNOSIS
Acute pain of right shoulder Closed displaced fracture of surgical neck of right humerus, unspecified fracture morphology, initial encounter

## 2019-07-12 NOTE — H&P ADULT - PROBLEM SELECTOR PLAN 1
Patient complains of persistent R shoulder pain. He was seen by orthopedic surgery team for concern for acute on chronic fracture of R proximal humerus without dislocation, and team determined no plan for acute surgical intervention.   Continue NWB RUE. Continue ice packs and tylenol for pain control  Patient was provided a sling.   PT was consulted by ED.  Patient encouraged to move fingers and elbow  Patient should follow up with Dr. Cohn 1 week after discharge.

## 2019-07-12 NOTE — ED PROVIDER NOTE - TOBACCO USE
pt alert and awake, normal baseline as per ems, BIBA from [facility] from unwitnessed fall, abrasion behind right ear with dry blood, denies blood thinner use.
Unknown if ever smoked

## 2019-07-12 NOTE — PHYSICAL THERAPY INITIAL EVALUATION ADULT - PRECAUTIONS/LIMITATIONS, REHAB EVAL
fall precautions/continued from above: X-ray R shoulder:Redemonstration of displaced fracture at the surgical neck of the humerus with overriding of fragments and rotation of humeral head fragment. There is apparent lucency extending through the cortex in the proximal humeral shaft which is suspicious for superimposed acute traumatic change/fracture./cardiac precautions

## 2019-07-12 NOTE — PHYSICAL THERAPY INITIAL EVALUATION ADULT - PERTINENT HX OF CURRENT PROBLEM, REHAB EVAL
Pt 89M, h.o parkinson's, HTN, CABG, IVC, p.w right shoulder pain s/p  fall 2 hrs ago. patient states fall backwards and hitting the back of the head and denied headache, vision changes, n/v, dizziness. Pt is unable to lift right arm due to pain. Hx of right shoulder injury no surgery in the past.

## 2019-07-12 NOTE — H&P ADULT - PROBLEM SELECTOR PLAN 4
Patient is on lasix 20mg PO BID for lower extremity edema. Pt has trace ankle edema at this time, but elevation in SCr compared to previous Patient is on lasix 20mg PO BID for lower extremity edema. Pt has trace ankle edema at this time, but elevation in SCr compared to previous. Will hold lasix for now.

## 2019-07-12 NOTE — ED ADULT NURSE NOTE - NSSUSCREENINGQ2_ED_ALL_ED
Review of Systems Health Update:     GENERAL / CONSTITUTIONAL:  []  YES    [x]  NO   Excessive fatigue.  []  YES    [x]  NO   Unexplained weight loss or gain.  []  YES    [x]  NO   Excessive sweating or night sweats.    EYES:  []  YES    [x]  NO   Blurry or double vision.  []  YES    [x]  NO   Eye pain.   []  YES    [x]  NO   Other visual disturbance.    EARS, NOSE, MOUTH AND THROAT:  []  YES    [x]  NO   Loss of hearing or prolonged roaring/ringing in ears.   []  YES    [x]  NO   Nasal obstruction or discharge.  []  YES    [x]  NO   Hoarseness or voice changes.  []  YES    [x]  NO   Difficult or painful swallowing.    CARDIOVASCULAR:  []  YES    [x]  NO   Chest pain/discomfort at rest or with exercise.  []  YES    [x]  NO   Heart murmur, palpitations, or irregular heart beat.  []  YES    [x]  NO   History of heart attack or other heart trouble.  [x]  YES    []  NO   Leg cramps with walking.  [x]  YES    []  NO   Ankle swelling.   []  YES    [x]  NO   Shortness of breath.  []  YES    [x]  NO   History of high blood pressure.    LUNGS:   []  YES    [x]  NO   Coughing up blood  []  YES    [x]  NO   Chronic cough.  []  YES    [x]  NO   Abnormal chest x-ray.  []  YES    [x]  NO   Wheezing.  [x]  YES    []  NO   History of positive TB skin test.     IMMUNE SYSTEM/ALLERGIES:  []  YES    [x]  NO   Frequent infections.   []  YES    [x]  NO   History of asthma/allergies.  []  YES    [x]  NO   Frequent nasal congestion.   []  YES    [x]  NO   Itchy or watery eyes.   []  YES    [x]  NO   History of blood transfusion.     INTESTINAL:  []  YES    [x]  NO   Poor appetite.  []  YES    [x]  NO   Frequent indigestion or heartburn.  []  YES    [x]  NO   Nausea, vomiting, diarrhea, or constipation.  []  YES    [x]  NO   Vomiting blood.  []  YES    [x]  NO   Rectal bleeding or black tarry stools.  []  YES    [x]  NO   Diagnosis of hepatitis.    ENDOCRINE:  []  YES    [x]  NO   Heat or cold intolerance.  []  YES    [x]  NO   Excessive  thirst or urination.  [x]  YES    [x]  NO   History of diabetes or thyroid disease.     HEMATOLOGIC:   []  YES    [x]  NO   Swollen glands or lymph nodes.  []  YES    [x]  NO   History of anemia.   []  YES    [x]  NO   Bruise easily.   []  YES    [x]  NO   Bleeding tendencies.  []  YES    [x]  NO   History of blood clots.    MUSCULOSKELETAL:  [x]  YES    []  NO   Swollen, stiff, or painful joints.   []  YES    [x]  NO   Neck pain.   [x]  YES    []  NO   Back pain.   []  YES    [x]  NO   History of gout.     SKIN:  []  YES    [x]  NO   Recurrent skin rash.   []  YES    [x]  NO   Mole changes in size or color.  []  YES    [x]  NO   Abnormal hair growth or loss.    NEUROLOGIC:   []  YES    [x]  NO   Frequent or severe headaches.  []  YES    [x]  NO   Loss of balance.  []  YES    [x]  NO   Unexplained dizziness.  []  YES    [x]  NO   Loss of consciousness.   []  YES    [x]  NO   History of head injury.  []  YES    [x]  NO   Weakness or recurrent numbness or tingling in hands or feet.  []  YES    [x]  NO   Twitching or tremors.   []  YES    [x]  NO   Difficulty with speech.     UROLOGIC:   []  YES    [x]  NO   Recurrent bladder or kidney infections.   []  YES    [x]  NO   Kidney stones.   []  YES    [x]  NO   Chronic bladder pain, incontinence, difficulty urinating, or urinary frequency.       No

## 2019-07-12 NOTE — ED PROVIDER NOTE - OBJECTIVE STATEMENT
89M, h.o parkinson's, HTN, CABG, IVC, p.w right shoulder pain s/p  fall 2 hrs ago. patient states fall backwards and hitting the back of the head and denied headache, vision changes, n/v, dizziness. Pt is unable to lift right arm due to pain. Hx of right shoulder injury no surgery in the past. Pt denied chest pain, shortness of breath, cough, urinary issue and abdominal pain. Aspirin no other AC meds.

## 2019-07-12 NOTE — ED PROVIDER NOTE - ATTENDING CONTRIBUTION TO CARE
Attending MD Brady: I personally have seen and examined this patient.  Resident note reviewed and agree on plan of care and except where noted.  See below for details.     Seen in FT    89M with PMH/PSH including HTN, BPH, CAD s/p CABG, HLD, DVT with IVC filter, CKD, GI bleed, Parkinson's presents to the ED with R shoulder pain s/p fall.  Reports at around 1-2pm fell at home.  Reports went to answer the door and fell backwards.  Reports hit the back of his head, denies LOC.  Reports was unable to get up, used Life Alert button and EMS got him up and brought him to ED.  Reports R shoulder pain and inability to move RUE.  Reports previous episode "feels like last time I broke my shoulder".  Reports supposed to use walker at balance.  Denies preceding dizziness, weakness, sensory changes.  Denies preceding chest pain, shortness of breath.  Denies abdominal pain, nausea, vomiting, diarrhea, blood in stools. Denies loss of urinary or bowel continence. Denies dysuria, hematuria, change in urinary habits including frequency, urgency. Denies recent illness. Denies fevers.  On exam, NAD, AAOx3, head NCAT, CN 2-12 grossly intact, PERRL, FROM at neck, no tenderness to midline palpation, no stepoffs along length of spine, lungs CTAB with good inspiratory effort, +S1S2, +murmur, no r/g, abdomen soft with +BS, NT, ND, no CVAT, moving all extremities except for RUE, +2 radials, +tenderness at R shoulder and R upper arm, sensory grossly intact, A/P: 89M s/p fall, now with R shoulder pain, high suspicion for fracture, will obtain XRs elbows, shoulder, CT head given unwitnessed, will give pain control, obtain labs, likely will need ortho

## 2019-07-12 NOTE — H&P ADULT - NSICDXPASTSURGICALHX_GEN_ALL_CORE_FT
PAST SURGICAL HISTORY:  History of prostate surgery 8/2016    Hx of CABG 1995 (age 65)    Presence of IVC filter right upper arm placed last year 2015    S/P appendectomy     Stented coronary artery cardiac stent x 2 - placement between 2000 & possible 2005    Varicose veins of legs h/o Vein stripping

## 2019-07-12 NOTE — ED PROVIDER NOTE - PHYSICAL EXAMINATION
General: NAD, good hygiene, well developed  HENT: Atraumatic, EMOI, no conjunctivae injection, moist mucosa, no post. oropharynx erythema, exudates  Neck: no midline tenderness of the cervix, normal ROM and trachea midline   Cardiovascular: RRR, S1&2, no M or R, radial pulses equal and b/l  Respiratory: CTABL, no wheezes or crackles, no decreased breath sounds  Abdominal: soft and non-tender non distended, neg for guarding, khan's sign, rovsing's sign, mcburney's sign, no CVA tenderness   Extremities: right shoulder tenderness anteriorly of the GH joint, no sign of dislocation or gross fx, right lateral superficial abrasion of the elbow, ROM of the elbow intact, radial pulses eqal and b/l, no edema of the legs/feet, DP/PT equal b/l  Skin: warm, well perfused  Neurologic: nonfocal, AAOx3  Psych: normal mood and affect

## 2019-07-13 LAB — TSH SERPL-MCNC: 4.77 UIU/ML — HIGH (ref 0.27–4.2)

## 2019-07-13 RX ORDER — FUROSEMIDE 40 MG
20 TABLET ORAL
Refills: 0 | Status: DISCONTINUED | OUTPATIENT
Start: 2019-07-13 | End: 2019-07-15

## 2019-07-13 RX ORDER — CARBIDOPA AND LEVODOPA 25; 100 MG/1; MG/1
1 TABLET ORAL
Refills: 0 | Status: DISCONTINUED | OUTPATIENT
Start: 2019-07-13 | End: 2019-07-15

## 2019-07-13 RX ADMIN — Medication 1000 UNIT(S): at 17:25

## 2019-07-13 RX ADMIN — Medication 81 MILLIGRAM(S): at 12:40

## 2019-07-13 RX ADMIN — HEPARIN SODIUM 5000 UNIT(S): 5000 INJECTION INTRAVENOUS; SUBCUTANEOUS at 14:33

## 2019-07-13 RX ADMIN — ENTACAPONE 200 MILLIGRAM(S): 200 TABLET, FILM COATED ORAL at 17:24

## 2019-07-13 RX ADMIN — ENTACAPONE 200 MILLIGRAM(S): 200 TABLET, FILM COATED ORAL at 05:37

## 2019-07-13 RX ADMIN — RANOLAZINE 1000 MILLIGRAM(S): 500 TABLET, FILM COATED, EXTENDED RELEASE ORAL at 06:23

## 2019-07-13 RX ADMIN — Medication 100 MILLIGRAM(S): at 17:25

## 2019-07-13 RX ADMIN — Medication 100 MILLIGRAM(S): at 05:37

## 2019-07-13 RX ADMIN — CARBIDOPA AND LEVODOPA 1 TABLET(S): 25; 100 TABLET ORAL at 17:24

## 2019-07-13 RX ADMIN — HEPARIN SODIUM 5000 UNIT(S): 5000 INJECTION INTRAVENOUS; SUBCUTANEOUS at 21:44

## 2019-07-13 RX ADMIN — Medication 25 MICROGRAM(S): at 05:37

## 2019-07-13 RX ADMIN — Medication 20 MILLIGRAM(S): at 17:28

## 2019-07-13 RX ADMIN — CARBIDOPA AND LEVODOPA 1 TABLET(S): 25; 100 TABLET ORAL at 05:39

## 2019-07-13 RX ADMIN — ISOSORBIDE MONONITRATE 60 MILLIGRAM(S): 60 TABLET, EXTENDED RELEASE ORAL at 06:21

## 2019-07-13 RX ADMIN — RANOLAZINE 1000 MILLIGRAM(S): 500 TABLET, FILM COATED, EXTENDED RELEASE ORAL at 17:29

## 2019-07-13 RX ADMIN — Medication 1000 UNIT(S): at 05:37

## 2019-07-13 RX ADMIN — HEPARIN SODIUM 5000 UNIT(S): 5000 INJECTION INTRAVENOUS; SUBCUTANEOUS at 05:37

## 2019-07-13 RX ADMIN — SENNA PLUS 1 TABLET(S): 8.6 TABLET ORAL at 05:37

## 2019-07-13 RX ADMIN — SENNA PLUS 1 TABLET(S): 8.6 TABLET ORAL at 17:25

## 2019-07-13 RX ADMIN — CARBIDOPA AND LEVODOPA 1 TABLET(S): 25; 100 TABLET ORAL at 12:40

## 2019-07-13 RX ADMIN — ENTACAPONE 200 MILLIGRAM(S): 200 TABLET, FILM COATED ORAL at 12:40

## 2019-07-13 RX ADMIN — ATORVASTATIN CALCIUM 80 MILLIGRAM(S): 80 TABLET, FILM COATED ORAL at 21:44

## 2019-07-13 RX ADMIN — POLYETHYLENE GLYCOL 3350 17 GRAM(S): 17 POWDER, FOR SOLUTION ORAL at 12:40

## 2019-07-14 LAB
ANION GAP SERPL CALC-SCNC: 10 MMOL/L — SIGNIFICANT CHANGE UP (ref 5–17)
BUN SERPL-MCNC: 30 MG/DL — HIGH (ref 7–23)
CALCIUM SERPL-MCNC: 8.6 MG/DL — SIGNIFICANT CHANGE UP (ref 8.4–10.5)
CHLORIDE SERPL-SCNC: 99 MMOL/L — SIGNIFICANT CHANGE UP (ref 96–108)
CO2 SERPL-SCNC: 27 MMOL/L — SIGNIFICANT CHANGE UP (ref 22–31)
CREAT SERPL-MCNC: 1.61 MG/DL — HIGH (ref 0.5–1.3)
GLUCOSE SERPL-MCNC: 107 MG/DL — HIGH (ref 70–99)
HCT VFR BLD CALC: 32.1 % — LOW (ref 39–50)
HGB BLD-MCNC: 10.4 G/DL — LOW (ref 13–17)
MCHC RBC-ENTMCNC: 28.9 PG — SIGNIFICANT CHANGE UP (ref 27–34)
MCHC RBC-ENTMCNC: 32.4 GM/DL — SIGNIFICANT CHANGE UP (ref 32–36)
MCV RBC AUTO: 89.2 FL — SIGNIFICANT CHANGE UP (ref 80–100)
PLATELET # BLD AUTO: 122 K/UL — LOW (ref 150–400)
POTASSIUM SERPL-MCNC: 3.9 MMOL/L — SIGNIFICANT CHANGE UP (ref 3.5–5.3)
POTASSIUM SERPL-SCNC: 3.9 MMOL/L — SIGNIFICANT CHANGE UP (ref 3.5–5.3)
RBC # BLD: 3.6 M/UL — LOW (ref 4.2–5.8)
RBC # FLD: 14.5 % — SIGNIFICANT CHANGE UP (ref 10.3–14.5)
SODIUM SERPL-SCNC: 136 MMOL/L — SIGNIFICANT CHANGE UP (ref 135–145)
WBC # BLD: 5.25 K/UL — SIGNIFICANT CHANGE UP (ref 3.8–10.5)
WBC # FLD AUTO: 5.25 K/UL — SIGNIFICANT CHANGE UP (ref 3.8–10.5)

## 2019-07-14 RX ADMIN — Medication 1000 UNIT(S): at 06:00

## 2019-07-14 RX ADMIN — Medication 1000 UNIT(S): at 17:29

## 2019-07-14 RX ADMIN — SENNA PLUS 1 TABLET(S): 8.6 TABLET ORAL at 05:59

## 2019-07-14 RX ADMIN — ENTACAPONE 200 MILLIGRAM(S): 200 TABLET, FILM COATED ORAL at 13:00

## 2019-07-14 RX ADMIN — SENNA PLUS 1 TABLET(S): 8.6 TABLET ORAL at 17:29

## 2019-07-14 RX ADMIN — RANOLAZINE 1000 MILLIGRAM(S): 500 TABLET, FILM COATED, EXTENDED RELEASE ORAL at 17:30

## 2019-07-14 RX ADMIN — ISOSORBIDE MONONITRATE 60 MILLIGRAM(S): 60 TABLET, EXTENDED RELEASE ORAL at 13:00

## 2019-07-14 RX ADMIN — CARBIDOPA AND LEVODOPA 1 TABLET(S): 25; 100 TABLET ORAL at 06:00

## 2019-07-14 RX ADMIN — ENTACAPONE 200 MILLIGRAM(S): 200 TABLET, FILM COATED ORAL at 00:21

## 2019-07-14 RX ADMIN — CARBIDOPA AND LEVODOPA 1 TABLET(S): 25; 100 TABLET ORAL at 13:00

## 2019-07-14 RX ADMIN — ENTACAPONE 200 MILLIGRAM(S): 200 TABLET, FILM COATED ORAL at 17:29

## 2019-07-14 RX ADMIN — POLYETHYLENE GLYCOL 3350 17 GRAM(S): 17 POWDER, FOR SOLUTION ORAL at 13:00

## 2019-07-14 RX ADMIN — Medication 100 MILLIGRAM(S): at 06:00

## 2019-07-14 RX ADMIN — RANOLAZINE 1000 MILLIGRAM(S): 500 TABLET, FILM COATED, EXTENDED RELEASE ORAL at 06:06

## 2019-07-14 RX ADMIN — Medication 100 MILLIGRAM(S): at 17:29

## 2019-07-14 RX ADMIN — HEPARIN SODIUM 5000 UNIT(S): 5000 INJECTION INTRAVENOUS; SUBCUTANEOUS at 06:02

## 2019-07-14 RX ADMIN — ENTACAPONE 200 MILLIGRAM(S): 200 TABLET, FILM COATED ORAL at 06:01

## 2019-07-14 RX ADMIN — HEPARIN SODIUM 5000 UNIT(S): 5000 INJECTION INTRAVENOUS; SUBCUTANEOUS at 22:39

## 2019-07-14 RX ADMIN — Medication 20 MILLIGRAM(S): at 06:00

## 2019-07-14 RX ADMIN — HEPARIN SODIUM 5000 UNIT(S): 5000 INJECTION INTRAVENOUS; SUBCUTANEOUS at 13:00

## 2019-07-14 RX ADMIN — ATORVASTATIN CALCIUM 80 MILLIGRAM(S): 80 TABLET, FILM COATED ORAL at 22:39

## 2019-07-14 RX ADMIN — Medication 25 MICROGRAM(S): at 05:59

## 2019-07-14 RX ADMIN — Medication 20 MILLIGRAM(S): at 17:29

## 2019-07-14 RX ADMIN — CARBIDOPA AND LEVODOPA 1 TABLET(S): 25; 100 TABLET ORAL at 23:44

## 2019-07-14 RX ADMIN — Medication 81 MILLIGRAM(S): at 13:00

## 2019-07-14 RX ADMIN — CARBIDOPA AND LEVODOPA 1 TABLET(S): 25; 100 TABLET ORAL at 17:29

## 2019-07-14 RX ADMIN — ENTACAPONE 200 MILLIGRAM(S): 200 TABLET, FILM COATED ORAL at 23:44

## 2019-07-14 RX ADMIN — CARBIDOPA AND LEVODOPA 1 TABLET(S): 25; 100 TABLET ORAL at 00:21

## 2019-07-15 ENCOUNTER — TRANSCRIPTION ENCOUNTER (OUTPATIENT)
Age: 84
End: 2019-07-15

## 2019-07-15 VITALS
TEMPERATURE: 98 F | SYSTOLIC BLOOD PRESSURE: 154 MMHG | OXYGEN SATURATION: 100 % | RESPIRATION RATE: 18 BRPM | HEART RATE: 56 BPM | DIASTOLIC BLOOD PRESSURE: 75 MMHG

## 2019-07-15 LAB
ANION GAP SERPL CALC-SCNC: 11 MMOL/L — SIGNIFICANT CHANGE UP (ref 5–17)
BUN SERPL-MCNC: 31 MG/DL — HIGH (ref 7–23)
CALCIUM SERPL-MCNC: 8 MG/DL — LOW (ref 8.4–10.5)
CHLORIDE SERPL-SCNC: 99 MMOL/L — SIGNIFICANT CHANGE UP (ref 96–108)
CO2 SERPL-SCNC: 26 MMOL/L — SIGNIFICANT CHANGE UP (ref 22–31)
CREAT SERPL-MCNC: 1.43 MG/DL — HIGH (ref 0.5–1.3)
GLUCOSE SERPL-MCNC: 101 MG/DL — HIGH (ref 70–99)
HCT VFR BLD CALC: 29.8 % — LOW (ref 39–50)
HGB BLD-MCNC: 9.6 G/DL — LOW (ref 13–17)
MCHC RBC-ENTMCNC: 29.2 PG — SIGNIFICANT CHANGE UP (ref 27–34)
MCHC RBC-ENTMCNC: 32.2 GM/DL — SIGNIFICANT CHANGE UP (ref 32–36)
MCV RBC AUTO: 90.6 FL — SIGNIFICANT CHANGE UP (ref 80–100)
PLATELET # BLD AUTO: 118 K/UL — LOW (ref 150–400)
POTASSIUM SERPL-MCNC: 3.8 MMOL/L — SIGNIFICANT CHANGE UP (ref 3.5–5.3)
POTASSIUM SERPL-SCNC: 3.8 MMOL/L — SIGNIFICANT CHANGE UP (ref 3.5–5.3)
RBC # BLD: 3.29 M/UL — LOW (ref 4.2–5.8)
RBC # FLD: 14.6 % — HIGH (ref 10.3–14.5)
SODIUM SERPL-SCNC: 136 MMOL/L — SIGNIFICANT CHANGE UP (ref 135–145)
WBC # BLD: 4.85 K/UL — SIGNIFICANT CHANGE UP (ref 3.8–10.5)
WBC # FLD AUTO: 4.85 K/UL — SIGNIFICANT CHANGE UP (ref 3.8–10.5)

## 2019-07-15 PROCEDURE — 80053 COMPREHEN METABOLIC PANEL: CPT

## 2019-07-15 PROCEDURE — 84443 ASSAY THYROID STIM HORMONE: CPT

## 2019-07-15 PROCEDURE — 80048 BASIC METABOLIC PNL TOTAL CA: CPT

## 2019-07-15 PROCEDURE — 70450 CT HEAD/BRAIN W/O DYE: CPT

## 2019-07-15 PROCEDURE — 85027 COMPLETE CBC AUTOMATED: CPT

## 2019-07-15 PROCEDURE — 99285 EMERGENCY DEPT VISIT HI MDM: CPT

## 2019-07-15 PROCEDURE — 97161 PT EVAL LOW COMPLEX 20 MIN: CPT

## 2019-07-15 PROCEDURE — 73030 X-RAY EXAM OF SHOULDER: CPT

## 2019-07-15 PROCEDURE — 73070 X-RAY EXAM OF ELBOW: CPT

## 2019-07-15 PROCEDURE — 73200 CT UPPER EXTREMITY W/O DYE: CPT

## 2019-07-15 PROCEDURE — 76376 3D RENDER W/INTRP POSTPROCES: CPT

## 2019-07-15 PROCEDURE — 73060 X-RAY EXAM OF HUMERUS: CPT

## 2019-07-15 RX ORDER — SENNA PLUS 8.6 MG/1
1 TABLET ORAL
Qty: 0 | Refills: 0 | DISCHARGE
Start: 2019-07-15

## 2019-07-15 RX ORDER — SENNA PLUS 8.6 MG/1
2 TABLET ORAL
Qty: 0 | Refills: 0 | DISCHARGE
Start: 2019-07-15

## 2019-07-15 RX ORDER — ACETAMINOPHEN 500 MG
2 TABLET ORAL
Qty: 0 | Refills: 0 | DISCHARGE
Start: 2019-07-15

## 2019-07-15 RX ORDER — HEPARIN SODIUM 5000 [USP'U]/ML
5000 INJECTION INTRAVENOUS; SUBCUTANEOUS
Qty: 0 | Refills: 0 | DISCHARGE
Start: 2019-07-15

## 2019-07-15 RX ADMIN — HEPARIN SODIUM 5000 UNIT(S): 5000 INJECTION INTRAVENOUS; SUBCUTANEOUS at 06:03

## 2019-07-15 RX ADMIN — ENTACAPONE 200 MILLIGRAM(S): 200 TABLET, FILM COATED ORAL at 17:08

## 2019-07-15 RX ADMIN — Medication 1000 UNIT(S): at 06:02

## 2019-07-15 RX ADMIN — RANOLAZINE 1000 MILLIGRAM(S): 500 TABLET, FILM COATED, EXTENDED RELEASE ORAL at 06:04

## 2019-07-15 RX ADMIN — Medication 81 MILLIGRAM(S): at 14:26

## 2019-07-15 RX ADMIN — CARBIDOPA AND LEVODOPA 1 TABLET(S): 25; 100 TABLET ORAL at 17:08

## 2019-07-15 RX ADMIN — CARBIDOPA AND LEVODOPA 1 TABLET(S): 25; 100 TABLET ORAL at 06:02

## 2019-07-15 RX ADMIN — CARBIDOPA AND LEVODOPA 1 TABLET(S): 25; 100 TABLET ORAL at 14:26

## 2019-07-15 RX ADMIN — Medication 20 MILLIGRAM(S): at 06:01

## 2019-07-15 RX ADMIN — POLYETHYLENE GLYCOL 3350 17 GRAM(S): 17 POWDER, FOR SOLUTION ORAL at 14:26

## 2019-07-15 RX ADMIN — Medication 1000 UNIT(S): at 17:08

## 2019-07-15 RX ADMIN — SENNA PLUS 1 TABLET(S): 8.6 TABLET ORAL at 06:01

## 2019-07-15 RX ADMIN — ISOSORBIDE MONONITRATE 60 MILLIGRAM(S): 60 TABLET, EXTENDED RELEASE ORAL at 14:26

## 2019-07-15 RX ADMIN — ENTACAPONE 200 MILLIGRAM(S): 200 TABLET, FILM COATED ORAL at 14:26

## 2019-07-15 RX ADMIN — Medication 100 MILLIGRAM(S): at 17:08

## 2019-07-15 RX ADMIN — SENNA PLUS 1 TABLET(S): 8.6 TABLET ORAL at 17:07

## 2019-07-15 RX ADMIN — ENTACAPONE 200 MILLIGRAM(S): 200 TABLET, FILM COATED ORAL at 06:02

## 2019-07-15 RX ADMIN — Medication 20 MILLIGRAM(S): at 17:08

## 2019-07-15 RX ADMIN — Medication 25 MICROGRAM(S): at 06:03

## 2019-07-15 RX ADMIN — Medication 100 MILLIGRAM(S): at 06:02

## 2019-07-15 RX ADMIN — RANOLAZINE 1000 MILLIGRAM(S): 500 TABLET, FILM COATED, EXTENDED RELEASE ORAL at 17:07

## 2019-07-15 RX ADMIN — HEPARIN SODIUM 5000 UNIT(S): 5000 INJECTION INTRAVENOUS; SUBCUTANEOUS at 14:27

## 2019-07-15 NOTE — PROGRESS NOTE ADULT - PROBLEM SELECTOR PLAN 1
concern for acute on chronic fracture of R proximal humerus without dislocation  appreciate ortho recs, no plan for acute surgical intervention.   Continue NWB RUE. Continue ice packs and tylenol for pain control  Patient was provided a sling.   PT recs HYACINTH  Patient encouraged to move fingers and elbow  Patient should follow up with Dr. Cohn 1 week after discharge.

## 2019-07-15 NOTE — PROGRESS NOTE ADULT - PROBLEM SELECTOR PLAN 7
VTE: heparin subQ  Activity: ambulate with assistance, PT eval  Diet: mechanical soft    dispo: HYACINTH
VTE: heparin subQ  Activity: ambulate with assistance, PT eval  Diet: mechanical soft    dispo: HYACINTH
VTE: heparin subQ  Activity: ambulate with assistance, PT eval  Diet: mechanical soft    dispo: medically cleared for DC, HYACINTH

## 2019-07-15 NOTE — PROGRESS NOTE ADULT - PROBLEM SELECTOR PLAN 4
patient hx CKD  resume lasix for HTN

## 2019-07-15 NOTE — PROGRESS NOTE ADULT - SUBJECTIVE AND OBJECTIVE BOX
Patient is a 89y old  Male who presents with a chief complaint of right shoulder pain (12 Jul 2019 22:07)      SUBJECTIVE / OVERNIGHT EVENTS:    Patient seen and examined. concerned about his blood pressure. denies pain in right arm at this time. only pain with movement. denies cp sob. left handed.      Vital Signs Last 24 Hrs  T(C): 36.4 (13 Jul 2019 05:00), Max: 37.2 (12 Jul 2019 14:26)  T(F): 97.6 (13 Jul 2019 05:00), Max: 98.9 (12 Jul 2019 14:26)  HR: 69 (13 Jul 2019 05:00) (64 - 74)  BP: 170/80 (13 Jul 2019 05:00) (98/62 - 183/89)  BP(mean): --  RR: 17 (13 Jul 2019 05:00) (16 - 18)  SpO2: 98% (13 Jul 2019 05:00) (97% - 99%)  I&O's Summary    12 Jul 2019 07:01  -  13 Jul 2019 07:00  --------------------------------------------------------  IN: 100 mL / OUT: 350 mL / NET: -250 mL        PE:  GENERAL: NAD, AAOx3  HEAD:  Atraumatic, Normocephalic  EYES: EOMI, PERRLA, conjunctiva and sclera clear  NECK: Supple, No JVD  CHEST/LUNG: CTABL, No wheeze  HEART: Regular rate and rhythm; no murmur  ABDOMEN: Soft, Nontender, Nondistended; Bowel sounds present  EXTREMITIES:  2+ Peripheral Pulses, right arm in sling  SKIN: No rashes or lesions  NEURO: No focal deficits    LABS:                        11.0   6.6   )-----------( 110      ( 12 Jul 2019 19:06 )             32.6     07-12    137  |  97  |  32<H>  ----------------------------<  114<H>  4.1   |  26  |  1.78<H>    Ca    8.5      12 Jul 2019 19:06    TPro  7.4  /  Alb  4.0  /  TBili  0.8  /  DBili  x   /  AST  9<L>  /  ALT  5<L>  /  AlkPhos  67  07-12      CAPILLARY BLOOD GLUCOSE                RADIOLOGY & ADDITIONAL TESTS:    Imaging Personally Reviewed:  [x] YES  [ ] NO    Consultant(s) Notes Reviewed:  [x] YES  [ ] NO    MEDICATIONS  (STANDING):  aspirin enteric coated 81 milliGRAM(s) Oral daily  atorvastatin 80 milliGRAM(s) Oral at bedtime  carbidopa/levodopa CR 50/200 1 Tablet(s) Oral <User Schedule>  cholecalciferol 1000 Unit(s) Oral two times a day  docusate sodium 100 milliGRAM(s) Oral two times a day  entacapone 200 milliGRAM(s) Oral four times a day  furosemide    Tablet 20 milliGRAM(s) Oral two times a day  heparin  Injectable 5000 Unit(s) SubCutaneous every 8 hours  isosorbide   mononitrate ER Tablet (IMDUR) 60 milliGRAM(s) Oral daily  levothyroxine 25 MICROGram(s) Oral daily  polyethylene glycol 3350 17 Gram(s) Oral daily  ranolazine 1000 milliGRAM(s) Oral two times a day  senna 1 Tablet(s) Oral two times a day    MEDICATIONS  (PRN):  acetaminophen   Tablet .. 650 milliGRAM(s) Oral every 6 hours PRN Mild Pain (1 - 3), Moderate Pain (4 - 6), Severe Pain (7 - 10)      Care Discussed with Consultants/Other Providers [x] YES  [ ] NO    HEALTH ISSUES - PROBLEM Dx:  Prophylactic measure: Prophylactic measure  Hypothyroidism: Hypothyroidism  HTN (hypertension): HTN (hypertension)  CKD (chronic kidney disease) stage 3, GFR 30-59 ml/min: CKD (chronic kidney disease) stage 3, GFR 30-59 ml/min  CAD (coronary artery disease): CAD (coronary artery disease)  Parkinson disease: Parkinson disease  Acute pain of right shoulder: Acute pain of right shoulder
Patient is a 89y old  Male who presents with a chief complaint of right shoulder pain (14 Jul 2019 11:44)      SUBJECTIVE / OVERNIGHT EVENTS:    Patient seen and examined. denies cp sob. awaiting rehab.      Vital Signs Last 24 Hrs  T(C): 36.3 (15 Jul 2019 04:57), Max: 36.7 (14 Jul 2019 19:38)  T(F): 97.4 (15 Jul 2019 04:57), Max: 98.1 (14 Jul 2019 19:38)  HR: 60 (15 Jul 2019 04:57) (60 - 74)  BP: 116/68 (15 Jul 2019 04:57) (116/68 - 153/78)  BP(mean): --  RR: 18 (15 Jul 2019 04:57) (18 - 18)  SpO2: 98% (15 Jul 2019 04:57) (98% - 98%)  I&O's Summary    14 Jul 2019 07:01  -  15 Jul 2019 07:00  --------------------------------------------------------  IN: 940 mL / OUT: 450 mL / NET: 490 mL        PE:  GENERAL: NAD, AAOx3  HEAD:  Atraumatic, Normocephalic  EYES: EOMI, PERRLA, conjunctiva and sclera clear  NECK: Supple, No JVD  CHEST/LUNG: CTABL, No wheeze  HEART: Regular rate and rhythm; no murmur  ABDOMEN: Soft, Nontender, Nondistended; Bowel sounds present  EXTREMITIES:  2+ Peripheral Pulses, right arm in sling  SKIN: No rashes or lesions  NEURO: No focal deficits    LABS:                        9.6    4.85  )-----------( 118      ( 15 Jul 2019 08:32 )             29.8     07-15    136  |  99  |  31<H>  ----------------------------<  101<H>  3.8   |  26  |  1.43<H>    Ca    8.0<L>      15 Jul 2019 06:35        CAPILLARY BLOOD GLUCOSE                RADIOLOGY & ADDITIONAL TESTS:    Imaging Personally Reviewed:  [x] YES  [ ] NO    Consultant(s) Notes Reviewed:  [x] YES  [ ] NO    MEDICATIONS  (STANDING):  aspirin enteric coated 81 milliGRAM(s) Oral daily  atorvastatin 80 milliGRAM(s) Oral at bedtime  carbidopa/levodopa CR 50/200 1 Tablet(s) Oral <User Schedule>  cholecalciferol 1000 Unit(s) Oral two times a day  docusate sodium 100 milliGRAM(s) Oral two times a day  entacapone 200 milliGRAM(s) Oral four times a day  furosemide    Tablet 20 milliGRAM(s) Oral two times a day  heparin  Injectable 5000 Unit(s) SubCutaneous every 8 hours  isosorbide   mononitrate ER Tablet (IMDUR) 60 milliGRAM(s) Oral daily  levothyroxine 25 MICROGram(s) Oral daily  polyethylene glycol 3350 17 Gram(s) Oral daily  ranolazine 1000 milliGRAM(s) Oral two times a day  senna 1 Tablet(s) Oral two times a day    MEDICATIONS  (PRN):  acetaminophen   Tablet .. 650 milliGRAM(s) Oral every 6 hours PRN Mild Pain (1 - 3), Moderate Pain (4 - 6), Severe Pain (7 - 10)      Care Discussed with Consultants/Other Providers [x] YES  [ ] NO    HEALTH ISSUES - PROBLEM Dx:  Prophylactic measure: Prophylactic measure  Hypothyroidism: Hypothyroidism  HTN (hypertension): HTN (hypertension)  CKD (chronic kidney disease) stage 3, GFR 30-59 ml/min: CKD (chronic kidney disease) stage 3, GFR 30-59 ml/min  CAD (coronary artery disease): CAD (coronary artery disease)  Parkinson disease: Parkinson disease  Acute pain of right shoulder: Acute pain of right shoulder
Patient is a 89y old  Male who presents with a chief complaint of right shoulder pain (13 Jul 2019 11:53)      SUBJECTIVE / OVERNIGHT EVENTS:    Patient seen and examined. denies cp sob. arm no pain without movement.      Vital Signs Last 24 Hrs  T(C): 36.3 (14 Jul 2019 11:21), Max: 36.8 (14 Jul 2019 05:57)  T(F): 97.4 (14 Jul 2019 11:21), Max: 98.3 (14 Jul 2019 05:57)  HR: 50 (14 Jul 2019 11:21) (50 - 65)  BP: 162/74 (14 Jul 2019 11:21) (124/74 - 162/86)  BP(mean): --  RR: 17 (14 Jul 2019 11:21) (17 - 18)  SpO2: 100% (14 Jul 2019 11:21) (98% - 100%)  I&O's Summary    13 Jul 2019 07:01  -  14 Jul 2019 07:00  --------------------------------------------------------  IN: 870 mL / OUT: 600 mL / NET: 270 mL        PE:  GENERAL: NAD, AAOx3  HEAD:  Atraumatic, Normocephalic  EYES: EOMI, PERRLA, conjunctiva and sclera clear  NECK: Supple, No JVD  CHEST/LUNG: CTABL, No wheeze  HEART: Regular rate and rhythm; no murmur  ABDOMEN: Soft, Nontender, Nondistended; Bowel sounds present  EXTREMITIES:  2+ Peripheral Pulses, right arm in sling  SKIN: No rashes or lesions  NEURO: No focal deficits    LABS:                        10.4   5.25  )-----------( 122      ( 14 Jul 2019 09:57 )             32.1     07-14    136  |  99  |  30<H>  ----------------------------<  107<H>  3.9   |  27  |  1.61<H>    Ca    8.6      14 Jul 2019 07:19    TPro  7.4  /  Alb  4.0  /  TBili  0.8  /  DBili  x   /  AST  9<L>  /  ALT  5<L>  /  AlkPhos  67  07-12      CAPILLARY BLOOD GLUCOSE                RADIOLOGY & ADDITIONAL TESTS:    Imaging Personally Reviewed:  [x] YES  [ ] NO    Consultant(s) Notes Reviewed:  [x] YES  [ ] NO    MEDICATIONS  (STANDING):  aspirin enteric coated 81 milliGRAM(s) Oral daily  atorvastatin 80 milliGRAM(s) Oral at bedtime  carbidopa/levodopa CR 50/200 1 Tablet(s) Oral <User Schedule>  cholecalciferol 1000 Unit(s) Oral two times a day  docusate sodium 100 milliGRAM(s) Oral two times a day  entacapone 200 milliGRAM(s) Oral four times a day  furosemide    Tablet 20 milliGRAM(s) Oral two times a day  heparin  Injectable 5000 Unit(s) SubCutaneous every 8 hours  isosorbide   mononitrate ER Tablet (IMDUR) 60 milliGRAM(s) Oral daily  levothyroxine 25 MICROGram(s) Oral daily  polyethylene glycol 3350 17 Gram(s) Oral daily  ranolazine 1000 milliGRAM(s) Oral two times a day  senna 1 Tablet(s) Oral two times a day    MEDICATIONS  (PRN):  acetaminophen   Tablet .. 650 milliGRAM(s) Oral every 6 hours PRN Mild Pain (1 - 3), Moderate Pain (4 - 6), Severe Pain (7 - 10)      Care Discussed with Consultants/Other Providers [x] YES  [ ] NO    HEALTH ISSUES - PROBLEM Dx:  Prophylactic measure: Prophylactic measure  Hypothyroidism: Hypothyroidism  HTN (hypertension): HTN (hypertension)  CKD (chronic kidney disease) stage 3, GFR 30-59 ml/min: CKD (chronic kidney disease) stage 3, GFR 30-59 ml/min  CAD (coronary artery disease): CAD (coronary artery disease)  Parkinson disease: Parkinson disease  Acute pain of right shoulder: Acute pain of right shoulder

## 2019-07-15 NOTE — PROGRESS NOTE ADULT - PROBLEM SELECTOR PLAN 2
entacapone 200mg 4xday, and carbidopa-levodopa 50-200mg 4xday.

## 2019-07-15 NOTE — PROGRESS NOTE ADULT - ASSESSMENT
89yoM with PMH of CAD s/p CABG, Parkinson's disease, htn, hLd, hx DVT s/p IVC filter, CKD stage III, hx GIB, R shoulder fracture who presents to the ED with complaint of right shoulder pain s/p mechanical fall, with concern for acute on chronic R proximal humeral fracture.

## 2019-07-15 NOTE — DISCHARGE NOTE PROVIDER - NSDCCPCAREPLAN_GEN_ALL_CORE_FT
PRINCIPAL DISCHARGE DIAGNOSIS  Diagnosis: Acute pain of right shoulder  Assessment and Plan of Treatment: possible acute on chronic fracture of humarus -proximal  seen by ortho- no acute ortho intervention  non weight bearing right upper extremity with arm sling at all times   physical therpay at rehab -range of motion fingers  follow up with ortho in 1 week      SECONDARY DISCHARGE DIAGNOSES  Diagnosis: CKD (chronic kidney disease) stage 3, GFR 30-59 ml/min  Assessment and Plan of Treatment: CKD (chronic kidney disease) stage 3, GFR 30-59 ml/min  avoid kidney toxic medication  monitor kidney function at rehab    Diagnosis: Parkinson disease  Assessment and Plan of Treatment: Parkinson disease  continue medication    Diagnosis: CAD (coronary artery disease)  Assessment and Plan of Treatment: CAD (coronary artery disease)  continue your heart medication

## 2019-07-15 NOTE — DISCHARGE NOTE PROVIDER - CARE PROVIDER_API CALL
Alvarez Cohn)  Orthopaedic Surgery  [address]  [place] [address]  Phone: [phone]  Fax: [phone]  Follow Up Time: 1 week

## 2019-07-15 NOTE — PROGRESS NOTE ADULT - PROBLEM SELECTOR PLAN 5
isosorbide mononitrate 60mg PO qd.  lasix
Pt is hypertensive- likely 2/2 pain  Start home dose of isosorbide mononitrate 60mg PO qd.  resume lasix
isosorbide mononitrate 60mg PO qd.  lasix

## 2019-07-15 NOTE — DISCHARGE NOTE NURSING/CASE MANAGEMENT/SOCIAL WORK - NSDCDPATPORTLINK_GEN_ALL_CORE
You can access the [facility], offered by [facility], by registering with the following website: [email or web address]

## 2019-07-15 NOTE — DISCHARGE NOTE PROVIDER - NSDCFUADDINST_GEN_ALL_CORE_FT
activity as tolerated; fall precaution  non weight bearing right upper extremity arm sling at all times

## 2019-07-15 NOTE — CHART NOTE - NSCHARTNOTEFT_GEN_A_CORE
follow up- pt is cleared by MD for discharge to rehab; discussed discharge medication/follow up.  Lawanda Resendiz(NP)  [address] [facility], [phone]

## 2019-07-22 ENCOUNTER — APPOINTMENT (OUTPATIENT)
Dept: ORTHOPEDIC SURGERY | Facility: CLINIC | Age: 84
End: 2019-07-22
Payer: COMMERCIAL

## 2019-07-22 DIAGNOSIS — S42.291D OTHER DISPLACED FRACTURE OF UPPER END OF RIGHT HUMERUS, SUBSEQUENT ENCOUNTER FOR FRACTURE WITH ROUTINE HEALING: ICD-10-CM

## 2019-07-22 PROCEDURE — 99214 OFFICE O/P EST MOD 30 MIN: CPT

## 2019-07-22 PROCEDURE — 73030 X-RAY EXAM OF SHOULDER: CPT | Mod: RT

## 2019-07-22 NOTE — HISTORY OF PRESENT ILLNESS
[de-identified] : Pt is an 88 y/o male who presents with right shoulder pain.  He fell 10 days ago and landed onto his right side while inside the house.  He previously fractured the right humerus in August 2018. He was treated with PT and was able to recover. He was seen at [facility] later that same day where xrays and CT scan were taken which revealed a displaced fracture at the surgical neck of the humerus with overriding of fragments and rotation of humeral head fragment. CT confirmed the nature and displacement of the fracture. He was placed in a sling.  He is currently recovering at [facility].  He is unable to use his walker because of the pain and his wife states that his legs are becoming weak because of this. He takes Tylenol as needed for pain.

## 2019-07-22 NOTE — DISCUSSION/SUMMARY
[de-identified] : Paperwork for rehab was filled out. \par Continue with sling immobilization. He was advised to remove the sling when he is able to tolerate the arm without the sling. \par Gentle range of motion and strengthening exercises were advised, as tolerated by pain. \par NSAIDs as tolerated. \par Follow up in 6 weeks. Xrays upon return.

## 2019-07-22 NOTE — ADDENDUM
[FreeTextEntry1] : I, Cherry Paiz wrote this note acting as a scribe for Dr. Alvarez Cohn on Jul 22, 2019.

## 2019-07-22 NOTE — PHYSICAL EXAM
[de-identified] : Patient is WDWN male in a wheelchair. He is alert and in NAD. Breathing is unlabored. He is grossly oriented to person, place and time.\par \par Right Shoulder: Right Shoulder has tenderness present. The ROM >45° with pain. \par  [de-identified] : AP, transcapula, and axillary views of the right shoulder were obtained and revealed a displaced fracture at the surgical neck of the humerus.

## 2019-07-22 NOTE — END OF VISIT
[FreeTextEntry3] : I, Alvarez Cohn MD, ordering physician, have read and attest that all the information, medical decision making and discharge instructions within are true and accurate.

## 2019-08-28 ENCOUNTER — INPATIENT (INPATIENT)
Facility: HOSPITAL | Age: 84
LOS: 19 days | Discharge: NURSING, REHAB OR LONG-TERM CARE | DRG: 682 | End: 2019-09-17
Attending: INTERNAL MEDICINE | Admitting: INTERNAL MEDICINE
Payer: MEDICARE

## 2019-08-28 VITALS
SYSTOLIC BLOOD PRESSURE: 149 MMHG | RESPIRATION RATE: 18 BRPM | WEIGHT: 181.88 LBS | TEMPERATURE: 98 F | OXYGEN SATURATION: 95 % | HEIGHT: 67 IN | DIASTOLIC BLOOD PRESSURE: 75 MMHG | HEART RATE: 60 BPM

## 2019-08-28 DIAGNOSIS — Z95.5 PRESENCE OF CORONARY ANGIOPLASTY IMPLANT AND GRAFT: Chronic | ICD-10-CM

## 2019-08-28 DIAGNOSIS — Z98.89 OTHER SPECIFIED POSTPROCEDURAL STATES: Chronic | ICD-10-CM

## 2019-08-28 DIAGNOSIS — L03.317 CELLULITIS OF BUTTOCK: ICD-10-CM

## 2019-08-28 DIAGNOSIS — R31.0 GROSS HEMATURIA: ICD-10-CM

## 2019-08-28 DIAGNOSIS — N17.9 ACUTE KIDNEY FAILURE, UNSPECIFIED: ICD-10-CM

## 2019-08-28 DIAGNOSIS — Z95.828 PRESENCE OF OTHER VASCULAR IMPLANTS AND GRAFTS: Chronic | ICD-10-CM

## 2019-08-28 DIAGNOSIS — R05 COUGH: ICD-10-CM

## 2019-08-28 DIAGNOSIS — I25.810 ATHEROSCLEROSIS OF CORONARY ARTERY BYPASS GRAFT(S) WITHOUT ANGINA PECTORIS: ICD-10-CM

## 2019-08-28 LAB
ALBUMIN SERPL ELPH-MCNC: 3.7 G/DL — SIGNIFICANT CHANGE UP (ref 3.3–5)
ALP SERPL-CCNC: 68 U/L — SIGNIFICANT CHANGE UP (ref 40–120)
ALT FLD-CCNC: <5 U/L — LOW (ref 10–45)
ANION GAP SERPL CALC-SCNC: 15 MMOL/L — SIGNIFICANT CHANGE UP (ref 5–17)
APPEARANCE UR: ABNORMAL
APTT BLD: 30.3 SEC — SIGNIFICANT CHANGE UP (ref 27.5–36.3)
AST SERPL-CCNC: 10 U/L — SIGNIFICANT CHANGE UP (ref 10–40)
BASOPHILS # BLD AUTO: 0 K/UL — SIGNIFICANT CHANGE UP (ref 0–0.2)
BASOPHILS NFR BLD AUTO: 0 % — SIGNIFICANT CHANGE UP (ref 0–2)
BILIRUB SERPL-MCNC: 0.6 MG/DL — SIGNIFICANT CHANGE UP (ref 0.2–1.2)
BILIRUB UR-MCNC: ABNORMAL
BUN SERPL-MCNC: 89 MG/DL — HIGH (ref 7–23)
CALCIUM SERPL-MCNC: 8.5 MG/DL — SIGNIFICANT CHANGE UP (ref 8.4–10.5)
CHLORIDE SERPL-SCNC: 96 MMOL/L — SIGNIFICANT CHANGE UP (ref 96–108)
CO2 SERPL-SCNC: 25 MMOL/L — SIGNIFICANT CHANGE UP (ref 22–31)
COLOR SPEC: ABNORMAL
CREAT SERPL-MCNC: 4.2 MG/DL — HIGH (ref 0.5–1.3)
DIFF PNL FLD: ABNORMAL
EOSINOPHIL # BLD AUTO: 0.1 K/UL — SIGNIFICANT CHANGE UP (ref 0–0.5)
EOSINOPHIL NFR BLD AUTO: 1.5 % — SIGNIFICANT CHANGE UP (ref 0–6)
GLUCOSE SERPL-MCNC: 127 MG/DL — HIGH (ref 70–99)
GLUCOSE UR QL: NEGATIVE — SIGNIFICANT CHANGE UP
HCT VFR BLD CALC: 25.2 % — LOW (ref 39–50)
HGB BLD-MCNC: 8.5 G/DL — LOW (ref 13–17)
INR BLD: 1.21 RATIO — HIGH (ref 0.88–1.16)
KETONES UR-MCNC: SIGNIFICANT CHANGE UP
LACTATE SERPL-SCNC: 1.1 MMOL/L — SIGNIFICANT CHANGE UP (ref 0.7–2)
LEUKOCYTE ESTERASE UR-ACNC: ABNORMAL
LYMPHOCYTES # BLD AUTO: 0.7 K/UL — LOW (ref 1–3.3)
LYMPHOCYTES # BLD AUTO: 7.8 % — LOW (ref 13–44)
MCHC RBC-ENTMCNC: 30.8 PG — SIGNIFICANT CHANGE UP (ref 27–34)
MCHC RBC-ENTMCNC: 33.7 GM/DL — SIGNIFICANT CHANGE UP (ref 32–36)
MCV RBC AUTO: 91.4 FL — SIGNIFICANT CHANGE UP (ref 80–100)
MONOCYTES # BLD AUTO: 0.8 K/UL — SIGNIFICANT CHANGE UP (ref 0–0.9)
MONOCYTES NFR BLD AUTO: 9.3 % — SIGNIFICANT CHANGE UP (ref 2–14)
NEUTROPHILS # BLD AUTO: 6.9 K/UL — SIGNIFICANT CHANGE UP (ref 1.8–7.4)
NEUTROPHILS NFR BLD AUTO: 81.4 % — HIGH (ref 43–77)
NITRITE UR-MCNC: POSITIVE
OB PNL STL: NEGATIVE — SIGNIFICANT CHANGE UP
PH UR: 5.5 — SIGNIFICANT CHANGE UP (ref 5–8)
PLATELET # BLD AUTO: 112 K/UL — LOW (ref 150–400)
POTASSIUM SERPL-MCNC: 3.8 MMOL/L — SIGNIFICANT CHANGE UP (ref 3.5–5.3)
POTASSIUM SERPL-SCNC: 3.8 MMOL/L — SIGNIFICANT CHANGE UP (ref 3.5–5.3)
PROT SERPL-MCNC: 6.9 G/DL — SIGNIFICANT CHANGE UP (ref 6–8.3)
PROT UR-MCNC: ABNORMAL
PROTHROM AB SERPL-ACNC: 14 SEC — HIGH (ref 10–12.9)
RBC # BLD: 2.76 M/UL — LOW (ref 4.2–5.8)
RBC # FLD: 13.4 % — SIGNIFICANT CHANGE UP (ref 10.3–14.5)
SODIUM SERPL-SCNC: 136 MMOL/L — SIGNIFICANT CHANGE UP (ref 135–145)
SP GR SPEC: 1.02 — SIGNIFICANT CHANGE UP (ref 1.01–1.02)
UROBILINOGEN FLD QL: NEGATIVE — SIGNIFICANT CHANGE UP
WBC # BLD: 8.5 K/UL — SIGNIFICANT CHANGE UP (ref 3.8–10.5)
WBC # FLD AUTO: 8.5 K/UL — SIGNIFICANT CHANGE UP (ref 3.8–10.5)

## 2019-08-28 PROCEDURE — 72192 CT PELVIS W/O DYE: CPT | Mod: 26

## 2019-08-28 PROCEDURE — 71045 X-RAY EXAM CHEST 1 VIEW: CPT | Mod: 26

## 2019-08-28 PROCEDURE — 99223 1ST HOSP IP/OBS HIGH 75: CPT

## 2019-08-28 PROCEDURE — 99285 EMERGENCY DEPT VISIT HI MDM: CPT

## 2019-08-28 RX ORDER — DOCUSATE SODIUM 100 MG
100 CAPSULE ORAL
Refills: 0 | Status: DISCONTINUED | OUTPATIENT
Start: 2019-08-28 | End: 2019-09-04

## 2019-08-28 RX ORDER — NYSTATIN CREAM 100000 [USP'U]/G
1 CREAM TOPICAL
Refills: 0 | Status: DISCONTINUED | OUTPATIENT
Start: 2019-08-28 | End: 2019-09-01

## 2019-08-28 RX ORDER — ATORVASTATIN CALCIUM 80 MG/1
80 TABLET, FILM COATED ORAL AT BEDTIME
Refills: 0 | Status: DISCONTINUED | OUTPATIENT
Start: 2019-08-28 | End: 2019-09-04

## 2019-08-28 RX ORDER — LEVOTHYROXINE SODIUM 125 MCG
25 TABLET ORAL DAILY
Refills: 0 | Status: DISCONTINUED | OUTPATIENT
Start: 2019-08-28 | End: 2019-08-30

## 2019-08-28 RX ORDER — ACETAMINOPHEN 500 MG
650 TABLET ORAL EVERY 6 HOURS
Refills: 0 | Status: DISCONTINUED | OUTPATIENT
Start: 2019-08-28 | End: 2019-09-04

## 2019-08-28 RX ORDER — FUROSEMIDE 40 MG
1 TABLET ORAL
Qty: 0 | Refills: 0 | DISCHARGE

## 2019-08-28 RX ORDER — ASPIRIN/CALCIUM CARB/MAGNESIUM 324 MG
81 TABLET ORAL DAILY
Refills: 0 | Status: DISCONTINUED | OUTPATIENT
Start: 2019-08-28 | End: 2019-09-04

## 2019-08-28 RX ORDER — RANOLAZINE 500 MG/1
1000 TABLET, FILM COATED, EXTENDED RELEASE ORAL
Refills: 0 | Status: DISCONTINUED | OUTPATIENT
Start: 2019-08-28 | End: 2019-09-04

## 2019-08-28 RX ORDER — CHOLECALCIFEROL (VITAMIN D3) 125 MCG
1000 CAPSULE ORAL
Refills: 0 | Status: DISCONTINUED | OUTPATIENT
Start: 2019-08-28 | End: 2019-09-04

## 2019-08-28 RX ORDER — ENTACAPONE 200 MG/1
200 TABLET, FILM COATED ORAL
Refills: 0 | Status: DISCONTINUED | OUTPATIENT
Start: 2019-08-28 | End: 2019-09-04

## 2019-08-28 RX ORDER — ASPIRIN/CALCIUM CARB/MAGNESIUM 324 MG
1 TABLET ORAL
Qty: 0 | Refills: 0 | DISCHARGE

## 2019-08-28 RX ORDER — SENNA PLUS 8.6 MG/1
2 TABLET ORAL AT BEDTIME
Refills: 0 | Status: DISCONTINUED | OUTPATIENT
Start: 2019-08-28 | End: 2019-09-04

## 2019-08-28 RX ORDER — FUROSEMIDE 40 MG
40 TABLET ORAL
Refills: 0 | Status: DISCONTINUED | OUTPATIENT
Start: 2019-08-28 | End: 2019-08-30

## 2019-08-28 RX ORDER — VANCOMYCIN HCL 1 G
1000 VIAL (EA) INTRAVENOUS ONCE
Refills: 0 | Status: COMPLETED | OUTPATIENT
Start: 2019-08-28 | End: 2019-08-28

## 2019-08-28 RX ORDER — PIPERACILLIN AND TAZOBACTAM 4; .5 G/20ML; G/20ML
3.38 INJECTION, POWDER, LYOPHILIZED, FOR SOLUTION INTRAVENOUS EVERY 12 HOURS
Refills: 0 | Status: DISCONTINUED | OUTPATIENT
Start: 2019-08-28 | End: 2019-09-02

## 2019-08-28 RX ORDER — VANCOMYCIN HCL 1 G
1000 VIAL (EA) INTRAVENOUS EVERY 24 HOURS
Refills: 0 | Status: DISCONTINUED | OUTPATIENT
Start: 2019-08-29 | End: 2019-09-02

## 2019-08-28 RX ORDER — ISOSORBIDE MONONITRATE 60 MG/1
60 TABLET, EXTENDED RELEASE ORAL DAILY
Refills: 0 | Status: DISCONTINUED | OUTPATIENT
Start: 2019-08-28 | End: 2019-09-03

## 2019-08-28 RX ORDER — PIPERACILLIN AND TAZOBACTAM 4; .5 G/20ML; G/20ML
3.38 INJECTION, POWDER, LYOPHILIZED, FOR SOLUTION INTRAVENOUS ONCE
Refills: 0 | Status: COMPLETED | OUTPATIENT
Start: 2019-08-28 | End: 2019-08-28

## 2019-08-28 RX ORDER — CARBIDOPA AND LEVODOPA 25; 100 MG/1; MG/1
1 TABLET ORAL
Refills: 0 | Status: DISCONTINUED | OUTPATIENT
Start: 2019-08-28 | End: 2019-09-04

## 2019-08-28 RX ORDER — LANOLIN ALCOHOL/MO/W.PET/CERES
5 CREAM (GRAM) TOPICAL AT BEDTIME
Refills: 0 | Status: DISCONTINUED | OUTPATIENT
Start: 2019-08-28 | End: 2019-09-04

## 2019-08-28 RX ADMIN — PIPERACILLIN AND TAZOBACTAM 3.38 GRAM(S): 4; .5 INJECTION, POWDER, LYOPHILIZED, FOR SOLUTION INTRAVENOUS at 14:09

## 2019-08-28 RX ADMIN — ENTACAPONE 200 MILLIGRAM(S): 200 TABLET, FILM COATED ORAL at 23:07

## 2019-08-28 RX ADMIN — CARBIDOPA AND LEVODOPA 1 TABLET(S): 25; 100 TABLET ORAL at 23:07

## 2019-08-28 RX ADMIN — Medication 250 MILLIGRAM(S): at 13:27

## 2019-08-28 RX ADMIN — PIPERACILLIN AND TAZOBACTAM 200 GRAM(S): 4; .5 INJECTION, POWDER, LYOPHILIZED, FOR SOLUTION INTRAVENOUS at 13:24

## 2019-08-28 RX ADMIN — Medication 5 MILLIGRAM(S): at 23:07

## 2019-08-28 RX ADMIN — ATORVASTATIN CALCIUM 80 MILLIGRAM(S): 80 TABLET, FILM COATED ORAL at 23:07

## 2019-08-28 RX ADMIN — Medication 1000 MILLIGRAM(S): at 19:09

## 2019-08-28 RX ADMIN — SENNA PLUS 2 TABLET(S): 8.6 TABLET ORAL at 23:07

## 2019-08-28 NOTE — ED PROVIDER NOTE - PROGRESS NOTE DETAILS
will call urology for ED consult. VIRGILIO Jacinto +UTI. Patient is being seen by Urology VIRGILIO Hager. Will admit patient to medicine. VIRGILIO Jacinto

## 2019-08-28 NOTE — ED PROVIDER NOTE - SKIN, MLM
Skin normal color for race, warm, dry and intact. No evidence of rash. BUTTOCKS: +Marked erythema noted b/l buttocks, stage II decubitus skin breakdown noted over sacrum area. Minimal tenderness. No obvious signs of abscess. Moderate diffuse erythema noted over perineum area. Mild tenderness. No crepitus.

## 2019-08-28 NOTE — H&P ADULT - ASSESSMENT
NIGHT HOSPITALIST:   Referral of patient from [facility] following gross haematuria following Cortés placement>>seen by urology in the ER.   Patient with a complex medical history of past CABG, past PCI, progressive functional impairment in the setting of Parkinson's, past complex GI bleeding with past DVT, IVC filter, presumably chronic atrial fibrillation but not a full AC candidate due to attendant risk, now with acute over chronic kidney injury.   Groin with cellulitis but no evidence of necrotizing fasciitis (Eliza) at present--seen by urology.   Would consider formal ID evaluation in the AM.    Will obtain a renal US to exclude hydronephrosis not noted on CTT abdomen.  Would consider a formal renal evaluation in the AM.    Will repeat patient's echo (last in 2015) to assess LV function.  Would consider formal evaluation by his cardiologist.   Will defer continued review of patient's atrial fibrillation as patient has been assessed not to be a full AC candidate, particularly now with gross haematuria.      Unclear to the chronic cough.   Chest radiograph nondiagnostic but suspect occult chronic aspiration.    Patient is insistent on a PO intake.   S/S evaluation.   Will obtain a non contrast chest CTT in the AM.

## 2019-08-28 NOTE — H&P ADULT - NSHPSOCIALHISTORY_GEN_ALL_CORE
NO prior tobacco use.   Rare wine in the past, negative CAGE screen.  Supportive spouse.  Retired former businessman.

## 2019-08-28 NOTE — H&P ADULT - NSHPPHYSICALEXAM_GEN_ALL_CORE
Physical exam with an elderly, chronically ill appearing M with B/L LE oedema and diffuse sarcopenia.    Afebrile.  HR  62    RR 14  BP  130/77  97% on RA    HEENT< PERRL< EOMI< healed scars nasal bridge from past falls, no Snyder sign, no raccoon eye.  Oropharynx clear  Neck supple,   NO thyromegaly  Chest grossly clear  Healed sternotomy scar  Cor s1 s2 2//6 MANA  Abdomen soft, scaphoid, normal bowel sounds, nontender, no rebound.   Cortés with serous blood but no manny red blood.  Diffuse perineal mild oedema and mild erythema but NO necrotic areas noted.  Ext with 2++ B/L LE oedema.  Poor nail hygiene.  Skin see above.  Neurologic exam AxOx3.   Speech fluent.  Cognition grossly intact.  Subtle short term memory impairment.  Mild bradykinesia.  UE/LE 5/5.  Gait NOT tested in the ER.  NO SI/HI> Physical exam with an elderly, chronically ill appearing M with B/L LE oedema and diffuse sarcopenia.    Afebrile.  HR  62    RR 14  BP  130/77  97% on RA    HEENT< PERRL< EOMI< healed scars nasal bridge from past falls, no Snyder sign, no raccoon eye.  Oropharynx clear  Neck supple,   NO thyromegaly  Chest grossly clear  Healed sternotomy scar  Cor s1 s2 2//6 MANA  Abdomen soft, scaphoid, normal bowel sounds, nontender, no rebound.   Cortés with serous blood but no manny red blood.  Diffuse perineal mild oedema and mild erythema but NO necrotic areas noted.  Ext with 2++ B/L LE oedema.  Poor nail hygiene.   RIGHT UE with limited ROM of shoulder consistent with frozen shoulder.  Skin see above.  Neurologic exam AxOx3.   Speech fluent.  Cognition grossly intact.  Subtle short term memory impairment.  Mild bradykinesia.  UE/LE 5/5.  Gait NOT tested in the ER.  NO SI/HI>

## 2019-08-28 NOTE — ED PROVIDER NOTE - MUSCULOSKELETAL, MLM
Spine appears normal, range of motion is not limited, +Mild tenderness right shoulder, ROM is limited due to pain.

## 2019-08-28 NOTE — INPATIENT CERTIFICATION FOR MEDICARE PATIENTS - RISKS OF ADVERSE EVENTS
Concern for worsening infectious process/Concern for delay in diagnosis and treatment/Concern for renal deterioration/Concern for neurologic deterioration

## 2019-08-28 NOTE — H&P ADULT - REASON FOR ADMISSION
[facility] following Cortés placement.  Groin erythema, oedema

## 2019-08-28 NOTE — H&P ADULT - NSICDXFAMILYHX_GEN_ALL_CORE_FT
FAMILY HISTORY:  Family history of coronary artery disease, Father,  of MI age 55    Aunt  Still living? Unknown  Family history of breast cancer, Age at diagnosis: Age Unknown

## 2019-08-28 NOTE — ED PROVIDER NOTE - ATTENDING CONTRIBUTION TO CARE
Dapsone Pregnancy And Lactation Text: This medication is Pregnancy Category C and is not considered safe during pregnancy or breast feeding. Patient is an 88 yo M with history of HTN, hyperlipidemia, CKD, hx of CAD s/p CABG, hx of DVT s/p IVC filter here for evaluation of gross hematuria in lara bag. Patient has a recent right shoulder fracture and is coming from rehab. He had a lara placed yesterday for urinary retention and since then has had gross hematuria.    VS noted  Gen. chronically ill  HEENT: EOMI, mmm   Lungs: CTAB/L no C/ W /R   CVS: RRR   Abd; Soft non tender, non distended   : erythematous in pelvic region, edematous penis, erythematous in buttocks, no crepitus, dark red urine in lara bag  Neuro AAOx3 non focal clear speech  a/p: hematuria - concern for traumatic injury, cystitis, erythema and edema concerning for cellulitis less likely Eliza's gangrene - plan for labs, cultures, antibiotics and CT Pelvis. Plan for urology consult. Patient to be admitted.   - Comfort RICE

## 2019-08-28 NOTE — ED PROVIDER NOTE - FAMILY HISTORY
Family history of coronary artery disease, Father,  of MI age 55     Aunt  Still living? Unknown  Family history of breast cancer, Age at diagnosis: Age Unknown

## 2019-08-28 NOTE — H&P ADULT - PROBLEM SELECTOR PLAN 3
See above.   Renal US>  Would consider formal renal evaluation in the AM. See above.   Will cautiously continue patient's Lasix with required diuresis in the context of patient's heart disease.  Renal US>  Would consider formal renal evaluation in the AM.

## 2019-08-28 NOTE — ED ADULT NURSE REASSESSMENT NOTE - NS ED NURSE REASSESS COMMENT FT1
comfort care provided after CT scan. turned to left position due to sacral discomfort.  Pt reports discomfort is alleviated. vss. currently resting in bed

## 2019-08-28 NOTE — H&P ADULT - ATTENDING COMMENTS
NIGHT HOSPITALIST:   Patient/ spouse updated aware of course and agree with plan/care as above.   Given patient's comorbidities, patient's long term prognosis is guarded.   Emotional support provided to patient/spouse.   Patient is not yet ready to discuss advance directives.   Care reviewed with covering NP/PA>   Care assumed by the [facility].    Rory Padilla MD  [phone]

## 2019-08-28 NOTE — H&P ADULT - NSHPSOURCEINFOTX_GEN_ALL_CORE
Reviewed records from [facility] and recent admission and discharge.   Updated patient's spouse, Alice, [phone], by patient's request.

## 2019-08-28 NOTE — ED PROVIDER NOTE - OBJECTIVE STATEMENT
Patient is an 88 yo M with PMH of CAD s/p CABG, Parkinson's disease, HTN, hLd, hx DVT s/p IVC filter, CKD stage III, hx GIB, R shoulder fracture currently in rehab who presents to the ED with gross hematuria after lara placement at nursing home.  Lara was placed for retention yesterday. patient states he cut his penis on a urinal. Patient has been constipated for few days, received enema last night with +BM.

## 2019-08-28 NOTE — H&P ADULT - NSHPREVIEWOFSYSTEMS_GEN_ALL_CORE
No abdominal pain, no red blood per rectum or melena.  No HA, no focal weakness.  No chest pain/pressure.  NO palpitations.  NO joint pain.  Chronic cough, scant sputum.  NO dyspnoea.    Patient with unspecified weight loss.  NO SI/HI>  NO thyroid symptoms.  NO dysphagia.

## 2019-08-28 NOTE — ED PROVIDER NOTE - CHIEF COMPLAINT
The patient is a 89y Male complaining of The patient is a 89y Male complaining of blood in urinary catheter.

## 2019-08-28 NOTE — ED ADULT NURSE NOTE - PSH
History of prostate surgery  8/2016  Hx of CABG  1995 (age 65)  Presence of IVC filter  right upper arm placed last year 2015  S/P appendectomy    Stented coronary artery  cardiac stent x 2 - placement between 2000 & possible 2005  Varicose veins of legs  h/o Vein stripping

## 2019-08-28 NOTE — ED ADULT NURSE NOTE - PMH
Aspiration pneumonia    BPH (benign prostatic hyperplasia)    CAD (Coronary Artery Disease)  s/p CABG and stents  Clostridium difficile colitis    HTN (Hypertension)    Hyperlipidemia    MI, old  age 55  Parkinsons Disease    Pneumonia    Unilateral inguinal hernia, with gangrene, not specified as recurrent    Upper GI bleed  [facility] admission 2015, EGD w/ gastric ulcer/visible vessel which was cauterized  UTI (urinary tract infection)

## 2019-08-28 NOTE — H&P ADULT - NSHPOUTPATIENTPROVIDERS_GEN_ALL_CORE
Arya Francis MD (cardiology)  [phone] Arya Francis MD (cardiology)  [phone]  Alvarez Cohn MD (ortho)  [phone] Radha Hernandez MD (PCP)  [phone]  Arya Francis MD (cardiology)  [phone]  Alvarez Cohn MD (ortho)  [phone]

## 2019-08-28 NOTE — H&P ADULT - PROBLEM SELECTOR PLAN 5
Will obtain noncontrast chest CTT in the AM.   Suspect occult aspiration from patient's Parkinson's.  S/S evaluation.

## 2019-08-28 NOTE — CONSULT NOTE ADULT - ASSESSMENT
89M with hx of BPH s/p thulmium enucleation of prostate 2015, CAD, Parkinson's, CKD stage 3 (baseline 1.4-1.6) presenting with hematuria and urinary retention  Cr elevated to 4.5 despite lara in place. Hct 23.9    - Please obtain CT abdomen to evaluate upper tracts for obstruction  - Followup UCx; UA with signs of infection  - Can initiate empiric abx while UCx pending  - Continue lara  - Monitor urine color; currently clearing up with minimal irrigation

## 2019-08-28 NOTE — CONSULT NOTE ADULT - SUBJECTIVE AND OBJECTIVE BOX
HPI:  Patient is a 89y Male who presented with gross hematuria after lara placement at nursing home.  Lara placed for retention yesterday but unknown the amount.  He also sustained an abrasion of the penile shaft while using the urinal.  He sees Dr. Oneill for management of BPH, and underwent a Thulium prostatectomy in .  Patient states he has been having difficulty voiding the last few days and he has been constipated, has not had BM in 3 days.      Denies fever or chills, nausea or vomiting, dysuria, chest pain or SOB.          PAST MEDICAL & SURGICAL HISTORY:  Unilateral inguinal hernia, with gangrene, not specified as recurrent  Upper GI bleed: MICU admission , EGD w/ gastric ulcer/visible vessel which was cauterized  Aspiration pneumonia  Clostridium difficile colitis  Pneumonia  MI, old: age 55  UTI (urinary tract infection)  BPH (benign prostatic hyperplasia)  Parkinsons Disease  CAD (Coronary Artery Disease): s/p CABG and stents  HTN (Hypertension)  Hyperlipidemia  Presence of IVC filter: right upper arm placed last year   History of prostate surgery: 2016  Varicose veins of legs: h/o Vein stripping  S/P appendectomy  Stented coronary artery: cardiac stent x 2 - placement between  &amp; possible   Hx of CAB (age 65)    MEDICATIONS  (STANDING):  senna oral tablet: 1 tab(s) orally 2 times a day  · 	heparin: 5000 unit(s) subcutaneous every 8 hours  · 	acetaminophen 325 mg oral tablet: 2 tab(s) orally every 6 hours, As needed, Mild Pain (1 - 3), Moderate Pain (4 - 6), Severe Pain (7 - 10)  · 	levothyroxine 25 mcg (0.025 mg) oral tablet: 1 tab(s) orally once a day  · 	ranolazine 1000 mg oral tablet, extended release: 1 tab(s) orally 2 times a day  · 	isosorbide mononitrate 60 mg oral tablet, extended release: 1 tab(s) orally once a day (in the morning)  · 	docusate sodium 100 mg oral tablet: 1 tab(s) orally 2 times a day  · 	MiraLax oral powder for reconstitution:  orally once a day in pm  · 	atorvastatin 80 mg oral tablet: 1 tab(s) orally once a day (at bedtime)  · 	entacapone 200 mg oral tablet: 1 tab(s) orally 4 times a day  · 	carbidopa-levodopa 50 mg-200 mg oral tablet, extended release: 1 tab(s) orally 4 times a day  · 	Lasix 20 mg oral tablet: 1 tab(s) orally 2 times a day  · 	Vitamin D3 1000 intl units oral capsule: 1 cap(s) orally 2 times a day  · 	aspirin 81 mg oral tablet: 1 tab(s) orally once a day  · 	biotin 5 mg oral capsule: 1 cap(s) orally once a day  · 	Calcium 500+D oral tablet, chewable: 1 tab(s) orally 2 times a day        MEDICATIONS  (PRN):    FAMILY HISTORY:  Family history of coronary artery disease: Father,  of MI age 55  Family history of breast cancer: Mother    Allergies    Cipro (Rash)    Intolerances      SOCIAL HISTORY:   Tobacco hx: unknown     REVIEW OF SYSTEMS: Pertinent positives and negatives as stated in HPI, otherwise negative    Vital signs  T(C): 37 (19 @ 12:48), Max: 37 (19 @ 12:48)  HR: 58 (19 @ 13:28)  BP: 122/93 (19 @ 13:28)  SpO2: 96% (19 @ 13:28)  Wt(kg): --      Physical Exam  Gen: NAD  Pulm: No respiratory distress, no subcostal retractions  CV: RRR, no JVD  Abd: Soft, NT, ND  : Circumcised, no lesions.  No discharge or blood at urethral meatus.  Testes descended bilaterally.  Testes and epididymis nontender bilaterally.  Cremasteric reflex present bilaterally.  Lara draining light maroon urine.   MSK: No edema present    LABS:     @ 09:39    WBC 7.29  / Hct 20.9  / SCr --        @ 07:03    WBC --    / Hct --    / SCr 3.37         136  |  102  |  78<H>  ----------------------------<  99  3.2<L>   |  18<L>  |  3.37<H>    Ca    6.8<L>      28 Aug 2019 07:03        Urinalysis Basic - ( 27 Aug 2019 17:18 )    Color: Yellow / Appearance: Clear / S.015 / pH: x  Gluc: x / Ketone: Negative  / Bili: Negative / Urobili: Negative   Blood: x / Protein: Negative / Nitrite: Negative   Leuk Esterase: Moderate / RBC: 5 /hpf / WBC 6 /HPF   Sq Epi: x / Non Sq Epi: 0 /hpf / Bacteria: 0.0      RADIOLOGY:    < from: CT Pelvis No Cont (08.28.19 @ 13:03) >      INTERPRETATION:  CLINICAL INFORMATION: Buttock cellulitis. Evaluate for   Eliza's gangrene.    COMPARISON: CT pelvis 3/7/2019    PROCEDURE:   CT of the Pelvis was performed without intravenous contrast.   Intravenous contrast: None.  Oral contrast: None.  Sagittal and coronal reformats were performed.    FINDINGS:    BLADDER: Collapsed on a Lara catheter.  REPRODUCTIVE ORGANS: No pelvic mass.  LYMPH NODES: No pelvic lymphadenopathy.    VISUALIZED PORTIONS:    ABDOMINAL ORGANS: Partially visualized left kidney with suspicion of   hydronephrosis. Atrophic right kidney.  BOWEL: Rectum is distended to 9.7 cm with stool. Mild distal rectal wall   thickening suggesting mild stercoral colitis.  PERITONEUM: No ascites.  VESSELS: Atherosclerotic changes. IVC filter.  ABDOMINAL WALL: Small left inguinal hernia containing fluid. Mild   anasarca. No evidence of a buttocks abscess or subcutaneous gas.  BONES:Degenerative changes.    IMPRESSION:     No evidence of a buttocks abscess or subcutaneous gas.    Mild stercoral colitis.    < end of copied text > HPI:    Patient is a 89y Male who presented with gross hematuria after lara placement at nursing home. He was having difficulty urinating at the nursing home which is why a lara was placed. After placement, there has been hematuria which is why he was sent in to the ED.    He sees Dr. Oneill for management of BPH, and underwent a Thulium enucleation of the prostate in .  Patient states he has been having difficulty voiding the last few days and he has been constipated, has not had BM in 3 days.      Denies fever or chills, nausea or vomiting, dysuria, chest pain or SOB.        PAST MEDICAL & SURGICAL HISTORY:  Unilateral inguinal hernia, with gangrene, not specified as recurrent  Upper GI bleed: MICU admission , EGD w/ gastric ulcer/visible vessel which was cauterized  Aspiration pneumonia  Clostridium difficile colitis  Pneumonia  MI, old: age 55  UTI (urinary tract infection)  BPH (benign prostatic hyperplasia)  Parkinsons Disease  CAD (Coronary Artery Disease): s/p CABG and stents  HTN (Hypertension)  Hyperlipidemia  Presence of IVC filter: right upper arm placed last year   History of prostate surgery: 2016  Varicose veins of legs: h/o Vein stripping  S/P appendectomy  Stented coronary artery: cardiac stent x 2 - placement between  &amp; possible   Hx of CAB (age 65)    MEDICATIONS  (STANDING):  senna oral tablet: 1 tab(s) orally 2 times a day  · 	heparin: 5000 unit(s) subcutaneous every 8 hours  · 	acetaminophen 325 mg oral tablet: 2 tab(s) orally every 6 hours, As needed, Mild Pain (1 - 3), Moderate Pain (4 - 6), Severe Pain (7 - 10)  · 	levothyroxine 25 mcg (0.025 mg) oral tablet: 1 tab(s) orally once a day  · 	ranolazine 1000 mg oral tablet, extended release: 1 tab(s) orally 2 times a day  · 	isosorbide mononitrate 60 mg oral tablet, extended release: 1 tab(s) orally once a day (in the morning)  · 	docusate sodium 100 mg oral tablet: 1 tab(s) orally 2 times a day  · 	MiraLax oral powder for reconstitution:  orally once a day in pm  · 	atorvastatin 80 mg oral tablet: 1 tab(s) orally once a day (at bedtime)  · 	entacapone 200 mg oral tablet: 1 tab(s) orally 4 times a day  · 	carbidopa-levodopa 50 mg-200 mg oral tablet, extended release: 1 tab(s) orally 4 times a day  · 	Lasix 20 mg oral tablet: 1 tab(s) orally 2 times a day  · 	Vitamin D3 1000 intl units oral capsule: 1 cap(s) orally 2 times a day  · 	aspirin 81 mg oral tablet: 1 tab(s) orally once a day  · 	biotin 5 mg oral capsule: 1 cap(s) orally once a day  · 	Calcium 500+D oral tablet, chewable: 1 tab(s) orally 2 times a day        MEDICATIONS  (PRN):    FAMILY HISTORY:  Family history of coronary artery disease: Father,  of MI age 55  Family history of breast cancer: Mother    Allergies    Cipro (Rash)    Intolerances      SOCIAL HISTORY:   Tobacco hx: unknown     REVIEW OF SYSTEMS: Pertinent positives and negatives as stated in HPI, otherwise negative    Vital signs  T(C): 37 (19 @ 12:48), Max: 37 (19 @ 12:48)  HR: 58 (19 @ 13:28)  BP: 122/93 (19 @ 13:28)  SpO2: 96% (19 @ 13:28)  Wt(kg): --      Physical Exam  Gen: NAD  Pulm: No respiratory distress, no subcostal retractions  CV: RRR, no JVD  Abd: Soft, NT, ND  : Circumcised, no lesions. Mild edema. No discharge or blood at urethral meatus.  Testes descended bilaterally.  Testes and epididymis nontender bilaterally.  Cremasteric reflex present bilaterally.  Lara draining light maroon urine.   MSK: No edema present    LABS:     @ 09:39    WBC 7.29  / Hct 20.9  / SCr --        @ 07:03    WBC --    / Hct --    / SCr 3.37         136  |  102  |  78<H>  ----------------------------<  99  3.2<L>   |  18<L>  |  3.37<H>    Ca    6.8<L>      28 Aug 2019 07:03        Urinalysis Basic - ( 27 Aug 2019 17:18 )    Color: Yellow / Appearance: Clear / S.015 / pH: x  Gluc: x / Ketone: Negative  / Bili: Negative / Urobili: Negative   Blood: x / Protein: Negative / Nitrite: Negative   Leuk Esterase: Moderate / RBC: 5 /hpf / WBC 6 /HPF   Sq Epi: x / Non Sq Epi: 0 /hpf / Bacteria: 0.0      RADIOLOGY:    < from: CT Pelvis No Cont (19 @ 13:03) >      INTERPRETATION:  CLINICAL INFORMATION: Buttock cellulitis. Evaluate for   Eliza's gangrene.    COMPARISON: CT pelvis 3/7/2019    PROCEDURE:   CT of the Pelvis was performed without intravenous contrast.   Intravenous contrast: None.  Oral contrast: None.  Sagittal and coronal reformats were performed.    FINDINGS:    BLADDER: Collapsed on a Lara catheter.  REPRODUCTIVE ORGANS: No pelvic mass.  LYMPH NODES: No pelvic lymphadenopathy.    VISUALIZED PORTIONS:    ABDOMINAL ORGANS: Partially visualized left kidney with suspicion of   hydronephrosis. Atrophic right kidney.  BOWEL: Rectum is distended to 9.7 cm with stool. Mild distal rectal wall   thickening suggesting mild stercoral colitis.  PERITONEUM: No ascites.  VESSELS: Atherosclerotic changes. IVC filter.  ABDOMINAL WALL: Small left inguinal hernia containing fluid. Mild   anasarca. No evidence of a buttocks abscess or subcutaneous gas.  BONES:Degenerative changes.    IMPRESSION:     No evidence of a buttocks abscess or subcutaneous gas.    Mild stercoral colitis.    < end of copied text >

## 2019-08-28 NOTE — ED PROVIDER NOTE - GENITOURINARY BLADDER
: +Penile edema noted with small abrasion on dorsal aspect of shaft of penis. Indwelling lara with bloody urine in bag noted. No blood at meatus.

## 2019-08-28 NOTE — H&P ADULT - HISTORY OF PRESENT ILLNESS
NIGHT HOSPITALIST:   Patient UNKNOWN to me previously, assigned to me at this point via the ER and by Dr. 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  Patient reported that he had local trauma to the skin of his penis following an attempt to use a hand held urinal.   Patient also notes that patient was on a ? commode and was noted to have increased scrotal oedema and redness.

## 2019-08-28 NOTE — ED ADULT NURSE NOTE - OBJECTIVE STATEMENT
89 year old male, a+ox3, [facility] that was placed last night. Upon arrival pt following commands and extremely cooperative. Arrives with lara in place, red urine with sedimentation noted in catheter and bag. Dark red/brown urine in urine collection bag. Pt arrives with penile swelling and excoriation to tip and shaft of penis. Reports tenderness/soreness to area.  Swollen scrotal area noted as well.  Sacral and buttock area has nonblanchable redness to generalized area and stool noted coming from anus. Afebrile at 98.6 rectal temperature. Painful to light touch.  Patient arrives with pink arm restriction band noted to right arm. pt states its because he has a broken shoulder and it will not be surgically repaired from a fall 5 months ago. Pt has had multiple falls and had a recent fall. Arrives with purple bruise to left upper arm , circumfrential blue bruise noted around left arm, and purple bruising noted to left forearm. Blue bruise present on anterior aspect of right lower leg. Lungs auscultated anteriorly, rhonchi noted. pt states he produces phlegm.  Objective short of breath when speaking to medical staff but denies feeling symptomatic. denies chest pain/dizzy. Abd soft nontender, nondistended. Denies n/v/fevers. swollen lower extremities. red socks on pt . Skin warm and dry. 89 year old male, a+ox3, [facility] that was placed last night. Pt arrived with 22g IV to left forearm. Upon arrival pt following commands and extremely cooperative. Arrives with lara in place, red urine with sedimentation noted in catheter and bag. Dark red/brown urine in urine collection bag. Pt arrives with penile swelling and excoriation to tip and shaft of penis. Reports tenderness/soreness to area.  Swollen scrotal area noted as well.  Sacral and buttock area has nonblanchable redness to generalized area and stool noted coming from anus. Afebrile at 98.6 rectal temperature. Painful to light touch.  Patient arrives with pink arm restriction band noted to right arm. pt states its because he has a broken shoulder and it will not be surgically repaired from a fall 5 months ago. Pt has had multiple falls and had a recent fall. Arrives with purple bruise to left upper arm , circumfrential blue bruise noted around left arm, and purple bruising noted to left forearm. Blue bruise present on anterior aspect of right lower leg. Lungs auscultated anteriorly, rhonchi noted. pt states he produces phlegm.  Objective short of breath when speaking to medical staff but denies feeling symptomatic. denies chest pain/dizzy. Abd soft nontender, nondistended. Denies n/v/fevers. swollen lower extremities. red socks on pt . Skin warm and dry.

## 2019-08-28 NOTE — H&P ADULT - NSHPLABSRESULTS_GEN_ALL_CORE
WBC 8.5.  Hgb 8.5.  Platelets of 112K>     INR 1.2      K+ 3.8.  Random glucose of 127.  CR 3.3  >>>4.2.    Alb 3.7.    Lactate 1.1 with ADEQUATE CAPILLARY REFILL.    UA with large blood.    Chest radiograph reviewed with sternotomy wires but no acute infiltrate or effusion.    CTT abdomen no contrast with no buttock abscess. WBC 8.5.  Hgb 8.5.  Platelets of 112K>     INR 1.2      K+ 3.8.  Random glucose of 127.  CR 3.3  >>>4.2.    Alb 3.7.    Lactate 1.1 with ADEQUATE CAPILLARY REFILL.    UA with large blood.    Chest radiograph reviewed with sternotomy wires but no acute infiltrate or effusion.    CTT abdomen no contrast with no buttock abscess.    EKG tracing reviewed with atrial fibrillation at 59 with LBBB--prior EKG in 3/ 2019 with atrial fibrillation with also nonspecific intraventricular conduction block and nonspecific T wave changes.

## 2019-08-28 NOTE — H&P ADULT - PROBLEM SELECTOR PLAN 2
Will continue with IV Zosyn and IV Vancomycin adjusted for renal failure.   Would consider formal ID evaluation in the AM.

## 2019-08-28 NOTE — ED PROVIDER NOTE - CLINICAL SUMMARY MEDICAL DECISION MAKING FREE TEXT BOX
hematuria - concern for traumatic injury, cystitis, erythema and edema concerning for cellulitis less likely Eliza's gangrene - plan for labs, cultures, antibiotics and CT Pelvis. Plan for urology consult. Patient to be admitted.

## 2019-08-28 NOTE — ED PROVIDER NOTE - CARE PLAN
Principal Discharge DX:	Cellulitis of buttock  Secondary Diagnosis:	UTI (urinary tract infection)  Secondary Diagnosis:	Hematuria

## 2019-08-29 ENCOUNTER — APPOINTMENT (OUTPATIENT)
Dept: ORTHOPEDIC SURGERY | Facility: CLINIC | Age: 84
End: 2019-08-29

## 2019-08-29 ENCOUNTER — TRANSCRIPTION ENCOUNTER (OUTPATIENT)
Age: 84
End: 2019-08-29

## 2019-08-29 DIAGNOSIS — G20 PARKINSON'S DISEASE: ICD-10-CM

## 2019-08-29 LAB
ANION GAP SERPL CALC-SCNC: 15 MMOL/L — SIGNIFICANT CHANGE UP (ref 5–17)
BASOPHILS # BLD AUTO: 0 K/UL — SIGNIFICANT CHANGE UP (ref 0–0.2)
BASOPHILS # BLD AUTO: 0.02 K/UL — SIGNIFICANT CHANGE UP (ref 0–0.2)
BASOPHILS NFR BLD AUTO: 0.2 % — SIGNIFICANT CHANGE UP (ref 0–2)
BASOPHILS NFR BLD AUTO: 0.3 % — SIGNIFICANT CHANGE UP (ref 0–2)
BLD GP AB SCN SERPL QL: NEGATIVE — SIGNIFICANT CHANGE UP
BUN SERPL-MCNC: 85 MG/DL — HIGH (ref 7–23)
CALCIUM SERPL-MCNC: 8 MG/DL — LOW (ref 8.4–10.5)
CHLORIDE SERPL-SCNC: 99 MMOL/L — SIGNIFICANT CHANGE UP (ref 96–108)
CO2 SERPL-SCNC: 21 MMOL/L — LOW (ref 22–31)
CREAT SERPL-MCNC: 3.49 MG/DL — HIGH (ref 0.5–1.3)
CULTURE RESULTS: NO GROWTH — SIGNIFICANT CHANGE UP
EOSINOPHIL # BLD AUTO: 0.2 K/UL — SIGNIFICANT CHANGE UP (ref 0–0.5)
EOSINOPHIL # BLD AUTO: 0.23 K/UL — SIGNIFICANT CHANGE UP (ref 0–0.5)
EOSINOPHIL NFR BLD AUTO: 3 % — SIGNIFICANT CHANGE UP (ref 0–6)
EOSINOPHIL NFR BLD AUTO: 3.1 % — SIGNIFICANT CHANGE UP (ref 0–6)
GLUCOSE SERPL-MCNC: 118 MG/DL — HIGH (ref 70–99)
HCT VFR BLD CALC: 24.7 % — LOW (ref 39–50)
HCT VFR BLD CALC: 26.3 % — LOW (ref 39–50)
HGB BLD-MCNC: 7.9 G/DL — LOW (ref 13–17)
HGB BLD-MCNC: 8.6 G/DL — LOW (ref 13–17)
IMM GRANULOCYTES NFR BLD AUTO: 0.5 % — SIGNIFICANT CHANGE UP (ref 0–1.5)
LYMPHOCYTES # BLD AUTO: 0.65 K/UL — LOW (ref 1–3.3)
LYMPHOCYTES # BLD AUTO: 0.7 K/UL — LOW (ref 1–3.3)
LYMPHOCYTES # BLD AUTO: 8.8 % — LOW (ref 13–44)
LYMPHOCYTES # BLD AUTO: 9 % — LOW (ref 13–44)
MCHC RBC-ENTMCNC: 28.5 PG — SIGNIFICANT CHANGE UP (ref 27–34)
MCHC RBC-ENTMCNC: 30 PG — SIGNIFICANT CHANGE UP (ref 27–34)
MCHC RBC-ENTMCNC: 32 GM/DL — SIGNIFICANT CHANGE UP (ref 32–36)
MCHC RBC-ENTMCNC: 32.9 GM/DL — SIGNIFICANT CHANGE UP (ref 32–36)
MCV RBC AUTO: 89.2 FL — SIGNIFICANT CHANGE UP (ref 80–100)
MCV RBC AUTO: 91.3 FL — SIGNIFICANT CHANGE UP (ref 80–100)
MONOCYTES # BLD AUTO: 0.87 K/UL — SIGNIFICANT CHANGE UP (ref 0–0.9)
MONOCYTES # BLD AUTO: 1 K/UL — HIGH (ref 0–0.9)
MONOCYTES NFR BLD AUTO: 11.8 % — SIGNIFICANT CHANGE UP (ref 2–14)
MONOCYTES NFR BLD AUTO: 12.8 % — SIGNIFICANT CHANGE UP (ref 2–14)
NEUTROPHILS # BLD AUTO: 5.58 K/UL — SIGNIFICANT CHANGE UP (ref 1.8–7.4)
NEUTROPHILS # BLD AUTO: 6 K/UL — SIGNIFICANT CHANGE UP (ref 1.8–7.4)
NEUTROPHILS NFR BLD AUTO: 75.1 % — SIGNIFICANT CHANGE UP (ref 43–77)
NEUTROPHILS NFR BLD AUTO: 75.5 % — SIGNIFICANT CHANGE UP (ref 43–77)
PLATELET # BLD AUTO: 129 K/UL — LOW (ref 150–400)
PLATELET # BLD AUTO: 130 K/UL — LOW (ref 150–400)
POTASSIUM SERPL-MCNC: 3.6 MMOL/L — SIGNIFICANT CHANGE UP (ref 3.5–5.3)
POTASSIUM SERPL-SCNC: 3.6 MMOL/L — SIGNIFICANT CHANGE UP (ref 3.5–5.3)
RBC # BLD: 2.77 M/UL — LOW (ref 4.2–5.8)
RBC # BLD: 2.88 M/UL — LOW (ref 4.2–5.8)
RBC # FLD: 13.4 % — SIGNIFICANT CHANGE UP (ref 10.3–14.5)
RBC # FLD: 14.3 % — SIGNIFICANT CHANGE UP (ref 10.3–14.5)
RH IG SCN BLD-IMP: POSITIVE — SIGNIFICANT CHANGE UP
SODIUM SERPL-SCNC: 135 MMOL/L — SIGNIFICANT CHANGE UP (ref 135–145)
SPECIMEN SOURCE: SIGNIFICANT CHANGE UP
TSH SERPL-MCNC: 7.12 UIU/ML — HIGH (ref 0.27–4.2)
VANCOMYCIN FLD-MCNC: 7.4 UG/ML — SIGNIFICANT CHANGE UP
WBC # BLD: 7.39 K/UL — SIGNIFICANT CHANGE UP (ref 3.8–10.5)
WBC # BLD: 7.9 K/UL — SIGNIFICANT CHANGE UP (ref 3.8–10.5)
WBC # FLD AUTO: 7.39 K/UL — SIGNIFICANT CHANGE UP (ref 3.8–10.5)
WBC # FLD AUTO: 7.9 K/UL — SIGNIFICANT CHANGE UP (ref 3.8–10.5)

## 2019-08-29 PROCEDURE — 71250 CT THORAX DX C-: CPT | Mod: 26

## 2019-08-29 PROCEDURE — 76770 US EXAM ABDO BACK WALL COMP: CPT | Mod: 26

## 2019-08-29 RX ORDER — CHOLECALCIFEROL (VITAMIN D3) 125 MCG
1 CAPSULE ORAL
Qty: 0 | Refills: 0 | DISCHARGE

## 2019-08-29 RX ORDER — POTASSIUM CHLORIDE 20 MEQ
20 PACKET (EA) ORAL ONCE
Refills: 0 | Status: COMPLETED | OUTPATIENT
Start: 2019-08-29 | End: 2019-08-29

## 2019-08-29 RX ORDER — DOCUSATE SODIUM 100 MG
2 CAPSULE ORAL
Qty: 0 | Refills: 0 | DISCHARGE

## 2019-08-29 RX ORDER — POLYETHYLENE GLYCOL 3350 17 G/17G
17 POWDER, FOR SOLUTION ORAL DAILY
Refills: 0 | Status: DISCONTINUED | OUTPATIENT
Start: 2019-08-29 | End: 2019-09-04

## 2019-08-29 RX ORDER — LANOLIN ALCOHOL/MO/W.PET/CERES
1 CREAM (GRAM) TOPICAL
Qty: 0 | Refills: 0 | DISCHARGE

## 2019-08-29 RX ORDER — POLYETHYLENE GLYCOL 3350 17 G/17G
17 POWDER, FOR SOLUTION ORAL
Qty: 0 | Refills: 0 | DISCHARGE

## 2019-08-29 RX ORDER — NYSTATIN CREAM 100000 [USP'U]/G
1 CREAM TOPICAL
Qty: 0 | Refills: 0 | DISCHARGE

## 2019-08-29 RX ORDER — CARBIDOPA AND LEVODOPA 25; 100 MG/1; MG/1
1 TABLET ORAL
Qty: 0 | Refills: 0 | DISCHARGE

## 2019-08-29 RX ORDER — ATORVASTATIN CALCIUM 80 MG/1
1 TABLET, FILM COATED ORAL
Qty: 0 | Refills: 0 | DISCHARGE

## 2019-08-29 RX ORDER — ENTACAPONE 200 MG/1
1 TABLET, FILM COATED ORAL
Qty: 0 | Refills: 0 | DISCHARGE

## 2019-08-29 RX ORDER — ZINC OXIDE 200 MG/G
1 OINTMENT TOPICAL
Qty: 0 | Refills: 0 | DISCHARGE

## 2019-08-29 RX ADMIN — Medication 250 MILLIGRAM(S): at 14:50

## 2019-08-29 RX ADMIN — CARBIDOPA AND LEVODOPA 1 TABLET(S): 25; 100 TABLET ORAL at 21:22

## 2019-08-29 RX ADMIN — SENNA PLUS 2 TABLET(S): 8.6 TABLET ORAL at 21:22

## 2019-08-29 RX ADMIN — Medication 1000 UNIT(S): at 17:44

## 2019-08-29 RX ADMIN — POLYETHYLENE GLYCOL 3350 17 GRAM(S): 17 POWDER, FOR SOLUTION ORAL at 15:03

## 2019-08-29 RX ADMIN — Medication 25 MICROGRAM(S): at 05:30

## 2019-08-29 RX ADMIN — Medication 81 MILLIGRAM(S): at 12:17

## 2019-08-29 RX ADMIN — ENTACAPONE 200 MILLIGRAM(S): 200 TABLET, FILM COATED ORAL at 17:44

## 2019-08-29 RX ADMIN — RANOLAZINE 1000 MILLIGRAM(S): 500 TABLET, FILM COATED, EXTENDED RELEASE ORAL at 17:44

## 2019-08-29 RX ADMIN — CARBIDOPA AND LEVODOPA 1 TABLET(S): 25; 100 TABLET ORAL at 12:17

## 2019-08-29 RX ADMIN — Medication 1000 UNIT(S): at 05:30

## 2019-08-29 RX ADMIN — ENTACAPONE 200 MILLIGRAM(S): 200 TABLET, FILM COATED ORAL at 21:22

## 2019-08-29 RX ADMIN — Medication 1 TABLET(S): at 12:18

## 2019-08-29 RX ADMIN — PIPERACILLIN AND TAZOBACTAM 25 GRAM(S): 4; .5 INJECTION, POWDER, LYOPHILIZED, FOR SOLUTION INTRAVENOUS at 01:28

## 2019-08-29 RX ADMIN — Medication 40 MILLIGRAM(S): at 17:44

## 2019-08-29 RX ADMIN — Medication 40 MILLIGRAM(S): at 05:30

## 2019-08-29 RX ADMIN — NYSTATIN CREAM 1 APPLICATION(S): 100000 CREAM TOPICAL at 05:30

## 2019-08-29 RX ADMIN — ISOSORBIDE MONONITRATE 60 MILLIGRAM(S): 60 TABLET, EXTENDED RELEASE ORAL at 12:17

## 2019-08-29 RX ADMIN — Medication 100 MILLIGRAM(S): at 05:30

## 2019-08-29 RX ADMIN — Medication 1 TABLET(S): at 12:17

## 2019-08-29 RX ADMIN — Medication 20 MILLIEQUIVALENT(S): at 17:41

## 2019-08-29 RX ADMIN — RANOLAZINE 1000 MILLIGRAM(S): 500 TABLET, FILM COATED, EXTENDED RELEASE ORAL at 05:29

## 2019-08-29 RX ADMIN — CARBIDOPA AND LEVODOPA 1 TABLET(S): 25; 100 TABLET ORAL at 08:42

## 2019-08-29 RX ADMIN — ENTACAPONE 200 MILLIGRAM(S): 200 TABLET, FILM COATED ORAL at 12:17

## 2019-08-29 RX ADMIN — ENTACAPONE 200 MILLIGRAM(S): 200 TABLET, FILM COATED ORAL at 08:42

## 2019-08-29 RX ADMIN — PIPERACILLIN AND TAZOBACTAM 25 GRAM(S): 4; .5 INJECTION, POWDER, LYOPHILIZED, FOR SOLUTION INTRAVENOUS at 17:41

## 2019-08-29 RX ADMIN — Medication 100 MILLIGRAM(S): at 17:44

## 2019-08-29 RX ADMIN — NYSTATIN CREAM 1 APPLICATION(S): 100000 CREAM TOPICAL at 17:43

## 2019-08-29 RX ADMIN — CARBIDOPA AND LEVODOPA 1 TABLET(S): 25; 100 TABLET ORAL at 17:44

## 2019-08-29 RX ADMIN — ATORVASTATIN CALCIUM 80 MILLIGRAM(S): 80 TABLET, FILM COATED ORAL at 21:22

## 2019-08-29 NOTE — DISCHARGE NOTE NURSING/CASE MANAGEMENT/SOCIAL WORK - PATIENT PORTAL LINK FT
You can access the FollowMyHealth Patient Portal offered by [facility] by registering at the following website: [email or web address]. By joining [facility]’s FollowMyHealth portal, you will also be able to view your health information using other applications (apps) compatible with our system.

## 2019-08-29 NOTE — PROGRESS NOTE ADULT - ASSESSMENT
patient sent for admission from [facility] following gross hematuria following Cortés placement>>seen by urology in the ER.   Patient with a complex medical history of past CABG, past PCI, progressive functional impairment in the setting of Parkinson's, past complex GI bleeding with past DVT, IVC filter, presumably chronic atrial fibrillation but not a full AC candidate due to attendant risk, now with acute over chronic kidney injury.   Groin with cellulitis but no evidence of necrotizing fasciitis (Eliza) at present--seen by urology.  ID called    Dea on CKD3, Awaiting  a renal US to exclude hydronephrosis not noted on CTT abdomen.  renal called    Will repeat patient's echo (last in 2015) to assess LV function. Cardiology called .   Suzan, Neuro called, awaiting PT eval    Unclear to the chronic cough.   Chest radiograph nondiagnostic but suspect occult chronic aspiration.    Patient is insistent on a PO intake.   S/S evaluation.   Will obtain a non contrast chest CTT in the AM. Pulm called    DVT ppx w PAS  I will be away 8/30- 9/7, please contact DR Benny Dias for questions regarding Mr. Moya inpatient stay

## 2019-08-29 NOTE — CONSULT NOTE ADULT - CONSULT REASON
pulmonary edema; pleural effusions; atelectasis pulmonary edema; pleural effusions; atelectasis; cough; dysphagia

## 2019-08-29 NOTE — DISCHARGE NOTE NURSING/CASE MANAGEMENT/SOCIAL WORK - NSDCPEWEB_GEN_ALL_CORE
[place] website --- [email or web address] Tobacco [email or web address] website --- [email or web address]

## 2019-08-29 NOTE — CONSULT NOTE ADULT - SUBJECTIVE AND OBJECTIVE BOX
Patient is a 89y old  Male who presents with a chief complaint of Sent in from [facility] for gross haematuria following Lara placement.  Groin erythema, oedema (28 Aug 2019 20:42)      HPI:  Mr. Moya is an 89 year-old man with history of multiple medical issues including hypertension, DVT-IVC filter, GI bleed, Parkinson's disease, coronary artery disease s/p CABG, and stage 3 chronic kidney disease. He is s/p recent admission at [facility] for right humeral fracture s/p fall, managed conservatively. He presented last night to the [facility] ER from [facility] with gross hematuria on lara placement, s/p lara placement for urinary retention. He was noted on presentation to the ER to have a BUN/creatinine of 85/4.61; therefore, a renal consultation was requested.     Upon lara placement last night in the ER, he immediately put out 1200cc of urine.      PAST MEDICAL & SURGICAL HISTORY:  Unilateral inguinal hernia, with gangrene, not specified as recurrent  Upper GI bleed: MICU admission , EGD w/ gastric ulcer/visible vessel which was cauterized  Aspiration pneumonia  Clostridium difficile colitis  BPH (benign prostatic hyperplasia)  Parkinsons Disease  HTN (Hypertension)  Hyperlipidemia  Presence of IVC filter: right upper arm placed last year   History of prostate surgery: 2016  Varicose veins of legs: h/o Vein stripping  S/P appendectomy  CAD-stents/CABG   CKD3    Allergies  Cipro (Rash)    SOCIAL HISTORY:  Denies ETOh,Smoking,     FAMILY HISTORY:  Family history of coronary artery disease: Father,  of MI age 55  Family history of breast cancer (Aunt): Mother    REVIEW OF SYSTEMS:  CONSTITUTIONAL: No weakness, fevers or chills  EYES/ENT: No visual changes;  No vertigo or throat pain   NECK: No pain or stiffness  RESPIRATORY: No cough, wheezing, hemoptysis; No shortness of breath  CARDIOVASCULAR: No chest pain or palpitations  GASTROINTESTINAL: No abdominal or epigastric pain. No nausea, vomiting, or hematemesis; No diarrhea or constipation. No melena or hematochezia.  GENITOURINARY: (+)gross hematuria  NEUROLOGICAL: No numbness or weakness  SKIN: No itching, burning, rashes, or lesions   All other review of systems is negative unless indicated above.    VITAL:  T(C): , Max: 36.6 (19 @ 21:11)  T(F): , Max: 97.8 (19 @ 21:11)  HR: 66 (19 @ 11:00)  BP: 122/70 (19 @ 11:00)  RR: 18 (19 @ 11:00)  SpO2: 96% (19 @ 11:00)    PHYSICAL EXAM:  Constitutional: NAD, Alert  HEENT: NCAT, MMM  Neck: Supple, No JVD  Respiratory: CTA-b/l  Cardiovascular: RRR s1s2, no m/r/g  Gastrointestinal: BS+, soft, NT/ND  Extremities: No peripheral edema b/l  Neurological: no focal deficits; strength grossly intact  Back: no CVAT b/l  Skin: No rashes, no nevi    LABS:                        7.9    7.39  )-----------( 129      ( 29 Aug 2019 08:14 )             24.7     Na(135)/K(3.6)/Cl(99)/HCO3(21)/BUN(85)/Cr(3.49)Glu(118)/Ca(8.0)/Mg(--)/PO4(--)     @ 07:01  Na(136)/K(3.8)/Cl(96)/HCO3(25)/BUN(89)/Cr(4.20)Glu(127)/Ca(8.5)/Mg(--)/PO4(--)     @ 13:34  Na(136)/K(3.2)/Cl(102)/HCO3(18)/BUN(78)/Cr(3.37)Glu(99)/Ca(6.8)/Mg(--)/PO4(--)     @ 07:03  Na(132)/K(4.4)/Cl(93)/HCO3(23)/BUN(93)/Cr(4.50)Glu(117)/Ca(8.3)/Mg(--)/PO4(--)     @ 18:06  Na(130)/K(4.5)/Cl(94)/HCO3(21)/BUN(85)/Cr(4.61)Glu(113)/Ca(8.8)/Mg(--)/PO4(--)     @ 08:34  (19) - BUN/creatinine: 42/1.78    Urinalysis Basic - ( 28 Aug 2019 13:40 )  Color: Red / Appearance: Turbid / S.018 / pH: x  Gluc: x / Ketone: Trace  / Bili: Moderate / Urobili: Negative   Blood: x / Protein: 300 mg/dL / Nitrite: Positive   Leuk Esterase: Large / RBC: >50 /hpf / WBC 6-10   Sq Epi: x / Non Sq Epi: Few / Bacteria: Few    UA  -no protein, 5-6 RBC/WBC    IMAGING:  < from: US Kidney and Bladder (19 @ 11:52) >  Atrophic right kidney with increased renal cortical echogenicity.   Moderate right hydronephrosis, with dilatation of the proximal right ureter.  Mild left hydronephrosis.  Bilateral pleural effusions.      ASSESSMENT:  (1)CKD - stage 3b, with base GFR 30-40ml/min; nonproteinuric. At least in part due to past obstructive nephropathy, with resultant atrophy of her right kidney.  (2)STEVEN - highly likely obstructive - improving as of today, s/p lara placement.  (3)Hypokalemia - (borderline) - at risk for worsening from postobstructive diuresis, although his urine output has not been overly impressive s/p lara placement last night  (4)CV - bilateral pleural effusion on imaging  (5)ID - cellulitis - on Vanco/Zosyn IV    RECOMMEND:  (1)No IVF/No diuretics for now  (2)KCl 20meq po x 1  (3)BMP daily  (4)Continue Abx for GFR 20ml/min (present dosing is appropriate; trend Vanco levels)  (5)Continue to trend I/Os    Thank you for involving [facility] in this patient's care.    With warm regards,    Abdi Vieira MD   [facility]  [phone] HPI:  Mr. Moya is an 89 year-old man with history of multiple medical issues including hypertension, DVT-IVC filter, GI bleed, Parkinson's disease, coronary artery disease s/p CABG, and stage 3 chronic kidney disease. He is s/p recent admission at [facility] for right humeral fracture s/p fall, managed conservatively. He presented last night to the [facility] ER from [facility] with gross hematuria on lara placement, s/p lara placement for urinary retention. He was noted on presentation to the ER to have a BUN/creatinine of 85/4.61; therefore, a renal consultation was requested.     Upon lara placement last night in the ER, he immediately put out 1200cc of urine.    He is being treated with IV Vanco and Zosyn for cellulitis of the right groin    PAST MEDICAL & SURGICAL HISTORY:  Unilateral inguinal hernia, with gangrene, not specified as recurrent  Upper GI bleed: MICU admission , EGD w/ gastric ulcer/visible vessel which was cauterized  Aspiration pneumonia  Clostridium difficile colitis  BPH (benign prostatic hyperplasia)  Parkinsons Disease  HTN (Hypertension)  Hyperlipidemia  Presence of IVC filter: right upper arm placed last year   History of prostate surgery: 2016  Varicose veins of legs: h/o Vein stripping  S/P appendectomy  CAD-stents/CABG   CKD3    Allergies  Cipro (Rash)    SOCIAL HISTORY:  Denies ETOh,Smoking,     FAMILY HISTORY:  Family history of coronary artery disease: Father,  of MI age 55  Family history of breast cancer (Aunt): Mother    REVIEW OF SYSTEMS:  CONSTITUTIONAL: No weakness, fevers or chills  EYES/ENT: No visual changes;  No vertigo or throat pain   NECK: No pain or stiffness  RESPIRATORY: No cough, wheezing, hemoptysis; No shortness of breath  CARDIOVASCULAR: No chest pain or palpitations  GASTROINTESTINAL: No abdominal or epigastric pain. No nausea, vomiting, or hematemesis; No diarrhea or constipation. No melena or hematochezia.  GENITOURINARY: (+)gross hematuria  NEUROLOGICAL: (+)b/l LE weakness  SKIN: (+)right groin rash  All other review of systems is negative unless indicated above.    VITAL:  T(C): , Max: 36.6 (19 @ 21:11)  T(F): , Max: 97.8 (19 @ 21:11)  HR: 66 (19 @ 11:00)  BP: 122/70 (19 @ 11:00)  RR: 18 (19 @ 11:00)  SpO2: 96% (19 @ 11:00)    PHYSICAL EXAM:  Constitutional: NAD; mild psychomotor retardation  HEENT: NCAT, DMM  Neck: Supple, No JVD  Respiratory: CTA-b/l  Cardiovascular: RRR s1s2, no m/r/g  Gastrointestinal: BS+, soft, NT/ND  Extremities: No peripheral edema b/l  : (+)lara  Neurological: reduced b/l LE strength; (+)increased tone; right arm in sling  Back: no CVAT b/l  Skin: (+)erythema right groin    LABS:                        7.9    7.39  )-----------( 129      ( 29 Aug 2019 08:14 )             24.7     Na(135)/K(3.6)/Cl(99)/HCO3(21)/BUN(85)/Cr(3.49)Glu(118)/Ca(8.0)/Mg(--)/PO4(--)     @ 07:01  Na(136)/K(3.8)/Cl(96)/HCO3(25)/BUN(89)/Cr(4.20)Glu(127)/Ca(8.5)/Mg(--)/PO4(--)     @ 13:34  Na(136)/K(3.2)/Cl(102)/HCO3(18)/BUN(78)/Cr(3.37)Glu(99)/Ca(6.8)/Mg(--)/PO4(--)     @ 07:03  Na(132)/K(4.4)/Cl(93)/HCO3(23)/BUN(93)/Cr(4.50)Glu(117)/Ca(8.3)/Mg(--)/PO4(--)     @ 18:06  Na(130)/K(4.5)/Cl(94)/HCO3(21)/BUN(85)/Cr(4.61)Glu(113)/Ca(8.8)/Mg(--)/PO4(--)     @ 08:34  (19) - BUN/creatinine: 42/1.78    Urinalysis Basic - ( 28 Aug 2019 13:40 )  Color: Red / Appearance: Turbid / S.018 / pH: x  Gluc: x / Ketone: Trace  / Bili: Moderate / Urobili: Negative   Blood: x / Protein: 300 mg/dL / Nitrite: Positive   Leuk Esterase: Large / RBC: >50 /hpf / WBC 6-10   Sq Epi: x / Non Sq Epi: Few / Bacteria: Few    UA  -no protein, 5-6 RBC/WBC    IMAGING:  < from: US Kidney and Bladder (19 @ 11:52) >  Atrophic right kidney with increased renal cortical echogenicity.   Moderate right hydronephrosis, with dilatation of the proximal right ureter.  Mild left hydronephrosis.  Bilateral pleural effusions.      ASSESSMENT:  (1)CKD - stage 3b, with base GFR 30-40ml/min; nonproteinuric. At least in part due to past obstructive nephropathy, with resultant atrophy of her right kidney.  (2)STEVEN - highly likely obstructive - improving as of today, s/p lara placement.  (3)Hypokalemia - (borderline) - at risk for worsening from postobstructive diuresis, although his urine output has not been overly impressive s/p lara placement last night  (4)CV - bilateral pleural effusion on imaging  (5)ID - cellulitis - on Vanco/Zosyn IV    RECOMMEND:  (1)No IVF/No diuretics for now  (2)KCl 20meq po x 1  (3)BMP daily  (4)Continue Abx for GFR 20ml/min (present dosing is appropriate; trend Vanco levels)  (5)Continue to trend I/Os    Thank you for involving [facility] in this patient's care.    With warm regards,    Abdi Vieira MD   [facility]  [phone]

## 2019-08-29 NOTE — CONSULT NOTE ADULT - ASSESSMENT
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 - mild left kidney hydronephrosis -> kidney function improving    urinary retention - lara catheter placement now with hematuria without infection and penile swelling/cellulitis    CAD/MI/CABG/PCI - ischemic cardiomyopathy    PLAN/RECOMMENDATIONS:    stable oxygenation on room air  observe off pulmonary medications  diurese as tolerated by renal function and hemodynamics  speech and swallow evaluation   outpatient speech and swallow therapy  on vanco/zosyn - await ID evaluation  ECHO  cardiac meds: ASA/lipitor/imdur/ranexa/lasix  neuro meds: sinemet/entacapone  bowel regimen    Thank you for the courtesy of this referral. Plan of care discussed with the patient at bedside     Martín Davis MD, [facility]  [phone]  Pulmonary Medicine

## 2019-08-29 NOTE — PHARMACOTHERAPY INTERVENTION NOTE - COMMENTS
Patient presents with cellulitis being treated with Vancomycin by level. Suggest Vancomycin 1000 mg IV x 1 dose followed by a random vancomycin level tomorrow afternoon, if no contraindications. Suggest discontinue Ranolazine 1 gram twice daily in setting of acute on chronic kidney injury, if no contraindications.    Vasquez Heredia PharmD  Pager number: [phone]  Tanya Brock PharmD Patient with severe STEVEN currently ordered for vancomycin 1g every 24 hours for treatment of cellulitis. Recommended dose vancomycin by level in setting of severe STEVEN. Vancomycin level 24 hours post first dose is 7.4, suggested redose 1g and monitor level tomorrow afternoon. Also suggested discontinue ranolazine 1 gram twice daily in the setting of acute on chronic kidney injury, if no contraindications.    Vasquez Heredia, PharmD  Pager number: [phone] Patient with severe STEVEN (Scr 3.49) currently ordered for vancomycin 1g every 24 hours for treatment of cellulitis. Recommended dose vancomycin by level in setting of severe STEVEN. Vancomycin level 24 hours post first dose is 7.4, suggested redose 1g and monitor level tomorrow afternoon. Also suggested discontinue ranolazine 1 gram twice daily in the setting of acute on chronic kidney injury, if no contraindications.    Vasquez Heredia, PharmD  Pager number: [phone] Patient with severe STEVEN (Scr 3.49) currently ordered for vancomycin 1g every 24 hours for treatment of cellulitis. Recommend dosing vancomycin by level in setting of severe STEVEN. Vancomycin level 24 hours post first dose is 7.4, suggest redose 1g and monitor level tomorrow afternoon. Also suggest discontinuing ranolazine 1 gram twice daily in the setting of acute on chronic kidney injury, if no contraindications.    Vasquez Heredia, PharmD  Pager number: [phone]

## 2019-08-29 NOTE — PROGRESS NOTE ADULT - SUBJECTIVE AND OBJECTIVE BOX
Patient is a 89y old  Male who presents with a chief [facility] following Cortés placement.  Groin erythema, oedema (28 Aug 2019 20:42)      SUBJECTIVE / OVERNIGHT EVENTS:    MEDICATIONS  (STANDING):  aspirin enteric coated 81 milliGRAM(s) Oral daily  atorvastatin 80 milliGRAM(s) Oral at bedtime  calcium carbonate 1250 mG  + Vitamin D (OsCal 500 + D) 1 Tablet(s) Oral daily  carbidopa/levodopa CR 50/200 1 Tablet(s) Oral <User Schedule>  cholecalciferol 1000 Unit(s) Oral two times a day  docusate sodium 100 milliGRAM(s) Oral two times a day  entacapone 200 milliGRAM(s) Oral <User Schedule>  furosemide    Tablet 40 milliGRAM(s) Oral two times a day  isosorbide   mononitrate ER Tablet (IMDUR) 60 milliGRAM(s) Oral daily  levothyroxine 25 MICROGram(s) Oral daily  multivitamin 1 Tablet(s) Oral daily  nystatin Ointment 1 Application(s) Topical two times a day  piperacillin/tazobactam IVPB.. 3.375 Gram(s) IV Intermittent every 12 hours  polyethylene glycol 3350 17 Gram(s) Oral daily  ranolazine 1000 milliGRAM(s) Oral two times a day  senna 2 Tablet(s) Oral at bedtime  vancomycin  IVPB 1000 milliGRAM(s) IV Intermittent every 24 hours    MEDICATIONS  (PRN):  acetaminophen   Tablet .. 650 milliGRAM(s) Oral every 6 hours PRN Temp greater or equal to 38C (100.4F), Mild Pain (1 - 3)  melatonin 5 milliGRAM(s) Oral at bedtime PRN Insomnia      Vital Signs Last 24 Hrs  T(F): 97.5 (19 @ 11:00), Max: 97.8 (19 @ 21:11)  HR: 66 (19 @ 11:00) (60 - 71)  BP: 122/70 (19 @ 11:00) (121/72 - 132/78)  RR: 18 (19 @ 11:00) (16 - 18)  SpO2: 96% (19 @ 11:00) (96% - 98%)  Telemetry:   CAPILLARY BLOOD GLUCOSE        I&O's Summary    28 Aug 2019 07:  -  29 Aug 2019 07:00  --------------------------------------------------------  IN: 0 mL / OUT: 1500 mL / NET: -1500 mL    29 Aug 2019 07:  -  29 Aug 2019 14:50  --------------------------------------------------------  IN: 860 mL / OUT: 700 mL / NET: 160 mL        PHYSICAL EXAM:  GENERAL: NAD, well-developed  HEAD:  Atraumatic, Normocephalic  EYES: EOMI, PERRLA, conjunctiva and sclera clear  NECK: Supple, No JVD  CHEST/LUNG: Clear to auscultation bilaterally; No wheeze  HEART: Regular rate and rhythm; No murmurs, rubs, or gallops  ABDOMEN: Soft, Nontender, Nondistended; Bowel sounds present  EXTREMITIES:  2+ Peripheral Pulses, No clubbing, cyanosis, or edema  PSYCH: AAOx3  NEUROLOGY: non-focal  SKIN: No rashes or lesions    LABS:                        7.9    7.39  )-----------( 129      ( 29 Aug 2019 08:14 )             24.7         135  |  99  |  85<H>  ----------------------------<  118<H>  3.6   |  21<L>  |  3.49<H>    Ca    8.0<L>      29 Aug 2019 07:01    TPro  6.9  /  Alb  3.7  /  TBili  0.6  /  DBili  x   /  AST  10  /  ALT  <5<L>  /  AlkPhos  68      PT/INR - ( 28 Aug 2019 13:34 )   PT: 14.0 sec;   INR: 1.21 ratio         PTT - ( 28 Aug 2019 13:34 )  PTT:30.3 sec      Urinalysis Basic - ( 28 Aug 2019 13:40 )    Color: Red / Appearance: Turbid / S.018 / pH: x  Gluc: x / Ketone: Trace  / Bili: Moderate / Urobili: Negative   Blood: x / Protein: 300 mg/dL / Nitrite: Positive   Leuk Esterase: Large / RBC: >50 /hpf / WBC 6-10   Sq Epi: x / Non Sq Epi: Few / Bacteria: Few        RADIOLOGY & ADDITIONAL TESTS:    Imaging Personally Reviewed:    Consultant(s) Notes Reviewed:      Care Discussed with Consultants/Other Providers:

## 2019-08-30 DIAGNOSIS — J69.0 PNEUMONITIS DUE TO INHALATION OF FOOD AND VOMIT: ICD-10-CM

## 2019-08-30 DIAGNOSIS — I25.10 ATHEROSCLEROTIC HEART DISEASE OF NATIVE CORONARY ARTERY WITHOUT ANGINA PECTORIS: ICD-10-CM

## 2019-08-30 DIAGNOSIS — I48.91 UNSPECIFIED ATRIAL FIBRILLATION: ICD-10-CM

## 2019-08-30 LAB
ANION GAP SERPL CALC-SCNC: 12 MMOL/L — SIGNIFICANT CHANGE UP (ref 5–17)
BUN SERPL-MCNC: 82 MG/DL — HIGH (ref 7–23)
CALCIUM SERPL-MCNC: 8.2 MG/DL — LOW (ref 8.4–10.5)
CHLORIDE SERPL-SCNC: 99 MMOL/L — SIGNIFICANT CHANGE UP (ref 96–108)
CO2 SERPL-SCNC: 25 MMOL/L — SIGNIFICANT CHANGE UP (ref 22–31)
CREAT SERPL-MCNC: 2.8 MG/DL — HIGH (ref 0.5–1.3)
GLUCOSE SERPL-MCNC: 126 MG/DL — HIGH (ref 70–99)
HCT VFR BLD CALC: 24.1 % — LOW (ref 39–50)
HGB BLD-MCNC: 8 G/DL — LOW (ref 13–17)
MCHC RBC-ENTMCNC: 29.4 PG — SIGNIFICANT CHANGE UP (ref 27–34)
MCHC RBC-ENTMCNC: 33.2 GM/DL — SIGNIFICANT CHANGE UP (ref 32–36)
MCV RBC AUTO: 88.6 FL — SIGNIFICANT CHANGE UP (ref 80–100)
PLATELET # BLD AUTO: 137 K/UL — LOW (ref 150–400)
POTASSIUM SERPL-MCNC: 3.2 MMOL/L — LOW (ref 3.5–5.3)
POTASSIUM SERPL-SCNC: 3.2 MMOL/L — LOW (ref 3.5–5.3)
RBC # BLD: 2.72 M/UL — LOW (ref 4.2–5.8)
RBC # FLD: 14.1 % — SIGNIFICANT CHANGE UP (ref 10.3–14.5)
SODIUM SERPL-SCNC: 136 MMOL/L — SIGNIFICANT CHANGE UP (ref 135–145)
VANCOMYCIN FLD-MCNC: 12.2 UG/ML — SIGNIFICANT CHANGE UP
WBC # BLD: 8.33 K/UL — SIGNIFICANT CHANGE UP (ref 3.8–10.5)
WBC # FLD AUTO: 8.33 K/UL — SIGNIFICANT CHANGE UP (ref 3.8–10.5)

## 2019-08-30 PROCEDURE — 93306 TTE W/DOPPLER COMPLETE: CPT | Mod: 26

## 2019-08-30 RX ORDER — POTASSIUM CHLORIDE 20 MEQ
20 PACKET (EA) ORAL
Refills: 0 | Status: COMPLETED | OUTPATIENT
Start: 2019-08-30 | End: 2019-08-30

## 2019-08-30 RX ORDER — LEVOTHYROXINE SODIUM 125 MCG
50 TABLET ORAL DAILY
Refills: 0 | Status: DISCONTINUED | OUTPATIENT
Start: 2019-08-30 | End: 2019-09-04

## 2019-08-30 RX ORDER — FUROSEMIDE 40 MG
40 TABLET ORAL DAILY
Refills: 0 | Status: DISCONTINUED | OUTPATIENT
Start: 2019-08-30 | End: 2019-09-03

## 2019-08-30 RX ADMIN — ENTACAPONE 200 MILLIGRAM(S): 200 TABLET, FILM COATED ORAL at 21:26

## 2019-08-30 RX ADMIN — CARBIDOPA AND LEVODOPA 1 TABLET(S): 25; 100 TABLET ORAL at 21:26

## 2019-08-30 RX ADMIN — Medication 40 MILLIGRAM(S): at 05:51

## 2019-08-30 RX ADMIN — ENTACAPONE 200 MILLIGRAM(S): 200 TABLET, FILM COATED ORAL at 11:47

## 2019-08-30 RX ADMIN — Medication 250 MILLIGRAM(S): at 14:40

## 2019-08-30 RX ADMIN — RANOLAZINE 1000 MILLIGRAM(S): 500 TABLET, FILM COATED, EXTENDED RELEASE ORAL at 17:43

## 2019-08-30 RX ADMIN — CARBIDOPA AND LEVODOPA 1 TABLET(S): 25; 100 TABLET ORAL at 17:42

## 2019-08-30 RX ADMIN — ATORVASTATIN CALCIUM 80 MILLIGRAM(S): 80 TABLET, FILM COATED ORAL at 21:26

## 2019-08-30 RX ADMIN — CARBIDOPA AND LEVODOPA 1 TABLET(S): 25; 100 TABLET ORAL at 14:36

## 2019-08-30 RX ADMIN — Medication 81 MILLIGRAM(S): at 11:48

## 2019-08-30 RX ADMIN — SENNA PLUS 2 TABLET(S): 8.6 TABLET ORAL at 21:26

## 2019-08-30 RX ADMIN — Medication 1000 UNIT(S): at 17:42

## 2019-08-30 RX ADMIN — Medication 100 MILLIGRAM(S): at 05:51

## 2019-08-30 RX ADMIN — POLYETHYLENE GLYCOL 3350 17 GRAM(S): 17 POWDER, FOR SOLUTION ORAL at 11:49

## 2019-08-30 RX ADMIN — CARBIDOPA AND LEVODOPA 1 TABLET(S): 25; 100 TABLET ORAL at 11:47

## 2019-08-30 RX ADMIN — Medication 100 MILLIGRAM(S): at 17:42

## 2019-08-30 RX ADMIN — NYSTATIN CREAM 1 APPLICATION(S): 100000 CREAM TOPICAL at 17:42

## 2019-08-30 RX ADMIN — PIPERACILLIN AND TAZOBACTAM 25 GRAM(S): 4; .5 INJECTION, POWDER, LYOPHILIZED, FOR SOLUTION INTRAVENOUS at 05:51

## 2019-08-30 RX ADMIN — Medication 20 MILLIEQUIVALENT(S): at 14:40

## 2019-08-30 RX ADMIN — ENTACAPONE 200 MILLIGRAM(S): 200 TABLET, FILM COATED ORAL at 14:35

## 2019-08-30 RX ADMIN — ENTACAPONE 200 MILLIGRAM(S): 200 TABLET, FILM COATED ORAL at 17:42

## 2019-08-30 RX ADMIN — PIPERACILLIN AND TAZOBACTAM 25 GRAM(S): 4; .5 INJECTION, POWDER, LYOPHILIZED, FOR SOLUTION INTRAVENOUS at 17:39

## 2019-08-30 RX ADMIN — Medication 1 TABLET(S): at 11:48

## 2019-08-30 RX ADMIN — ISOSORBIDE MONONITRATE 60 MILLIGRAM(S): 60 TABLET, EXTENDED RELEASE ORAL at 11:48

## 2019-08-30 RX ADMIN — RANOLAZINE 1000 MILLIGRAM(S): 500 TABLET, FILM COATED, EXTENDED RELEASE ORAL at 05:51

## 2019-08-30 RX ADMIN — Medication 20 MILLIEQUIVALENT(S): at 11:55

## 2019-08-30 RX ADMIN — Medication 25 MICROGRAM(S): at 05:51

## 2019-08-30 RX ADMIN — NYSTATIN CREAM 1 APPLICATION(S): 100000 CREAM TOPICAL at 05:52

## 2019-08-30 RX ADMIN — Medication 1000 UNIT(S): at 05:51

## 2019-08-30 NOTE — CONSULT NOTE ADULT - SUBJECTIVE AND OBJECTIVE BOX
CHIEF COMPLAINT:  SOB     HISTORY OF PRESENT ILLNESS:  89 year old gentleman, lifelong non smoker, with history of HTN, HLD, CAD/MI s/p CABG/PCI with decreased LVEF, atrial fibrillation, Parkinson's disease with progressive functional impairment, dysphagia and multiple falls, DVT s/p IVC placement, CKD and GI bleeds. The patient was recently admitted to [place] following a fall and was found to have an acute on chronic right proximal humeral fracture being treated conservatively. The patient has been at [facility] where the patient developed gross hematuria following lara placement for urinary incontinence. He lacerated his penis while using a commode which is no red and swollen. The patient reports shortness of breath with exertion and with extended conversations. His legs are swollen. He has a chronic cough productive of clear mucous which is worse in the morning and after eating. He has no chest congestion or wheeze. He has no fevers, chills or sweats. No chest pain/pressure or palpitations.     PAST MEDICAL & SURGICAL HISTORY:  Unilateral inguinal hernia, with gangrene, not specified as recurrent  Upper GI bleed: MICU admission , EGD w/ gastric ulcer/visible vessel which was cauterized  Aspiration pneumonia  Clostridium difficile colitis  Pneumonia  MI, old: age 55  UTI (urinary tract infection)  BPH (benign prostatic hyperplasia)  Parkinsons Disease  CAD (Coronary Artery Disease): s/p CABG and stents  HTN (Hypertension)  Hyperlipidemia  Presence of IVC filter: right upper arm placed last year   History of prostate surgery: 2016  Varicose veins of legs: h/o Vein stripping  S/P appendectomy  Stented coronary artery: cardiac stent x 2 - placement between  &amp; possible   Hx of CAB (age 65)          MEDICATIONS:  aspirin enteric coated 81 milliGRAM(s) Oral daily  furosemide    Tablet 40 milliGRAM(s) Oral daily  isosorbide   mononitrate ER Tablet (IMDUR) 60 milliGRAM(s) Oral daily  ranolazine 1000 milliGRAM(s) Oral two times a day    piperacillin/tazobactam IVPB.. 3.375 Gram(s) IV Intermittent every 12 hours  vancomycin  IVPB 1000 milliGRAM(s) IV Intermittent every 24 hours    guaiFENesin    Syrup 100 milliGRAM(s) Oral every 6 hours PRN    acetaminophen   Tablet .. 650 milliGRAM(s) Oral every 6 hours PRN  carbidopa/levodopa CR 50/200 1 Tablet(s) Oral <User Schedule>  entacapone 200 milliGRAM(s) Oral <User Schedule>  melatonin 5 milliGRAM(s) Oral at bedtime PRN    docusate sodium 100 milliGRAM(s) Oral two times a day  polyethylene glycol 3350 17 Gram(s) Oral daily  senna 2 Tablet(s) Oral at bedtime    atorvastatin 80 milliGRAM(s) Oral at bedtime  levothyroxine 50 MICROGram(s) Oral daily    calcium carbonate 1250 mG  + Vitamin D (OsCal 500 + D) 1 Tablet(s) Oral daily  cholecalciferol 1000 Unit(s) Oral two times a day  multivitamin 1 Tablet(s) Oral daily  nystatin Ointment 1 Application(s) Topical two times a day  potassium chloride    Tablet ER 20 milliEquivalent(s) Oral every 2 hours      FAMILY HISTORY:  Family history of coronary artery disease: Father,  of MI age 55  Family history of breast cancer (Aunt): Mother      SOCIAL HISTORY:    [ ] Non-smoker  [ ] Smoker  [ ] Alcohol    Allergies    Cipro (Rash)    Intolerances    	    REVIEW OF SYSTEMS:  CONSTITUTIONAL: No fever, weight loss, + fatigue  EYES: No eye pain, visual disturbances, or discharge  ENMT:  No difficulty hearing, tinnitus, vertigo; No sinus or throat pain  NECK: No pain or stiffness  RESPIRATORY: No cough, wheezing, chills or hemoptysis; No Shortness of Breath  CARDIOVASCULAR: No chest pain, palpitations, passing out, dizziness, or leg swelling  GASTROINTESTINAL: No abdominal or epigastric pain. No nausea, vomiting, or hematemesis; No diarrhea or constipation. No melena or hematochezia.  GENITOURINARY: No dysuria, frequency, hematuria, or incontinence  NEUROLOGICAL: No headaches, memory loss, loss of strength, numbness, or tremors  SKIN: No itching, burning, rashes, or lesions   LYMPH Nodes: No enlarged glands  ENDOCRINE: No heat or cold intolerance; No hair loss  MUSCULOSKELETAL: + joint pain or swelling; No muscle, back, or extremity pain  PSYCHIATRIC: No depression, anxiety, mood swings, or difficulty sleeping  HEME/LYMPH: No easy bruising, or bleeding gums  ALLERY AND IMMUNOLOGIC: No hives or eczema	    [ ] All others negative	  [ ] Unable to obtain    PHYSICAL EXAM:  T(C): 36.7 (19 @ 09:31), Max: 37.1 (19 @ 20:43)  HR: 60 (19 @ 09:31) (56 - 66)  BP: 133/69 (19 @ 09:31) (133/69 - 152/75)  RR: 18 (19 @ 09:31) (18 - 18)  SpO2: 98% (19 @ 09:31) (95% - 98%)  Wt(kg): --  I&O's Summary    29 Aug 2019 07:  -  30 Aug 2019 07:00  --------------------------------------------------------  IN: 1590 mL / OUT: 1700 mL / NET: -110 mL    30 Aug 2019 07:  -  30 Aug 2019 12:50  --------------------------------------------------------  IN: 240 mL / OUT: 600 mL / NET: -360 mL        Appearance: NAD  HEENT:   Normal oral mucosa, PERRL, EOMI	  Lymphatic: No lymphadenopathy  Cardiovascular: Irregular S1 S2, No JVD, No murmurs, No edema  Respiratory: decreased bs   Psychiatry: A & O x 3, Mood & affect appropriate  Gastrointestinal:  Soft, Non-tender, + BS	  Skin: No rashes, No ecchymoses, No cyanosis	  Neurologic: Non-focal  Extremities: decreased range of motion, No clubbing, cyanosis or edema  Vascular: Peripheral pulses palpable 2+ bilaterally    TELEMETRY: 	    ECG:  	Afib, LBBB  RADIOLOGY:  < from: Xray Chest 1 View- PORTABLE-Urgent (19 @ 20:12) >    EXAM:  XR CHEST PORTABLE URGENT 1V                            PROCEDURE DATE:  2019            INTERPRETATION:    CLINICAL INDICATION: Sepsis.    TECHNIQUE: Portable frontal view of the chest.    COMPARISON: Frontal chest radiograph from 3/7/2019.    FINDINGS:     Median sternotomy wires.  Cardiac silhouette is not accurately evaluated in this projection.  Lungs are clear.  No pleural effusions or pneumothorax.  No acute osseous abnormality.      IMPRESSION:    Clear lungs.                DONOVAN SYLVESTER M.D., RADIOLOGY RESIDENT  This document has been electronically signed.  QUENTIN TEJEDA M.D., ATTENDING RADIOLOGIST  This document has been electronically signed. Aug 29 2019 12:26PM                < end of copied text >    OTHER: 	  	  LABS:	 	    CARDIAC MARKERS:    < from: Transthoracic Echocardiogram (19 @ 09:59) >    Patient name: RYAN FITZPATRICK  YOB: 1930   Age: 89 (M)   MR#: [id number]  Study Date: 2019  Location: [address] [place], [place] [id number]: Braden Mack [facility]  Study quality: Technically fair  Referring Physician: Jac Overton MD  Blood Pressure: 133/69 mmHg  Height: 173 cm  Weight: 87 kg  BSA: 2 m2  Heart Rate: 58 mmHg  ------------------------------------------------------------------------  PROCEDURE: Transthoracic echocardiogram with 2-D, M-Mode  and complete spectral and color flow Doppler.  INDICATION: Atherosclerotic heart disease of native  coronary artery without angina pectoris (I25.10)  ------------------------------------------------------------------------  Dimensions:    Normal Values:  LA:     5.2    2.0 - 4.0 cm  Ao:     3.2    2.0 - 3.8 cm  SEPTUM: 1.2    0.6 - 1.2 cm  PWT:    1.0    0.6 - 1.1 cm  LVIDd:  5.3    3.0 - 5.6 cm  LVIDs:  3.7    1.8 - 4.0 cm  Derived variables:  LVMI: 113 g/m2  RWT: 0.37  Fractional short: 30 %  EF (Visual Estimate): 55-60 %  Doppler Peak Velocity (m/sec): AoV=2.7  ------------------------------------------------------------------------  Observations:  Mitral Valve: Mitral annular calcification and calcified  mitral leaflets with normal diastolic opening.  Mild-moderate mitralregurgitation.  Aortic Valve/Aorta: Calcified trileaflet aortic valve with  decreased opening. Peak transaortic valve gradient equals  29 mm Hg, mean transaortic valve gradient equals 19 mm Hg,  estimated aortic valve area equals 1.5 sqcm (by continuity  equation), aortic valve velocity time integral equals 58  cm, consistent with moderate aortic stenosis. Minimal  aortic regurgitation.  Peak left ventricular outflow tract  gradient equals 5 mm Hg, mean gradient is equal to 3 mm Hg,  LVOT velocity time integral equals 25 cm.  Aortic Root: 3.2 cm.  LVOT diameter: 2.2 cm.  Left Atrium: Severely dilated left atrium.  LA volume index  = 71 cc/m2.  Left Ventricle: Overall preserved left ventricular ejection  fraction despite mild segmental wall motion abnormalities.  The basal  inferolateral wall, and the basal inferior wall  are hypokinetic.  Eccentric left ventricular hypertrophy  (dilated left ventricle with normal relative wall  thickness). Normal diastolic function  Right Heart: Normal right atrium. The right ventricle is  not well visualized; grossly normal right ventricular  systolic function. Normal tricuspid valve. Mild tricuspid  regurgitation. Pulmonic valve not well visualized, probably  normal. Mild pulmonic regurgitation.  Pericardium/Pleura: Normal pericardium with no pericardial  effusion.  Hemodynamic: Estimated right atrial pressure is 8 mm Hg.  Estimated right ventricular systolic pressure equals 35 mm  Hg, assuming right atrial pressure equals 8 mm Hg,  consistent with borderline pulmonary hypertension.  ------------------------------------------------------------------------  Conclusions:  1. Mitral annular calcification and calcified mitral  leaflets with normal diastolic opening. Mild-moderate  mitral regurgitation.  2. Calcified trileaflet aortic valve with decreased  opening. Peak transaortic valve gradient equals 29 mm Hg,  mean transaortic valve gradient equals 19 mm Hg, estimated  aortic valve area equals 1.5 sqcm (by continuity equation),  aortic valve velocity time integral equals 58 cm,  consistent with moderate aortic stenosis. Minimal aortic  regurgitation.  3. Severely dilated left atrium.  LA volume index = 71  cc/m2.  4. Eccentric left ventricular hypertrophy (dilated left  ventricle withnormal relative wall thickness).  5. Overall preserved left ventricular ejection fraction  despite mild segmental wall motion abnormalities. The basal   inferolateral wall, and the basal inferior wall are  hypokinetic.  6. The right ventricle is not well visualized; grossly  normal right ventricular systolic function.  7. Estimated right ventricular systolic pressure equals 35  mm Hg, assuming right atrial pressure equals 8 mm Hg,  consistent with borderline pulmonary hypertension.  *** Compared with echocardiogram of 10/29/2015, Aortic  Stenosis is now seen. Left ventricular function has  improved. Other findings are grossly similar.  ------------------------------------------------------------------------  Confirmed on  2019 - 12:16:01by Deepak Rome M.D.  ------------------------------------------------------------------------    < end of copied text >                                8.0    8.33  )-----------( 137      ( 30 Aug 2019 08:43 )             24.1         136  |  99  |  82<H>  ----------------------------<  126<H>  3.2<L>   |  25  |  2.80<H>    Ca    8.2<L>      30 Aug 2019 06:45    TPro  6.9  /  Alb  3.7  /  TBili  0.6  /  DBili  x   /  AST  10  /  ALT  <5<L>  /  AlkPhos  68      proBNP:   Lipid Profile:   HgA1c:   TSH:

## 2019-08-30 NOTE — SWALLOW BEDSIDE ASSESSMENT ADULT - ASR SWALLOW RECOMMEND DIAG
offered objective testing- pt declined all testing and intervention by this service (counseled pt on the need for lifelong swallow tx 2/2 PD dx)

## 2019-08-30 NOTE — PROGRESS NOTE ADULT - ASSESSMENT
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 - mild left kidney hydronephrosis -> kidney function improving    urinary retention - lara catheter placement now with hematuria without infection and penile swelling/cellulitis    CAD/MI/CABG/PCI - ischemic cardiomyopathy    PLAN/RECOMMENDATIONS:    stable oxygenation on room air  observe off pulmonary medications  diurese as tolerated by renal function and hemodynamics  speech and swallow evaluation -pt may be microaspirating given overall status  outpatient speech and swallow therapy  on vanco/zosyn - await ID evaluation  ECHO  cardiac meds: ASA/lipitor/imdur/ranexa/lasix  neuro meds: sinemet/entacapone  incentive spirometry    Plan of care discussed with the patient at bedside     Heidi Yip MD, [facility]  [phone]  Pulmonary Medicine 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 - mild left kidney hydronephrosis -> kidney function improving    urinary retention - lara catheter placement now with hematuria without infection and penile swelling/cellulitis    CAD/MI/CABG/PCI - ischemic cardiomyopathy    PLAN/RECOMMENDATIONS:    stable oxygenation on room air  observe off pulmonary medications- can use prn robitussin if necessary  diurese as tolerated by renal function and hemodynamics  speech and swallow evaluation -pt may be microaspirating given overall status  outpatient speech and swallow therapy  on vanco/zosyn - await ID evaluation  ECHO  cardiac meds: ASA/lipitor/imdur/ranexa/lasix  neuro meds: sinemet/entacapone  incentive spirometry      Plan of care discussed with the patient at bedside     Heidi Yip MD, [facility]  [phone]  Pulmonary Medicine

## 2019-08-30 NOTE — CONSULT NOTE ADULT - SUBJECTIVE AND OBJECTIVE BOX
Patient is a 89y old  Male who presents with a chief complaint of Sent in from [facility] for gross haematuria following Cortés placement.  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 with subsequent PCI, Parkinson disease with progressive functional impairment, essential HTN< past DVT with past IVC filter placement not on full AC, chronic kidney disease stage 3, past GI bleed, with multiple past falls with a recent admission to [place] in 2019 following a presumed mechanical fall, noted to have an acute on chronic RIGHT proximal humeral fracture not felt to be operative with plans for secondary healing, with patient referred to [facility] with patient referred following gross haematuria noted on Cortés placement following urinary retention.  Patient reported that he had local trauma to the skin of his penis following an attempt to use a hand held urinal.   Patient also notes that patient was on a ? commode and was noted to have increased scrotal oedema and redness. (28 Aug 2019 20:42)    ER VSS, no leukocytosis.  Cr 2.8.  UA (+) nit/LE.  Large bld.  WBC 6-10, >50 RBC.  Ucx and Bcx (-).  CT pelv no buttock abscess or subcut gas, increased stool in the rectum and mild stercoral colitis.  CT chest shows pulm edema with small b/l pleural effusions.  US kidney/bladder shows moderate R hydronephrosis and mild L hydronephrosis.      Pt seen by Urology for hematuria, s/p Cortés replacement.  Hematuria has been improving with mild irrigation.      Pt currently on vanco/zosyn for groin cellulitis.  ID consult called for further abx managment.        REVIEW OF SYSTEMS:    CONSTITUTIONAL: No fever, weight loss, or fatigue  EYES: No eye pain, visual disturbances, or discharge  ENMT:  No sore throat  NECK: No pain or stiffness  RESPIRATORY: No cough, wheezing, chills or hemoptysis; No shortness of breath  CARDIOVASCULAR: No chest pain, palpitations, dizziness, or leg swelling  GASTROINTESTINAL: No abdominal or epigastric pain. No nausea, vomiting, or hematemesis; No diarrhea or constipation. No melena or hematochezia.  GENITOURINARY: No dysuria, frequency, hematuria, or incontinence  NEUROLOGICAL: No headaches, memory loss, loss of strength, numbness, or tremors  SKIN: No itching, burning, rashes, or lesions   LYMPH NODES: No enlarged glands  MUSCULOSKELETAL: No joint pain or swelling; No muscle, back, or extremity pain      PAST MEDICAL & SURGICAL HISTORY:  Unilateral inguinal hernia, with gangrene, not specified as recurrent  Upper GI bleed: MICU admission , EGD w/ gastric ulcer/visible vessel which was cauterized  Aspiration pneumonia  Clostridium difficile colitis  Pneumonia  MI, old: age 55  UTI (urinary tract infection)  BPH (benign prostatic hyperplasia)  Parkinsons Disease  CAD (Coronary Artery Disease): s/p CABG and stents  HTN (Hypertension)  Hyperlipidemia  Presence of IVC filter: right upper arm placed last year   History of prostate surgery: 2016  Varicose veins of legs: h/o Vein stripping  S/P appendectomy  Stented coronary artery: cardiac stent x 2 - placement between  &amp; possible   Hx of CAB (age 65)      Allergies    Cipro (Rash)    Intolerances        FAMILY HISTORY:  Family history of coronary artery disease: Father,  of MI age 55  Family history of breast cancer (Aunt): Mother      SOCIAL HISTORY:    NO prior tobacco use.   Rare wine in the past, negative CAGE screen.  Supportive spouse.  Retired former businessman.    MEDICATIONS  (STANDING):  aspirin enteric coated 81 milliGRAM(s) Oral daily  atorvastatin 80 milliGRAM(s) Oral at bedtime  calcium carbonate 1250 mG  + Vitamin D (OsCal 500 + D) 1 Tablet(s) Oral daily  carbidopa/levodopa CR 50/200 1 Tablet(s) Oral <User Schedule>  cholecalciferol 1000 Unit(s) Oral two times a day  docusate sodium 100 milliGRAM(s) Oral two times a day  entacapone 200 milliGRAM(s) Oral <User Schedule>  furosemide    Tablet 40 milliGRAM(s) Oral daily  isosorbide   mononitrate ER Tablet (IMDUR) 60 milliGRAM(s) Oral daily  levothyroxine 50 MICROGram(s) Oral daily  multivitamin 1 Tablet(s) Oral daily  nystatin Ointment 1 Application(s) Topical two times a day  piperacillin/tazobactam IVPB.. 3.375 Gram(s) IV Intermittent every 12 hours  polyethylene glycol 3350 17 Gram(s) Oral daily  ranolazine 1000 milliGRAM(s) Oral two times a day  senna 2 Tablet(s) Oral at bedtime  vancomycin  IVPB 1000 milliGRAM(s) IV Intermittent every 24 hours    MEDICATIONS  (PRN):  acetaminophen   Tablet .. 650 milliGRAM(s) Oral every 6 hours PRN Temp greater or equal to 38C (100.4F), Mild Pain (1 - 3)  guaiFENesin    Syrup 100 milliGRAM(s) Oral every 6 hours PRN Cough  melatonin 5 milliGRAM(s) Oral at bedtime PRN Insomnia      Vital Signs Last 24 Hrs  T(C): 36.7 (30 Aug 2019 13:54), Max: 37.1 (29 Aug 2019 20:43)  T(F): 98 (30 Aug 2019 13:54), Max: 98.7 (29 Aug 2019 20:43)  HR: 59 (30 Aug 2019 13:54) (56 - 66)  BP: 147/68 (30 Aug 2019 13:54) (133/69 - 152/75)  BP(mean): --  RR: 18 (30 Aug 2019 13:54) (18 - 18)  SpO2: 98% (30 Aug 2019 13:54) (95% - 98%)    PHYSICAL EXAM:    GENERAL: NAD, well-groomed  HEAD:  Atraumatic, Normocephalic  EYES: EOMI, PERRLA, conjunctiva and sclera clear  ENMT: No tonsillar erythema, exudates, or enlargement; Moist mucous membranes  NECK: Supple, No JVD  CHEST/LUNG: Clear to percussion bilaterally; No rales, rhonchi, wheezing, or rubs  HEART: Regular rate and rhythm; No murmurs, rubs, or gallops  ABDOMEN: Soft, Nontender, Nondistended; Bowel sounds present  EXTREMITIES:  2+ Peripheral Pulses, No clubbing, cyanosis, or edema  LYMPH: No lymphadenopathy noted  SKIN: No rashes or lesions    LABS:  CBC Full  -  ( 30 Aug 2019 08:43 )  WBC Count : 8.33 K/uL  RBC Count : 2.72 M/uL  Hemoglobin : 8.0 g/dL  Hematocrit : 24.1 %  Platelet Count - Automated : 137 K/uL  Mean Cell Volume : 88.6 fl  Mean Cell Hemoglobin : 29.4 pg  Mean Cell Hemoglobin Concentration : 33.2 gm/dL  Auto Neutrophil # : x  Auto Lymphocyte # : x  Auto Monocyte # : x  Auto Eosinophil # : x  Auto Basophil # : x  Auto Neutrophil % : x  Auto Lymphocyte % : x  Auto Monocyte % : x  Auto Eosinophil % : x  Auto Basophil % : x          136  |  99  |  82<H>  ----------------------------<  126<H>  3.2<L>   |  25  |  2.80<H>    Ca    8.2<L>      30 Aug 2019 06:45            MICROBIOLOGY:      Culture - Blood (19 @ 17:55)    Specimen Source: .Blood    Culture Results:   No growth to date.    Culture - Blood (19 @ 17:55)    Specimen Source: .Blood    Culture Results:   No growth to date.    Culture - Urine (19 @ 17:52)    Specimen Source: .Urine    Culture Results:   No growth    Culture - Urine (19 @ 21:50)    Specimen Source: .Urine    Culture Results:   No growth                RADIOLOGY:    < from: US Kidney and Bladder (19 @ 11:52) >  IMPRESSION: Limited study.    Atrophic right kidney with increased renal cortical echogenicity.   Moderate right hydronephrosis, with dilatation of the proximal right   ureter.    Mild left hydronephrosis.    Bilateral pleural effusions.    < end of copied text >        < from: CT Chest No Cont (19 @ 09:17) >  FINDINGS:    LUNGS AND AIRWAYS: Patent central airways. Left distal mucoid impacted   airways. Interlobular septal thickening with patchy bilateral ground   glass opacities. Bibasilar passive atelectasis.    PLEURA: Small bilateral pleural effusions.    MEDIASTINUM AND CRYSTAL: No lymphadenopathy.    VESSELS: Atherosclerotic calcifications of the aorta and coronary   arteries.    HEART: Heart size is enlarged. Aortic valve calcifications. No   pericardial effusion.    CHEST WALL AND LOWER NECK: Subcentimeter hypodense thyroid nodule,   unchanged from prior study in 2015.    VISUALIZED UPPER ABDOMEN: Atrophic right kidney. Unchanged right   hydronephrosis. Left renal cysts.    BONES: Median sternotomy. Degenerative changes. Old healed left rib   fractures. Right humeral head fracture.    IMPRESSION:     Pulmonary edema with small bilateral pleural effusions.    < end of copied text >          < from: Xray Chest 1 View- PORTABLE-Urgent (19 @ 20:12) >    IMPRESSION:    Clear lungs.    < end of copied text > Patient is a 89y old  Male who presents with a chief complaint of Sent in from [facility] for gross haematuria following Cortés placement.  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  Patient reported that he had local trauma to the skin of his penis following an attempt to use a hand held urinal.   Patient also notes that patient was on a ? commode and was noted to have increased scrotal oedema and redness. (28 Aug 2019 20:42)    ER VSS, no leukocytosis.  Cr 2.8.  UA (+) nit/LE.  Large bld.  WBC 6-10, >50 RBC.  Ucx and Bcx (-).  CT pelv no buttock abscess or subcut gas, increased stool in the rectum and mild stercoral colitis.  CT chest shows pulm edema with small b/l pleural effusions.  US kidney/bladder shows moderate R hydronephrosis and mild L hydronephrosis.      Pt seen by Urology for hematuria, s/p Cortés replacement.  Hematuria has been improving with mild irrigation.      Pt currently on vanco/zosyn for groin cellulitis.  ID consult called for further abx managment.        REVIEW OF SYSTEMS:    CONSTITUTIONAL: No fever, weight loss, or fatigue  EYES: No eye pain, visual disturbances, or discharge  ENMT:  No sore throat  NECK: No pain or stiffness  RESPIRATORY: No cough, wheezing, chills or hemoptysis; No shortness of breath  CARDIOVASCULAR: No chest pain, palpitations, dizziness, or leg swelling  GASTROINTESTINAL: No abdominal or epigastric pain. No nausea, vomiting, or hematemesis; No diarrhea or constipation. No melena or hematochezia.  GENITOURINARY: No dysuria, frequency, hematuria, or incontinence  NEUROLOGICAL: No headaches, memory loss, loss of strength, numbness, or tremors  SKIN: No itching, burning, rashes, or lesions   LYMPH NODES: No enlarged glands  MUSCULOSKELETAL: No joint pain or swelling; No muscle, back, or extremity pain  :  swelling around penis      PAST MEDICAL & SURGICAL HISTORY:  Unilateral inguinal hernia, with gangrene, not specified as recurrent  Upper GI bleed: MICU admission , EGD w/ gastric ulcer/visible vessel which was cauterized  Aspiration pneumonia  Clostridium difficile colitis  Pneumonia  MI, old: age 55  UTI (urinary tract infection)  BPH (benign prostatic hyperplasia)  Parkinsons Disease  CAD (Coronary Artery Disease): s/p CABG and stents  HTN (Hypertension)  Hyperlipidemia  Presence of IVC filter: right upper arm placed last year   History of prostate surgery: 2016  Varicose veins of legs: h/o Vein stripping  S/P appendectomy  Stented coronary artery: cardiac stent x 2 - placement between  &amp; possible   Hx of CAB (age 65)      Allergies    Cipro (Rash)    Intolerances        FAMILY HISTORY:  Family history of coronary artery disease: Father,  of MI age 55  Family history of breast cancer (Aunt): Mother      SOCIAL HISTORY:    NO prior tobacco use.   Rare wine in the past, negative CAGE screen.  Supportive spouse.  Retired former businessman.    MEDICATIONS  (STANDING):  aspirin enteric coated 81 milliGRAM(s) Oral daily  atorvastatin 80 milliGRAM(s) Oral at bedtime  calcium carbonate 1250 mG  + Vitamin D (OsCal 500 + D) 1 Tablet(s) Oral daily  carbidopa/levodopa CR 50/200 1 Tablet(s) Oral <User Schedule>  cholecalciferol 1000 Unit(s) Oral two times a day  docusate sodium 100 milliGRAM(s) Oral two times a day  entacapone 200 milliGRAM(s) Oral <User Schedule>  furosemide    Tablet 40 milliGRAM(s) Oral daily  isosorbide   mononitrate ER Tablet (IMDUR) 60 milliGRAM(s) Oral daily  levothyroxine 50 MICROGram(s) Oral daily  multivitamin 1 Tablet(s) Oral daily  nystatin Ointment 1 Application(s) Topical two times a day  piperacillin/tazobactam IVPB.. 3.375 Gram(s) IV Intermittent every 12 hours  polyethylene glycol 3350 17 Gram(s) Oral daily  ranolazine 1000 milliGRAM(s) Oral two times a day  senna 2 Tablet(s) Oral at bedtime  vancomycin  IVPB 1000 milliGRAM(s) IV Intermittent every 24 hours    MEDICATIONS  (PRN):  acetaminophen   Tablet .. 650 milliGRAM(s) Oral every 6 hours PRN Temp greater or equal to 38C (100.4F), Mild Pain (1 - 3)  guaiFENesin    Syrup 100 milliGRAM(s) Oral every 6 hours PRN Cough  melatonin 5 milliGRAM(s) Oral at bedtime PRN Insomnia      Vital Signs Last 24 Hrs  T(C): 36.7 (30 Aug 2019 13:54), Max: 37.1 (29 Aug 2019 20:43)  T(F): 98 (30 Aug 2019 13:54), Max: 98.7 (29 Aug 2019 20:43)  HR: 59 (30 Aug 2019 13:54) (56 - 66)  BP: 147/68 (30 Aug 2019 13:54) (133/69 - 152/75)  BP(mean): --  RR: 18 (30 Aug 2019 13:54) (18 - 18)  SpO2: 98% (30 Aug 2019 13:54) (95% - 98%)    PHYSICAL EXAM:    GENERAL: NAD, well-groomed  HEAD:  Atraumatic, Normocephalic  EYES: EOMI, PERRLA, conjunctiva and sclera clear  ENMT: No tonsillar erythema, exudates, or enlargement; Moist mucous membranes  NECK: Supple, No JVD  CHEST/LUNG: Clear to percussion bilaterally; No rales, rhonchi, wheezing, or rubs  HEART: Regular rate and rhythm; No murmurs, rubs, or gallops  ABDOMEN: Soft, Nontender, Nondistended; Bowel sounds present  EXTREMITIES:  2+ Peripheral Pulses, No clubbing, cyanosis, or edema  LYMPH: No lymphadenopathy noted  SKIN: moisture associated dermatitis involving upper back, buttocks, groin.  Penile swelling/edema, tiny lacerations, no purulence, no eschar, no TTP out of proportion to exam.     LABS:  CBC Full  -  ( 30 Aug 2019 08:43 )  WBC Count : 8.33 K/uL  RBC Count : 2.72 M/uL  Hemoglobin : 8.0 g/dL  Hematocrit : 24.1 %  Platelet Count - Automated : 137 K/uL  Mean Cell Volume : 88.6 fl  Mean Cell Hemoglobin : 29.4 pg  Mean Cell Hemoglobin Concentration : 33.2 gm/dL  Auto Neutrophil # : x  Auto Lymphocyte # : x  Auto Monocyte # : x  Auto Eosinophil # : x  Auto Basophil # : x  Auto Neutrophil % : x  Auto Lymphocyte % : x  Auto Monocyte % : x  Auto Eosinophil % : x  Auto Basophil % : x      08    136  |  99  |  82<H>  ----------------------------<  126<H>  3.2<L>   |  25  |  2.80<H>    Ca    8.2<L>      30 Aug 2019 06:45            MICROBIOLOGY:      Culture - Blood (19 @ 17:55)    Specimen Source: .Blood    Culture Results:   No growth to date.    Culture - Blood (19 @ 17:55)    Specimen Source: .Blood    Culture Results:   No growth to date.    Culture - Urine (19 @ 17:52)    Specimen Source: .Urine    Culture Results:   No growth    Culture - Urine (19 @ 21:50)    Specimen Source: .Urine    Culture Results:   No growth                RADIOLOGY:    < from: US Kidney and Bladder (19 @ 11:52) >  IMPRESSION: Limited study.    Atrophic right kidney with increased renal cortical echogenicity.   Moderate right hydronephrosis, with dilatation of the proximal right   ureter.    Mild left hydronephrosis.    Bilateral pleural effusions.    < end of copied text >        < from: CT Chest No Cont (19 @ 09:17) >  FINDINGS:    LUNGS AND AIRWAYS: Patent central airways. Left distal mucoid impacted   airways. Interlobular septal thickening with patchy bilateral ground   glass opacities. Bibasilar passive atelectasis.    PLEURA: Small bilateral pleural effusions.    MEDIASTINUM AND CRYSTAL: No lymphadenopathy.    VESSELS: Atherosclerotic calcifications of the aorta and coronary   arteries.    HEART: Heart size is enlarged. Aortic valve calcifications. No   pericardial effusion.    CHEST WALL AND LOWER NECK: Subcentimeter hypodense thyroid nodule,   unchanged from prior study in 2015.    VISUALIZED UPPER ABDOMEN: Atrophic right kidney. Unchanged right   hydronephrosis. Left renal cysts.    BONES: Median sternotomy. Degenerative changes. Old healed left rib   fractures. Right humeral head fracture.    IMPRESSION:     Pulmonary edema with small bilateral pleural effusions.    < end of copied text >          < from: Xray Chest 1 View- PORTABLE-Urgent (19 @ 20:12) >    IMPRESSION:    Clear lungs.    < end of copied text >

## 2019-08-30 NOTE — PHYSICAL THERAPY INITIAL EVALUATION ADULT - ADDITIONAL COMMENTS
past DVT with past IVC filter placement not on full AC, CKD stage 3, GI bleed, multiple falls. Home situation/prior level of function- As per PT eval 7/12, pt reports that prior to last hospitalization, lives in an apartment w/ spouse and 4 steps to enter w/ UHR. PTA pt was independent w/ all mobility w/ RW and required assistance w/ some ADLs. past DVT with past IVC filter placement not on full AC, CKD stage 3, GI bleed, multiple falls. Home situation/prior level of function- As per PT eval 7/12, pt reports that prior to last hospitalization, lives in an apartment w/ spouse and 4 steps to enter w/ UHR. PTA pt was independent w/ all mobility w/ RW and required assistance w/ some ADLs. As per pt, was doing well in rehab ambulating with cane and assist until about a week ago when "legs collapsed."

## 2019-08-30 NOTE — PROGRESS NOTE ADULT - SUBJECTIVE AND OBJECTIVE BOX
No pain, no shortness of breath      VITAL:  T(C): , Max: 37.1 (19 @ 20:43)  T(F): , Max: 98.7 (19 @ 20:43)  HR: 60 (19 @ 09:31)  BP: 133/69 (19 @ 09:31)  RR: 18 (19 @ 09:31)  SpO2: 98% (19 @ 09:31)  urine output 1700cc/24h      PHYSICAL EXAM:  Constitutional: NAD; mild psychomotor retardation  HEENT: NCAT, DMM  Neck: Supple, No JVD  Respiratory: CTA-b/l  Cardiovascular: RRR s1s2, no m/r/g  Gastrointestinal: BS+, soft, NT/ND  Extremities: No peripheral edema b/l  : (+)lara    LABS:                        8.0    8.33  )-----------( 137      ( 30 Aug 2019 08:43 )             24.1     Na(136)/K(3.2)/Cl(99)/HCO3(25)/BUN(82)/Cr(2.80)Glu(126)/Ca(8.2)/Mg(--)/PO4(--)     @ 06:45  Na(135)/K(3.6)/Cl(99)/HCO3(21)/BUN(85)/Cr(3.49)Glu(118)/Ca(8.0)/Mg(--)/PO4(--)     @ 07:01  Na(136)/K(3.8)/Cl(96)/HCO3(25)/BUN(89)/Cr(4.20)Glu(127)/Ca(8.5)/Mg(--)/PO4(--)     @ 13:34  Na(136)/K(3.2)/Cl(102)/HCO3(18)/BUN(78)/Cr(3.37)Glu(99)/Ca(6.8)/Mg(--)/PO4(--)     @ 07:03  Na(132)/K(4.4)/Cl(93)/HCO3(23)/BUN(93)/Cr(4.50)Glu(117)/Ca(8.3)/Mg(--)/PO4(--)     @ 18:06    Urinalysis Basic - ( 28 Aug 2019 13:40 )  Color: Red / Appearance: Turbid / S.018 / pH: x  Gluc: x / Ketone: Trace  / Bili: Moderate / Urobili: Negative   Blood: x / Protein: 300 mg/dL / Nitrite: Positive   Leuk Esterase: Large / RBC: >50 /hpf / WBC 6-10   Sq Epi: x / Non Sq Epi: Few / Bacteria: Few      IMPRESSION: 89M w/ HTN, DVT-IVCF, Parkinson's disease, CAD-CABG, and CKD3, 19 a/w urinary retention/STEVEN  (1)CKD - stage 3b, with base GFR 30-40ml/min; nonproteinuric. At least in part due to past obstructive nephropathy, with resultant atrophy of his right kidney.  (2)STEVEN - obstructive - resolving s/p lara placement.  (3)Hypokalemia - worsening - in setting of postobstructive diuresis, Lasix, and low-K diet.   (4)CV - bilateral pleural effusion on imaging  (5)ID - cellulitis - on Vanco/Zosyn IV    RECOMMEND:  (1)Reduce Lasix from 40bid to 40qd  (2)Remove dietary renal restrictions  (3)KCl 20meq po x 2          Abdi Vieira MD  [facility]  [phone] complains of pain from buttock sore. No SOB.      VITAL:  T(C): , Max: 37.1 (19 @ 20:43)  T(F): , Max: 98.7 (19 @ 20:43)  HR: 60 (19 @ 09:31)  BP: 133/69 (19 @ 09:31)  RR: 18 (19 @ 09:31)  SpO2: 98% (19 @ 09:31)  urine output 1700cc/24h      PHYSICAL EXAM:  Constitutional: NAD; mild psychomotor retardation  HEENT: NCAT, DMM  Neck: Supple, No JVD  Respiratory: CTA-b/l  Cardiovascular: RRR s1s2, no m/r/g  Gastrointestinal: BS+, soft, NT/ND  Extremities: No peripheral edema b/l  : (+)lara    LABS:                        8.0    8.33  )-----------( 137      ( 30 Aug 2019 08:43 )             24.1     Na(136)/K(3.2)/Cl(99)/HCO3(25)/BUN(82)/Cr(2.80)Glu(126)/Ca(8.2)/Mg(--)/PO4(--)     @ 06:45  Na(135)/K(3.6)/Cl(99)/HCO3(21)/BUN(85)/Cr(3.49)Glu(118)/Ca(8.0)/Mg(--)/PO4(--)     @ 07:01  Na(136)/K(3.8)/Cl(96)/HCO3(25)/BUN(89)/Cr(4.20)Glu(127)/Ca(8.5)/Mg(--)/PO4(--)     @ 13:34  Na(136)/K(3.2)/Cl(102)/HCO3(18)/BUN(78)/Cr(3.37)Glu(99)/Ca(6.8)/Mg(--)/PO4(--)     @ 07:03  Na(132)/K(4.4)/Cl(93)/HCO3(23)/BUN(93)/Cr(4.50)Glu(117)/Ca(8.3)/Mg(--)/PO4(--)    - @ 18:06    Urinalysis Basic - ( 28 Aug 2019 13:40 )  Color: Red / Appearance: Turbid / S.018 / pH: x  Gluc: x / Ketone: Trace  / Bili: Moderate / Urobili: Negative   Blood: x / Protein: 300 mg/dL / Nitrite: Positive   Leuk Esterase: Large / RBC: >50 /hpf / WBC 6-10   Sq Epi: x / Non Sq Epi: Few / Bacteria: Few      IMPRESSION: 89M w/ HTN, DVT-IVCF, Parkinson's disease, CAD-CABG, and CKD3, 19 a/w urinary retention/STEVEN  (1)CKD - stage 3b, with base GFR 30-40ml/min; nonproteinuric. At least in part due to past obstructive nephropathy, with resultant atrophy of his right kidney.  (2)STEVEN - obstructive - resolving s/p lara placement.  (3)Hypokalemia - worsening - in setting of postobstructive diuresis, Lasix, and low-K diet.   (4)CV - bilateral pleural effusion on imaging  (5)ID - cellulitis - on Vanco/Zosyn IV    RECOMMEND:  (1)Reduce Lasix from 40bid to 40qd  (2)Remove dietary renal restrictions  (3)KCl 20meq po x 2          Abdi Vieira MD  [facility]  [phone]

## 2019-08-30 NOTE — SWALLOW BEDSIDE ASSESSMENT ADULT - SLP GENERAL OBSERVATIONS
R arm in sling. [place], alert, oriented, able to follow most commands, stated that PD progressed-> falls, poor balance, worse voice, and progressive dysphagia. Stated that he saw SLP here (likely Keira as the last time we saw him was 2015) and they did an xray and that he had swallow txt that he didn't find it very useful except that "they told me to look down at my feet when I drink which is helpful" but he wasn't consistent in use when observed. When prompted, flexion was limited. Pt endorsed + cough with solids and liquids and that he requires a fluid wash "because it gets stuck". Stated that he doesn't like the thick fluids. Reported +" lots of phlegm in the morning that I can't cough up". Voice: hypophonic, weak, poor respiratory support for phonation (2-3 words/breath"

## 2019-08-30 NOTE — PROGRESS NOTE ADULT - SUBJECTIVE AND OBJECTIVE BOX
Follow-up Pulm Progress Note    No new respiratory events overnight.  Denies increased SOB, chest pain, cough or mucus.  intermittent cough. some difficulty clearing secretions- clear    Medications:  MEDICATIONS  (STANDING):  aspirin enteric coated 81 milliGRAM(s) Oral daily  atorvastatin 80 milliGRAM(s) Oral at bedtime  calcium carbonate 1250 mG  + Vitamin D (OsCal 500 + D) 1 Tablet(s) Oral daily  carbidopa/levodopa CR 50/200 1 Tablet(s) Oral <User Schedule>  cholecalciferol 1000 Unit(s) Oral two times a day  docusate sodium 100 milliGRAM(s) Oral two times a day  entacapone 200 milliGRAM(s) Oral <User Schedule>  furosemide    Tablet 40 milliGRAM(s) Oral two times a day  isosorbide   mononitrate ER Tablet (IMDUR) 60 milliGRAM(s) Oral daily  levothyroxine 25 MICROGram(s) Oral daily  multivitamin 1 Tablet(s) Oral daily  nystatin Ointment 1 Application(s) Topical two times a day  piperacillin/tazobactam IVPB.. 3.375 Gram(s) IV Intermittent every 12 hours  polyethylene glycol 3350 17 Gram(s) Oral daily  ranolazine 1000 milliGRAM(s) Oral two times a day  senna 2 Tablet(s) Oral at bedtime  vancomycin  IVPB 1000 milliGRAM(s) IV Intermittent every 24 hours    MEDICATIONS  (PRN):  acetaminophen   Tablet .. 650 milliGRAM(s) Oral every 6 hours PRN Temp greater or equal to 38C (100.4F), Mild Pain (1 - 3)  melatonin 5 milliGRAM(s) Oral at bedtime PRN Insomnia      Vent settings (if applicable)      Vital Signs Last 24 Hrs  T(C): 36.5 (30 Aug 2019 05:01), Max: 37.1 (29 Aug 2019 20:43)  T(F): 97.7 (30 Aug 2019 05:01), Max: 98.7 (29 Aug 2019 20:43)  HR: 63 (30 Aug 2019 05:01) (56 - 66)  BP: 138/74 (30 Aug 2019 05:01) (122/70 - 152/75)  BP(mean): --  RR: 18 (30 Aug 2019 05:01) (18 - 18)  SpO2: 95% (30 Aug 2019 05:01) (95% - 98%)           @ 07:01  -   @ 07:00  --------------------------------------------------------  IN: 1590 mL / OUT: 700 mL / NET: 890 mL          LABS:                        7.9    7.39  )-----------( 129      ( 29 Aug 2019 08:14 )             24.7         136  |  99  |  82<H>  ----------------------------<  126<H>  3.2<L>   |  25  |  2.80<H>    Ca    8.2<L>      30 Aug 2019 06:45    TPro  6.9  /  Alb  3.7  /  TBili  0.6  /  DBili  x   /  AST  10  /  ALT  <5<L>  /  AlkPhos  68            CAPILLARY BLOOD GLUCOSE        PT/INR - ( 28 Aug 2019 13:34 )   PT: 14.0 sec;   INR: 1.21 ratio         PTT - ( 28 Aug 2019 13:34 )  PTT:30.3 sec  Urinalysis Basic - ( 28 Aug 2019 13:40 )    Color: Red / Appearance: Turbid / S.018 / pH: x  Gluc: x / Ketone: Trace  / Bili: Moderate / Urobili: Negative   Blood: x / Protein: 300 mg/dL / Nitrite: Positive   Leuk Esterase: Large / RBC: >50 /hpf / WBC 6-10   Sq Epi: x / Non Sq Epi: Few / Bacteria: Few            CULTURES:  Culture Results:   No growth to date. ( @ 17:55)  Culture Results:   No growth to date. ( @ 17:55)  Culture Results:   No growth ( @ 17:52)  Culture Results:   No growth ( @ 21:50)    Most recent blood culture --  @ 17:55   -- -- .Blood  @ 17:55  Most recent blood culture --  @ 17:52   -- -- .Urine  @ 17:52  Most recent blood culture -- 08-27 @ 21:50   -- -- .Urine  @ 21:50      Physical Examination:  Awake and alert, generally comfortable  HEENT: unremarkable  PULM: Clear to auscultation bilaterally, no significant sputum production  CVS: Regular rate and rhythm, no murmurs, rubs, or gallops  Abd:  soft, non tender  Extrem: No CCE    RADIOLOGY REVIEWED  CXR:    CT chest:

## 2019-08-30 NOTE — SWALLOW BEDSIDE ASSESSMENT ADULT - SWALLOW EVAL: DIAGNOSIS
Pt known to this service from prior admissions when he was treated for aspiration PNA. Had objective swallow studies and was diagnosed with cey-uhwrozla-[id number] dysphagia with silent aspiration of thin liquids, nectar thick fluids, and hard solids. 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 A dysphagia diet was recommended at that time. Pt now admitted for hematuria, but IM and Pulmonary both concerned that pt may be aspirating. Based upon assessment today, pt with evidence suggestive of progression of dysphagia, with c/o pharyngeal stasis, reports of impaired secretion management and s/s aspiration on conservative p.o. textures. This is likely associated with progression of PD (although previously dx CP bar may also be a contributing factor). 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

## 2019-08-30 NOTE — PHYSICAL THERAPY INITIAL EVALUATION ADULT - PERTINENT HX OF CURRENT PROBLEM, REHAB EVAL
89 y/oM admitted 8/28 from [facility], 2/2 gross hematuria noted on lara placement following urinary retention. UA with signs of infection. Recent admission to [facility] in 7/2019 following a presumed mechanical fall, noted to have an acute on chronic R proximal humeral fracture not felt to be operative with plans for secondary healing. PMH CABG with subsequent PCI, Parkinson disease with progressive functional impairment, essential HTN, (cont below)

## 2019-08-30 NOTE — PROGRESS NOTE ADULT - SUBJECTIVE AND OBJECTIVE BOX
Subjective  No overnight events. Pt doing well. Reports some cough and discomfort from buttocks  Objective    Vital signs  T(F): , Max: 98.7 (08-29-19 @ 20:43)  HR: 69 (08-30-19 @ 17:32)  BP: 151/73 (08-30-19 @ 17:32)  SpO2: 98% (08-30-19 @ 17:32)  Wt(kg): --    Output     08-29 @ 07:01  -  08-30 @ 07:00  --------------------------------------------------------  IN: 1590 mL / OUT: 1700 mL / NET: -110 mL    08-30 @ 07:01  -  08-30 @ 18:02  --------------------------------------------------------  IN: 830 mL / OUT: 900 mL / NET: -70 mL        Gen: NAD  Abd: soft, NT ,ND  : phallus edematous, circumcised, lara output abdiaziz    Labs      08-30 @ 08:43    WBC 8.33  / Hct 24.1  / SCr --       08-30 @ 06:45    WBC --    / Hct --    / SCr 2.80       Urine Cx: Culture - Urine (08.28.19 @ 17:52)    Specimen Source: .Urine    Culture Results:   No growth    Imaging  < from: US Kidney and Bladder (08.29.19 @ 11:52) >  IMPRESSION: Limited study.    Atrophic right kidney with increased renal cortical echogenicity.   Moderate right hydronephrosis, with dilatation of the proximal right   ureter.    Mild left hydronephrosis.    Bilateral pleural effusions.        < end of copied text >

## 2019-08-30 NOTE — PROGRESS NOTE ADULT - SUBJECTIVE AND OBJECTIVE BOX
Patient is a 89y old  Male who presents with a [facility] following Cortés placement.  Groin erythema, oedema (29 Aug 2019 14:55)      SUBJECTIVE / OVERNIGHT EVENTS: still w hematuria, but improving, c/o cough and a " I cant bring phlegm up" ptn is lying only at 15 degree angle. has h/o dysphagia    MEDICATIONS  (STANDING):  aspirin enteric coated 81 milliGRAM(s) Oral daily  atorvastatin 80 milliGRAM(s) Oral at bedtime  calcium carbonate 1250 mG  + Vitamin D (OsCal 500 + D) 1 Tablet(s) Oral daily  carbidopa/levodopa CR 50/200 1 Tablet(s) Oral <User Schedule>  cholecalciferol 1000 Unit(s) Oral two times a day  docusate sodium 100 milliGRAM(s) Oral two times a day  entacapone 200 milliGRAM(s) Oral <User Schedule>  furosemide    Tablet 40 milliGRAM(s) Oral two times a day  isosorbide   mononitrate ER Tablet (IMDUR) 60 milliGRAM(s) Oral daily  levothyroxine 25 MICROGram(s) Oral daily  multivitamin 1 Tablet(s) Oral daily  nystatin Ointment 1 Application(s) Topical two times a day  piperacillin/tazobactam IVPB.. 3.375 Gram(s) IV Intermittent every 12 hours  polyethylene glycol 3350 17 Gram(s) Oral daily  ranolazine 1000 milliGRAM(s) Oral two times a day  senna 2 Tablet(s) Oral at bedtime  vancomycin  IVPB 1000 milliGRAM(s) IV Intermittent every 24 hours    MEDICATIONS  (PRN):  acetaminophen   Tablet .. 650 milliGRAM(s) Oral every 6 hours PRN Temp greater or equal to 38C (100.4F), Mild Pain (1 - 3)  melatonin 5 milliGRAM(s) Oral at bedtime PRN Insomnia      Vital Signs Last 24 Hrs  T(F): 97.7 (19 @ 05:01), Max: 98.7 (19 @ 20:43)  HR: 63 (19 @ 05:01) (56 - 66)  BP: 138/74 (19 @ 05:01) (122/70 - 152/75)  RR: 18 (19 @ 05:01) (18 - 18)  SpO2: 95% (19 @ 05:01) (95% - 98%)  Telemetry:   CAPILLARY BLOOD GLUCOSE        I&O's Summary    29 Aug 2019 07:01  -  30 Aug 2019 07:00  --------------------------------------------------------  IN: 1590 mL / OUT: 700 mL / NET: 890 mL        PHYSICAL EXAM:  GENERAL: NAD, well-developed  HEAD:  Atraumatic, Normocephalic  EYES: EOMI, PERRLA, conjunctiva and sclera clear  NECK: Supple, No JVD  CHEST/LUNG: Clear to auscultation bilaterally; No wheeze  HEART: Regular rate and rhythm; No murmurs, rubs, or gallops  ABDOMEN: Soft, Nontender, Nondistended; Bowel sounds present  EXTREMITIES:  2+ Peripheral Pulses, No clubbing, cyanosis, or edema  PSYCH: AAOx3  NEUROLOGY: non-focal  SKIN: No rashes or lesions    LABS:                        7.9    7.39  )-----------( 129      ( 29 Aug 2019 08:14 )             24.7         136  |  99  |  82<H>  ----------------------------<  126<H>  3.2<L>   |  25  |  2.80<H>    Ca    8.2<L>      30 Aug 2019 06:45    TPro  6.9  /  Alb  3.7  /  TBili  0.6  /  DBili  x   /  AST  10  /  ALT  <5<L>  /  AlkPhos  68  08-    PT/INR - ( 28 Aug 2019 13:34 )   PT: 14.0 sec;   INR: 1.21 ratio         PTT - ( 28 Aug 2019 13:34 )  PTT:30.3 sec      Urinalysis Basic - ( 28 Aug 2019 13:40 )    Color: Red / Appearance: Turbid / S.018 / pH: x  Gluc: x / Ketone: Trace  / Bili: Moderate / Urobili: Negative   Blood: x / Protein: 300 mg/dL / Nitrite: Positive   Leuk Esterase: Large / RBC: >50 /hpf / WBC 6-10   Sq Epi: x / Non Sq Epi: Few / Bacteria: Few        RADIOLOGY & ADDITIONAL TESTS:    Imaging Personally Reviewed:    Consultant(s) Notes Reviewed:      Care Discussed with Consultants/Other Providers:

## 2019-08-30 NOTE — PROGRESS NOTE ADULT - ASSESSMENT
89M with hx of BPH s/p thulmium enucleation of prostate 2015, CAD, Parkinson's, CKD stage 3 (baseline 1.4-1.6) presenting with hematuria and urinary retention - hematuria now resolved    -- atrophic right kidney, however w b/l hydronephrosis R>L, unclear etiology  -- no cross sectional imaging of abdomen, please obtain noncontrast CT a/p  -- trend Cr, recommend TOV w/Cr lucia  -- bowel regimen  -- start flomax  -- UCx neg

## 2019-08-30 NOTE — PHYSICAL THERAPY INITIAL EVALUATION ADULT - RANGE OF MOTION EXAMINATION, REHAB EVAL
RUE immobilized in sling/Left UE ROM was WFL (within functional limits)/bilateral lower extremity ROM was WFL (within functional limits)

## 2019-08-30 NOTE — SWALLOW BEDSIDE ASSESSMENT ADULT - PHARYNGEAL PHASE
Delayed pharyngeal swallow/Wet vocal quality post oral intake/Complaints of pharyngeal stasis/Multiple swallows/Decreased laryngeal elevation

## 2019-08-30 NOTE — PROGRESS NOTE ADULT - ASSESSMENT
patient transferred from [facility] following gross haematuria following Cortés placement>>seen by urology in the ER.     Patient with a complex medical history of past CABG, past PCI, progressive functional impairment in the setting of Parkinson's, past complex GI bleeding with past DVT, IVC filter, presumably chronic atrial fibrillation but not a full AC candidate due to attendant risk, now with acute over chronic kidney injury.   Groin with cellulitis but no evidence of necrotizing fasciitis (Eliza) at present--seen by urology.       STEVEN/CKD3, primarily obstructive in nature, B/L hydro, R>L,  renal eval appreciated     CAD/ICM/Afib, Will repeat patient's echo (last in 2015) to assess LV function.  Dr. Dias called for card consult,  Cardiac status is stable.   Ptn w b/l pleural effusions and pulm edema  systolic CHF. awaiting TTE, on lasix bid    cellulitis buttock, Will continue with IV Zosyn and IV Vancomycin adjusted for renal failure. ID consult called    Tsh elevated c/w hypothyroidism uncontrolled, will increase Synthroid to 50 mcg daily     Parkinson's and functional paraplegia, awaiting Neuro consult, cont present meds    Ptn has h/o dysphagia and high risk for aspiration, would place formal S&S eval, place on Dysphagia diet, aspiration precautions. Pulm eval appreciated     Right shoulder pain, had a humeral Fx in 3/19, get repeat xrays and ortho consult   DVT ppx w PAS

## 2019-08-30 NOTE — CONSULT NOTE ADULT - SUBJECTIVE AND OBJECTIVE BOX
[facility]        Patient is a 89y old  Male who presents with a chief complaint of Sent in from [facility] for gross haematuria following Cortés placement.  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  Patient reported that he had local trauma to the skin of his penis following an attempt to use a hand held urinal.   Patient also notes that patient was on a ? commode and was noted to have increased scrotal oedema and redness. called to manage his parkinsons dementia. unable to tell me the name of his neurologist.  He reports having hallucinations, reports hearing his daughter and friend planning to get him into rehab, however it appears that they both were not there.   He is not sure if these are dreams.   He doesnt know if he act out his dreams.            *****PAST MEDICAL / Surgical  HISTORY:  PAST MEDICAL & SURGICAL HISTORY:  Unilateral inguinal hernia, with gangrene, not specified as recurrent  Upper GI bleed: MICU admission , EGD w/ gastric ulcer/visible vessel which was cauterized  Aspiration pneumonia  Clostridium difficile colitis  Pneumonia  MI, old: age 55  UTI (urinary tract infection)  BPH (benign prostatic hyperplasia)  Parkinsons Disease  CAD (Coronary Artery Disease): s/p CABG and stents  HTN (Hypertension)  Hyperlipidemia  Presence of IVC filter: right upper arm placed last year   History of prostate surgery: 2016  Varicose veins of legs: h/o Vein stripping  S/P appendectomy  Stented coronary artery: cardiac stent x 2 - placement between  &amp; possible   Hx of CAB (age 65)           *****FAMILY HISTORY:  FAMILY HISTORY:  Family history of coronary artery disease: Father,  of MI age 55  Family history of breast cancer (Aunt): Mother           *****SOCIAL HISTORY:  Alcohol: None  Smoking: None         *****ALLERGIES:   Allergies    Cipro (Rash)    Intolerances             *****MEDICATIONS: current medication reviewed and documented.   MEDICATIONS  (STANDING):  aspirin enteric coated 81 milliGRAM(s) Oral daily  atorvastatin 80 milliGRAM(s) Oral at bedtime  calcium carbonate 1250 mG  + Vitamin D (OsCal 500 + D) 1 Tablet(s) Oral daily  carbidopa/levodopa CR 50/200 1 Tablet(s) Oral <User Schedule>  cholecalciferol 1000 Unit(s) Oral two times a day  docusate sodium 100 milliGRAM(s) Oral two times a day  entacapone 200 milliGRAM(s) Oral <User Schedule>  furosemide    Tablet 40 milliGRAM(s) Oral daily  isosorbide   mononitrate ER Tablet (IMDUR) 60 milliGRAM(s) Oral daily  levothyroxine 50 MICROGram(s) Oral daily  multivitamin 1 Tablet(s) Oral daily  nystatin Ointment 1 Application(s) Topical two times a day  piperacillin/tazobactam IVPB.. 3.375 Gram(s) IV Intermittent every 12 hours  polyethylene glycol 3350 17 Gram(s) Oral daily  potassium chloride    Tablet ER 20 milliEquivalent(s) Oral every 2 hours  ranolazine 1000 milliGRAM(s) Oral two times a day  senna 2 Tablet(s) Oral at bedtime  vancomycin  IVPB 1000 milliGRAM(s) IV Intermittent every 24 hours    MEDICATIONS  (PRN):  acetaminophen   Tablet .. 650 milliGRAM(s) Oral every 6 hours PRN Temp greater or equal to 38C (100.4F), Mild Pain (1 - 3)  guaiFENesin    Syrup 100 milliGRAM(s) Oral every 6 hours PRN Cough  melatonin 5 milliGRAM(s) Oral at bedtime PRN Insomnia           *****REVIEW OF SYSTEM:  GEN: no fever, no chills, no pain  RESP: no SOB, no cough, no sputum  CVS: no chest pain, no palpitations, no edema  GI: no abdominal pain, no nausea, no vomiting, no constipation, no diarrhea  : no dysurea, no frequency, no hematurea  Neuro: no headache, no dizziness  PSYCH: no anxiety, no depression  Derm : no itching, no rash         *****VITAL SIGNS:  T(C): 36.7 (19 @ 09:31), Max: 37.1 (19 @ 20:43)  HR: 60 (19 @ 09:31) (56 - 66)  BP: 133/69 (19 @ 09:31) (133/69 - 152/75)  RR: 18 (19 @ 09:31) (18 - 18)  SpO2: 98% (19 @ 09:31) (95% - 98%)  Wt(kg): --     @ 07:01  -   @ 07:00  --------------------------------------------------------  IN: 1590 mL / OUT: 1700 mL / NET: -110 mL     @ 07:01  -   @ 11:53  --------------------------------------------------------  IN: 240 mL / OUT: 600 mL / NET: -360 mL             *****PHYSICAL EXAM:  [place]   Alert oriented x 2  Attention comprehension are fair. Able to name, repeat  without any difficulty.   Able to follow 1-2  step commands.     EOMI fundi not visualized,  VFF to confrontration  No facial asymmetry   Tongue is midline   Palate elevates symmetrically   Moving all 4 ext symmetrically no pronator drift  limited eval of rue due to sling      sensation is grossly symmetric  Gait : not assessed.  B/L down going toes               *****LAB AND IMAGIN.0    8.33  )-----------( 137      ( 30 Aug 2019 08:43 )             24.1                   136  |  99  |  82<H>  ----------------------------<  126<H>  3.2<L>   |  25  |  2.80<H>    Ca    8.2<L>      30 Aug 2019 06:45    TPro  6.9  /  Alb  3.7  /  TBili  0.6  /  DBili  x   /  AST  10  /  ALT  <5<L>  /  AlkPhos  68  08-28    PT/INR - ( 28 Aug 2019 13:34 )   PT: 14.0 sec;   INR: 1.21 ratio         PTT - ( 28 Aug 2019 13:34 )  PTT:30.3 sec                        Urinalysis Basic - ( 28 Aug 2019 13:40 )    Color: Red / Appearance: Turbid / S.018 / pH: x  Gluc: x / Ketone: Trace  / Bili: Moderate / Urobili: Negative   Blood: x / Protein: 300 mg/dL / Nitrite: Positive   Leuk Esterase: Large / RBC: >50 /hpf / WBC 6-10   Sq Epi: x / Non Sq Epi: Few / Bacteria: Few    < from: CT Head No Cont (19 @ 17:59) >    There is no evidence of an acute intracranial hemorrhage, extra-axial   collection, mass effect, or midline shift. The basal cisterns are patent.   No evidence of downward herniation. No hydrocephalus.    There is age-appropriate cerebral volume loss with proportional   prominence of the sulci and ventricles. Nonspecific white matter   hypoattenuation, likely related to chronic microvascular changes.    The visualized paranasal sinuses and mastoid air cells are clear.    No displaced calvarial fracture.     IMPRESSION:     No evidence of acute intracranial hemorrhage, midline shift, or displaced  calvarial fracture.          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  Patient reported that he had local trauma to the skin of his penis following an attempt to use a hand held urinal.   Patient also notes that patient was on a ? commode and was noted to have increased scrotal oedema and redness. called to manage his parkinsons dementia. unable to tell me the name of his neurologist.  He reports having hallucinations, reports hearing his daughter and friend planning to get him into rehab, however it appears that they both were not there.   He is not sure if these are dreams.   He doesn't know if he act out his dreams.          Problem/Recommendations 1: Parkinsons   continue sinemet/entacapone  ? hallucinations related to parkinsons vs. metabolic encephalopathy due to STEVEN   will continue to monitor closely   regulate sleep wake cycle.     Problem/Recommendations 2:  Falls likely related to parkinsons +/- deconditioning +/- microvascular disease.   fall precautions.   pt consult may need therapy        ___________________________  Will follow with you.  Thank you,  Farideh Irvin MD  Diplomate of the American Board of Neurology and Psychiatry.  Diplomate of the American Board of Vascular Neurology.   [facility] ([facility]), [facility]   Ph: [phone]    Differential diagnosis and plan of care discussed with patient after the evaluation.   Advanced care planning options discussed.   Pain assessed and judicious use of narcotics when appropriate was discussed.  Importance of Fall prevention discussed.  Counseling on Smoking and Alcohol cessation was offered when appropriate.  Counseling on Diet, exercise, and medication compliance was done.     62 minutes spent on the total encounter;  more than 50 % of the visit was spent on counseling  and or coordinating care by the attending physician.    Thank you for allowing me to participate in the care of this danilo patient. Please do not hesitate to call me if you have any questions.     This and subsequent notes were partially created using voice recognition software and will  inherently be subject to errors including those of syntax and sound alike substitutions which may escape proofreading. In such instances original meaning may be extrapolated by contextual derivation.

## 2019-08-30 NOTE — CONSULT NOTE ADULT - ASSESSMENT
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  Patient reported that he had local trauma to the skin of his penis following an attempt to use a hand held urinal.   Patient also notes that patient was on a ? commode and was noted to have increased scrotal oedema and redness. (28 Aug 2019 20:42)    ER VSS, no leukocytosis.  Cr 2.8.  UA (+) nit/LE.  Large bld.  WBC 6-10, >50 RBC.  Ucx and Bcx (-).  CT pelv no buttock abscess or subcut gas, increased stool in the rectum and mild stercoral colitis.  CT chest shows pulm edema with small b/l pleural effusions.  US kidney/bladder shows moderate R hydronephrosis and mild L hydronephrosis.      Pt seen by Urology for hematuria, s/p Lara replacement.  Hematuria has been improving with mild irrigation.      Pt currently on vanco/zosyn for groin cellulitis.  ID consult called for further abx managment.      r/o Groin cellulitis:    - No fever or leukocytosis.  Skin changes suggestive of moisture associated dermatitis >> cellulitis.  Cont abx x 7 days to minimize microbial bioburden.  Keep area dry and clean of feces/urine.  Recommend changing to nystatin powder to treat for localized candidiasis.     - f/u vanco trough.    - If pt spikes fever > 100.4, send blood cultures.    - No signs of Eliza's gangrene    - If no improvement, consider Wound care eval.      Urinary retention:    - Post obstructive uropathy, b/l hydronephrosis seen.  s/p Lara.  Hematuria resolved with minimal irrigation.  Cont to f/u with Urology.    - Urine cultures NGTD    - STEVEN on CKD.  Cr improving post lara placement, lasix dose decreased.  Monitor Vanco trough and adjust dose accordingly, maintain between 10-15      Will follow,    Jasmine Ramirez  [phone]

## 2019-08-30 NOTE — SWALLOW BEDSIDE ASSESSMENT ADULT - SWALLOW EVAL: RECOMMENDED DIET
Given findings of this examination, pt remains at risk for aspiration. Pt wishes to take a palliative approach to feeding, has accepted the risk of aspiration and related complications and wishes to remain on a a p.o. diet. (Pt stated that "I am almost 89 years old, I don't want a feeding tube"

## 2019-08-31 LAB
ANION GAP SERPL CALC-SCNC: 14 MMOL/L — SIGNIFICANT CHANGE UP (ref 5–17)
BUN SERPL-MCNC: 73 MG/DL — HIGH (ref 7–23)
CALCIUM SERPL-MCNC: 8 MG/DL — LOW (ref 8.4–10.5)
CHLORIDE SERPL-SCNC: 98 MMOL/L — SIGNIFICANT CHANGE UP (ref 96–108)
CO2 SERPL-SCNC: 23 MMOL/L — SIGNIFICANT CHANGE UP (ref 22–31)
CREAT SERPL-MCNC: 2.4 MG/DL — HIGH (ref 0.5–1.3)
GLUCOSE SERPL-MCNC: 162 MG/DL — HIGH (ref 70–99)
HCT VFR BLD CALC: 24.2 % — LOW (ref 39–50)
HGB BLD-MCNC: 8 G/DL — LOW (ref 13–17)
MAGNESIUM SERPL-MCNC: 2.8 MG/DL — HIGH (ref 1.6–2.6)
MCHC RBC-ENTMCNC: 29.3 PG — SIGNIFICANT CHANGE UP (ref 27–34)
MCHC RBC-ENTMCNC: 33.1 GM/DL — SIGNIFICANT CHANGE UP (ref 32–36)
MCV RBC AUTO: 88.6 FL — SIGNIFICANT CHANGE UP (ref 80–100)
PLATELET # BLD AUTO: 151 K/UL — SIGNIFICANT CHANGE UP (ref 150–400)
POTASSIUM SERPL-MCNC: 3.4 MMOL/L — LOW (ref 3.5–5.3)
POTASSIUM SERPL-SCNC: 3.4 MMOL/L — LOW (ref 3.5–5.3)
RBC # BLD: 2.73 M/UL — LOW (ref 4.2–5.8)
RBC # FLD: 14.5 % — SIGNIFICANT CHANGE UP (ref 10.3–14.5)
SODIUM SERPL-SCNC: 135 MMOL/L — SIGNIFICANT CHANGE UP (ref 135–145)
WBC # BLD: 8.33 K/UL — SIGNIFICANT CHANGE UP (ref 3.8–10.5)
WBC # FLD AUTO: 8.33 K/UL — SIGNIFICANT CHANGE UP (ref 3.8–10.5)

## 2019-08-31 PROCEDURE — 74176 CT ABD & PELVIS W/O CONTRAST: CPT | Mod: 26

## 2019-08-31 PROCEDURE — 73030 X-RAY EXAM OF SHOULDER: CPT | Mod: 26,RT

## 2019-08-31 PROCEDURE — 73060 X-RAY EXAM OF HUMERUS: CPT | Mod: 26,RT

## 2019-08-31 RX ORDER — TAMSULOSIN HYDROCHLORIDE 0.4 MG/1
0.4 CAPSULE ORAL AT BEDTIME
Refills: 0 | Status: DISCONTINUED | OUTPATIENT
Start: 2019-08-31 | End: 2019-08-31

## 2019-08-31 RX ORDER — FLUTICASONE PROPIONATE 50 MCG
1 SPRAY, SUSPENSION NASAL
Refills: 0 | Status: DISCONTINUED | OUTPATIENT
Start: 2019-08-31 | End: 2019-09-04

## 2019-08-31 RX ORDER — TAMSULOSIN HYDROCHLORIDE 0.4 MG/1
0.4 CAPSULE ORAL DAILY
Refills: 0 | Status: DISCONTINUED | OUTPATIENT
Start: 2019-08-31 | End: 2019-09-04

## 2019-08-31 RX ORDER — POTASSIUM CHLORIDE 20 MEQ
20 PACKET (EA) ORAL ONCE
Refills: 0 | Status: COMPLETED | OUTPATIENT
Start: 2019-08-31 | End: 2019-08-31

## 2019-08-31 RX ADMIN — Medication 5 MILLIGRAM(S): at 20:16

## 2019-08-31 RX ADMIN — TAMSULOSIN HYDROCHLORIDE 0.4 MILLIGRAM(S): 0.4 CAPSULE ORAL at 12:10

## 2019-08-31 RX ADMIN — Medication 100 MILLIGRAM(S): at 12:10

## 2019-08-31 RX ADMIN — SENNA PLUS 2 TABLET(S): 8.6 TABLET ORAL at 21:58

## 2019-08-31 RX ADMIN — Medication 1 SPRAY(S): at 17:31

## 2019-08-31 RX ADMIN — PIPERACILLIN AND TAZOBACTAM 25 GRAM(S): 4; .5 INJECTION, POWDER, LYOPHILIZED, FOR SOLUTION INTRAVENOUS at 05:22

## 2019-08-31 RX ADMIN — CARBIDOPA AND LEVODOPA 1 TABLET(S): 25; 100 TABLET ORAL at 08:13

## 2019-08-31 RX ADMIN — ENTACAPONE 200 MILLIGRAM(S): 200 TABLET, FILM COATED ORAL at 17:31

## 2019-08-31 RX ADMIN — Medication 1 TABLET(S): at 12:10

## 2019-08-31 RX ADMIN — CARBIDOPA AND LEVODOPA 1 TABLET(S): 25; 100 TABLET ORAL at 12:12

## 2019-08-31 RX ADMIN — PIPERACILLIN AND TAZOBACTAM 25 GRAM(S): 4; .5 INJECTION, POWDER, LYOPHILIZED, FOR SOLUTION INTRAVENOUS at 18:33

## 2019-08-31 RX ADMIN — ENTACAPONE 200 MILLIGRAM(S): 200 TABLET, FILM COATED ORAL at 12:12

## 2019-08-31 RX ADMIN — RANOLAZINE 1000 MILLIGRAM(S): 500 TABLET, FILM COATED, EXTENDED RELEASE ORAL at 17:32

## 2019-08-31 RX ADMIN — Medication 81 MILLIGRAM(S): at 12:11

## 2019-08-31 RX ADMIN — ENTACAPONE 200 MILLIGRAM(S): 200 TABLET, FILM COATED ORAL at 21:58

## 2019-08-31 RX ADMIN — Medication 20 MILLIEQUIVALENT(S): at 12:09

## 2019-08-31 RX ADMIN — Medication 40 MILLIGRAM(S): at 05:21

## 2019-08-31 RX ADMIN — Medication 100 MILLIGRAM(S): at 05:22

## 2019-08-31 RX ADMIN — Medication 100 MILLIGRAM(S): at 17:31

## 2019-08-31 RX ADMIN — Medication 250 MILLIGRAM(S): at 13:33

## 2019-08-31 RX ADMIN — ENTACAPONE 200 MILLIGRAM(S): 200 TABLET, FILM COATED ORAL at 08:13

## 2019-08-31 RX ADMIN — Medication 1000 UNIT(S): at 17:31

## 2019-08-31 RX ADMIN — ATORVASTATIN CALCIUM 80 MILLIGRAM(S): 80 TABLET, FILM COATED ORAL at 21:58

## 2019-08-31 RX ADMIN — POLYETHYLENE GLYCOL 3350 17 GRAM(S): 17 POWDER, FOR SOLUTION ORAL at 12:11

## 2019-08-31 RX ADMIN — NYSTATIN CREAM 1 APPLICATION(S): 100000 CREAM TOPICAL at 05:22

## 2019-08-31 RX ADMIN — ISOSORBIDE MONONITRATE 60 MILLIGRAM(S): 60 TABLET, EXTENDED RELEASE ORAL at 12:11

## 2019-08-31 RX ADMIN — Medication 1000 UNIT(S): at 05:21

## 2019-08-31 RX ADMIN — CARBIDOPA AND LEVODOPA 1 TABLET(S): 25; 100 TABLET ORAL at 21:58

## 2019-08-31 RX ADMIN — Medication 50 MICROGRAM(S): at 05:21

## 2019-08-31 RX ADMIN — RANOLAZINE 1000 MILLIGRAM(S): 500 TABLET, FILM COATED, EXTENDED RELEASE ORAL at 05:21

## 2019-08-31 RX ADMIN — CARBIDOPA AND LEVODOPA 1 TABLET(S): 25; 100 TABLET ORAL at 17:31

## 2019-08-31 NOTE — PROGRESS NOTE ADULT - ASSESSMENT
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  Patient reported that he had local trauma to the skin of his penis following an attempt to use a hand held urinal.   Patient also notes that patient was on a ? commode and was noted to have increased scrotal oedema and redness. (28 Aug 2019 20:42)    ER VSS, no leukocytosis.  Cr 2.8.  UA (+) nit/LE.  Large bld.  WBC 6-10, >50 RBC.  Ucx and Bcx (-).  CT pelv no buttock abscess or subcut gas, increased stool in the rectum and mild stercoral colitis.  CT chest shows pulm edema with small b/l pleural effusions.  US kidney/bladder shows moderate R hydronephrosis and mild L hydronephrosis.      Pt seen by Urology for hematuria, s/p Lara replacement.  Hematuria has been improving with mild irrigation.      Pt currently on vanco/zosyn for groin cellulitis.  ID consult called for further abx managment.      r/o Groin cellulitis:    - No fever or leukocytosis.  Skin changes suggestive of moisture associated dermatitis >> cellulitis.  Cont abx x 7 days to minimize microbial bioburden.  Keep area dry and clean of feces/urine.  Recommend changing to nystatin powder to treat for localized candidiasis.  Keep area free from soilage.     - f/u vanco trough.    - If pt spikes fever > 100.4, send blood cultures.    - No signs of Eliza's gangrene    - If no improvement, consider Wound care eval.    - Can de-escalate to PO doxy/augmentin for remainder of course.       Urinary retention:    - Post obstructive uropathy, b/l hydronephrosis seen.  s/p Lara.  Hematuria resolved with minimal irrigation.  Cont to f/u with Urology.    - f/u ct ap for further evaluation    - Urine cultures NGTD    - STEVEN on CKD.  Cr improving post lara placement, lasix dose decreased.  Monitor Vanco trough and adjust dose accordingly, maintain between 10-15      Will follow,    Jasmine Ramirez  [phone]

## 2019-08-31 NOTE — PROGRESS NOTE ADULT - SUBJECTIVE AND OBJECTIVE BOX
Subjective: Patient seen and examined. No new events except as noted.     REVIEW OF SYSTEMS:    CONSTITUTIONAL: + weakness, fevers or chills  EYES/ENT: No visual changes;  No vertigo or throat pain   NECK: No pain or stiffness  RESPIRATORY: No cough, wheezing, hemoptysis; No shortness of breath  CARDIOVASCULAR: No chest pain or palpitations  GASTROINTESTINAL: No abdominal or epigastric pain. No nausea, vomiting, or hematemesis; No diarrhea or constipation. No melena or hematochezia.  GENITOURINARY: No dysuria, frequency or hematuria  NEUROLOGICAL: No numbness or weakness  SKIN: No itching, burning, rashes, or lesions   All other review of systems is negative unless indicated above.    MEDICATIONS:  MEDICATIONS  (STANDING):  aspirin enteric coated 81 milliGRAM(s) Oral daily  atorvastatin 80 milliGRAM(s) Oral at bedtime  calcium carbonate 1250 mG  + Vitamin D (OsCal 500 + D) 1 Tablet(s) Oral daily  carbidopa/levodopa CR 50/200 1 Tablet(s) Oral <User Schedule>  cholecalciferol 1000 Unit(s) Oral two times a day  docusate sodium 100 milliGRAM(s) Oral two times a day  entacapone 200 milliGRAM(s) Oral <User Schedule>  fluticasone propionate 50 MICROgram(s)/spray Nasal Spray 1 Spray(s) Both Nostrils two times a day  furosemide    Tablet 40 milliGRAM(s) Oral daily  isosorbide   mononitrate ER Tablet (IMDUR) 60 milliGRAM(s) Oral daily  levothyroxine 50 MICROGram(s) Oral daily  multivitamin 1 Tablet(s) Oral daily  nystatin Ointment 1 Application(s) Topical two times a day  piperacillin/tazobactam IVPB.. 3.375 Gram(s) IV Intermittent every 12 hours  polyethylene glycol 3350 17 Gram(s) Oral daily  ranolazine 1000 milliGRAM(s) Oral two times a day  senna 2 Tablet(s) Oral at bedtime  tamsulosin 0.4 milliGRAM(s) Oral daily  vancomycin  IVPB 1000 milliGRAM(s) IV Intermittent every 24 hours      PHYSICAL EXAM:  T(C): 36.5 (08-31-19 @ 20:05), Max: 36.8 (08-31-19 @ 00:05)  HR: 51 (08-31-19 @ 20:05) (51 - 67)  BP: 122/63 (08-31-19 @ 20:05) (122/63 - 145/77)  RR: 18 (08-31-19 @ 20:05) (18 - 18)  SpO2: 98% (08-31-19 @ 20:05) (95% - 98%)  Wt(kg): --  I&O's Summary    30 Aug 2019 07:01  -  31 Aug 2019 07:00  --------------------------------------------------------  IN: 1110 mL / OUT: 2400 mL / NET: -1290 mL    31 Aug 2019 07:01  -  31 Aug 2019 21:33  --------------------------------------------------------  IN: 360 mL / OUT: 500 mL / NET: -140 mL          Appearance: NAD  HEENT:   Normal oral mucosa, PERRL, EOMI	  Lymphatic: No lymphadenopathy , no edema  Cardiovascular: Irregular  S1 S2, No JVD, No murmurs , Peripheral pulses palpable 2+ bilaterally  Respiratory: Lungs clear to auscultation, normal effort 	  Gastrointestinal:  Soft, Non-tender, + BS	  Skin: No rashes, No ecchymoses, No cyanosis, warm to touch  Musculoskeletal: Decreased range of motion and  strength  Psychiatry:  Mood & affect appropriate  Ext: No edema      LABS:    CARDIAC MARKERS:                                8.0    8.33  )-----------( 151      ( 31 Aug 2019 10:31 )             24.2     08-31    135  |  98  |  73<H>  ----------------------------<  162<H>  3.4<L>   |  23  |  2.40<H>    Ca    8.0<L>      31 Aug 2019 07:02  Mg     2.8     08-31      proBNP:   Lipid Profile:   HgA1c:   TSH:             TELEMETRY: 	    ECG:  	  RADIOLOGY:   DIAGNOSTIC TESTING:  [ ] Echocardiogram:  [ ]  Catheterization:  [ ] Stress Test:    OTHER:

## 2019-08-31 NOTE — PROGRESS NOTE ADULT - SUBJECTIVE AND OBJECTIVE BOX
Afebrile. O2 sat 95% on room air.  Occasional coughing-- sometimes related to eating and at other times possibly related to postnasal drip.  He feels short of breath at times.  Speech/swallowing assessment noted and appreciated.    Vital Signs Last 24 Hrs  T(C): 36.6 (31 Aug 2019 04:55), Max: 37.1 (30 Aug 2019 21:02)  T(F): 97.9 (31 Aug 2019 04:55), Max: 98.8 (30 Aug 2019 21:02)  HR: 67 (31 Aug 2019 04:55) (59 - 69)  BP: 132/71 (31 Aug 2019 04:55) (132/71 - 151/73)  BP(mean): --  RR: 18 (31 Aug 2019 04:55) (18 - 18)  SpO2: 95% (31 Aug 2019 04:55) (95% - 98%)    Medications:  MEDICATIONS  (STANDING):  aspirin enteric coated 81 milliGRAM(s) Oral daily  atorvastatin 80 milliGRAM(s) Oral at bedtime  calcium carbonate 1250 mG  + Vitamin D (OsCal 500 + D) 1 Tablet(s) Oral daily  carbidopa/levodopa CR 50/200 1 Tablet(s) Oral <User Schedule>  cholecalciferol 1000 Unit(s) Oral two times a day  docusate sodium 100 milliGRAM(s) Oral two times a day  entacapone 200 milliGRAM(s) Oral <User Schedule>  furosemide    Tablet 40 milliGRAM(s) Oral daily  isosorbide   mononitrate ER Tablet (IMDUR) 60 milliGRAM(s) Oral daily  levothyroxine 50 MICROGram(s) Oral daily  multivitamin 1 Tablet(s) Oral daily  nystatin Ointment 1 Application(s) Topical two times a day  piperacillin/tazobactam IVPB.. 3.375 Gram(s) IV Intermittent every 12 hours  polyethylene glycol 3350 17 Gram(s) Oral daily  ranolazine 1000 milliGRAM(s) Oral two times a day  senna 2 Tablet(s) Oral at bedtime  tamsulosin 0.4 milliGRAM(s) Oral daily  vancomycin  IVPB 1000 milliGRAM(s) IV Intermittent every 24 hours    MEDICATIONS  (PRN):  acetaminophen   Tablet .. 650 milliGRAM(s) Oral every 6 hours PRN Temp greater or equal to 38C (100.4F), Mild Pain (1 - 3)  guaiFENesin    Syrup 100 milliGRAM(s) Oral every 6 hours PRN Cough  melatonin 5 milliGRAM(s) Oral at bedtime PRN Insomnia      Allergies    Cipro (Rash)    Intolerances        Physical Examination:  Pleasant. In no acute distress.  Neck: no JVD, LAD, accessory muscle use  PULM: Decreased breath sounds at bases with few crackles. No wheezes or rhonchi.  CVS: IRR  Abdomen: nontender  Extremities: no edema    LAB:  WBC 8330    CT chest:  EXAM:  CT CHEST                        PROCEURE DATE:  08/29/2019      INTERPRETATION:  CLINICAL INFORMATION: Chronic cough. Patient with   Parkinson's disease.     COMPARISON: Chest CT from 10/26/2015.    PROCEDURE:   CT of the Chest was performed without intravenous contrast.  Sagittal and coronal reformats were performed.      FINDINGS:    LUNGS AND AIRWAYS: Patent central airways. Left distal mucoid impacted   airways. Interlobular septal thickening with patchy bilateral ground   glass opacities. Bibasilar passive atelectasis.    PLEURA: Small bilateral pleural effusions.    MEDIASTINUM AND CRYSTAL: No lymphadenopathy.    VESSELS: Atherosclerotic calcifications of the aorta and coronary   arteries.    HEART: Heart size is enlarged. Aortic valve calcifications. No   pericardial effusion.    CHEST WALL AND LOWER NECK: Subcentimeter hypodense thyroid nodule,   unchanged from prior study in 2015.    VISUALIZED UPPER ABDOMEN: Atrophic right kidney. Unchanged right   hydronephrosis. Left renal cysts.    BONES: Median sternotomy. Degenerative changes. Old healed left rib   fractures. Right humeral head fracture.    IMPRESSION:     Pulmonary edema with small bilateral pleural effusions.    Plan:  1. Antibiotic Rx as per ID.  2. Continue dysphagia 2 mechanical soft honey thickened liquid diet.  3. Add Flonase nasal spray.  4. Robitussin expectorant PRN.  5. Cardiac and Renal Rx as noted.    POC: Martín Warren M.D.  [phone]

## 2019-08-31 NOTE — PROGRESS NOTE ADULT - ASSESSMENT
IMPRESSION: 89M w/ HTN, DVT-IVCF, Parkinson's disease, CAD-CABG, and CKD3, 8/28/19 a/w urinary retention/STEVEN  (1)CKD - stage 3b, with base GFR 30-40ml/min; nonproteinuric. At least in part due to past obstructive nephropathy, with resultant atrophy of his right kidney.  (2)STEVEN - obstructive - resolving s/p lara placement.  (3)Hypokalemia - worsening - in setting of postobstructive diuresis, Lasix, and low-K diet.   (4)CV - bilateral pleural effusion on imaging  (5)ID - cellulitis - on Vanco/Zosyn IV    RECOMMEND:  (1)Continue Lasix  40mg PO qd  (2)Remove dietary renal restrictions  (3)KCl 20meq po     NNEKA BrantleyC  [facility]  [phone] IMPRESSION: 89M w/ HTN, DVT-IVCF, Parkinson's disease, CAD-CABG, and CKD3, 8/28/19 a/w urinary retention/STEVEN  (1)CKD - stage 3b, with base GFR 30-40ml/min; nonproteinuric. At least in part due to past obstructive nephropathy, with resultant atrophy of his right kidney.  (2)STEVEN - obstructive - improving s/p lara placement.  (3)Hypokalemia - in setting of postobstructive diuresis, Lasix, and low-K diet. K is 3.4 improved from yesterday 3.2, supplemented with KCl 20mEq solution x 1 today  (4)CV - bilateral pleural effusion on imaging  (5)ID - cellulitis - on IV Vanco and IVZosyn     RECOMMEND:  (1)Continue Lasix  40mg PO qd  (2)Remove dietary renal restrictions    NNEKA BrantleyC  [facility]  [phone]

## 2019-08-31 NOTE — PROGRESS NOTE ADULT - SUBJECTIVE AND OBJECTIVE BOX
Infectious Diseases progress note:    Subjective:  Resting comfortably, wife at bedside.  pt with (+) soft BM.  Hematuria resolving.      ROS:  CONSTITUTIONAL:  No fever, chills, rigors  CARDIOVASCULAR:  No chest pain or palpitations  RESPIRATORY:   No SOB, cough, dyspnea on exertion.  No wheezing  GASTROINTESTINAL:  No abd pain, N/V, diarrhea/constipation  EXTREMITIES:  No swelling or joint pain  GENITOURINARY:  No burning on urination, increased frequency or urgency.  No flank pain  NEUROLOGIC:  No HA, visual disturbances  SKIN: No rashes    Allergies    Cipro (Rash)    Intolerances        ANTIBIOTICS/RELEVANT:  antimicrobials  piperacillin/tazobactam IVPB.. 3.375 Gram(s) IV Intermittent every 12 hours  vancomycin  IVPB 1000 milliGRAM(s) IV Intermittent every 24 hours    immunologic:    OTHER:  acetaminophen   Tablet .. 650 milliGRAM(s) Oral every 6 hours PRN  aspirin enteric coated 81 milliGRAM(s) Oral daily  atorvastatin 80 milliGRAM(s) Oral at bedtime  calcium carbonate 1250 mG  + Vitamin D (OsCal 500 + D) 1 Tablet(s) Oral daily  carbidopa/levodopa CR 50/200 1 Tablet(s) Oral <User Schedule>  cholecalciferol 1000 Unit(s) Oral two times a day  docusate sodium 100 milliGRAM(s) Oral two times a day  entacapone 200 milliGRAM(s) Oral <User Schedule>  fluticasone propionate 50 MICROgram(s)/spray Nasal Spray 1 Spray(s) Both Nostrils two times a day  furosemide    Tablet 40 milliGRAM(s) Oral daily  guaiFENesin   Syrup  (Sugar-Free) 200 milliGRAM(s) Oral every 6 hours PRN  isosorbide   mononitrate ER Tablet (IMDUR) 60 milliGRAM(s) Oral daily  levothyroxine 50 MICROGram(s) Oral daily  melatonin 5 milliGRAM(s) Oral at bedtime PRN  multivitamin 1 Tablet(s) Oral daily  nystatin Powder 1 Application(s) Topical two times a day  polyethylene glycol 3350 17 Gram(s) Oral daily  ranolazine 1000 milliGRAM(s) Oral two times a day  senna 2 Tablet(s) Oral at bedtime  tamsulosin 0.4 milliGRAM(s) Oral daily      Objective:  Vital Signs Last 24 Hrs  T(C): 36.8 (01 Sep 2019 00:20), Max: 36.8 (01 Sep 2019 00:20)  T(F): 98.3 (01 Sep 2019 00:20), Max: 98.3 (01 Sep 2019 00:20)  HR: 58 (01 Sep 2019 00:20) (51 - 67)  BP: 135/75 (01 Sep 2019 00:20) (122/63 - 137/69)  BP(mean): --  RR: 18 (01 Sep 2019 00:20) (18 - 18)  SpO2: 94% (01 Sep 2019 00:20) (94% - 98%)    PHYSICAL EXAM:  Constitutional:NAD  Eyes:FLORY, EOMI  Ear/Nose/Throat: no thrush, mucositis.  Moist mucous membranes	  Neck:no JVD, no lymphadenopathy, supple  Respiratory: CTA tran  Cardiovascular: S1S2 RRR, no murmurs  Gastrointestinal:soft, nontender,  nondistended (+) BS  Extremities:no e/e/c  Skin:  erythema of lower back and groin s/o moisture associated dermatitis.  Gu:  penile swelling stable, lara in place draining dark yellow urine        LABS:                        8.0    8.33  )-----------( 151      ( 31 Aug 2019 10:31 )             24.2     08-31    135  |  98  |  73<H>  ----------------------------<  162<H>  3.4<L>   |  23  |  2.40<H>    Ca    8.0<L>      31 Aug 2019 07:02  Mg     2.8     08-31              Vancomycin Level, Random:  ug/mL (08-30 @ 15:28)  Vancomycin Level, Random:  ug/mL (08-29 @ 13:29)                  MICROBIOLOGY:    Culture - Blood (08.28.19 @ 17:55)    Specimen Source: .Blood    Culture Results:   No growth to date.    Culture - Blood (08.28.19 @ 17:55)    Specimen Source: .Blood    Culture Results:   No growth to date.    Culture - Urine (08.28.19 @ 17:52)    Specimen Source: .Urine    Culture Results:   No growth          RADIOLOGY & ADDITIONAL STUDIES:    < from: CT Abdomen and Pelvis No Cont (08.31.19 @ 18:10) >  EXAM:  CT ABDOMEN AND PELVIS                            PROCEDURE DATE:  08/31/2019      ******PRELIMINARY REPORT******    ******PRELIMINARY REPORT******              INTERPRETATION:  Moderate bilateral hydronephrosis. No urinary tract   calculus. Large rectal stool burden with rectal wall thickening, which   may represent stercoral colitis. The bladder is collapsed around Lara   catheter.    < end of copied text >

## 2019-08-31 NOTE — PROGRESS NOTE ADULT - SUBJECTIVE AND OBJECTIVE BOX
NEPHROLOGY       MEDICATIONS  (STANDING):  aspirin enteric coated 81 milliGRAM(s) Oral daily  atorvastatin 80 milliGRAM(s) Oral at bedtime  calcium carbonate 1250 mG  + Vitamin D (OsCal 500 + D) 1 Tablet(s) Oral daily  carbidopa/levodopa CR 50/200 1 Tablet(s) Oral <User Schedule>  cholecalciferol 1000 Unit(s) Oral two times a day  docusate sodium 100 milliGRAM(s) Oral two times a day  entacapone 200 milliGRAM(s) Oral <User Schedule>  furosemide    Tablet 40 milliGRAM(s) Oral daily  isosorbide   mononitrate ER Tablet (IMDUR) 60 milliGRAM(s) Oral daily  levothyroxine 50 MICROGram(s) Oral daily  multivitamin 1 Tablet(s) Oral daily  nystatin Ointment 1 Application(s) Topical two times a day  piperacillin/tazobactam IVPB.. 3.375 Gram(s) IV Intermittent every 12 hours  polyethylene glycol 3350 17 Gram(s) Oral daily  ranolazine 1000 milliGRAM(s) Oral two times a day  senna 2 Tablet(s) Oral at bedtime  vancomycin  IVPB 1000 milliGRAM(s) IV Intermittent every 24 hours    VITALS:  T(C): , Max: 37.1 (08-30-19 @ 21:02)  T(F): , Max: 98.8 (08-30-19 @ 21:02)  HR: 67 (08-31-19 @ 04:55)  BP: 132/71 (08-31-19 @ 04:55)  RR: 18 (08-31-19 @ 04:55)  SpO2: 95% (08-31-19 @ 04:55)    I and O's:    08-30 @ 07:01  -  08-31 @ 07:00  --------------------------------------------------------  IN: 1110 mL / OUT: 2400 mL / NET: -1290 mL    08-31 @ 07:01  -  08-31 @ 11:21  --------------------------------------------------------  IN: 120 mL / OUT: 0 mL / NET: 120 mL    PHYSICAL EXAM:  Constitutional: NAD; mild psychomotor retardation  HEENT: NCAT, DMM  Neck: Supple, No JVD  Respiratory: CTA-b/l  Cardiovascular: RRR s1s2, no m/r/g  Gastrointestinal: BS+, soft, NT/ND  Extremities: No peripheral edema b/l  : (+)lara    LABS:                        8.0    8.33  )-----------( 151      ( 31 Aug 2019 10:31 )             24.2     08-31    135  |  98  |  73<H>  ----------------------------<  162<H>  3.4<L>   |  23  |  2.40<H>    Ca    8.0<L>      31 Aug 2019 07:02  Mg     2.8     08-31 NEPHROLOGY       Pt seen and examined. Pt seen in bed having lunch family at bedside, no complaints offered.     MEDICATIONS  (STANDING):  aspirin enteric coated 81 milliGRAM(s) Oral daily  atorvastatin 80 milliGRAM(s) Oral at bedtime  calcium carbonate 1250 mG  + Vitamin D (OsCal 500 + D) 1 Tablet(s) Oral daily  carbidopa/levodopa CR 50/200 1 Tablet(s) Oral <User Schedule>  cholecalciferol 1000 Unit(s) Oral two times a day  docusate sodium 100 milliGRAM(s) Oral two times a day  entacapone 200 milliGRAM(s) Oral <User Schedule>  furosemide    Tablet 40 milliGRAM(s) Oral daily  isosorbide   mononitrate ER Tablet (IMDUR) 60 milliGRAM(s) Oral daily  levothyroxine 50 MICROGram(s) Oral daily  multivitamin 1 Tablet(s) Oral daily  nystatin Ointment 1 Application(s) Topical two times a day  piperacillin/tazobactam IVPB.. 3.375 Gram(s) IV Intermittent every 12 hours  polyethylene glycol 3350 17 Gram(s) Oral daily  ranolazine 1000 milliGRAM(s) Oral two times a day  senna 2 Tablet(s) Oral at bedtime  vancomycin  IVPB 1000 milliGRAM(s) IV Intermittent every 24 hours    VITALS:  T(C): , Max: 37.1 (08-30-19 @ 21:02)  T(F): , Max: 98.8 (08-30-19 @ 21:02)  HR: 67 (08-31-19 @ 04:55)  BP: 132/71 (08-31-19 @ 04:55)  RR: 18 (08-31-19 @ 04:55)  SpO2: 95% (08-31-19 @ 04:55)    I and O's:    08-30 @ 07:01  -  08-31 @ 07:00  --------------------------------------------------------  IN: 1110 mL / OUT: 2400 mL / NET: -1290 mL    08-31 @ 07:01  -  08-31 @ 11:21  --------------------------------------------------------  IN: 120 mL / OUT: 0 mL / NET: 120 mL    PHYSICAL EXAM:  Constitutional: NAD; mild psychomotor retardation  HEENT: NCAT, DMM  Neck: Supple, No JVD  Respiratory: CTA-b/l  Cardiovascular: RRR s1s2, no m/r/g  Gastrointestinal: BS+, soft, NT/ND  Extremities: No peripheral edema b/l  : (+)lara    LABS:                        8.0    8.33  )-----------( 151      ( 31 Aug 2019 10:31 )             24.2     08-31    135  |  98  |  73<H>  ----------------------------<  162<H>  3.4<L>   |  23  |  2.40<H>    Ca    8.0<L>      31 Aug 2019 07:02  Mg     2.8     08-31

## 2019-09-01 LAB
ANION GAP SERPL CALC-SCNC: 12 MMOL/L — SIGNIFICANT CHANGE UP (ref 5–17)
BUN SERPL-MCNC: 67 MG/DL — HIGH (ref 7–23)
CALCIUM SERPL-MCNC: 8.5 MG/DL — SIGNIFICANT CHANGE UP (ref 8.4–10.5)
CHLORIDE SERPL-SCNC: 102 MMOL/L — SIGNIFICANT CHANGE UP (ref 96–108)
CO2 SERPL-SCNC: 24 MMOL/L — SIGNIFICANT CHANGE UP (ref 22–31)
CREAT SERPL-MCNC: 2.52 MG/DL — HIGH (ref 0.5–1.3)
GLUCOSE SERPL-MCNC: 104 MG/DL — HIGH (ref 70–99)
HCT VFR BLD CALC: 24.7 % — LOW (ref 39–50)
HGB BLD-MCNC: 7.8 G/DL — LOW (ref 13–17)
MCHC RBC-ENTMCNC: 28.4 PG — SIGNIFICANT CHANGE UP (ref 27–34)
MCHC RBC-ENTMCNC: 31.6 GM/DL — LOW (ref 32–36)
MCV RBC AUTO: 89.8 FL — SIGNIFICANT CHANGE UP (ref 80–100)
PLATELET # BLD AUTO: 144 K/UL — LOW (ref 150–400)
POTASSIUM SERPL-MCNC: 3.6 MMOL/L — SIGNIFICANT CHANGE UP (ref 3.5–5.3)
POTASSIUM SERPL-SCNC: 3.6 MMOL/L — SIGNIFICANT CHANGE UP (ref 3.5–5.3)
RBC # BLD: 2.75 M/UL — LOW (ref 4.2–5.8)
RBC # FLD: 14.6 % — HIGH (ref 10.3–14.5)
SODIUM SERPL-SCNC: 138 MMOL/L — SIGNIFICANT CHANGE UP (ref 135–145)
WBC # BLD: 7.89 K/UL — SIGNIFICANT CHANGE UP (ref 3.8–10.5)
WBC # FLD AUTO: 7.89 K/UL — SIGNIFICANT CHANGE UP (ref 3.8–10.5)

## 2019-09-01 RX ORDER — NYSTATIN CREAM 100000 [USP'U]/G
1 CREAM TOPICAL
Refills: 0 | Status: DISCONTINUED | OUTPATIENT
Start: 2019-09-01 | End: 2019-09-04

## 2019-09-01 RX ADMIN — TAMSULOSIN HYDROCHLORIDE 0.4 MILLIGRAM(S): 0.4 CAPSULE ORAL at 12:44

## 2019-09-01 RX ADMIN — Medication 100 MILLIGRAM(S): at 05:45

## 2019-09-01 RX ADMIN — ENTACAPONE 200 MILLIGRAM(S): 200 TABLET, FILM COATED ORAL at 21:33

## 2019-09-01 RX ADMIN — Medication 100 MILLIGRAM(S): at 17:20

## 2019-09-01 RX ADMIN — NYSTATIN CREAM 1 APPLICATION(S): 100000 CREAM TOPICAL at 17:20

## 2019-09-01 RX ADMIN — PIPERACILLIN AND TAZOBACTAM 25 GRAM(S): 4; .5 INJECTION, POWDER, LYOPHILIZED, FOR SOLUTION INTRAVENOUS at 17:20

## 2019-09-01 RX ADMIN — ENTACAPONE 200 MILLIGRAM(S): 200 TABLET, FILM COATED ORAL at 08:25

## 2019-09-01 RX ADMIN — Medication 50 MICROGRAM(S): at 05:45

## 2019-09-01 RX ADMIN — Medication 81 MILLIGRAM(S): at 12:44

## 2019-09-01 RX ADMIN — NYSTATIN CREAM 1 APPLICATION(S): 100000 CREAM TOPICAL at 05:46

## 2019-09-01 RX ADMIN — RANOLAZINE 1000 MILLIGRAM(S): 500 TABLET, FILM COATED, EXTENDED RELEASE ORAL at 17:20

## 2019-09-01 RX ADMIN — Medication 1 TABLET(S): at 12:44

## 2019-09-01 RX ADMIN — ENTACAPONE 200 MILLIGRAM(S): 200 TABLET, FILM COATED ORAL at 12:44

## 2019-09-01 RX ADMIN — CARBIDOPA AND LEVODOPA 1 TABLET(S): 25; 100 TABLET ORAL at 12:44

## 2019-09-01 RX ADMIN — SENNA PLUS 2 TABLET(S): 8.6 TABLET ORAL at 21:33

## 2019-09-01 RX ADMIN — CARBIDOPA AND LEVODOPA 1 TABLET(S): 25; 100 TABLET ORAL at 17:19

## 2019-09-01 RX ADMIN — PIPERACILLIN AND TAZOBACTAM 25 GRAM(S): 4; .5 INJECTION, POWDER, LYOPHILIZED, FOR SOLUTION INTRAVENOUS at 05:43

## 2019-09-01 RX ADMIN — POLYETHYLENE GLYCOL 3350 17 GRAM(S): 17 POWDER, FOR SOLUTION ORAL at 12:44

## 2019-09-01 RX ADMIN — ATORVASTATIN CALCIUM 80 MILLIGRAM(S): 80 TABLET, FILM COATED ORAL at 21:33

## 2019-09-01 RX ADMIN — Medication 1000 UNIT(S): at 05:46

## 2019-09-01 RX ADMIN — RANOLAZINE 1000 MILLIGRAM(S): 500 TABLET, FILM COATED, EXTENDED RELEASE ORAL at 05:45

## 2019-09-01 RX ADMIN — Medication 1 SPRAY(S): at 05:46

## 2019-09-01 RX ADMIN — CARBIDOPA AND LEVODOPA 1 TABLET(S): 25; 100 TABLET ORAL at 08:25

## 2019-09-01 RX ADMIN — Medication 1000 UNIT(S): at 17:19

## 2019-09-01 RX ADMIN — ISOSORBIDE MONONITRATE 60 MILLIGRAM(S): 60 TABLET, EXTENDED RELEASE ORAL at 12:44

## 2019-09-01 RX ADMIN — ENTACAPONE 200 MILLIGRAM(S): 200 TABLET, FILM COATED ORAL at 17:19

## 2019-09-01 RX ADMIN — Medication 250 MILLIGRAM(S): at 14:20

## 2019-09-01 RX ADMIN — Medication 1 SPRAY(S): at 17:19

## 2019-09-01 RX ADMIN — Medication 40 MILLIGRAM(S): at 05:46

## 2019-09-01 RX ADMIN — CARBIDOPA AND LEVODOPA 1 TABLET(S): 25; 100 TABLET ORAL at 21:33

## 2019-09-01 NOTE — PROGRESS NOTE ADULT - ASSESSMENT
IMPRESSION: 89M w/ HTN, DVT-IVCF, Parkinson's disease, CAD-CABG, and CKD3, 8/28/19 a/w urinary retention/STEVEN  (1)CKD - stage 3b, with base GFR 30-40ml/min; nonproteinuric. At least in part due to past obstructive nephropathy, with resultant atrophy of his right kidney.  (2)STEVEN - obstructive - elevated compared to yesterday but overall improved  (3)Hypokalemia - in setting of postobstructive diuresis, Lasix, and low-K diet. Improved, s/p KCl 20mEq soln x1 yesterday  (4)CV - bilateral pleural effusion on imaging  (5)ID - cellulitis - on IV Vanco and IV Zosyn     RECOMMEND:  (1)Continue Lasix  40mg PO qd  (2)Remove dietary renal restrictions    NNEKA BrantleyC  [facility]  [phone] IMPRESSION: 89M w/ HTN, DVT-IVCF, Parkinson's disease, CAD-CABG, and CKD3, 8/28/19 a/w urinary retention/STEVEN  (1)CKD - stage 3b, with base GFR 30-40ml/min; nonproteinuric. At least in part due to past obstructive nephropathy, with resultant atrophy of his right kidney.  (2)STEVEN - obstructive - creatinine elevated compared to yesterday but overall improved  (3)Hypokalemia - in setting of postobstructive diuresis, Lasix, and low-K diet. Improved, s/p KCl 20mEq soln x1 yesterday  (4)CV - bilateral pleural effusion on imaging  (5)ID - cellulitis - on IV Vanco and IV Zosyn     RECOMMEND:  (1)Continue Lasix  40mg PO qd  (2)Remove dietary renal restrictions    NNEKA BrantleyC  [facility]  [phone]

## 2019-09-01 NOTE — PROGRESS NOTE ADULT - SUBJECTIVE AND OBJECTIVE BOX
Afebrile. O2 sat 96% on room air.  Occasional coughing-- sometimes related to eating and at other times possibly related to postnasal drip.  He still feels short of breath at times.  Started on Flonase nasal spray.    Vital Signs Last 24 Hrs  T(C): 36.4 (01 Sep 2019 11:37), Max: 36.8 (01 Sep 2019 00:20)  T(F): 97.6 (01 Sep 2019 11:37), Max: 98.3 (01 Sep 2019 00:20)  HR: 60 (01 Sep 2019 11:37) (51 - 66)  BP: 138/61 (01 Sep 2019 11:37) (122/63 - 138/61)  BP(mean): --  RR: 18 (01 Sep 2019 11:37) (18 - 18)  SpO2: 96% (01 Sep 2019 11:37) (93% - 98%)    Medications:  MEDICATIONS  (STANDING):  aspirin enteric coated 81 milliGRAM(s) Oral daily  atorvastatin 80 milliGRAM(s) Oral at bedtime  calcium carbonate 1250 mG  + Vitamin D (OsCal 500 + D) 1 Tablet(s) Oral daily  carbidopa/levodopa CR 50/200 1 Tablet(s) Oral <User Schedule>  cholecalciferol 1000 Unit(s) Oral two times a day  docusate sodium 100 milliGRAM(s) Oral two times a day  entacapone 200 milliGRAM(s) Oral <User Schedule>  fluticasone propionate 50 MICROgram(s)/spray Nasal Spray 1 Spray(s) Both Nostrils two times a day  furosemide    Tablet 40 milliGRAM(s) Oral daily  isosorbide   mononitrate ER Tablet (IMDUR) 60 milliGRAM(s) Oral daily  levothyroxine 50 MICROGram(s) Oral daily  multivitamin 1 Tablet(s) Oral daily  nystatin Powder 1 Application(s) Topical two times a day  piperacillin/tazobactam IVPB.. 3.375 Gram(s) IV Intermittent every 12 hours  polyethylene glycol 3350 17 Gram(s) Oral daily  ranolazine 1000 milliGRAM(s) Oral two times a day  senna 2 Tablet(s) Oral at bedtime  tamsulosin 0.4 milliGRAM(s) Oral daily  vancomycin  IVPB 1000 milliGRAM(s) IV Intermittent every 24 hours    MEDICATIONS  (PRN):  acetaminophen   Tablet .. 650 milliGRAM(s) Oral every 6 hours PRN Temp greater or equal to 38C (100.4F), Mild Pain (1 - 3)  guaiFENesin   Syrup  (Sugar-Free) 200 milliGRAM(s) Oral every 6 hours PRN Cough  melatonin 5 milliGRAM(s) Oral at bedtime PRN Insomnia      Allergies    Cipro (Rash)    Intolerances      Physical Examination:  Pleasant. In no acute distress.  Neck: no JVD, LAD, accessory muscle use  PULM: Decreased breath sounds at bases with few crackles. No wheezes or rhonchi.  CVS: IRR  Abdomen: nontender  Extremities: no edema    LAB:  WBC [id number]    Plan:  1. Antibiotic Rx as per ID.  2. Continue dysphagia 2 mechanical soft honey thickened liquid diet.  3. Now on Flonase nasal spray.  4. Robitussin expectorant PRN.  5. Cardiac and Renal Rx as noted.    POC: Martín Warren M.D.  [phone]

## 2019-09-01 NOTE — PROGRESS NOTE ADULT - SUBJECTIVE AND OBJECTIVE BOX
Subjective  Patient seen at bedside.  CTAP performed, mild-mod. b/l hydronephrosis, bladder collapsed around lara, no stones visible.    Denies any fever/chills, no suprapubic/back pain.   The patient's only complaint is regarding his diet, wants clear liquids.       Objective  Vital Signs Last 24 Hrs  T(C): 36.4 (01 Sep 2019 04:51), Max: 36.8 (01 Sep 2019 00:20)  T(F): 97.5 (01 Sep 2019 04:51), Max: 98.3 (01 Sep 2019 00:20)  HR: 66 (01 Sep 2019 04:51) (51 - 66)  BP: 125/65 (01 Sep 2019 04:51) (122/63 - 137/69)  BP(mean): --  RR: 18 (01 Sep 2019 04:51) (18 - 18)  SpO2: 93% (01 Sep 2019 04:51) (93% - 98%)    08-31-19 @ 07:01  -  09-01-19 @ 07:00  --------------------------------------------------------  IN: 540 mL / OUT: 2250 mL / NET: -1710 mL            Gen: NAD  Abd: soft, NT ,ND  : phallus edematous, circumcised, lara output abdiaziz, no clots, clear to drainage    Labs  CBC (08-31 @ 10:31)                              8.0<L>                         8.33    )----------------(  151        --    % Neutrophils, --    % Lymphocytes, ANC: --                                  24.2<L>                BMP (09-01 @ 08:30)             138     |  102     |  67<H> 		Ca++ --      Ca 8.5                ---------------------------------( 104<H>		Mg --                 3.6     |  24      |  2.52<H>			Ph --      BMP (08-31 @ 07:02)             135     |  98      |  73<H> 		Ca++ --      Ca 8.0<L>             ---------------------------------( 162<H>		Mg 2.8<H>             3.4<L>  |  23      |  2.40<H>			Ph --                -> .Blood Culture (08-28 @ 17:55)     NG    NG    No growth to date.    -> .Urine Culture (08-28 @ 17:52)     NG    NG    No growth    -> .Urine Culture (08-27 @ 21:50)     NG    NG    No growth          Imaging  EXAM:  CT ABDOMEN AND PELVIS                            PROCEDURE DATE:  08/31/2019            INTERPRETATION:  CLINICAL INFORMATION: Hydronephrosis. Hematuria.    COMPARISON: CT pelvis 8/28/2019.    PROCEDURE:   CT of the Abdomen and Pelvis was performed without intravenous contrast.   Intravenous contrast: None.  Oral contrast: None.  Sagittal and coronal reformats were performed.    FINDINGS:    Evaluation of the solid organs and vessels is limited without the use of   IV contrast.    LOWER CHEST: Bilateral lower lobe dependent atelectasis. Mild   cardiomegaly. Coronary artery calcifications.    LIVER: Within normal limits.  BILE DUCTS: Normal caliber.  GALLBLADDER: Distended.  SPLEEN: Within normal limits.  PANCREAS: Within normal limits.  ADRENALS: Within normal limits.  KIDNEYS/URETERS: Atrophic right kidney. Mild to moderate bilateral   hydronephrosis. Bilateral renal cysts and subcentimeter hypodense foci   too small to characterize. No renal stones visualized.    BLADDER: Underdistended around a Lara catheter.  REPRODUCTIVE ORGANS: Prostate is normal in size.    BOWEL: No bowel obstruction. Appendix not visualized. Large rectal stool   burden with wall thickening and surrounding inflammatory changes   compatible with stercoral colitis.  PERITONEUM: No ascites.  VESSELS: IVC filter. Atherosclerotic changes.  RETROPERITONEUM/LYMPH NODES: No lymphadenopathy.    ABDOMINAL WALL: Within normal limits.  BONES: Dextroscoliosis of the lumbar spine. Additional degenerative   changes.    IMPRESSION:     Stercoral colitis.    Mild to moderate bilateral hydronephrosis. No renal stones visualized.    < from: US Kidney and Bladder (08.29.19 @ 11:52) >  IMPRESSION: Limited study.    Atrophic right kidney with increased renal cortical echogenicity.   Moderate right hydronephrosis, with dilatation of the proximal right   ureter.    Mild left hydronephrosis.    Bilateral pleural effusions.        < end of copied text >

## 2019-09-01 NOTE — PROGRESS NOTE ADULT - SUBJECTIVE AND OBJECTIVE BOX
Also covering for Dr. Ayala     Subjective: Patient seen and examined. No new events except as noted.   is not happy with his diet   no cp or sob     REVIEW OF SYSTEMS:    CONSTITUTIONAL: + weakness, fevers or chills  EYES/ENT: No visual changes;  No vertigo or throat pain   NECK: No pain or stiffness  RESPIRATORY: No cough, wheezing, hemoptysis; No shortness of breath  CARDIOVASCULAR: No chest pain or palpitations  GASTROINTESTINAL: No abdominal or epigastric pain. No nausea, vomiting, or hematemesis; No diarrhea or constipation. No melena or hematochezia.  GENITOURINARY: No dysuria, frequency or hematuria  NEUROLOGICAL: No numbness or weakness  SKIN: No itching, burning, rashes, or lesions   All other review of systems is negative unless indicated above.    MEDICATIONS:  MEDICATIONS  (STANDING):  aspirin enteric coated 81 milliGRAM(s) Oral daily  atorvastatin 80 milliGRAM(s) Oral at bedtime  calcium carbonate 1250 mG  + Vitamin D (OsCal 500 + D) 1 Tablet(s) Oral daily  carbidopa/levodopa CR 50/200 1 Tablet(s) Oral <User Schedule>  cholecalciferol 1000 Unit(s) Oral two times a day  docusate sodium 100 milliGRAM(s) Oral two times a day  entacapone 200 milliGRAM(s) Oral <User Schedule>  fluticasone propionate 50 MICROgram(s)/spray Nasal Spray 1 Spray(s) Both Nostrils two times a day  furosemide    Tablet 40 milliGRAM(s) Oral daily  isosorbide   mononitrate ER Tablet (IMDUR) 60 milliGRAM(s) Oral daily  levothyroxine 50 MICROGram(s) Oral daily  multivitamin 1 Tablet(s) Oral daily  nystatin Powder 1 Application(s) Topical two times a day  piperacillin/tazobactam IVPB.. 3.375 Gram(s) IV Intermittent every 12 hours  polyethylene glycol 3350 17 Gram(s) Oral daily  ranolazine 1000 milliGRAM(s) Oral two times a day  senna 2 Tablet(s) Oral at bedtime  tamsulosin 0.4 milliGRAM(s) Oral daily  vancomycin  IVPB 1000 milliGRAM(s) IV Intermittent every 24 hours      PHYSICAL EXAM:  T(C): 36.4 (09-01-19 @ 04:51), Max: 36.8 (09-01-19 @ 00:20)  HR: 66 (09-01-19 @ 04:51) (51 - 66)  BP: 125/65 (09-01-19 @ 04:51) (122/63 - 137/69)  RR: 18 (09-01-19 @ 04:51) (18 - 18)  SpO2: 93% (09-01-19 @ 04:51) (93% - 98%)  Wt(kg): --  I&O's Summary    31 Aug 2019 07:01  -  01 Sep 2019 07:00  --------------------------------------------------------  IN: 540 mL / OUT: 2250 mL / NET: -1710 mL    01 Sep 2019 07:01  -  01 Sep 2019 11:29  --------------------------------------------------------  IN: 200 mL / OUT: 0 mL / NET: 200 mL          Appearance: NAD  HEENT:   Normal oral mucosa, PERRL, EOMI	  Lymphatic: No lymphadenopathy , no edema  Cardiovascular: Normal S1 S2, No JVD, No murmurs , Peripheral pulses palpable 2+ bilaterally  Respiratory: Lungs clear to auscultation, normal effort 	  Gastrointestinal:  Soft, Non-tender, + BS	  Skin: No rashes, No ecchymoses, No cyanosis, warm to touch  Musculoskeletal: Decreased range of motion and  strength  Psychiatry:  Mood & affect appropriate  Ext: No edema  +lara     LABS:    CARDIAC MARKERS:                                8.0    8.33  )-----------( 151      ( 31 Aug 2019 10:31 )             24.2     09-01    138  |  102  |  67<H>  ----------------------------<  104<H>  3.6   |  24  |  2.52<H>    Ca    8.5      01 Sep 2019 08:30  Mg     2.8     08-31      proBNP:   Lipid Profile:   HgA1c:   TSH:             TELEMETRY: 	    ECG:  	  RADIOLOGY:   DIAGNOSTIC TESTING:  [ ] Echocardiogram:  [ ]  Catheterization:  [ ] Stress Test:    OTHER:

## 2019-09-01 NOTE — PROGRESS NOTE ADULT - ASSESSMENT
89M with hx of BPH s/p thulmium enucleation of prostate 2015, CAD, Parkinson's, CKD stage 3 (baseline 1.4-1.6) presenting with hematuria and urinary retention - hematuria now resolved    -- atrophic right kidney, CTAP non-con (8/31) showed mild-mod. bilateral hydronephrosis, no renal stones.  Likely 2/2 large rectal stool burden, stercoral colitis.   -- trend Cr, recommend TOV w/Cr lucia  -- bowel regimen  -- start flomax  -- UCx neg 89M with hx of BPH s/p thulmium enucleation of prostate 2015, CAD, Parkinson's, CKD stage 3 (baseline 1.4-1.6) presenting with hematuria and urinary retention - hematuria now resolved    -- atrophic right kidney, CTAP non-con (8/31) showed mild-mod. bilateral hydronephrosis, no renal stones.  Likely 2/2 large rectal stool burden, stercoral colitis.   -- trend Cr, recommend TOV w/Cr lucia, appreciate nephrology recommendations  -- bowel regimen  -- start flomax  -- UCx neg

## 2019-09-01 NOTE — PROGRESS NOTE ADULT - SUBJECTIVE AND OBJECTIVE BOX
NEPHROLOGY         MEDICATIONS  (STANDING):  aspirin enteric coated 81 milliGRAM(s) Oral daily  atorvastatin 80 milliGRAM(s) Oral at bedtime  calcium carbonate 1250 mG  + Vitamin D (OsCal 500 + D) 1 Tablet(s) Oral daily  carbidopa/levodopa CR 50/200 1 Tablet(s) Oral <User Schedule>  cholecalciferol 1000 Unit(s) Oral two times a day  docusate sodium 100 milliGRAM(s) Oral two times a day  entacapone 200 milliGRAM(s) Oral <User Schedule>  fluticasone propionate 50 MICROgram(s)/spray Nasal Spray 1 Spray(s) Both Nostrils two times a day  furosemide    Tablet 40 milliGRAM(s) Oral daily  isosorbide   mononitrate ER Tablet (IMDUR) 60 milliGRAM(s) Oral daily  levothyroxine 50 MICROGram(s) Oral daily  multivitamin 1 Tablet(s) Oral daily  nystatin Powder 1 Application(s) Topical two times a day  piperacillin/tazobactam IVPB.. 3.375 Gram(s) IV Intermittent every 12 hours  polyethylene glycol 3350 17 Gram(s) Oral daily  ranolazine 1000 milliGRAM(s) Oral two times a day  senna 2 Tablet(s) Oral at bedtime  tamsulosin 0.4 milliGRAM(s) Oral daily  vancomycin  IVPB 1000 milliGRAM(s) IV Intermittent every 24 hours    VITALS:  T(C): , Max: 36.8 (09-01-19 @ 00:20)  T(F): , Max: 98.3 (09-01-19 @ 00:20)  HR: 66 (09-01-19 @ 04:51)  BP: 125/65 (09-01-19 @ 04:51)  RR: 18 (09-01-19 @ 04:51)  SpO2: 93% (09-01-19 @ 04:51)    I and O's:    08-31 @ 07:01  -  09-01 @ 07:00  --------------------------------------------------------  IN: 540 mL / OUT: 2250 mL / NET: -1710 mL    PHYSICAL EXAM:  Constitutional: NAD; mild psychomotor retardation  HEENT: NCAT, DMM  Neck: Supple, No JVD  Respiratory: CTA-b/l  Cardiovascular: RRR s1s2, no m/r/g  Gastrointestinal: BS+, soft, NT/ND  Extremities: No peripheral edema b/l  : (+)lara    LABS:                        8.0    8.33  )-----------( 151      ( 31 Aug 2019 10:31 )             24.2     09-01    138  |  102  |  67<H>  ----------------------------<  104<H>  3.6   |  24  |  2.52<H>    Ca    8.5      01 Sep 2019 08:30  Mg     2.8     08-31    RADIOLOGY & ADDITIONAL STUDIES:    EXAM:  CT ABDOMEN AND PELVIS                          PROCEDURE DATE:  08/31/2019        INTERPRETATION:  CLINICAL INFORMATION: Hydronephrosis. Hematuria.    COMPARISON: CT pelvis 8/28/2019.    PROCEDURE:   CT of the Abdomen and Pelvis was performed without intravenous contrast.   Intravenous contrast: None.  Oral contrast: None.  Sagittal and coronal reformats were performed.    FINDINGS:    Evaluation of the solid organs and vessels is limited without the use of   IV contrast.    LOWER CHEST: Bilateral lower lobe dependent atelectasis. Mild   cardiomegaly. Coronary artery calcifications.    LIVER: Within normal limits.  BILE DUCTS: Normal caliber.  GALLBLADDER: Distended.  SPLEEN: Within normal limits.  PANCREAS: Within normal limits.  ADRENALS: Within normal limits.  KIDNEYS/URETERS: Atrophic right kidney. Mild to moderate bilateral   hydronephrosis. Bilateral renal cysts and subcentimeter hypodense foci   too small to characterize. No renal stones visualized.    BLADDER: Underdistended around a Lara catheter.  REPRODUCTIVE ORGANS: Prostate is normal in size.    BOWEL: No bowel obstruction. Appendix not visualized. Large rectal stool   burden with wall thickening and surrounding inflammatory changes   compatible with stercoral colitis.  PERITONEUM: No ascites.  VESSELS: IVC filter. Atherosclerotic changes.  RETROPERITONEUM/LYMPH NODES: No lymphadenopathy.    ABDOMINAL WALL: Within normal limits.  BONES: Dextroscoliosis of the lumbar spine. Additional degenerative   changes.    IMPRESSION:     Stercoral colitis.    Mild to moderate bilateral hydronephrosis. No renal stones visualized.    JF HICKS M.D., RADIOLOGIST RESIDENT  This document has been electronically signed.  GABI CLEMENS M.D., ATTENDING RADIOLOGIST  This document has been electronically signed. Sep  1 2019  9:26AM        EXAM:  SHOULDER RIGHT (MINIMUM 2 VIEWS)                          EXAM:  HUMERUS RIGHT                          PROCEDURE DATE:  08/31/2019      INTERPRETATION:  EXAMINATION: XR HUMERUS RIGHT 2 views, XR SHOULDER 2   VIEWS RIGHT    INDICATION: Humerus fracture, pain    COMPARISON: None    FINDINGS/  IMPRESSION:    Redemonstration of displaced fracture at the surgical neck of the humerus   with overriding of fragments and rotation of humeral head fragment,   unchanged.     NORA ANN M.D., ATTENDING RADIOLOGIST  This document has been electronically signed. Aug 31 2019  1:19PM NEPHROLOGY         Pt seen and examined. Pt seen laying in bed, complaints of his diet, no other complaints offered.     MEDICATIONS  (STANDING):  aspirin enteric coated 81 milliGRAM(s) Oral daily  atorvastatin 80 milliGRAM(s) Oral at bedtime  calcium carbonate 1250 mG  + Vitamin D (OsCal 500 + D) 1 Tablet(s) Oral daily  carbidopa/levodopa CR 50/200 1 Tablet(s) Oral <User Schedule>  cholecalciferol 1000 Unit(s) Oral two times a day  docusate sodium 100 milliGRAM(s) Oral two times a day  entacapone 200 milliGRAM(s) Oral <User Schedule>  fluticasone propionate 50 MICROgram(s)/spray Nasal Spray 1 Spray(s) Both Nostrils two times a day  furosemide    Tablet 40 milliGRAM(s) Oral daily  isosorbide   mononitrate ER Tablet (IMDUR) 60 milliGRAM(s) Oral daily  levothyroxine 50 MICROGram(s) Oral daily  multivitamin 1 Tablet(s) Oral daily  nystatin Powder 1 Application(s) Topical two times a day  piperacillin/tazobactam IVPB.. 3.375 Gram(s) IV Intermittent every 12 hours  polyethylene glycol 3350 17 Gram(s) Oral daily  ranolazine 1000 milliGRAM(s) Oral two times a day  senna 2 Tablet(s) Oral at bedtime  tamsulosin 0.4 milliGRAM(s) Oral daily  vancomycin  IVPB 1000 milliGRAM(s) IV Intermittent every 24 hours    VITALS:  T(C): , Max: 36.8 (09-01-19 @ 00:20)  T(F): , Max: 98.3 (09-01-19 @ 00:20)  HR: 66 (09-01-19 @ 04:51)  BP: 125/65 (09-01-19 @ 04:51)  RR: 18 (09-01-19 @ 04:51)  SpO2: 93% (09-01-19 @ 04:51)    I and O's:    08-31 @ 07:01  -  09-01 @ 07:00  --------------------------------------------------------  IN: 540 mL / OUT: 2250 mL / NET: -1710 mL    PHYSICAL EXAM:  Constitutional: NAD; mild psychomotor retardation  HEENT: NCAT, DMM  Neck: Supple, No JVD  Respiratory: CTA-b/l  Cardiovascular: RRR s1s2, no m/r/g  Gastrointestinal: BS+, soft, NT/ND  Extremities: No peripheral edema b/l  : (+)lara    LABS:                        8.0    8.33  )-----------( 151      ( 31 Aug 2019 10:31 )             24.2     09-01    138  |  102  |  67<H>  ----------------------------<  104<H>  3.6   |  24  |  2.52<H>    Ca    8.5      01 Sep 2019 08:30  Mg     2.8     08-31    RADIOLOGY & ADDITIONAL STUDIES:    EXAM:  CT ABDOMEN AND PELVIS                          PROCEDURE DATE:  08/31/2019        INTERPRETATION:  CLINICAL INFORMATION: Hydronephrosis. Hematuria.    COMPARISON: CT pelvis 8/28/2019.    PROCEDURE:   CT of the Abdomen and Pelvis was performed without intravenous contrast.   Intravenous contrast: None.  Oral contrast: None.  Sagittal and coronal reformats were performed.    FINDINGS:    Evaluation of the solid organs and vessels is limited without the use of   IV contrast.    LOWER CHEST: Bilateral lower lobe dependent atelectasis. Mild   cardiomegaly. Coronary artery calcifications.    LIVER: Within normal limits.  BILE DUCTS: Normal caliber.  GALLBLADDER: Distended.  SPLEEN: Within normal limits.  PANCREAS: Within normal limits.  ADRENALS: Within normal limits.  KIDNEYS/URETERS: Atrophic right kidney. Mild to moderate bilateral   hydronephrosis. Bilateral renal cysts and subcentimeter hypodense foci   too small to characterize. No renal stones visualized.    BLADDER: Underdistended around a Lara catheter.  REPRODUCTIVE ORGANS: Prostate is normal in size.    BOWEL: No bowel obstruction. Appendix not visualized. Large rectal stool   burden with wall thickening and surrounding inflammatory changes   compatible with stercoral colitis.  PERITONEUM: No ascites.  VESSELS: IVC filter. Atherosclerotic changes.  RETROPERITONEUM/LYMPH NODES: No lymphadenopathy.    ABDOMINAL WALL: Within normal limits.  BONES: Dextroscoliosis of the lumbar spine. Additional degenerative   changes.    IMPRESSION:     Stercoral colitis.    Mild to moderate bilateral hydronephrosis. No renal stones visualized.    JF HICKS M.D., RADIOLOGIST RESIDENT  This document has been electronically signed.  GABI CLEMENS M.D., ATTENDING RADIOLOGIST  This document has been electronically signed. Sep  1 2019  9:26AM        EXAM:  SHOULDER RIGHT (MINIMUM 2 VIEWS)                          EXAM:  HUMERUS RIGHT                          PROCEDURE DATE:  08/31/2019      INTERPRETATION:  EXAMINATION: XR HUMERUS RIGHT 2 views, XR SHOULDER 2   VIEWS RIGHT    INDICATION: Humerus fracture, pain    COMPARISON: None    FINDINGS/  IMPRESSION:    Redemonstration of displaced fracture at the surgical neck of the humerus   with overriding of fragments and rotation of humeral head fragment,   unchanged.     NORA ANN M.D., ATTENDING RADIOLOGIST  This document has been electronically signed. Aug 31 2019  1:19PM

## 2019-09-02 LAB
ANION GAP SERPL CALC-SCNC: 15 MMOL/L — SIGNIFICANT CHANGE UP (ref 5–17)
BUN SERPL-MCNC: 66 MG/DL — HIGH (ref 7–23)
CALCIUM SERPL-MCNC: 8.5 MG/DL — SIGNIFICANT CHANGE UP (ref 8.4–10.5)
CHLORIDE SERPL-SCNC: 101 MMOL/L — SIGNIFICANT CHANGE UP (ref 96–108)
CO2 SERPL-SCNC: 25 MMOL/L — SIGNIFICANT CHANGE UP (ref 22–31)
CREAT SERPL-MCNC: 2.53 MG/DL — HIGH (ref 0.5–1.3)
CULTURE RESULTS: SIGNIFICANT CHANGE UP
CULTURE RESULTS: SIGNIFICANT CHANGE UP
GLUCOSE SERPL-MCNC: 124 MG/DL — HIGH (ref 70–99)
POTASSIUM SERPL-MCNC: 3.3 MMOL/L — LOW (ref 3.5–5.3)
POTASSIUM SERPL-SCNC: 3.3 MMOL/L — LOW (ref 3.5–5.3)
SODIUM SERPL-SCNC: 141 MMOL/L — SIGNIFICANT CHANGE UP (ref 135–145)
SPECIMEN SOURCE: SIGNIFICANT CHANGE UP
SPECIMEN SOURCE: SIGNIFICANT CHANGE UP

## 2019-09-02 RX ORDER — POTASSIUM CHLORIDE 20 MEQ
40 PACKET (EA) ORAL ONCE
Refills: 0 | Status: COMPLETED | OUTPATIENT
Start: 2019-09-02 | End: 2019-09-02

## 2019-09-02 RX ADMIN — ENTACAPONE 200 MILLIGRAM(S): 200 TABLET, FILM COATED ORAL at 17:47

## 2019-09-02 RX ADMIN — Medication 1 SPRAY(S): at 17:46

## 2019-09-02 RX ADMIN — PIPERACILLIN AND TAZOBACTAM 25 GRAM(S): 4; .5 INJECTION, POWDER, LYOPHILIZED, FOR SOLUTION INTRAVENOUS at 05:49

## 2019-09-02 RX ADMIN — Medication 1 TABLET(S): at 11:24

## 2019-09-02 RX ADMIN — Medication 81 MILLIGRAM(S): at 11:24

## 2019-09-02 RX ADMIN — Medication 40 MILLIEQUIVALENT(S): at 20:54

## 2019-09-02 RX ADMIN — RANOLAZINE 1000 MILLIGRAM(S): 500 TABLET, FILM COATED, EXTENDED RELEASE ORAL at 17:45

## 2019-09-02 RX ADMIN — CARBIDOPA AND LEVODOPA 1 TABLET(S): 25; 100 TABLET ORAL at 17:46

## 2019-09-02 RX ADMIN — SENNA PLUS 2 TABLET(S): 8.6 TABLET ORAL at 21:06

## 2019-09-02 RX ADMIN — Medication 40 MILLIGRAM(S): at 05:49

## 2019-09-02 RX ADMIN — CARBIDOPA AND LEVODOPA 1 TABLET(S): 25; 100 TABLET ORAL at 21:03

## 2019-09-02 RX ADMIN — ENTACAPONE 200 MILLIGRAM(S): 200 TABLET, FILM COATED ORAL at 13:20

## 2019-09-02 RX ADMIN — RANOLAZINE 1000 MILLIGRAM(S): 500 TABLET, FILM COATED, EXTENDED RELEASE ORAL at 05:49

## 2019-09-02 RX ADMIN — Medication 1 TABLET(S): at 17:45

## 2019-09-02 RX ADMIN — Medication 1000 UNIT(S): at 17:46

## 2019-09-02 RX ADMIN — Medication 1 SPRAY(S): at 05:49

## 2019-09-02 RX ADMIN — ENTACAPONE 200 MILLIGRAM(S): 200 TABLET, FILM COATED ORAL at 08:30

## 2019-09-02 RX ADMIN — CARBIDOPA AND LEVODOPA 1 TABLET(S): 25; 100 TABLET ORAL at 08:30

## 2019-09-02 RX ADMIN — NYSTATIN CREAM 1 APPLICATION(S): 100000 CREAM TOPICAL at 17:47

## 2019-09-02 RX ADMIN — Medication 50 MICROGRAM(S): at 05:49

## 2019-09-02 RX ADMIN — ISOSORBIDE MONONITRATE 60 MILLIGRAM(S): 60 TABLET, EXTENDED RELEASE ORAL at 11:30

## 2019-09-02 RX ADMIN — CARBIDOPA AND LEVODOPA 1 TABLET(S): 25; 100 TABLET ORAL at 13:20

## 2019-09-02 RX ADMIN — POLYETHYLENE GLYCOL 3350 17 GRAM(S): 17 POWDER, FOR SOLUTION ORAL at 11:23

## 2019-09-02 RX ADMIN — NYSTATIN CREAM 1 APPLICATION(S): 100000 CREAM TOPICAL at 05:50

## 2019-09-02 RX ADMIN — Medication 100 MILLIGRAM(S): at 05:49

## 2019-09-02 RX ADMIN — Medication 100 MILLIGRAM(S): at 17:46

## 2019-09-02 RX ADMIN — ENTACAPONE 200 MILLIGRAM(S): 200 TABLET, FILM COATED ORAL at 21:04

## 2019-09-02 RX ADMIN — TAMSULOSIN HYDROCHLORIDE 0.4 MILLIGRAM(S): 0.4 CAPSULE ORAL at 11:24

## 2019-09-02 RX ADMIN — ATORVASTATIN CALCIUM 80 MILLIGRAM(S): 80 TABLET, FILM COATED ORAL at 21:05

## 2019-09-02 RX ADMIN — Medication 100 MILLIGRAM(S): at 17:45

## 2019-09-02 RX ADMIN — Medication 1000 UNIT(S): at 05:49

## 2019-09-02 NOTE — PROGRESS NOTE ADULT - SUBJECTIVE AND OBJECTIVE BOX
Subjective: Patient seen and examined. No new events except as noted.   wants to go home     REVIEW OF SYSTEMS:    CONSTITUTIONAL:+ weakness, fevers or chills  EYES/ENT: No visual changes;  No vertigo or throat pain   NECK: No pain or stiffness  RESPIRATORY: No cough, wheezing, hemoptysis; No shortness of breath  CARDIOVASCULAR: No chest pain or palpitations  GASTROINTESTINAL: No abdominal or epigastric pain. No nausea, vomiting, or hematemesis; No diarrhea or constipation. No melena or hematochezia.  GENITOURINARY: No dysuria, frequency or hematuria  NEUROLOGICAL: No numbness or weakness  SKIN: No itching, burning, rashes, or lesions   All other review of systems is negative unless indicated above.    MEDICATIONS:  MEDICATIONS  (STANDING):  aspirin enteric coated 81 milliGRAM(s) Oral daily  atorvastatin 80 milliGRAM(s) Oral at bedtime  calcium carbonate 1250 mG  + Vitamin D (OsCal 500 + D) 1 Tablet(s) Oral daily  carbidopa/levodopa CR 50/200 1 Tablet(s) Oral <User Schedule>  cholecalciferol 1000 Unit(s) Oral two times a day  docusate sodium 100 milliGRAM(s) Oral two times a day  entacapone 200 milliGRAM(s) Oral <User Schedule>  fluticasone propionate 50 MICROgram(s)/spray Nasal Spray 1 Spray(s) Both Nostrils two times a day  furosemide    Tablet 40 milliGRAM(s) Oral daily  isosorbide   mononitrate ER Tablet (IMDUR) 60 milliGRAM(s) Oral daily  levothyroxine 50 MICROGram(s) Oral daily  multivitamin 1 Tablet(s) Oral daily  nystatin Powder 1 Application(s) Topical two times a day  piperacillin/tazobactam IVPB.. 3.375 Gram(s) IV Intermittent every 12 hours  polyethylene glycol 3350 17 Gram(s) Oral daily  ranolazine 1000 milliGRAM(s) Oral two times a day  senna 2 Tablet(s) Oral at bedtime  tamsulosin 0.4 milliGRAM(s) Oral daily  vancomycin  IVPB 1000 milliGRAM(s) IV Intermittent every 24 hours      PHYSICAL EXAM:  T(C): 36.5 (09-02-19 @ 11:29), Max: 37.1 (09-01-19 @ 20:22)  HR: 60 (09-02-19 @ 11:29) (60 - 79)  BP: 146/77 (09-02-19 @ 11:29) (125/69 - 148/77)  RR: 18 (09-02-19 @ 11:29) (18 - 18)  SpO2: 96% (09-02-19 @ 11:29) (93% - 99%)  Wt(kg): --  I&O's Summary    01 Sep 2019 07:01  -  02 Sep 2019 07:00  --------------------------------------------------------  IN: 870 mL / OUT: 1500 mL / NET: -630 mL    02 Sep 2019 07:01  -  02 Sep 2019 12:59  --------------------------------------------------------  IN: 240 mL / OUT: 300 mL / NET: -60 mL              Appearance: NAD  HEENT:   Normal oral mucosa, PERRL, EOMI	  Lymphatic: No lymphadenopathy , no edema  Cardiovascular: Normal S1 S2, No JVD, No murmurs , Peripheral pulses palpable 2+ bilaterally  Respiratory: Lungs clear to auscultation, normal effort 	  Gastrointestinal:  Soft, Non-tender, + BS	  Skin: No rashes, No ecchymoses, No cyanosis, warm to touch  Musculoskeletal: Decreased range of motion and  strength  Psychiatry:  Mood & affect appropriate  Ext: No edema  +lara       LABS:    CARDIAC MARKERS:                                7.8    7.89  )-----------( 144      ( 01 Sep 2019 12:23 )             24.7     09-02    141  |  101  |  66<H>  ----------------------------<  124<H>  3.3<L>   |  25  |  2.53<H>    Ca    8.5      02 Sep 2019 06:37      proBNP:   Lipid Profile:   HgA1c:   TSH:             TELEMETRY: 	    ECG:  	  RADIOLOGY:   DIAGNOSTIC TESTING:  [ ] Echocardiogram:  [ ]  Catheterization:  [ ] Stress Test:    OTHER:

## 2019-09-02 NOTE — PROGRESS NOTE ADULT - SUBJECTIVE AND OBJECTIVE BOX
Infectious Diseases progress note:    Subjective: NAD,  afebrile.  No acute o/n events.  Wife at bedside. Hematuria resolved.  CTap shows large rectal stool burden and b/l hydro.      ROS:  CONSTITUTIONAL:  No fever, chills, rigors  CARDIOVASCULAR:  No chest pain or palpitations  RESPIRATORY:   No SOB, cough, dyspnea on exertion.  No wheezing  GASTROINTESTINAL:  No abd pain, N/V, diarrhea/constipation  EXTREMITIES:  No swelling or joint pain  GENITOURINARY:  No burning on urination, increased frequency or urgency.  No flank pain  NEUROLOGIC:  No HA, visual disturbances  SKIN: No rashes    Allergies    Cipro (Rash)    Intolerances        ANTIBIOTICS/RELEVANT:  antimicrobials  piperacillin/tazobactam IVPB.. 3.375 Gram(s) IV Intermittent every 12 hours  vancomycin  IVPB 1000 milliGRAM(s) IV Intermittent every 24 hours    immunologic:    OTHER:  acetaminophen   Tablet .. 650 milliGRAM(s) Oral every 6 hours PRN  aspirin enteric coated 81 milliGRAM(s) Oral daily  atorvastatin 80 milliGRAM(s) Oral at bedtime  calcium carbonate 1250 mG  + Vitamin D (OsCal 500 + D) 1 Tablet(s) Oral daily  carbidopa/levodopa CR 50/200 1 Tablet(s) Oral <User Schedule>  cholecalciferol 1000 Unit(s) Oral two times a day  docusate sodium 100 milliGRAM(s) Oral two times a day  entacapone 200 milliGRAM(s) Oral <User Schedule>  fluticasone propionate 50 MICROgram(s)/spray Nasal Spray 1 Spray(s) Both Nostrils two times a day  furosemide    Tablet 40 milliGRAM(s) Oral daily  guaiFENesin   Syrup  (Sugar-Free) 200 milliGRAM(s) Oral every 6 hours PRN  isosorbide   mononitrate ER Tablet (IMDUR) 60 milliGRAM(s) Oral daily  levothyroxine 50 MICROGram(s) Oral daily  melatonin 5 milliGRAM(s) Oral at bedtime PRN  multivitamin 1 Tablet(s) Oral daily  nystatin Powder 1 Application(s) Topical two times a day  polyethylene glycol 3350 17 Gram(s) Oral daily  ranolazine 1000 milliGRAM(s) Oral two times a day  senna 2 Tablet(s) Oral at bedtime  tamsulosin 0.4 milliGRAM(s) Oral daily      Objective:  Vital Signs Last 24 Hrs  T(C): 36.5 (02 Sep 2019 11:29), Max: 37.1 (01 Sep 2019 20:22)  T(F): 97.7 (02 Sep 2019 11:29), Max: 98.7 (01 Sep 2019 20:22)  HR: 60 (02 Sep 2019 11:29) (60 - 79)  BP: 146/77 (02 Sep 2019 11:29) (125/69 - 148/77)  BP(mean): --  RR: 18 (02 Sep 2019 11:29) (18 - 18)  SpO2: 96% (02 Sep 2019 11:29) (93% - 99%)    PHYSICAL EXAM:  Constitutional:NAD  Eyes:FLORY, EOMI  Ear/Nose/Throat: no thrush, mucositis.  Moist mucous membranes	  Neck:no JVD, no lymphadenopathy, supple  Respiratory: CTA tran  Cardiovascular: S1S2 RRR, no murmurs  Gastrointestinal:soft, nontender,  nondistended (+) BS  Extremities:no e/e/c  Skin:  intertrigonal groin rash  :  penile swelling improved.  lara draining dark yellow urine.        LABS:                        7.8    7.89  )-----------( 144      ( 01 Sep 2019 12:23 )             24.7     09-02    141  |  101  |  66<H>  ----------------------------<  124<H>  3.3<L>   |  25  |  2.53<H>    Ca    8.5      02 Sep 2019 06:37              Vancomycin Level, Random:  ug/mL (08-30 @ 15:28)  Vancomycin Level, Random:  ug/mL (08-29 @ 13:29)                  MICROBIOLOGY:    Culture - Blood (08.28.19 @ 17:55)    Specimen Source: .Blood    Culture Results:   No growth to date.    Culture - Blood (08.28.19 @ 17:55)    Specimen Source: .Blood    Culture Results:   No growth to date.    Culture - Urine (08.28.19 @ 17:52)    Specimen Source: .Urine    Culture Results:   No growth    Culture - Urine (08.27.19 @ 21:50)    Specimen Source: .Urine    Culture Results:   No growth          RADIOLOGY & ADDITIONAL STUDIES:    < from: CT Abdomen and Pelvis No Cont (08.31.19 @ 18:10) >  FINDINGS:    Evaluation of the solid organs and vessels is limited without the use of   IV contrast.    LOWER CHEST: Bilateral lower lobe dependent atelectasis. Mild   cardiomegaly. Coronary artery calcifications.    LIVER: Within normal limits.  BILE DUCTS: Normal caliber.  GALLBLADDER: Distended.  SPLEEN: Within normal limits.  PANCREAS: Within normal limits.  ADRENALS: Within normal limits.  KIDNEYS/URETERS: Atrophic right kidney. Mild to moderate bilateral   hydronephrosis. Bilateral renal cysts and subcentimeter hypodense foci   too small to characterize. No renal stones visualized.    BLADDER: Underdistended around a Lara catheter.  REPRODUCTIVE ORGANS: Prostate is normal in size.    BOWEL: No bowel obstruction. Appendix not visualized. Large rectal stool   burden with wall thickening and surrounding inflammatory changes   compatible with stercoral colitis.  PERITONEUM: No ascites.  VESSELS: IVC filter. Atherosclerotic changes.  RETROPERITONEUM/LYMPH NODES: No lymphadenopathy.    ABDOMINAL WALL: Within normal limits.  BONES: Dextroscoliosis of the lumbar spine. Additional degenerative   changes.    IMPRESSION:     Stercoral colitis.    Mild to moderate bilateral hydronephrosis. No renal stones visualized.    < end of copied text >

## 2019-09-02 NOTE — PROGRESS NOTE ADULT - ASSESSMENT
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  Patient reported that he had local trauma to the skin of his penis following an attempt to use a hand held urinal.   Patient also notes that patient was on a ? commode and was noted to have increased scrotal oedema and redness. (28 Aug 2019 20:42)    ER VSS, no leukocytosis.  Cr 2.8.  UA (+) nit/LE.  Large bld.  WBC 6-10, >50 RBC.  Ucx and Bcx (-).  CT pelv no buttock abscess or subcut gas, increased stool in the rectum and mild stercoral colitis.  CT chest shows pulm edema with small b/l pleural effusions.  US kidney/bladder shows moderate R hydronephrosis and mild L hydronephrosis.      Pt seen by Urology for hematuria, s/p Lara replacement.  Hematuria has been improving with mild irrigation.      Pt currently on vanco/zosyn for groin cellulitis.  ID consult called for further abx managment.      r/o Groin cellulitis:    - No fever or leukocytosis.  Skin changes suggestive of moisture associated dermatitis >> cellulitis.  Cont abx x 7 days to minimize microbial bioburden.  Keep area dry and clean of feces/urine.  Cont nystatin powder to treat for localized candidiasis.  Keep area free from soilage.     - No signs of Eliza's gangrene    - De-escalate to PO doxy/augmentin for remainder of course (end date: 9/3)      Urinary retention:    - Post obstructive uropathy, b/l hydronephrosis seen.  s/p Lara.  Hematuria resolved with minimal irrigation.  Cont to f/u with Urology.    - ct ap shows b/l hydronephrosis - likely secondary to large stool burden.  Pt with possible stercoral colitis - pt already on abx.  Cont bowel regimen.      - Urine cultures NGTD    - STEVEN on CKD.  Cr improving post lara placement, lasix dose decreased.        Will follow,    Jasmine Ramirez  [phone]

## 2019-09-03 ENCOUNTER — TRANSCRIPTION ENCOUNTER (OUTPATIENT)
Age: 84
End: 2019-09-03

## 2019-09-03 LAB
ANION GAP SERPL CALC-SCNC: 14 MMOL/L — SIGNIFICANT CHANGE UP (ref 5–17)
BLD GP AB SCN SERPL QL: NEGATIVE — SIGNIFICANT CHANGE UP
BUN SERPL-MCNC: 65 MG/DL — HIGH (ref 7–23)
CALCIUM SERPL-MCNC: 8.2 MG/DL — LOW (ref 8.4–10.5)
CHLORIDE SERPL-SCNC: 102 MMOL/L — SIGNIFICANT CHANGE UP (ref 96–108)
CO2 SERPL-SCNC: 24 MMOL/L — SIGNIFICANT CHANGE UP (ref 22–31)
CREAT SERPL-MCNC: 2.4 MG/DL — HIGH (ref 0.5–1.3)
GAS PNL BLDA: SIGNIFICANT CHANGE UP
GLUCOSE BLDC GLUCOMTR-MCNC: 143 MG/DL — HIGH (ref 70–99)
GLUCOSE SERPL-MCNC: 128 MG/DL — HIGH (ref 70–99)
HCT VFR BLD CALC: 23.5 % — LOW (ref 39–50)
HCT VFR BLD CALC: 25.3 % — LOW (ref 39–50)
HCT VFR BLD CALC: 25.9 % — LOW (ref 39–50)
HGB BLD-MCNC: 7.8 G/DL — LOW (ref 13–17)
HGB BLD-MCNC: 8.1 G/DL — LOW (ref 13–17)
HGB BLD-MCNC: 8.2 G/DL — LOW (ref 13–17)
MCHC RBC-ENTMCNC: 28.9 PG — SIGNIFICANT CHANGE UP (ref 27–34)
MCHC RBC-ENTMCNC: 29.2 PG — SIGNIFICANT CHANGE UP (ref 27–34)
MCHC RBC-ENTMCNC: 30.2 PG — SIGNIFICANT CHANGE UP (ref 27–34)
MCHC RBC-ENTMCNC: 31.3 GM/DL — LOW (ref 32–36)
MCHC RBC-ENTMCNC: 32.4 GM/DL — SIGNIFICANT CHANGE UP (ref 32–36)
MCHC RBC-ENTMCNC: 33.3 GM/DL — SIGNIFICANT CHANGE UP (ref 32–36)
MCV RBC AUTO: 90 FL — SIGNIFICANT CHANGE UP (ref 80–100)
MCV RBC AUTO: 90.7 FL — SIGNIFICANT CHANGE UP (ref 80–100)
MCV RBC AUTO: 92.4 FL — SIGNIFICANT CHANGE UP (ref 80–100)
OB PNL STL: POSITIVE
PLATELET # BLD AUTO: 128 K/UL — LOW (ref 150–400)
PLATELET # BLD AUTO: 146 K/UL — LOW (ref 150–400)
PLATELET # BLD AUTO: 156 K/UL — SIGNIFICANT CHANGE UP (ref 150–400)
POTASSIUM SERPL-MCNC: 3.5 MMOL/L — SIGNIFICANT CHANGE UP (ref 3.5–5.3)
POTASSIUM SERPL-SCNC: 3.5 MMOL/L — SIGNIFICANT CHANGE UP (ref 3.5–5.3)
RBC # BLD: 2.59 M/UL — LOW (ref 4.2–5.8)
RBC # BLD: 2.8 M/UL — LOW (ref 4.2–5.8)
RBC # BLD: 2.81 M/UL — LOW (ref 4.2–5.8)
RBC # FLD: 13.3 % — SIGNIFICANT CHANGE UP (ref 10.3–14.5)
RBC # FLD: 13.5 % — SIGNIFICANT CHANGE UP (ref 10.3–14.5)
RBC # FLD: 14.6 % — HIGH (ref 10.3–14.5)
RH IG SCN BLD-IMP: POSITIVE — SIGNIFICANT CHANGE UP
SODIUM SERPL-SCNC: 140 MMOL/L — SIGNIFICANT CHANGE UP (ref 135–145)
WBC # BLD: 7.6 K/UL — SIGNIFICANT CHANGE UP (ref 3.8–10.5)
WBC # BLD: 8.2 K/UL — SIGNIFICANT CHANGE UP (ref 3.8–10.5)
WBC # BLD: 9 K/UL — SIGNIFICANT CHANGE UP (ref 3.8–10.5)
WBC # FLD AUTO: 7.6 K/UL — SIGNIFICANT CHANGE UP (ref 3.8–10.5)
WBC # FLD AUTO: 8.2 K/UL — SIGNIFICANT CHANGE UP (ref 3.8–10.5)
WBC # FLD AUTO: 9 K/UL — SIGNIFICANT CHANGE UP (ref 3.8–10.5)

## 2019-09-03 RX ORDER — CALCIUM GLUCONATE 100 MG/ML
1 VIAL (ML) INTRAVENOUS ONCE
Refills: 0 | Status: COMPLETED | OUTPATIENT
Start: 2019-09-03 | End: 2019-09-03

## 2019-09-03 RX ORDER — SODIUM CHLORIDE 9 MG/ML
1000 INJECTION INTRAMUSCULAR; INTRAVENOUS; SUBCUTANEOUS ONCE
Refills: 0 | Status: COMPLETED | OUTPATIENT
Start: 2019-09-03 | End: 2019-09-03

## 2019-09-03 RX ORDER — SOD SULF/SODIUM/NAHCO3/KCL/PEG
4000 SOLUTION, RECONSTITUTED, ORAL ORAL ONCE
Refills: 0 | Status: DISCONTINUED | OUTPATIENT
Start: 2019-09-03 | End: 2019-09-04

## 2019-09-03 RX ORDER — PANTOPRAZOLE SODIUM 20 MG/1
8 TABLET, DELAYED RELEASE ORAL
Qty: 80 | Refills: 0 | Status: DISCONTINUED | OUTPATIENT
Start: 2019-09-03 | End: 2019-09-04

## 2019-09-03 RX ADMIN — Medication 1000 UNIT(S): at 17:19

## 2019-09-03 RX ADMIN — TAMSULOSIN HYDROCHLORIDE 0.4 MILLIGRAM(S): 0.4 CAPSULE ORAL at 12:52

## 2019-09-03 RX ADMIN — CARBIDOPA AND LEVODOPA 1 TABLET(S): 25; 100 TABLET ORAL at 12:52

## 2019-09-03 RX ADMIN — Medication 50 MICROGRAM(S): at 05:56

## 2019-09-03 RX ADMIN — Medication 1 TABLET(S): at 12:52

## 2019-09-03 RX ADMIN — CARBIDOPA AND LEVODOPA 1 TABLET(S): 25; 100 TABLET ORAL at 08:22

## 2019-09-03 RX ADMIN — ENTACAPONE 200 MILLIGRAM(S): 200 TABLET, FILM COATED ORAL at 12:52

## 2019-09-03 RX ADMIN — Medication 1000 UNIT(S): at 05:56

## 2019-09-03 RX ADMIN — NYSTATIN CREAM 1 APPLICATION(S): 100000 CREAM TOPICAL at 17:20

## 2019-09-03 RX ADMIN — Medication 1 SPRAY(S): at 06:00

## 2019-09-03 RX ADMIN — CARBIDOPA AND LEVODOPA 1 TABLET(S): 25; 100 TABLET ORAL at 22:35

## 2019-09-03 RX ADMIN — POLYETHYLENE GLYCOL 3350 17 GRAM(S): 17 POWDER, FOR SOLUTION ORAL at 12:52

## 2019-09-03 RX ADMIN — ATORVASTATIN CALCIUM 80 MILLIGRAM(S): 80 TABLET, FILM COATED ORAL at 22:34

## 2019-09-03 RX ADMIN — Medication 100 MILLIGRAM(S): at 05:56

## 2019-09-03 RX ADMIN — Medication 81 MILLIGRAM(S): at 12:52

## 2019-09-03 RX ADMIN — ENTACAPONE 200 MILLIGRAM(S): 200 TABLET, FILM COATED ORAL at 17:19

## 2019-09-03 RX ADMIN — Medication 40 MILLIGRAM(S): at 05:55

## 2019-09-03 RX ADMIN — RANOLAZINE 1000 MILLIGRAM(S): 500 TABLET, FILM COATED, EXTENDED RELEASE ORAL at 05:58

## 2019-09-03 RX ADMIN — RANOLAZINE 1000 MILLIGRAM(S): 500 TABLET, FILM COATED, EXTENDED RELEASE ORAL at 17:19

## 2019-09-03 RX ADMIN — NYSTATIN CREAM 1 APPLICATION(S): 100000 CREAM TOPICAL at 05:58

## 2019-09-03 RX ADMIN — CARBIDOPA AND LEVODOPA 1 TABLET(S): 25; 100 TABLET ORAL at 17:19

## 2019-09-03 RX ADMIN — ISOSORBIDE MONONITRATE 60 MILLIGRAM(S): 60 TABLET, EXTENDED RELEASE ORAL at 12:52

## 2019-09-03 RX ADMIN — Medication 100 MILLIGRAM(S): at 05:57

## 2019-09-03 RX ADMIN — ENTACAPONE 200 MILLIGRAM(S): 200 TABLET, FILM COATED ORAL at 22:42

## 2019-09-03 RX ADMIN — ENTACAPONE 200 MILLIGRAM(S): 200 TABLET, FILM COATED ORAL at 08:23

## 2019-09-03 RX ADMIN — SODIUM CHLORIDE 2000 MILLILITER(S): 9 INJECTION INTRAMUSCULAR; INTRAVENOUS; SUBCUTANEOUS at 21:35

## 2019-09-03 RX ADMIN — Medication 1 SPRAY(S): at 17:19

## 2019-09-03 NOTE — PROVIDER CONTACT NOTE (CHANGE IN STATUS NOTIFICATION) - SITUATION
Patient had large bloody bowel movement, bright red blood with some large clots, still with blood from the rectum

## 2019-09-03 NOTE — PROVIDER CONTACT NOTE (OTHER) - BACKGROUND
Patient is an 89 year old male admitted with cellulitis of the buttocks. PMH includes CAD, MI, aspiration PNA, UTI, BPH, Parkinson's, HTN, and HLD.

## 2019-09-03 NOTE — PROVIDER CONTACT NOTE (OTHER) - ASSESSMENT
Patient alert and orientedx2. Patient BP=98/46, asymptomatic. Other VS stable. Patient receiving NS 1000ml bolus initiated during RRT.

## 2019-09-03 NOTE — PROVIDER CONTACT NOTE (OTHER) - DATE AND TIME:
"[facility] Hospital Medicine  Progress Note    Patient Name: Allie Cardozo  MRN: [id number]  Patient Class: OP- Observation   Admission Date: 2019  Length of Stay: 0 days  Attending Physician: Malathi Perdomo MD  Primary Care Provider: [facility] Medicine Team: Northwest Surgical cardiologist she follows with in [place]. She denies history of MI eldridge in place that [facility] 1659              Discussed with staff  Kasia Hopper PA-C  Department of Hospital Medicine   [facility]

## 2019-09-03 NOTE — DISCHARGE NOTE PROVIDER - NSDCCPCAREPLAN_GEN_ALL_CORE_FT
PRINCIPAL DISCHARGE DIAGNOSIS  Diagnosis: Cellulitis of buttock  Assessment and Plan of Treatment: Take all of your antibiotics as ordered.  Call your Health Care Provider within two days of arriving home to make a follow up appointment within one week.  If the affected cellulitic area increases in redness, warmth, pain or swelling call your Health Care Provider.  If you develop fever, chills, and/or malaise, call your Health Care Provider.      SECONDARY DISCHARGE DIAGNOSES  Diagnosis: Urinary retention  Assessment and Plan of Treatment:     Diagnosis: Hematuria  Assessment and Plan of Treatment: Resolved    Diagnosis: UTI (urinary tract infection)  Assessment and Plan of Treatment: HOME CARE INSTRUCTIONS  f you were prescribed antibiotics, take them exactly as your caregiver instructs you. Finish the medication even if you feel better after you have only taken some of the medication.  Drink enough water and fluids to keep your urine clear or pale yellow.  Avoid caffeine, tea, and carbonated beverages. They tend to irritate your bladder.  Empty your bladder often. Avoid holding urine for long periods of time.  After a bowel movement, women should cleanse from front to back. Use each tissue only once.  SEEK MEDICAL CARE IF:  You have back pain.  You develop a fever.  Your symptoms do not begin to resolve within 3 days.  SEEK IMMEDIATE MEDICAL CARE IF:  You have severe back pain or lower abdominal pain.  You develop chills.  You have nausea or vomiting.  You have continued burning or discomfort with urination.    Diagnosis: Parkinsons Disease  Assessment and Plan of Treatment: Continue medications as prescribed    Diagnosis: Acute kidney injury superimposed on chronic kidney disease  Assessment and Plan of Treatment: Avoid taking (NSAIDs) - (ex: Ibuprofen, Advil, Celebrex, Naprosyn)  Avoid taking any nephrotoxic agents (can harm kidneys) - Intravenous contrast for diagnostic testing, combination cold medications.  Have all medications adjusted for your renal function by your Health Care Provider.  Blood pressure control is important.  Take all medication as prescribed. PRINCIPAL DISCHARGE DIAGNOSIS  Diagnosis: Cellulitis of buttock  Assessment and Plan of Treatment: Take all of your antibiotics as ordered.  Call your Health Care Provider within two days of arriving home to make a follow up appointment within one week.  If the affected cellulitic area increases in redness, warmth, pain or swelling call your Health Care Provider.  If you develop fever, chills, and/or malaise, call your Health Care Provider.      SECONDARY DISCHARGE DIAGNOSES  Diagnosis: Urinary retention  Assessment and Plan of Treatment: lara catheter care  chnage monthly;  urology follow up    Diagnosis: Parkinsons Disease  Assessment and Plan of Treatment: Continue medications as prescribed  continue medication    Diagnosis: Acute kidney injury superimposed on chronic kidney disease  Assessment and Plan of Treatment: Avoid taking (NSAIDs) - (ex: Ibuprofen, Advil, Celebrex, Naprosyn)  Avoid taking any nephrotoxic agents (can harm kidneys) - Intravenous contrast for diagnostic testing, combination cold medications.  Have all medications adjusted for your renal function by your Health Care Provider.  Blood pressure control is important.  Take all medication as prescribed.    Diagnosis: Hematuria  Assessment and Plan of Treatment: Resolved    Diagnosis: UTI (urinary tract infection)  Assessment and Plan of Treatment: HOME CARE INSTRUCTIONS  f you were prescribed antibiotics, take them exactly as your caregiver instructs you. Finish the medication even if you feel better after you have only taken some of the medication.  Drink enough water and fluids to keep your urine clear or pale yellow.  Avoid caffeine, tea, and carbonated beverages. They tend to irritate your bladder.  Empty your bladder often. Avoid holding urine for long periods of time.  After a bowel movement, women should cleanse from front to back. Use each tissue only once.  SEEK MEDICAL CARE IF:  You have back pain.  You develop a fever.  Your symptoms do not begin to resolve within 3 days.  SEEK IMMEDIATE MEDICAL CARE IF:  You have severe back pain or lower abdominal pain.  You develop chills.  You have nausea or vomiting.  You have continued burning or discomfort with urination.    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  Recommend out-patient biopsy upon discharge  Do not recommend in-patient biopsy as concern for follow up and definitive treatment   Discussed importance of biopsy and treating lesion given concern for NMSC. PRINCIPAL DISCHARGE DIAGNOSIS  Diagnosis: Cellulitis of buttock  Assessment and Plan of Treatment: Take all of your antibiotics as ordered.  Call your Health Care Provider within two days of arriving home to make a follow up appointment within one week.  If the affected cellulitic area increases in redness, warmth, pain or swelling call your Health Care Provider.  If you develop fever, chills, and/or malaise, call your Health Care Provider.      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  Recommend out-patient biopsy upon discharge  Do not recommend in-patient biopsy as concern for follow up and definitive treatment   Discussed importance of biopsy and treating lesion given concern for NMSC.       Diagnosis: Urinary retention  Assessment and Plan of Treatment: lara catheter care  chnage monthly;  urology follow up    Diagnosis: Parkinsons Disease  Assessment and Plan of Treatment: Continue medications as prescribed  continue medication    Diagnosis: Acute kidney injury superimposed on chronic kidney disease  Assessment and Plan of Treatment: Avoid taking (NSAIDs) - (ex: Ibuprofen, Advil, Celebrex, Naprosyn)  Avoid taking any nephrotoxic agents (can harm kidneys) - Intravenous contrast for diagnostic testing, combination cold medications.  Have all medications adjusted for your renal function by your Health Care Provider.  Blood pressure control is important.  Take all medication as prescribed.    Diagnosis: Hematuria  Assessment and Plan of Treatment: Resolved    Diagnosis: UTI (urinary tract infection)  Assessment and Plan of Treatment: HOME CARE INSTRUCTIONS  f you were prescribed antibiotics, take them exactly as your caregiver instructs you. Finish the medication even if you feel better after you have only taken some of the medication.  Drink enough water and fluids to keep your urine clear or pale yellow.  Avoid caffeine, tea, and carbonated beverages. They tend to irritate your bladder.  Empty your bladder often. Avoid holding urine for long periods of time.  After a bowel movement, women should cleanse from front to back. Use each tissue only once.  SEEK MEDICAL CARE IF:  You have back pain.  You develop a fever.  Your symptoms do not begin to resolve within 3 days.  SEEK IMMEDIATE MEDICAL CARE IF:  You have severe back pain or lower abdominal pain.  You develop chills.  You have nausea or vomiting.  You have continued burning or discomfort with urination. PRINCIPAL DISCHARGE DIAGNOSIS  Diagnosis: GI bleed  Assessment and Plan of Treatment: Your hospital course was comlpicated by GI bleeding requiring multiple transfusions of blood cells and platelets. During endoscopy with the gastroenterology doctors, they visualized rectal ulcers, polyps in the ascending colon, and diverticulosis. You also required surgery for further rectal bleeding with low blood pressure. Surgery team stopped a bleeding ulcer and packed it for wound care. Please follow up with surgery and gastroenterology teams.      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  Recommend out-patient biopsy upon discharge  Do not recommend in-patient biopsy as concern for follow up and definitive treatment   Discussed importance of biopsy and treating lesion given concern for NMSC.       Diagnosis: Urinary retention  Assessment and Plan of Treatment: lara catheter care  chnage monthly;  urology follow up    Diagnosis: Parkinsons Disease  Assessment and Plan of Treatment: Continue medications as prescribed  continue medication    Diagnosis: Acute kidney injury superimposed on chronic kidney disease  Assessment and Plan of Treatment: Avoid taking (NSAIDs) - (ex: Ibuprofen, Advil, Celebrex, Naprosyn)  Avoid taking any nephrotoxic agents (can harm kidneys) - Intravenous contrast for diagnostic testing, combination cold medications.  Have all medications adjusted for your renal function by your Health Care Provider.  Blood pressure control is important.  Take all medication as prescribed.    Diagnosis: Hematuria  Assessment and Plan of Treatment: Resolved    Diagnosis: UTI (urinary tract infection)  Assessment and Plan of Treatment: HOME CARE INSTRUCTIONS  f you were prescribed antibiotics, take them exactly as your caregiver instructs you. Finish the medication even if you feel better after you have only taken some of the medication.  Drink enough water and fluids to keep your urine clear or pale yellow.  Avoid caffeine, tea, and carbonated beverages. They tend to irritate your bladder.  Empty your bladder often. Avoid holding urine for long periods of time.  After a bowel movement, women should cleanse from front to back. Use each tissue only once.  SEEK MEDICAL CARE IF:  You have back pain.  You develop a fever.  Your symptoms do not begin to resolve within 3 days.  SEEK IMMEDIATE MEDICAL CARE IF:  You have severe back pain or lower abdominal pain.  You develop chills.  You have nausea or vomiting.  You have continued burning or discomfort with urination.

## 2019-09-03 NOTE — PROGRESS NOTE ADULT - SUBJECTIVE AND OBJECTIVE BOX
No pain, no shortness of breath      VITAL:  T(C): , Max: 36.7 (09-02-19 @ 19:50)  T(F): , Max: 98.1 (09-02-19 @ 19:50)  HR: 74 (09-03-19 @ 04:57)  BP: 143/71 (09-03-19 @ 04:57)  RR: 18 (09-03-19 @ 04:57)  SpO2: 95% (09-03-19 @ 04:57      PHYSICAL EXAM:  Constitutional: NAD; mild psychomotor retardation  HEENT: NCAT, DMM  Neck: Supple, No JVD  Respiratory: CTA-b/l  Cardiovascular: RRR s1s2, no m/r/g  Gastrointestinal: BS+, soft, NT/ND  Extremities: No peripheral edema b/l  : (+)lara      LABS:                        7.8    7.89  )-----------( 144      ( 01 Sep 2019 12:23 )             24.7     Na(140)/K(3.5)/Cl(102)/HCO3(24)/BUN(65)/Cr(2.40)Glu(128)/Ca(8.2)/Mg(--)/PO4(--)    09-03 @ 06:41  Na(141)/K(3.3)/Cl(101)/HCO3(25)/BUN(66)/Cr(2.53)Glu(124)/Ca(8.5)/Mg(--)/PO4(--)    09-02 @ 06:37  Na(138)/K(3.6)/Cl(102)/HCO3(24)/BUN(67)/Cr(2.52)Glu(104)/Ca(8.5)/Mg(--)/PO4(--)    09-01 @ 08:30      IMPRESSION: 89M w/ HTN, DVT-IVCF, Parkinson's disease, CAD-CABG, and CKD3, 8/28/19 a/w urinary retention/STEVEN  (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 At least in part due to past obstructive nephropathy, with resultant atrophy of his right kidney.  (2)STEVEN - obstructive - improved relative to admission s/p lara placement. Creatinine however has plateaued in the mid 2s. Will it further improve? It was 1.6-1.7mg/dL back in mid-August. Is he dry (prerenal) at present? Does he have a component of ATN from his recent obstruction? Doubt abx-induced AIN.  (3)ID - cellulitis - advanced to PO abx (Amoxicillin)      RECOMMEND:  (1)D/C Lasix  (2)Check urine: lytes, creatinine, urinalysis  (3)BMP daily  (4)Dose new meds for GFR 20-30ml/min          Abdi Vieira MD  [facility]  [phone] No pain, no shortness of breath. Complains of being on a dysphagia diet. Thirsty for regular water.  Lara removed this a.m. - yet to void since then      VITAL:  T(C): , Max: 36.7 (09-02-19 @ 19:50)  T(F): , Max: 98.1 (09-02-19 @ 19:50)  HR: 74 (09-03-19 @ 04:57)  BP: 143/71 (09-03-19 @ 04:57)  RR: 18 (09-03-19 @ 04:57)  SpO2: 95% (09-03-19 @ 04:57      PHYSICAL EXAM:  Constitutional: NAD; mild psychomotor retardation  HEENT: NCAT, DMM  Neck: Supple, No JVD  Respiratory: CTA-b/l  Cardiovascular: RRR s1s2, no m/r/g  Gastrointestinal: BS+, soft, NT/ND  Extremities: No peripheral edema b/l  : no lara  Neuro: (+)coarse tremor LUE        LABS:                        7.8    7.89  )-----------( 144      ( 01 Sep 2019 12:23 )             24.7     Na(140)/K(3.5)/Cl(102)/HCO3(24)/BUN(65)/Cr(2.40)Glu(128)/Ca(8.2)/Mg(--)/PO4(--)    09-03 @ 06:41  Na(141)/K(3.3)/Cl(101)/HCO3(25)/BUN(66)/Cr(2.53)Glu(124)/Ca(8.5)/Mg(--)/PO4(--)    09-02 @ 06:37  Na(138)/K(3.6)/Cl(102)/HCO3(24)/BUN(67)/Cr(2.52)Glu(104)/Ca(8.5)/Mg(--)/PO4(--)    09-01 @ 08:30      IMPRESSION: 89M w/ HTN, DVT-IVCF, Parkinson's disease, CAD-CABG, and CKD3, 8/28/19 a/w urinary retention/STEVEN  (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 At least in part due to past obstructive nephropathy, with resultant atrophy of his right kidney.  (2)STEVEN - obstructive - improved relative to admission s/p lara placement. Creatinine however has plateaued in the mid 2s. Will it further improve? It was 1.6-1.7mg/dL back in mid-August. Is he dry (prerenal) at present? Does he have a component of ATN from his recent obstruction? Doubt abx-induced AIN.  (3)ID - cellulitis - advanced to PO abx (Amoxicillin)      RECOMMEND:  (1)D/C Lasix  (2)Check urine: lytes, creatinine, urinalysis  (3)BMP daily  (4)Dose new meds for GFR 20-30ml/min  (5)TOV as planned        Abdi Vieira MD  [facility]  [phone]

## 2019-09-03 NOTE — DISCHARGE NOTE PROVIDER - CARE PROVIDER_API CALL
Bowen Dias (DO)  Cardiology; Internal Medicine  [address] [place] [address], [address]  [place] [address]  Phone: [phone]  Fax: [phone]  Follow Up Time: Bowen Dias ()  Cardiology; Internal Medicine  [address] [place] [address], [address]  [place] [address]  Phone: [phone]  Fax: [phone]  Follow Up Time:     Zee Patel)  Dermatology  [address] [facility], [address]  [place] [address]  Phone: [phone]  Fax: [phone]  Follow Up Time: Bowen Dias (DO)  Cardiology; Internal Medicine  [address] [place] [address], [address]  [place] [address]  Phone: [phone]  Fax: [phone]  Follow Up Time: 2 weeks    Zee Patel)  Dermatology  [address] [facility], [address]  [place] [address]  Phone: [phone]  Fax: [phone]  Follow Up Time:

## 2019-09-03 NOTE — PROVIDER CONTACT NOTE (CHANGE IN STATUS NOTIFICATION) - BACKGROUND
Patient is a 89 year old male admitted Patient is a 89 year old male admitted with cellulitis of the buttocks. Patient medical history includes CAD, Parkinson's, Upper GI bleed, Aspiration PNA, UTI, BPH, HTN, HLD.

## 2019-09-03 NOTE — PROVIDER CONTACT NOTE (CHANGE IN STATUS NOTIFICATION) - ASSESSMENT
Patient is alert and orientedx2. XG=896/44, HR=63, T=98.1, RR=17, O2=97 on room air. Patient denies feeling lightheaded or any dizziness

## 2019-09-03 NOTE — PROGRESS NOTE ADULT - ASSESSMENT
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 - mild left kidney hydronephrosis -> kidney function improving    urinary retention - lara catheter placement now with hematuria without infection and penile swelling/cellulitis    CAD/MI/CABG/PCI - ischemic cardiomyopathy    PLAN/RECOMMENDATIONS:    stable oxygenation on room air  observe off pulmonary medications- can use prn robitussin if necessary  diurese as tolerated by renal function and hemodynamics  speech and swallow evaluation -pt may be microaspirating given overall status  continue dyspnagia 2 dioet  cardiac meds: ASA/lipitor/imdur/ranexa/lasix  neuro meds:   incentive spirometry  PT/activity as able        Heidi Yip MD, [facility]  [phone]  Pulmonary Medicine

## 2019-09-03 NOTE — PROGRESS NOTE ADULT - SUBJECTIVE AND OBJECTIVE BOX
Follow-up Pulm Progress Note    No new respiratory events overnight.  Denies increased SOB, chest pain, cough or mucus.  overall weak, but cough is improved    Medications:  MEDICATIONS  (STANDING):  amoxicillin  500 milliGRAM(s)/clavulanate 1 Tablet(s) Oral daily  aspirin enteric coated 81 milliGRAM(s) Oral daily  atorvastatin 80 milliGRAM(s) Oral at bedtime  calcium carbonate 1250 mG  + Vitamin D (OsCal 500 + D) 1 Tablet(s) Oral daily  carbidopa/levodopa CR 50/200 1 Tablet(s) Oral <User Schedule>  cholecalciferol 1000 Unit(s) Oral two times a day  docusate sodium 100 milliGRAM(s) Oral two times a day  entacapone 200 milliGRAM(s) Oral <User Schedule>  fluticasone propionate 50 MICROgram(s)/spray Nasal Spray 1 Spray(s) Both Nostrils two times a day  furosemide    Tablet 40 milliGRAM(s) Oral daily  isosorbide   mononitrate ER Tablet (IMDUR) 60 milliGRAM(s) Oral daily  levothyroxine 50 MICROGram(s) Oral daily  multivitamin 1 Tablet(s) Oral daily  nystatin Powder 1 Application(s) Topical two times a day  polyethylene glycol 3350 17 Gram(s) Oral daily  ranolazine 1000 milliGRAM(s) Oral two times a day  senna 2 Tablet(s) Oral at bedtime  tamsulosin 0.4 milliGRAM(s) Oral daily    MEDICATIONS  (PRN):  acetaminophen   Tablet .. 650 milliGRAM(s) Oral every 6 hours PRN Temp greater or equal to 38C (100.4F), Mild Pain (1 - 3)  guaiFENesin   Syrup  (Sugar-Free) 200 milliGRAM(s) Oral every 6 hours PRN Cough  melatonin 5 milliGRAM(s) Oral at bedtime PRN Insomnia      Vent settings (if applicable)      Vital Signs Last 24 Hrs  T(C): 36.7 (03 Sep 2019 04:57), Max: 36.7 (02 Sep 2019 19:50)  T(F): 98 (03 Sep 2019 04:57), Max: 98.1 (02 Sep 2019 19:50)  HR: 74 (03 Sep 2019 04:57) (60 - 74)  BP: 143/71 (03 Sep 2019 04:57) (143/71 - 154/62)  BP(mean): --  RR: 18 (03 Sep 2019 04:57) (18 - 18)  SpO2: 95% (03 Sep 2019 04:57) (95% - 97%)          09-02 @ 07:01  -  09-03 @ 07:00  --------------------------------------------------------  IN: 240 mL / OUT: 1150 mL / NET: -910 mL          LABS:                        7.8    7.89  )-----------( 144      ( 01 Sep 2019 12:23 )             24.7     09-03    140  |  102  |  65<H>  ----------------------------<  128<H>  3.5   |  24  |  2.40<H>    Ca    8.2<L>      03 Sep 2019 06:41            CAPILLARY BLOOD GLUCOSE                  CULTURES:  Culture Results:   No growth at 5 days. (08-28 @ 17:55)  Culture Results:   No growth at 5 days. (08-28 @ 17:55)  Culture Results:   No growth (08-28 @ 17:52)  Culture Results:   No growth (08-27 @ 21:50)        Physical Examination:  Awake and alert, generally comfortable  HEENT: unremarkable  PULM: Clear to auscultation bilaterally, no significant sputum production  CVS: Regular rate and rhythm, no murmurs, rubs, or gallops  Abd:  soft, non tender  Extrem: No CCE    RADIOLOGY REVIEWED  CXR:    CT chest:

## 2019-09-03 NOTE — PROGRESS NOTE ADULT - SUBJECTIVE AND OBJECTIVE BOX
Subjective: Patient seen and examined. No new events except as noted.   resting comfortably       REVIEW OF SYSTEMS:    CONSTITUTIONAL: + weakness, fevers or chills  EYES/ENT: No visual changes;  No vertigo or throat pain   NECK: No pain or stiffness  RESPIRATORY: No cough, wheezing, hemoptysis; No shortness of breath  CARDIOVASCULAR: No chest pain or palpitations  GASTROINTESTINAL: No abdominal or epigastric pain. No nausea, vomiting, or hematemesis; No diarrhea or constipation. No melena or hematochezia.  GENITOURINARY: No dysuria, frequency or hematuria  NEUROLOGICAL: No numbness or weakness  SKIN: No itching, burning, rashes, or lesions   All other review of systems is negative unless indicated above.    MEDICATIONS:  MEDICATIONS  (STANDING):  amoxicillin  500 milliGRAM(s)/clavulanate 1 Tablet(s) Oral daily  aspirin enteric coated 81 milliGRAM(s) Oral daily  atorvastatin 80 milliGRAM(s) Oral at bedtime  calcium carbonate 1250 mG  + Vitamin D (OsCal 500 + D) 1 Tablet(s) Oral daily  carbidopa/levodopa CR 50/200 1 Tablet(s) Oral <User Schedule>  cholecalciferol 1000 Unit(s) Oral two times a day  docusate sodium 100 milliGRAM(s) Oral two times a day  entacapone 200 milliGRAM(s) Oral <User Schedule>  fluticasone propionate 50 MICROgram(s)/spray Nasal Spray 1 Spray(s) Both Nostrils two times a day  isosorbide   mononitrate ER Tablet (IMDUR) 60 milliGRAM(s) Oral daily  levothyroxine 50 MICROGram(s) Oral daily  multivitamin 1 Tablet(s) Oral daily  nystatin Powder 1 Application(s) Topical two times a day  polyethylene glycol 3350 17 Gram(s) Oral daily  ranolazine 1000 milliGRAM(s) Oral two times a day  senna 2 Tablet(s) Oral at bedtime  tamsulosin 0.4 milliGRAM(s) Oral daily      PHYSICAL EXAM:  T(C): 36.7 (09-03-19 @ 04:57), Max: 36.7 (09-02-19 @ 19:50)  HR: 74 (09-03-19 @ 04:57) (60 - 74)  BP: 143/71 (09-03-19 @ 04:57) (143/71 - 154/62)  RR: 18 (09-03-19 @ 04:57) (18 - 18)  SpO2: 95% (09-03-19 @ 04:57) (95% - 97%)  Wt(kg): --  I&O's Summary    02 Sep 2019 07:01  -  03 Sep 2019 07:00  --------------------------------------------------------  IN: 240 mL / OUT: 1150 mL / NET: -910 mL    03 Sep 2019 07:01  -  03 Sep 2019 09:47  --------------------------------------------------------  IN: 0 mL / OUT: 350 mL / NET: -350 mL              Appearance: NAD  HEENT:   Normal oral mucosa, PERRL, EOMI	  Lymphatic: No lymphadenopathy , no edema  Cardiovascular: Normal S1 S2, No JVD, No murmurs , Peripheral pulses palpable 2+ bilaterally  Respiratory: Lungs clear to auscultation, normal effort 	  Gastrointestinal:  Soft, Non-tender, + BS	  Skin: No rashes, No ecchymoses, No cyanosis, warm to touch  Musculoskeletal: Decreased range of motion and  strength  Psychiatry:  Mood & affect appropriate  Ext: No edema  +lara       LABS:    CARDIAC MARKERS:                                8.2    8.20  )-----------( 156      ( 03 Sep 2019 09:10 )             25.3     09-03    140  |  102  |  65<H>  ----------------------------<  128<H>  3.5   |  24  |  2.40<H>    Ca    8.2<L>      03 Sep 2019 06:41      proBNP:   Lipid Profile:   HgA1c:   TSH:             TELEMETRY: 	    ECG:  	  RADIOLOGY:   DIAGNOSTIC TESTING:  [ ] Echocardiogram:  [ ]  Catheterization:  [ ] Stress Test:    OTHER:

## 2019-09-03 NOTE — DISCHARGE NOTE PROVIDER - NSFOLLOWUPCLINICS_GEN_ALL_ED_FT
[facility]  General Surgery  [address] [place] [address] - [address] [place] [address]  Phone: [phone]  Fax:   Follow Up Time:

## 2019-09-03 NOTE — CHART NOTE - NSCHARTNOTEFT_GEN_A_CORE
Contacted by Medicine NF for patient with hematochezia.     88 y/o M w/ hx of CAD s/p CABG followed by PCI on ASA, ICM, HTN, Parkinson's Disease, and history of GI bleeding s/p multiple mechanical falls with right humeral fracture, sent to ED from [facility] for evaluation of gross hematuria, initially admitted to CCU with c/b aspiration pneumonia who developed acute onset of hematochezia this evening.     Per Medicine NF, the patient is not tachycardic with HRs in the 50s and with BP in the 90s/60s - most recently 97/59. He is not on a beta blocker. The patient has yet to receive blood - ordered for 1 unit pRBCs. Last Hgb was 7.8 at 21:17 and 8:1 at 16:04.    Recommended MICU consult, monitoring serial CBCs, IV PPI, CTA A/P and to consult IR for possible embolization pending results of CTA A/P.    Patient with Dysphagia 2 requiring honey thickened liquids - will need NG tube placement prior to bowel preparation. [facility] midnight [facility] Wednesday.    Discussed with on call GI attending. Contacted by Medicine NF for patient with hematochezia.     88 y/o M w/ hx of CAD s/p CABG followed by PCI on ASA, ICM, HTN, Parkinson's Disease, and history of GI bleeding s/p multiple mechanical falls with right humeral fracture, sent to ED from [facility] for evaluation of gross hematuria, initially admitted to CCU with c/b aspiration pneumonia who developed acute onset of hematochezia this evening.     Per Medicine NF, the patient is not tachycardic with HRs in the 50s and with BP in the 90s/60s - most recently 97/59. He is not on a beta blocker. The patient has yet to receive blood - ordered for 2 units pRBCs. Last Hgb was 7.8 at 21:17 and 8:1 at 16:04. Hgb 6.3 at 23:40.    Recommended MICU consult, monitoring serial CBCs, IV PPI, CTA A/P and to consult IR for possible embolization pending results of CTA A/P.    Patient with Dysphagia 2 diet requiring honey thickened liquids - will need NG tube placement to give bowel preparation.     NPO after midnight.  [facility] ordered for possible colonoscopy +/- EGD Wednesday - will be started after CTA A/P and NG tube placement per primary team.    Discussed with on call GI attending.

## 2019-09-03 NOTE — PROGRESS NOTE ADULT - SUBJECTIVE AND OBJECTIVE BOX
[facility]      Seen earlier today, and examined.  - Today, patient is without complaints.           *****MEDICATIONS: Current medication reviewed and documented.    MEDICATIONS  (STANDING):  aspirin enteric coated 81 milliGRAM(s) Oral daily  atorvastatin 80 milliGRAM(s) Oral at bedtime  calcium carbonate 1250 mG  + Vitamin D (OsCal 500 + D) 1 Tablet(s) Oral daily  carbidopa/levodopa CR 50/200 1 Tablet(s) Oral <User Schedule>  cholecalciferol 1000 Unit(s) Oral two times a day  docusate sodium 100 milliGRAM(s) Oral two times a day  entacapone 200 milliGRAM(s) Oral <User Schedule>  fluticasone propionate 50 MICROgram(s)/spray Nasal Spray 1 Spray(s) Both Nostrils two times a day  isosorbide   mononitrate ER Tablet (IMDUR) 60 milliGRAM(s) Oral daily  levothyroxine 50 MICROGram(s) Oral daily  multivitamin 1 Tablet(s) Oral daily  nystatin Powder 1 Application(s) Topical two times a day  polyethylene glycol 3350 17 Gram(s) Oral daily  ranolazine 1000 milliGRAM(s) Oral two times a day  senna 2 Tablet(s) Oral at bedtime  tamsulosin 0.4 milliGRAM(s) Oral daily    MEDICATIONS  (PRN):  acetaminophen   Tablet .. 650 milliGRAM(s) Oral every 6 hours PRN Temp greater or equal to 38C (100.4F), Mild Pain (1 - 3)  guaiFENesin   Syrup  (Sugar-Free) 200 milliGRAM(s) Oral every 6 hours PRN Cough  melatonin 5 milliGRAM(s) Oral at bedtime PRN Insomnia          ***** VITAL SIGNS:  T(F): 97.9 (19 @ 22:15), Max: 98.1 (19 @ 21:06)  HR: 69 (19 @ 22:15) (57 - 93)  BP: 98/46 (19 @ 22:15) (98/46 - 154/62)  RR: 16 (19 @ 22:15) (16 - 18)  SpO2: 98% (19 @ 22:15) (95% - 98%)  Wt(kg): --  ,   I&O's Summary    02 Sep 2019 07:  -  03 Sep 2019 07:00  --------------------------------------------------------  IN: 240 mL / OUT: 1150 mL / NET: -910 mL    03 Sep 2019 07:01  -  03 Sep 2019 23:37  --------------------------------------------------------  IN: 600 mL / OUT: 350 mL / NET: 250 mL             *****PHYSICAL EXAM: Alert oriented x 2  Attention comprehension are fair. Able to name, repeat  without any difficulty.   Able to follow 1-2  step commands.     EOMI fundi not visualized,  VFF to confrontration  No facial asymmetry   Tongue is midline   Palate elevates symmetrically   Moving all 4 ext symmetrically no pronator drift  limited eval of rue due to sling      sensation is grossly symmetric           *****LAB AND IMAGIN.8    7.6   )-----------( 128      ( 03 Sep 2019 21:17 )             23.5                   140  |  102  |  65<H>  ----------------------------<  128<H>  3.5   |  24  |  2.40<H>    Ca    8.2<L>      03 Sep 2019 06:41                           [All pertinent recent Imaging/Reports reviewed]           *****A S S E S S M E N T   A N D   P L A N :    Excerpt from H&P, 89 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  Patient reported that he had local trauma to the skin of his penis following an attempt to use a hand held urinal.   Patient also notes that patient was on a ? commode and was noted to have increased scrotal oedema and redness. called to manage his parkinsons dementia. unable to tell me the name of his neurologist.  He reports having hallucinations, reports hearing his daughter and friend planning to get him into rehab, however it appears that they both were not there.   He is not sure if these are dreams.   He doesn't know if he act out his dreams.          Problem/Recommendations 1: Parkinsons   continue sinemet/entacapone  ? hallucinations related to parkinsons vs. metabolic encephalopathy due to STEVEN   will continue to monitor closely   regulate sleep wake cycle.     Problem/Recommendations 2:  Falls likely related to parkinsons +/- deconditioning +/- microvascular disease.   fall precautions.   pt consult  appreciated recommend jeannie     Thank you for allowing me to participate in the care of this patient. Please do not hesitate to call me if you have any  questions.        ________________  Farideh Irvin MD  [facility] [place] ([facility]  [phone]      30 minutes spent on total encounter; more than 50 % of the visit was  spent counseling about plan of care, compliance to diet/exercise and medication regimen and or  coordinating care by the attending physician.      It is advised that stroke patients follow up with GUADALUPE Viera @ [phone] in 1- 2 weeks.   Others please follow up with Dr. Michael Nissenbaum [phone]

## 2019-09-03 NOTE — DISCHARGE NOTE PROVIDER - REASON FOR ADMISSION
[facility] following Cortés placement.  Groin erythema, oedema

## 2019-09-03 NOTE — DISCHARGE NOTE PROVIDER - NSDCFUADDINST_GEN_ALL_CORE_FT
scalp lesion- per derm -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  Recommend out-patient biopsy upon discharge  Do not recommend in-patient biopsy as concern for follow up and definitive treatment   Discussed importance of biopsy and treating lesion given concern for NMSC.

## 2019-09-03 NOTE — DISCHARGE NOTE PROVIDER - NSDCCPTREATMENT_GEN_ALL_CORE_FT
PRINCIPAL PROCEDURE  Procedure: EGD  Findings and Treatment: Findings:       A 2 cm hiatus hernia was present.       The exam of the esophagus was otherwise normal.       The entire examined stomach was normal.       The duodenal bulb and 2nd part of the duodenum were normal.                                                                                                        Impression:          - Hiatal Hernia, otherwise normal EGD  Recommendation:      - Perform a colonoscopy today.        SECONDARY PROCEDURE  Procedure: Colonoscopy  Findings and Treatment: Impression:          - Diverticulosis in the sigmoid colon and in the ascending colon.                       - Three 8 to 12 mm polyps in the ascending colon.                       - Large rectal ulcers  Recommendation:      - [facility].                       - Aggressive bowel regimen - can start with miralax BID and titrate up as                        necessary                       - No rectal tube                       - Repeat colonoscopy within 6 months for polyp removal if patient is                        agreeable.                       - Will need repeat Flex Sig vs. Colorectal Anoscopy to assess ulcer healing                        and fdor biopsy of ulcers if persisting

## 2019-09-03 NOTE — CHART NOTE - NSCHARTNOTEFT_GEN_A_CORE
RYAN FITZPATRICK    Notified by RN patient with bright red bloody stool per rectum. Called RRT.      Interventions taken   stat CBC and blood type and cross.  stool occult +  RRT suggested most likely from Diverticulitis and pt's v/s is stable right now.    Informed pt's condition to Dr. Dias and requiring pt to transfer to tele floor.  CBC : H/H still pending  cont assess and monitor.                       Autumn Medina ANP-BC  Spectralink #[id number]

## 2019-09-03 NOTE — DISCHARGE NOTE PROVIDER - CARE PROVIDERS DIRECT ADDRESSES
,DirectAddress_Unknown ,DirectAddress_Unknown,[email or web address]

## 2019-09-03 NOTE — DISCHARGE NOTE PROVIDER - HOSPITAL COURSE
89 yo male with PMHx of HTN, HLD, CAD/ICM s/p CABG/PCI, hypothyroidism, Parkinson's disease with progressive functional impairment, chronic Afib not an AC candidate, h/o DVT s/p IVC filter not on full AC, CKD stage 3 (baseline 1.4-1.6), h/o GI bleed, multiple falls (recent admission 7/2019), admitted to [facility] medical floors 8/28 with hematuria s/p lara insertion, found to have STEVEN on CKD i/s/o obstructive nephropathy, groin erythema/edema concerning for cellulitis s/p IV abx, course c/b pulmonary edema s/p diuresis. Transferred to MICU s/p RRT pm of 9/3 for management of brisk lower GI bleed associated hypotension requiring 2U PRBC and 1U Plt. 88 yo male with PMHx of HTN, HLD, CAD/ICM s/p CABG/PCI, hypothyroidism, Parkinson's disease with progressive functional impairment, chronic Afib not an AC candidate, h/o DVT s/p IVC filter not on full AC, CKD stage 3 (baseline 1.4-1.6), h/o GI bleed, multiple falls (recent admission 7/2019), admitted to [facility] medical floors 8/28 with hematuria s/p lara insertion, found to have STEVEN on CKD i/s/o obstructive nephropathy, groin erythema/edema concerning for cellulitis s/p IV abx, course c/b pulmonary edema s/p diuresis. Transferred to MICU s/p RRT pm of 9/3 for management of brisk lower GI bleed associated hypotension requiring 2U PRBC and 1U Plt. CTAP with linear region of hyperdensity in the rectum wtihout pooling in the venous phase, likely representing hemorrhoid. Patient had EGD/colonoscopy performed revealing, hiatal hernia, large rectal ulcers, three 8 to 12 mm polyps in the ascending colon, and diverticulosis in the sigmoid as well as ascending colon. Patient sent to floors for further managememt. While on the floor, course was complicated by blood loss anemia, requiring 3 units of pRBC transfusion. On 9/11 RRT (3rd during hospitalization) was called for rectal bleeding. BP downtrended from SBP 140s to 70/40s. 2U pRBC and 1U FFP were transfused. Patient was transferred to SICU, requiring pressor support, and was transferred to OR. Bleeding ulcer was ligated and rectum packed. Patient transferred back to SICU for further management. Patient's VS stabilized and became off pressors. SICU course complicated by anemia, requiring 2 more units pRBCs. Patient deemed not to require ICU support and transferred to the floors. Patient failed TOV and now has lara. 88 yo male with PMHx of HTN, HLD, CAD/ICM s/p CABG/PCI, hypothyroidism, Parkinson's disease with progressive functional impairment, chronic Afib not an AC candidate, h/o DVT s/p IVC filter not on full AC, CKD stage 3 (baseline 1.4-1.6), h/o GI bleed, multiple falls (recent admission 7/2019), admitted to [facility] medical floors 8/28 with hematuria s/p lara insertion, found to have STEVEN on CKD i/s/o obstructive nephropathy, groin erythema/edema concerning for cellulitis s/p IV abx, course c/b pulmonary edema s/p diuresis. Transferred to MICU s/p RRT pm of 9/3 for management of brisk lower GI bleed associated hypotension requiring 2U PRBC and 1U Plt. CTAP with linear region of hyperdensity in the rectum wtihout pooling in the venous phase, likely representing hemorrhoid. Patient had EGD/colonoscopy performed revealing, hiatal hernia, large rectal ulcers, three 8 to 12 mm polyps in the ascending colon, and diverticulosis in the sigmoid as well as ascending colon. Patient sent to floors for further managememt. While on the floor, course was complicated by blood loss anemia, requiring 3 units of pRBC transfusion. On 9/11 RRT (3rd during hospitalization) was called for rectal bleeding. BP downtrended from SBP 140s to 70/40s. 2U pRBC and 1U FFP were transfused. Patient was transferred to SICU, requiring pressor support, and was transferred to OR. Pt's blood pressure medications were held due to his low pressures. Bleeding ulcer was ligated and rectum packed. Patient transferred back to SICU for further management. Patient's VS stabilized and became off pressors. SICU course complicated by anemia, requiring 2 more units pRBCs. Patient deemed not to require ICU support and transferred to the floors. Patient failed TOV and now has lara.

## 2019-09-03 NOTE — DISCHARGE NOTE PROVIDER - PROVIDER TOKENS
PROVIDER:[TOKEN:[[id number]]] PROVIDER:[TOKEN:[[id number]]],PROVIDER:[TOKEN:[[id number]]] PROVIDER:[TOKEN:[[id number]],FOLLOWUP:[2 weeks]],PROVIDER:[TOKEN:[[id number]]]

## 2019-09-04 DIAGNOSIS — K62.5 HEMORRHAGE OF ANUS AND RECTUM: ICD-10-CM

## 2019-09-04 LAB
ALBUMIN SERPL ELPH-MCNC: 2.4 G/DL — LOW (ref 3.3–5)
ALP SERPL-CCNC: 53 U/L — SIGNIFICANT CHANGE UP (ref 40–120)
ALT FLD-CCNC: <5 U/L — LOW (ref 10–45)
ANION GAP SERPL CALC-SCNC: 12 MMOL/L — SIGNIFICANT CHANGE UP (ref 5–17)
APTT 50/50 2HOUR INCUB: 34.6 SEC — SIGNIFICANT CHANGE UP (ref 27.5–37.4)
APTT BLD: 106.6 SEC — HIGH (ref 27.5–36.3)
APTT BLD: 26.2 SEC — LOW (ref 27.5–36.3)
APTT BLD: 27.1 SEC — LOW (ref 27.5–36.3)
APTT BLD: 27.1 SEC — LOW (ref 27.5–36.3)
APTT BLD: 28.8 SEC — SIGNIFICANT CHANGE UP (ref 27.5–37.4)
AST SERPL-CCNC: 10 U/L — SIGNIFICANT CHANGE UP (ref 10–40)
BASOPHILS # BLD AUTO: 0 K/UL — SIGNIFICANT CHANGE UP (ref 0–0.2)
BASOPHILS NFR BLD AUTO: 0.1 % — SIGNIFICANT CHANGE UP (ref 0–2)
BASOPHILS NFR BLD AUTO: 0.2 % — SIGNIFICANT CHANGE UP (ref 0–2)
BASOPHILS NFR BLD AUTO: 0.4 % — SIGNIFICANT CHANGE UP (ref 0–2)
BILIRUB SERPL-MCNC: 0.4 MG/DL — SIGNIFICANT CHANGE UP (ref 0.2–1.2)
BUN SERPL-MCNC: 65 MG/DL — HIGH (ref 7–23)
CALCIUM SERPL-MCNC: 7.4 MG/DL — LOW (ref 8.4–10.5)
CHLORIDE SERPL-SCNC: 106 MMOL/L — SIGNIFICANT CHANGE UP (ref 96–108)
CO2 SERPL-SCNC: 24 MMOL/L — SIGNIFICANT CHANGE UP (ref 22–31)
CREAT SERPL-MCNC: 2.46 MG/DL — HIGH (ref 0.5–1.3)
EOSINOPHIL # BLD AUTO: 0.1 K/UL — SIGNIFICANT CHANGE UP (ref 0–0.5)
EOSINOPHIL # BLD AUTO: 0.1 K/UL — SIGNIFICANT CHANGE UP (ref 0–0.5)
EOSINOPHIL # BLD AUTO: 0.2 K/UL — SIGNIFICANT CHANGE UP (ref 0–0.5)
EOSINOPHIL NFR BLD AUTO: 1 % — SIGNIFICANT CHANGE UP (ref 0–6)
EOSINOPHIL NFR BLD AUTO: 1.7 % — SIGNIFICANT CHANGE UP (ref 0–6)
EOSINOPHIL NFR BLD AUTO: 2.9 % — SIGNIFICANT CHANGE UP (ref 0–6)
GLUCOSE SERPL-MCNC: 133 MG/DL — HIGH (ref 70–99)
HCT VFR BLD CALC: 19.7 % — CRITICAL LOW (ref 39–50)
HCT VFR BLD CALC: 21.9 % — LOW (ref 39–50)
HCT VFR BLD CALC: 22 % — LOW (ref 39–50)
HCT VFR BLD CALC: 23.1 % — LOW (ref 39–50)
HGB BLD-MCNC: 6.3 G/DL — CRITICAL LOW (ref 13–17)
HGB BLD-MCNC: 7.2 G/DL — LOW (ref 13–17)
HGB BLD-MCNC: 7.2 G/DL — LOW (ref 13–17)
HGB BLD-MCNC: 7.5 G/DL — LOW (ref 13–17)
HYPOCHROMIA BLD QL: SLIGHT — SIGNIFICANT CHANGE UP
INR BLD: 1.27 RATIO — HIGH (ref 0.88–1.16)
INR BLD: 1.36 RATIO — HIGH (ref 0.88–1.16)
INR BLD: 1.4 RATIO — HIGH (ref 0.88–1.16)
LYMPHOCYTES # BLD AUTO: 0.8 K/UL — LOW (ref 1–3.3)
LYMPHOCYTES # BLD AUTO: 1.1 K/UL — SIGNIFICANT CHANGE UP (ref 1–3.3)
LYMPHOCYTES # BLD AUTO: 1.4 K/UL — SIGNIFICANT CHANGE UP (ref 1–3.3)
LYMPHOCYTES # BLD AUTO: 13.5 % — SIGNIFICANT CHANGE UP (ref 13–44)
LYMPHOCYTES # BLD AUTO: 18.3 % — SIGNIFICANT CHANGE UP (ref 13–44)
LYMPHOCYTES # BLD AUTO: 8.4 % — LOW (ref 13–44)
MACROCYTES BLD QL: SLIGHT — SIGNIFICANT CHANGE UP
MAGNESIUM SERPL-MCNC: 2.5 MG/DL — SIGNIFICANT CHANGE UP (ref 1.6–2.6)
MCHC RBC-ENTMCNC: 29.6 PG — SIGNIFICANT CHANGE UP (ref 27–34)
MCHC RBC-ENTMCNC: 29.7 PG — SIGNIFICANT CHANGE UP (ref 27–34)
MCHC RBC-ENTMCNC: 30.3 PG — SIGNIFICANT CHANGE UP (ref 27–34)
MCHC RBC-ENTMCNC: 30.6 PG — SIGNIFICANT CHANGE UP (ref 27–34)
MCHC RBC-ENTMCNC: 32 GM/DL — SIGNIFICANT CHANGE UP (ref 32–36)
MCHC RBC-ENTMCNC: 32.3 GM/DL — SIGNIFICANT CHANGE UP (ref 32–36)
MCHC RBC-ENTMCNC: 32.7 GM/DL — SIGNIFICANT CHANGE UP (ref 32–36)
MCHC RBC-ENTMCNC: 33 GM/DL — SIGNIFICANT CHANGE UP (ref 32–36)
MCV RBC AUTO: 91.6 FL — SIGNIFICANT CHANGE UP (ref 80–100)
MCV RBC AUTO: 92.5 FL — SIGNIFICANT CHANGE UP (ref 80–100)
MCV RBC AUTO: 92.6 FL — SIGNIFICANT CHANGE UP (ref 80–100)
MCV RBC AUTO: 92.7 FL — SIGNIFICANT CHANGE UP (ref 80–100)
MICROCYTES BLD QL: SIGNIFICANT CHANGE UP
MONOCYTES # BLD AUTO: 0.6 K/UL — SIGNIFICANT CHANGE UP (ref 0–0.9)
MONOCYTES # BLD AUTO: 0.7 K/UL — SIGNIFICANT CHANGE UP (ref 0–0.9)
MONOCYTES # BLD AUTO: 0.9 K/UL — SIGNIFICANT CHANGE UP (ref 0–0.9)
MONOCYTES NFR BLD AUTO: 10.8 % — SIGNIFICANT CHANGE UP (ref 2–14)
MONOCYTES NFR BLD AUTO: 6 % — SIGNIFICANT CHANGE UP (ref 2–14)
MONOCYTES NFR BLD AUTO: 9.1 % — SIGNIFICANT CHANGE UP (ref 2–14)
NEUTROPHILS # BLD AUTO: 5.4 K/UL — SIGNIFICANT CHANGE UP (ref 1.8–7.4)
NEUTROPHILS # BLD AUTO: 6 K/UL — SIGNIFICANT CHANGE UP (ref 1.8–7.4)
NEUTROPHILS # BLD AUTO: 8 K/UL — HIGH (ref 1.8–7.4)
NEUTROPHILS NFR BLD AUTO: 67.6 % — SIGNIFICANT CHANGE UP (ref 43–77)
NEUTROPHILS NFR BLD AUTO: 75.5 % — SIGNIFICANT CHANGE UP (ref 43–77)
NEUTROPHILS NFR BLD AUTO: 84.5 % — HIGH (ref 43–77)
OVALOCYTES BLD QL SMEAR: SLIGHT — SIGNIFICANT CHANGE UP
PAT CTL 2H: 33.9 SEC — SIGNIFICANT CHANGE UP (ref 27.5–37.4)
PHOSPHATE SERPL-MCNC: 4.1 MG/DL — SIGNIFICANT CHANGE UP (ref 2.5–4.5)
PLAT MORPH BLD: NORMAL — SIGNIFICANT CHANGE UP
PLATELET # BLD AUTO: 132 K/UL — LOW (ref 150–400)
PLATELET # BLD AUTO: 143 K/UL — LOW (ref 150–400)
PLATELET # BLD AUTO: 145 K/UL — LOW (ref 150–400)
PLATELET # BLD AUTO: 163 K/UL — SIGNIFICANT CHANGE UP (ref 150–400)
POTASSIUM SERPL-MCNC: 3.5 MMOL/L — SIGNIFICANT CHANGE UP (ref 3.5–5.3)
POTASSIUM SERPL-SCNC: 3.5 MMOL/L — SIGNIFICANT CHANGE UP (ref 3.5–5.3)
PROT SERPL-MCNC: 5.5 G/DL — LOW (ref 6–8.3)
PROTHROM AB SERPL-ACNC: 14.7 SEC — HIGH (ref 10–12.9)
PROTHROM AB SERPL-ACNC: 15.6 SEC — HIGH (ref 10–12.9)
PROTHROM AB SERPL-ACNC: 16.1 SEC — HIGH (ref 10–12.9)
PT 100%: 15.6 SEC — HIGH (ref 10–12.9)
PT 50/50: 12.5 SEC — SIGNIFICANT CHANGE UP (ref 9.7–15.2)
RBC # BLD: 2.13 M/UL — LOW (ref 4.2–5.8)
RBC # BLD: 2.37 M/UL — LOW (ref 4.2–5.8)
RBC # BLD: 2.38 M/UL — LOW (ref 4.2–5.8)
RBC # BLD: 2.53 M/UL — LOW (ref 4.2–5.8)
RBC # FLD: 12.9 % — SIGNIFICANT CHANGE UP (ref 10.3–14.5)
RBC # FLD: 13 % — SIGNIFICANT CHANGE UP (ref 10.3–14.5)
RBC # FLD: 13.2 % — SIGNIFICANT CHANGE UP (ref 10.3–14.5)
RBC # FLD: 13.3 % — SIGNIFICANT CHANGE UP (ref 10.3–14.5)
RBC BLD AUTO: ABNORMAL
REPTILASE TIME: 17.5 SEC — SIGNIFICANT CHANGE UP (ref 15–20.5)
SCHISTOCYTES BLD QL AUTO: SLIGHT — SIGNIFICANT CHANGE UP
SODIUM SERPL-SCNC: 142 MMOL/L — SIGNIFICANT CHANGE UP (ref 135–145)
THROMBIN TIME: 27.8 SEC — HIGH (ref 16–25)
WBC # BLD: 7.9 K/UL — SIGNIFICANT CHANGE UP (ref 3.8–10.5)
WBC # BLD: 8 K/UL — SIGNIFICANT CHANGE UP (ref 3.8–10.5)
WBC # BLD: 8.5 K/UL — SIGNIFICANT CHANGE UP (ref 3.8–10.5)
WBC # BLD: 9.5 K/UL — SIGNIFICANT CHANGE UP (ref 3.8–10.5)
WBC # FLD AUTO: 7.9 K/UL — SIGNIFICANT CHANGE UP (ref 3.8–10.5)
WBC # FLD AUTO: 8 K/UL — SIGNIFICANT CHANGE UP (ref 3.8–10.5)
WBC # FLD AUTO: 8.5 K/UL — SIGNIFICANT CHANGE UP (ref 3.8–10.5)
WBC # FLD AUTO: 9.5 K/UL — SIGNIFICANT CHANGE UP (ref 3.8–10.5)

## 2019-09-04 PROCEDURE — 45378 DIAGNOSTIC COLONOSCOPY: CPT

## 2019-09-04 PROCEDURE — 99222 1ST HOSP IP/OBS MODERATE 55: CPT

## 2019-09-04 PROCEDURE — 74177 CT ABD & PELVIS W/CONTRAST: CPT | Mod: 26

## 2019-09-04 PROCEDURE — 43235 EGD DIAGNOSTIC BRUSH WASH: CPT

## 2019-09-04 PROCEDURE — 99291 CRITICAL CARE FIRST HOUR: CPT | Mod: 25

## 2019-09-04 PROCEDURE — 99223 1ST HOSP IP/OBS HIGH 75: CPT | Mod: 25

## 2019-09-04 PROCEDURE — 71045 X-RAY EXAM CHEST 1 VIEW: CPT | Mod: 26

## 2019-09-04 RX ORDER — DESMOPRESSIN ACETATE 0.1 MG/1
20 TABLET ORAL ONCE
Refills: 0 | Status: COMPLETED | OUTPATIENT
Start: 2019-09-04 | End: 2019-09-04

## 2019-09-04 RX ORDER — DESMOPRESSIN ACETATE 0.1 MG/1
20 TABLET ORAL ONCE
Refills: 0 | Status: DISCONTINUED | OUTPATIENT
Start: 2019-09-04 | End: 2019-09-04

## 2019-09-04 RX ORDER — SOD SULF/SODIUM/NAHCO3/KCL/PEG
2000 SOLUTION, RECONSTITUTED, ORAL ORAL ONCE
Refills: 0 | Status: COMPLETED | OUTPATIENT
Start: 2019-09-04 | End: 2019-09-04

## 2019-09-04 RX ORDER — LEVOTHYROXINE SODIUM 125 MCG
25 TABLET ORAL AT BEDTIME
Refills: 0 | Status: DISCONTINUED | OUTPATIENT
Start: 2019-09-04 | End: 2019-09-06

## 2019-09-04 RX ORDER — POLYETHYLENE GLYCOL 3350 17 G/17G
17 POWDER, FOR SOLUTION ORAL
Refills: 0 | Status: DISCONTINUED | OUTPATIENT
Start: 2019-09-04 | End: 2019-09-06

## 2019-09-04 RX ORDER — NOREPINEPHRINE BITARTRATE/D5W 8 MG/250ML
0.02 PLASTIC BAG, INJECTION (ML) INTRAVENOUS
Qty: 8 | Refills: 0 | Status: DISCONTINUED | OUTPATIENT
Start: 2019-09-04 | End: 2019-09-04

## 2019-09-04 RX ORDER — ENTACAPONE 200 MG/1
200 TABLET, FILM COATED ORAL
Refills: 0 | Status: DISCONTINUED | OUTPATIENT
Start: 2019-09-04 | End: 2019-09-11

## 2019-09-04 RX ORDER — SOD SULF/SODIUM/NAHCO3/KCL/PEG
4000 SOLUTION, RECONSTITUTED, ORAL ORAL ONCE
Refills: 0 | Status: COMPLETED | OUTPATIENT
Start: 2019-09-04 | End: 2019-09-04

## 2019-09-04 RX ORDER — FLUTICASONE PROPIONATE 50 MCG
1 SPRAY, SUSPENSION NASAL
Refills: 0 | Status: DISCONTINUED | OUTPATIENT
Start: 2019-09-04 | End: 2019-09-11

## 2019-09-04 RX ORDER — RANOLAZINE 500 MG/1
1000 TABLET, FILM COATED, EXTENDED RELEASE ORAL
Refills: 0 | Status: DISCONTINUED | OUTPATIENT
Start: 2019-09-04 | End: 2019-09-11

## 2019-09-04 RX ORDER — PANTOPRAZOLE SODIUM 20 MG/1
8 TABLET, DELAYED RELEASE ORAL
Qty: 80 | Refills: 0 | Status: DISCONTINUED | OUTPATIENT
Start: 2019-09-04 | End: 2019-09-06

## 2019-09-04 RX ORDER — TAMSULOSIN HYDROCHLORIDE 0.4 MG/1
0.4 CAPSULE ORAL DAILY
Refills: 0 | Status: DISCONTINUED | OUTPATIENT
Start: 2019-09-04 | End: 2019-09-11

## 2019-09-04 RX ORDER — ATORVASTATIN CALCIUM 80 MG/1
80 TABLET, FILM COATED ORAL AT BEDTIME
Refills: 0 | Status: DISCONTINUED | OUTPATIENT
Start: 2019-09-04 | End: 2019-09-11

## 2019-09-04 RX ORDER — CARBIDOPA AND LEVODOPA 25; 100 MG/1; MG/1
1 TABLET ORAL
Refills: 0 | Status: DISCONTINUED | OUTPATIENT
Start: 2019-09-04 | End: 2019-09-11

## 2019-09-04 RX ORDER — CHLORHEXIDINE GLUCONATE 213 G/1000ML
1 SOLUTION TOPICAL
Refills: 0 | Status: DISCONTINUED | OUTPATIENT
Start: 2019-09-04 | End: 2019-09-11

## 2019-09-04 RX ADMIN — RANOLAZINE 1000 MILLIGRAM(S): 500 TABLET, FILM COATED, EXTENDED RELEASE ORAL at 05:01

## 2019-09-04 RX ADMIN — DESMOPRESSIN ACETATE 220 MICROGRAM(S): 0.1 TABLET ORAL at 01:00

## 2019-09-04 RX ADMIN — Medication 1 SPRAY(S): at 05:01

## 2019-09-04 RX ADMIN — ENTACAPONE 200 MILLIGRAM(S): 200 TABLET, FILM COATED ORAL at 22:01

## 2019-09-04 RX ADMIN — Medication 2000 MILLILITER(S): at 11:57

## 2019-09-04 RX ADMIN — PANTOPRAZOLE SODIUM 10 MG/HR: 20 TABLET, DELAYED RELEASE ORAL at 23:12

## 2019-09-04 RX ADMIN — Medication 1 SPRAY(S): at 23:12

## 2019-09-04 RX ADMIN — Medication 4000 MILLILITER(S): at 05:02

## 2019-09-04 RX ADMIN — RANOLAZINE 1000 MILLIGRAM(S): 500 TABLET, FILM COATED, EXTENDED RELEASE ORAL at 23:12

## 2019-09-04 RX ADMIN — Medication 2.82 MICROGRAM(S)/KG/MIN: at 05:02

## 2019-09-04 RX ADMIN — PANTOPRAZOLE SODIUM 10 MG/HR: 20 TABLET, DELAYED RELEASE ORAL at 05:02

## 2019-09-04 RX ADMIN — PANTOPRAZOLE SODIUM 10 MG/HR: 20 TABLET, DELAYED RELEASE ORAL at 01:00

## 2019-09-04 RX ADMIN — CARBIDOPA AND LEVODOPA 1 TABLET(S): 25; 100 TABLET ORAL at 22:00

## 2019-09-04 RX ADMIN — Medication 200 GRAM(S): at 01:20

## 2019-09-04 RX ADMIN — Medication 25 MICROGRAM(S): at 23:00

## 2019-09-04 RX ADMIN — CHLORHEXIDINE GLUCONATE 1 APPLICATION(S): 213 SOLUTION TOPICAL at 07:10

## 2019-09-04 RX ADMIN — ATORVASTATIN CALCIUM 80 MILLIGRAM(S): 80 TABLET, FILM COATED ORAL at 22:00

## 2019-09-04 NOTE — PROGRESS NOTE ADULT - SUBJECTIVE AND OBJECTIVE BOX
Follow-up Pulm Progress Note  Moved to MICU for BRBPR with hypotension- stool now brown.  pt without acute complaints  No new respiratory events overnight.  Denies increased SOB, chest pain, cough or mucus.    Medications:  MEDICATIONS  (STANDING):  atorvastatin 80 milliGRAM(s) Oral at bedtime  carbidopa/levodopa CR 50/200 1 Tablet(s) Oral <User Schedule>  chlorhexidine 4% Liquid 1 Application(s) Topical <User Schedule>  entacapone 200 milliGRAM(s) Oral <User Schedule>  fluticasone propionate 50 MICROgram(s)/spray Nasal Spray 1 Spray(s) Both Nostrils two times a day  levothyroxine Injectable 25 MICROGram(s) IV Push at bedtime  norepinephrine Infusion 0.02 MICROgram(s)/kG/Min (2.824 mL/Hr) IV Continuous <Continuous>  pantoprazole Infusion 8 mG/Hr (10 mL/Hr) IV Continuous <Continuous>  ranolazine 1000 milliGRAM(s) Oral two times a day  tamsulosin 0.4 milliGRAM(s) Oral daily    MEDICATIONS  (PRN):      Vent settings (if applicable)      Vital Signs Last 24 Hrs  T(C): 36.7 (04 Sep 2019 08:30), Max: 36.7 (03 Sep 2019 21:06)  T(F): 98.1 (04 Sep 2019 08:30), Max: 98.1 (03 Sep 2019 21:06)  HR: 68 (04 Sep 2019 08:30) (57 - 93)  BP: 126/60 (04 Sep 2019 08:30) (79/50 - 142/72)  BP(mean): 86 (04 Sep 2019 08:30) (60 - 100)  RR: 18 (04 Sep 2019 08:30) (12 - 18)  SpO2: 98% (04 Sep 2019 08:30) (95% - 100%)    ABG - ( 03 Sep 2019 23:37 )  pH, Arterial: 7.51  pH, Blood: x     /  pCO2: 35    /  pO2: 106   / HCO3: 28    / Base Excess: 4.4   /  SaO2: 97                    09-03 @ 07:01  -  09-04 @ 07:00  --------------------------------------------------------  IN: 915.6 mL / OUT: 450 mL / NET: 465.6 mL          LABS:                        7.2    9.5   )-----------( 163      ( 04 Sep 2019 04:00 )             22.0     09-03    142  |  106  |  65<H>  ----------------------------<  133<H>  3.5   |  24  |  2.46<H>    Ca    7.4<L>      03 Sep 2019 23:40  Phos  4.1     09-03  Mg     2.5     09-03    TPro  5.5<L>  /  Alb  2.4<L>  /  TBili  0.4  /  DBili  x   /  AST  10  /  ALT  <5<L>  /  AlkPhos  53  09-03          CAPILLARY BLOOD GLUCOSE      POCT Blood Glucose.: 143 mg/dL (03 Sep 2019 23:23)    PT/INR - ( 04 Sep 2019 08:36 )   PT: 14.7 sec;   INR: 1.27 ratio         PTT - ( 04 Sep 2019 08:36 )  PTT:26.2 sec          CULTURES:  Culture Results:   No growth at 5 days. (08-28 @ 17:55)  Culture Results:   No growth at 5 days. (08-28 @ 17:55)  Culture Results:   No growth (08-28 @ 17:52)        Physical Examination:  Awake and alert, generally comfortable  HEENT: unremarkable  PULM: Clear to auscultation bilaterally, no significant sputum production  CVS: Regular rate and rhythm, no murmurs, rubs, or gallops  Abd:  soft, non tender  Extrem: No CCE    RADIOLOGY REVIEWED  CXR:    CT chest:

## 2019-09-04 NOTE — CONSULT NOTE ADULT - ASSESSMENT
88 y/o M w/ hx of CAD s/p CABG s/p PCI, ICM, and Parkinson's disease with recent right humeral fracture, with admission for hematuria c/b aspiration pneumonia, now with hematochezia.    Impression:  # Acute blood loss anemia with hematochezia: Currently with no active bleeding, MAP > 65 off vasopressors, and with Hgb of 7.2 after receiving 3 units pRBCs. Hgb dropped to 6.3 from 8.0 during acute bleeding. May represent bleeding from stercoral colitis in the setting of high stool burden versus diverticular bleeding. May represent bleeding colonic angioectasia or Dieulafoy's lesion. Less likely to represent UGIB given clear return on NG lavage, however, may represent bleeding from PUD given history.  # CAD s/p CABG s/p PCI  # ICM  # Parkinson's disease    Recommendations:  - Plan for colonoscopy +/- EGD today (patient requesting that plan be discussed with his cardiologist prior to agreeing with procedure)  - Monitor CBCs q8h  - Monitor bowel movements  - Transfuse for goal Hgb >/= 7.0  - Continue pantoprazole infusion at 8 mg/hr  - Two large bore IVs  - Maintain active type and screen  - Rest of care per MICU team    Constantine Rose MD  Gastroenterology Fellow  Pager number: [phone] / [phone]

## 2019-09-04 NOTE — PROGRESS NOTE ADULT - SUBJECTIVE AND OBJECTIVE BOX
No pain, no shortness of breath      VITAL:  T(C): , Max: 36.7 (09-03-19 @ 21:06)  T(F): , Max: 98.1 (09-03-19 @ 21:06)  HR: 58 (09-04-19 @ 07:30)  BP: 135/64 (09-04-19 @ 07:30)  BP(mean): 92 (09-04-19 @ 07:30)  RR: 14 (09-04-19 @ 07:30)  SpO2: 100% (09-04-19 @ 07:30)      PHYSICAL EXAM:  Constitutional: NAD; mild psychomotor retardation  HEENT: NCAT, DMM  Neck: Supple, No JVD  Respiratory: CTA-b/l  Cardiovascular: RRR s1s2, no m/r/g  Gastrointestinal: BS+, soft, NT/ND  Extremities: No peripheral edema b/l  : no lara  Neuro: (+)coarse tremor LUE      LABS:                        7.2    9.5   )-----------( 163      ( 04 Sep 2019 04:00 )             22.0     Na(142)/K(3.5)/Cl(106)/HCO3(24)/BUN(65)/Cr(2.46)Glu(133)/Ca(7.4)/Mg(2.5)/PO4(4.1)    09-03 @ 23:40  Na(140)/K(3.5)/Cl(102)/HCO3(24)/BUN(65)/Cr(2.40)Glu(128)/Ca(8.2)/Mg(--)/PO4(--)    09-03 @ 06:41  Na(141)/K(3.3)/Cl(101)/HCO3(25)/BUN(66)/Cr(2.53)Glu(124)/Ca(8.5)/Mg(--)/PO4(--)    09-02 @ 06:37  Na(138)/K(3.6)/Cl(102)/HCO3(24)/BUN(67)/Cr(2.52)Glu(104)/Ca(8.5)/Mg(--)/PO4(--)    09-01 @ 08:30      IMPRESSION: 89M w/ HTN, DVT-IVCF, Parkinson's disease, CAD-CABG, and CKD3, 8/28/19 a/w urinary retention/STEVEN  (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 At least in part due to past obstructive nephropathy, with resultant atrophy of his right kidney.    (2)STEVEN - obstructive - improved relative to admission s/p lara placement. Creatinine however has plateaued in the mid 2s. Second bout of STEVEN, it appears - likely hemodynamic in association with GIB.    (3)ID - cellulitis - completed antibiotics course    (4)Anemia - GIB    (5)CV - tenuous hemodynamics    RECOMMEND:  (1)PRBCs per primary team  (3)Colonoscopy +/- EGD per GI  (3)Is>Os  (4)Check urine: lytes, creatinine, urinalysis  (5)BMP daily  (6)Dose new meds for GFR 20-30ml/min                Abdi Vieira MD  [facility]  [phone] overnight events noted - now in MICU - planned for colonoscopy this a.m  no pain/no sob    VITAL:  T(C): , Max: 36.7 (09-03-19 @ 21:06)  T(F): , Max: 98.1 (09-03-19 @ 21:06)  HR: 58 (09-04-19 @ 07:30)  BP: 135/64 (09-04-19 @ 07:30)  BP(mean): 92 (09-04-19 @ 07:30)  RR: 14 (09-04-19 @ 07:30)  SpO2: 100% (09-04-19 @ 07:30)      PHYSICAL EXAM:  Constitutional: NAD; mild psychomotor retardation  HEENT: DMM; (+)NGT  Neck: Supple, No JVD  Respiratory: CTA-b/l  Cardiovascular: irreg s1s2  Gastrointestinal: BS+, soft, NT/ND  Extremities: No peripheral edema b/l  : no lara  Neuro: (+)coarse tremor LUE      LABS:                        7.2    9.5   )-----------( 163      ( 04 Sep 2019 04:00 )             22.0     Na(142)/K(3.5)/Cl(106)/HCO3(24)/BUN(65)/Cr(2.46)Glu(133)/Ca(7.4)/Mg(2.5)/PO4(4.1)    09-03 @ 23:40  Na(140)/K(3.5)/Cl(102)/HCO3(24)/BUN(65)/Cr(2.40)Glu(128)/Ca(8.2)/Mg(--)/PO4(--)    09-03 @ 06:41  Na(141)/K(3.3)/Cl(101)/HCO3(25)/BUN(66)/Cr(2.53)Glu(124)/Ca(8.5)/Mg(--)/PO4(--)    09-02 @ 06:37  Na(138)/K(3.6)/Cl(102)/HCO3(24)/BUN(67)/Cr(2.52)Glu(104)/Ca(8.5)/Mg(--)/PO4(--)    09-01 @ 08:30      IMPRESSION: 89M w/ HTN, DVT-IVCF, Parkinson's disease, CAD-CABG, and CKD3, 8/28/19 a/w urinary retention/STEVEN  (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 At least in part due to past obstructive nephropathy, with resultant atrophy of his right kidney.    (2)STEVEN - obstructive - improved relative to admission s/p lara placement. Creatinine however has plateaued in the mid 2s. Second bout of STEVEN, it appears - likely hemodynamic in association with GIB.    (3)ID - cellulitis - completed antibiotics course    (4)Anemia - GIB    (5)CV - tenuous hemodynamics    RECOMMEND:  (1)PRBCs per primary team  (3)Colonoscopy +/- EGD per GI  (3)Is>Os  (4)Check urine: lytes, creatinine, urinalysis  (5)BMP daily  (6)Dose new meds for GFR 20-30ml/min                Abdi Vieira MD  [facility]  [phone]

## 2019-09-04 NOTE — CHART NOTE - NSCHARTNOTEFT_GEN_A_CORE
MEDICINE PA     EVENT SUMMARY   Pt actively bleeding; MICU recs; CTA to locate the source of the bleed. Pt with CKD - stage 3b, with base GFR 30-40ml/min; nonproteinuric. At least in part due to past obstructive nephropathy, with resultant atrophy of his right kidney. D/w pt about the risks and benefits of the CTA with IV contrast and the benefits outweigh the risk given active bleed. Pt verbalizes understanding and agrees with the test. D/w Dr. Dias; agrees with above plan. Will continue to monitor.     Itzle LOONEY  #[id number] MEDICINE PA     EVENT SUMMARY   Pt with active BRBPR with hypotesion; MICU recs; CTA to locate the source of the bleed. Pt with CKD - stage 3b, with base GFR 30-40ml/min; nonproteinuric. At least in part due to past obstructive nephropathy, with resultant atrophy of his right kidney. D/w pt about the risks and benefits of the CTA with IV contrast and the benefits outweigh the risk given active bleed. Pt verbalizes understanding and agrees with the test. D/w Dr. Dias; agrees with above plan. Will continue to monitor.     Itzel LOONEY  #[id number]

## 2019-09-04 NOTE — CONSULT NOTE ADULT - SUBJECTIVE AND OBJECTIVE BOX
Chief Complaint: Bloody urine    HPI:    89 y/o M w/ hx of CAD s/p CABG followed by PCI on ASA, ICM, HTN, Parkinson's Disease, and history of GI bleeding s/p multiple mechanical falls with right humeral fracture, sent to ED from [facility] for evaluation of gross hematuria, initially admitted to CCU with c/b aspiration pneumonia who developed acute onset of hematochezia: GI consulted for evaluation.    The patient endorses developing BRBPR last night, however, has trouble remembering additional details about last night. The patient denies bloody emesis and black tarry stools. He denies nausea/vomiting and abdominal pain. The patient endorses feeling constipated. The patient endorse prior history of GI bleeding, however, does not recall exactly when. 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 The patient states he has had a prior colonoscopy, but does not remember when. He denies NSAID use.     The patient underwent CTA A/P with no visualization of active bleeding. The patient was transferred from the floor to the ICU. The patient was briefly on norepinephrine, however, MAPS were > 70 at the time of my evaluation this morning off vasopressors. NG tube was placed with clear return on NG tube lavage. The patient was prepped for colonoscopy starting this morning.    Allergies:  Cipro (Rash)    Home Medications:    · 	Senna 8.6 mg oral tablet: Last Dose Taken:  , 2 tab(s) orally once a day (at bedtime)  · 	acetaminophen 325 mg oral tablet: Last Dose Taken:  , 2 tab(s) orally every 6 hours, As needed, Mild Pain (1 - 3), Moderate Pain (4 - 6), Severe Pain (7 - 10)  · 	levothyroxine 25 mcg (0.025 mg) oral tablet: Last Dose Taken:  , 1 tab(s) orally once a day  · 	ranolazine 1000 mg oral tablet, extended release: Last Dose Taken:  , 1 tab(s) orally 2 times a day  · 	isosorbide mononitrate 60 mg oral tablet, extended release: Last Dose Taken:  , 1 tab(s) orally once a day (in the morning)  · 	atorvastatin 80 mg oral tablet: Last Dose Taken:  , 1 tab(s) orally once a day (at bedtime)  · 	entacapone 200 mg oral tablet: Last Dose Taken:  , 1 tab(s) orally 4 times a day  	(4xs-[id number]-5db-10pm)  · 	carbidopa-levodopa 50 mg-200 mg oral tablet, extended release: Last Dose Taken:  , 1 tab(s) orally 4 times a day  	(4ly-5yp-9ch-10pm)  · 	Vitamin D3 1000 intl units oral capsule: Last Dose Taken:  , 1 cap(s) orally 2 times a day  · 	biotin 5 mg oral capsule: Last Dose Taken:  , 1 cap(s) orally once a day  · 	Calcium 500+D oral tablet, chewable: Last Dose Taken:  , 1 tab(s) orally 2 times a day  · 	docusate sodium 100 mg oral tablet: Last Dose Taken:  , 2 tab(s) orally once a day (at bedtime)  · 	MiraLax oral powder for reconstitution: Last Dose Taken:  , 17 gram(s) orally once a day (at bedtime)  · 	Multiple Vitamins oral tablet: Last Dose Taken:  , 1 tab(s) orally once a day  · 	aspirin 81 mg oral tablet, chewable: Last Dose Taken:  , 1 tab(s) orally once a day  · 	silver sulfADIAZINE 1% topical cream: Last Dose Taken:  , Apply topically to affected area 2 times a day (sacrum)  · 	nystatin 100,000 units/g topical ointment: Last Dose Taken:  , Apply topically to affected area 2 times a day (bilateral groin)  · 	bacitracin 500 units/g topical ointment: Last Dose Taken:  , Apply topically to affected area 2 times a day (right elbow skin tear & scrotum area)  · 	A & D topical ointment: Last Dose Taken:  , Apply topically to affected area 3 times a day(Right and Left Heel, Right and left buttocks & sacrum)  · 	Bisac-Evac 10 mg rectal suppository: Last Dose Taken:  , 1 suppository(ies) rectal once a day, As Needed - for constipation  · 	Melatonin 5 mg oral tablet: Last Dose Taken:  , 1 tab(s) orally once a day (in the evening)  · 	Lasix 40 mg oral tablet: Last Dose Taken:  , 1 tab(s) orally 2 times a day  · 	Fleet Enema 7 g-19 g rectal enema: Last Dose Taken:  , 118 milliliter(s) rectal every other day, As Needed - for constipation  · 	bacitracin 500 units/g topical ointment: Last Dose Taken:  , Apply topically to affected area every 6 hours (scrotal & perneum)  · 	zinc oxide 20% topical ointment: Last Dose Taken:  , Apply topically to affected area every 6 hours (scrotal and perineum)    Hospital Medications:  atorvastatin 80 milliGRAM(s) Oral at bedtime  carbidopa/levodopa CR 50/200 1 Tablet(s) Oral <User Schedule>  chlorhexidine 4% Liquid 1 Application(s) Topical <User Schedule>  entacapone 200 milliGRAM(s) Oral <User Schedule>  fluticasone propionate 50 MICROgram(s)/spray Nasal Spray 1 Spray(s) Both Nostrils two times a day  levothyroxine Injectable 25 MICROGram(s) IV Push at bedtime  norepinephrine Infusion 0.02 MICROgram(s)/kG/Min IV Continuous <Continuous>  pantoprazole Infusion 8 mG/Hr IV Continuous <Continuous>  ranolazine 1000 milliGRAM(s) Oral two times a day  tamsulosin 0.4 milliGRAM(s) Oral daily    PMHX/PSHX:  Unilateral inguinal hernia, with gangrene, not specified as recurrent  Upper GI bleed  Aspiration pneumonia  Clostridium difficile colitis  Pneumonia  MI, old  UTI (urinary tract infection)  BPH (benign prostatic hyperplasia)  Parkinsons Disease  CAD (Coronary Artery Disease)  HTN (Hypertension)  Hyperlipidemia  Presence of IVC filter  History of prostate surgery  Varicose veins of legs  S/P appendectomy  Stented coronary artery  Hx of CABG  Hyperlipidemia    Family history:  Family history of coronary artery disease  Family history of breast cancer (Aunt)    Denies family history of colon cancer/polyps, stomach cancer/polyps, pancreatic cancer/masses, liver cancer/disease, ovarian cancer and endometrial cancer.    Social History:     Tob: Denies  EtOH: Denies  Illicit Drugs: Denies    ROS:     General:  No wt loss, fevers, chills, night sweats, fatigue  Eyes:  Good vision, no reported pain  ENT:  No sore throat, pain, runny nose, dysphagia  CV:  No pain, palpitations, hypo/hypertension  Pulm:  No dyspnea, cough, tachypnea, wheezing  GI:  No pain, No nausea, No vomiting, No diarrhea, No constipation, No weight loss, No fever, No pruritis, + rectal bleeding, No tarry stools, No dysphagia,  :  No pain, bleeding, incontinence, nocturia  Muscle:  No pain, weakness  Neuro:  No weakness, tingling, memory problems  Psych:  No fatigue, insomnia, mood problems, depression  Endocrine:  No polyuria, polydipsia, cold/heat intolerance  Heme:  No petechiae, ecchymosis, easy bruisability  Skin:  No rash, tattoos, scars, edema    PHYSICAL EXAM:     GENERAL:  No acute distress  HEENT:  Normocephalic/atraumatic, no scleral icterus  CHEST:  Clear to auscultation bilaterally, no wheezes/rales/ronchi, no accessory muscle use  HEART:  Regular rate and rhythm, no murmurs/rubs/gallops  ABDOMEN:  Soft, non-tender, non-distended, normoactive bowel sounds  RECTAL: Dried red blood on bed  EXTREMITIES: No cyanosis, clubbing, or edema  SKIN:  No rash/erythema  NEURO:  Alert and oriented x 3    Vital Signs:  Vital Signs Last 24 Hrs  T(C): 36.7 (04 Sep 2019 08:30), Max: 36.7 (03 Sep 2019 21:06)  T(F): 98.1 (04 Sep 2019 08:30), Max: 98.1 (03 Sep 2019 21:06)  HR: 68 (04 Sep 2019 08:30) (57 - 93)  BP: 126/60 (04 Sep 2019 08:30) (79/50 - 142/72)  BP(mean): 86 (04 Sep 2019 08:30) (60 - 100)  RR: 18 (04 Sep 2019 08:30) (12 - 18)  SpO2: 98% (04 Sep 2019 08:30) (95% - 100%)  Daily Height in cm: 172.72 (04 Sep 2019 02:03)      LABS:                        7.2    9.5   )-----------( 163      ( 04 Sep 2019 04:00 )             22.0     Mean Cell Volume: 92.6 fl (09-04-19 @ 04:00)    09-03    142  |  106  |  65<H>  ----------------------------<  133<H>  3.5   |  24  |  2.46<H>    Ca    7.4<L>      03 Sep 2019 23:40  Phos  4.1     09-03  Mg     2.5     09-03    TPro  5.5<L>  /  Alb  2.4<L>  /  TBili  0.4  /  DBili  x   /  AST  10  /  ALT  <5<L>  /  AlkPhos  53  09-03    LIVER FUNCTIONS - ( 03 Sep 2019 23:40 )  Alb: 2.4 g/dL / Pro: 5.5 g/dL / ALK PHOS: 53 U/L / ALT: <5 U/L / AST: 10 U/L / GGT: x           PT/INR - ( 04 Sep 2019 03:59 )   PT: 15.6 sec;   INR: 1.36 ratio         PTT - ( 04 Sep 2019 03:59 )  PTT:27.1 sec               7.2    9.5   )-----------( 163      ( 04 Sep 2019 04:00 )             22.0                         6.3    7.9   )-----------( 143      ( 03 Sep 2019 23:40 )             19.7                         7.8    7.6   )-----------( 128      ( 03 Sep 2019 21:17 )             23.5                         8.1    9.0   )-----------( 146      ( 03 Sep 2019 16:04 )             25.9                         8.2    8.20  )-----------( 156      ( 03 Sep 2019 09:10 )             25.3     Imaging:    < from: CT Abdomen and Pelvis w/ IV Cont (09.04.19 @ 00:35) >    ******PRELIMINARY REPORT******    ******PRELIMINARY REPORT******          EXAM:  CT ABDOMEN AND PELVIS IC                            PROCEDURE DATE:  09/04/2019      ******PRELIMINARY REPORT******    ******PRELIMINARY REPORT******          INTERPRETATION:  linear region of hyperdensity in the rectum without   pooling in the venous phase likely represents hemorrhoid, no other   regions of irregular enhancement    images reviewed with attending radiologist Dr. Hanks and discussed with   VIRGILIO Ridley    ******PRELIMINARY REPORT******    ******PRELIMINARY REPORT******          KATHERINE ADAMSON M.D., RADIOLOGY RESIDENT    < end of copied text > Chief Complaint: Hematochezia    HPI:    88 y/o M w/ hx of CAD s/p CABG followed by PCI on ASA, ICM, HTN, Parkinson's Disease, and history of GI bleeding s/p multiple mechanical falls with right humeral fracture, sent to ED from [facility] for evaluation of gross hematuria, initially admitted to CCU with c/b aspiration pneumonia who developed acute onset of hematochezia: GI consulted for evaluation.    The patient endorses developing BRBPR last night, however, has trouble remembering additional details about last night. The patient denies bloody emesis and black tarry stools. He denies nausea/vomiting and abdominal pain. The patient endorses feeling constipated. The patient endorse prior history of GI bleeding, however, does not recall exactly when. 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 The patient states he has had a prior colonoscopy, but does not remember when. He denies NSAID use.     The patient underwent CTA A/P with no visualization of active bleeding. The patient was transferred from the floor to the ICU. The patient was briefly on norepinephrine, however, MAPS were > 70 at the time of my evaluation this morning off vasopressors. NG tube was placed with clear return on NG tube lavage. The patient was prepped for colonoscopy starting this morning.    Allergies:  Cipro (Rash)    Home Medications:    · 	Senna 8.6 mg oral tablet: Last Dose Taken:  , 2 tab(s) orally once a day (at bedtime)  · 	acetaminophen 325 mg oral tablet: Last Dose Taken:  , 2 tab(s) orally every 6 hours, As needed, Mild Pain (1 - 3), Moderate Pain (4 - 6), Severe Pain (7 - 10)  · 	levothyroxine 25 mcg (0.025 mg) oral tablet: Last Dose Taken:  , 1 tab(s) orally once a day  · 	ranolazine 1000 mg oral tablet, extended release: Last Dose Taken:  , 1 tab(s) orally 2 times a day  · 	isosorbide mononitrate 60 mg oral tablet, extended release: Last Dose Taken:  , 1 tab(s) orally once a day (in the morning)  · 	atorvastatin 80 mg oral tablet: Last Dose Taken:  , 1 tab(s) orally once a day (at bedtime)  · 	entacapone 200 mg oral tablet: Last Dose Taken:  , 1 tab(s) orally 4 times a day  	(3ur-0oz-2jx-10pm)  · 	carbidopa-levodopa 50 mg-200 mg oral tablet, extended release: Last Dose Taken:  , 1 tab(s) orally 4 times a day  	(5lq-8ft-1wi-10pm)  · 	Vitamin D3 1000 intl units oral capsule: Last Dose Taken:  , 1 cap(s) orally 2 times a day  · 	biotin 5 mg oral capsule: Last Dose Taken:  , 1 cap(s) orally once a day  · 	Calcium 500+D oral tablet, chewable: Last Dose Taken:  , 1 tab(s) orally 2 times a day  · 	docusate sodium 100 mg oral tablet: Last Dose Taken:  , 2 tab(s) orally once a day (at bedtime)  · 	MiraLax oral powder for reconstitution: Last Dose Taken:  , 17 gram(s) orally once a day (at bedtime)  · 	Multiple Vitamins oral tablet: Last Dose Taken:  , 1 tab(s) orally once a day  · 	aspirin 81 mg oral tablet, chewable: Last Dose Taken:  , 1 tab(s) orally once a day  · 	silver sulfADIAZINE 1% topical cream: Last Dose Taken:  , Apply topically to affected area 2 times a day (sacrum)  · 	nystatin 100,000 units/g topical ointment: Last Dose Taken:  , Apply topically to affected area 2 times a day (bilateral groin)  · 	bacitracin 500 units/g topical ointment: Last Dose Taken:  , Apply topically to affected area 2 times a day (right elbow skin tear & scrotum area)  · 	A & D topical ointment: Last Dose Taken:  , Apply topically to affected area 3 times a day(Right and Left Heel, Right and left buttocks & sacrum)  · 	Bisac-Evac 10 mg rectal suppository: Last Dose Taken:  , 1 suppository(ies) rectal once a day, As Needed - for constipation  · 	Melatonin 5 mg oral tablet: Last Dose Taken:  , 1 tab(s) orally once a day (in the evening)  · 	Lasix 40 mg oral tablet: Last Dose Taken:  , 1 tab(s) orally 2 times a day  · 	Fleet Enema 7 g-19 g rectal enema: Last Dose Taken:  , 118 milliliter(s) rectal every other day, As Needed - for constipation  · 	bacitracin 500 units/g topical ointment: Last Dose Taken:  , Apply topically to affected area every 6 hours (scrotal & perneum)  · 	zinc oxide 20% topical ointment: Last Dose Taken:  , Apply topically to affected area every 6 hours (scrotal and perineum)    Hospital Medications:  atorvastatin 80 milliGRAM(s) Oral at bedtime  carbidopa/levodopa CR 50/200 1 Tablet(s) Oral <User Schedule>  chlorhexidine 4% Liquid 1 Application(s) Topical <User Schedule>  entacapone 200 milliGRAM(s) Oral <User Schedule>  fluticasone propionate 50 MICROgram(s)/spray Nasal Spray 1 Spray(s) Both Nostrils two times a day  levothyroxine Injectable 25 MICROGram(s) IV Push at bedtime  norepinephrine Infusion 0.02 MICROgram(s)/kG/Min IV Continuous <Continuous>  pantoprazole Infusion 8 mG/Hr IV Continuous <Continuous>  ranolazine 1000 milliGRAM(s) Oral two times a day  tamsulosin 0.4 milliGRAM(s) Oral daily    PMHX/PSHX:  Unilateral inguinal hernia, with gangrene, not specified as recurrent  Upper GI bleed  Aspiration pneumonia  Clostridium difficile colitis  Pneumonia  MI, old  UTI (urinary tract infection)  BPH (benign prostatic hyperplasia)  Parkinsons Disease  CAD (Coronary Artery Disease)  HTN (Hypertension)  Hyperlipidemia  Presence of IVC filter  History of prostate surgery  Varicose veins of legs  S/P appendectomy  Stented coronary artery  Hx of CABG  Hyperlipidemia    Family history:  Family history of coronary artery disease  Family history of breast cancer (Aunt)    Denies family history of colon cancer/polyps, stomach cancer/polyps, pancreatic cancer/masses, liver cancer/disease, ovarian cancer and endometrial cancer.    Social History:     Tob: Denies  EtOH: Denies  Illicit Drugs: Denies    ROS:     General:  No wt loss, fevers, chills, night sweats, fatigue  Eyes:  Good vision, no reported pain  ENT:  No sore throat, pain, runny nose, dysphagia  CV:  No pain, palpitations, hypo/hypertension  Pulm:  No dyspnea, cough, tachypnea, wheezing  GI:  No pain, No nausea, No vomiting, No diarrhea, No constipation, No weight loss, No fever, No pruritis, + rectal bleeding, No tarry stools, No dysphagia,  :  No pain, bleeding, incontinence, nocturia  Muscle:  No pain, weakness  Neuro:  No weakness, tingling, memory problems  Psych:  No fatigue, insomnia, mood problems, depression  Endocrine:  No polyuria, polydipsia, cold/heat intolerance  Heme:  No petechiae, ecchymosis, easy bruisability  Skin:  No rash, tattoos, scars, edema    PHYSICAL EXAM:     GENERAL:  No acute distress  HEENT:  Normocephalic/atraumatic, no scleral icterus  CHEST:  Clear to auscultation bilaterally, no wheezes/rales/ronchi, no accessory muscle use  HEART:  Regular rate and rhythm, no murmurs/rubs/gallops  ABDOMEN:  Soft, non-tender, non-distended, normoactive bowel sounds  RECTAL: Dried red blood on bed  EXTREMITIES: No cyanosis, clubbing, or edema  SKIN:  No rash/erythema  NEURO:  Alert and oriented x 3    Vital Signs:  Vital Signs Last 24 Hrs  T(C): 36.7 (04 Sep 2019 08:30), Max: 36.7 (03 Sep 2019 21:06)  T(F): 98.1 (04 Sep 2019 08:30), Max: 98.1 (03 Sep 2019 21:06)  HR: 68 (04 Sep 2019 08:30) (57 - 93)  BP: 126/60 (04 Sep 2019 08:30) (79/50 - 142/72)  BP(mean): 86 (04 Sep 2019 08:30) (60 - 100)  RR: 18 (04 Sep 2019 08:30) (12 - 18)  SpO2: 98% (04 Sep 2019 08:30) (95% - 100%)  Daily Height in cm: 172.72 (04 Sep 2019 02:03)      LABS:                        7.2    9.5   )-----------( 163      ( 04 Sep 2019 04:00 )             22.0     Mean Cell Volume: 92.6 fl (09-04-19 @ 04:00)    09-03    142  |  106  |  65<H>  ----------------------------<  133<H>  3.5   |  24  |  2.46<H>    Ca    7.4<L>      03 Sep 2019 23:40  Phos  4.1     09-03  Mg     2.5     09-03    TPro  5.5<L>  /  Alb  2.4<L>  /  TBili  0.4  /  DBili  x   /  AST  10  /  ALT  <5<L>  /  AlkPhos  53  09-03    LIVER FUNCTIONS - ( 03 Sep 2019 23:40 )  Alb: 2.4 g/dL / Pro: 5.5 g/dL / ALK PHOS: 53 U/L / ALT: <5 U/L / AST: 10 U/L / GGT: x           PT/INR - ( 04 Sep 2019 03:59 )   PT: 15.6 sec;   INR: 1.36 ratio         PTT - ( 04 Sep 2019 03:59 )  PTT:27.1 sec               7.2    9.5   )-----------( 163      ( 04 Sep 2019 04:00 )             22.0                         6.3    7.9   )-----------( 143      ( 03 Sep 2019 23:40 )             19.7                         7.8    7.6   )-----------( 128      ( 03 Sep 2019 21:17 )             23.5                         8.1    9.0   )-----------( 146      ( 03 Sep 2019 16:04 )             25.9                         8.2    8.20  )-----------( 156      ( 03 Sep 2019 09:10 )             25.3     Imaging:    < from: CT Abdomen and Pelvis w/ IV Cont (09.04.19 @ 00:35) >    ******PRELIMINARY REPORT******    ******PRELIMINARY REPORT******          EXAM:  CT ABDOMEN AND PELVIS IC                            PROCEDURE DATE:  09/04/2019      ******PRELIMINARY REPORT******    ******PRELIMINARY REPORT******          INTERPRETATION:  linear region of hyperdensity in the rectum without   pooling in the venous phase likely represents hemorrhoid, no other   regions of irregular enhancement    images reviewed with attending radiologist Dr. Hanks and discussed with   VIRGILIO Ridley    ******PRELIMINARY REPORT******    ******PRELIMINARY REPORT******          KATHERINE ADAMSON M.D., RADIOLOGY RESIDENT    < end of copied text >

## 2019-09-04 NOTE — PROGRESS NOTE ADULT - SUBJECTIVE AND OBJECTIVE BOX
Trauma/ACS Progress Note    SUBJECTIVE: Patient seen and examined at the bedside. Feeling ok this morning, denies any pain or discomfort. Has been passing flatus. Per nursing, has had approximately half of the GoLytely thus far, has not had any bowel movements with prep so far. Last bowel movement was before 2AM and it did contain blood. NGT was lavaged earlier in the night and output was nonbloody    VITALS  T(C): 36.6 (09-04-19 @ 04:00), Max: 36.7 (09-03-19 @ 21:06)  HR: 60 (09-04-19 @ 06:00) (57 - 93)  BP: 99/54 (09-04-19 @ 06:00) (79/50 - 142/72)  RR: 13 (09-04-19 @ 06:00) (12 - 18)  SpO2: 100% (09-04-19 @ 06:00) (95% - 100%)  CAPILLARY BLOOD GLUCOSE      POCT Blood Glucose.: 143 mg/dL (03 Sep 2019 23:23)    Is/Os    09-02 @ 07:01  -  09-03 @ 07:00  --------------------------------------------------------  IN:    Oral Fluid: 240 mL  Total IN: 240 mL    OUT:    Indwelling Catheter - Urethral: 1150 mL  Total OUT: 1150 mL    Total NET: -910 mL      09-03 @ 07:01  -  09-04 @ 06:38  --------------------------------------------------------  IN:    norepinephrine Infusion: 5.6 mL    Oral Fluid: 600 mL    pantoprazole Infusion: 60 mL    Plasma: 250 mL  Total IN: 915.6 mL    OUT:    Voided: 650 mL  Total OUT: 650 mL    Total NET: 265.6 mL    PHYSICAL EXAM:  General: NAD, Lying in bed comfortably, alert, responsive to questioning, NGT in place with minimal clear output  Pulm: Non-labored breathing on 2LNC  GI/Abd: Softly distended, nontender      MEDICATIONS (PRN):  LABS  CBC (09-04 @ 04:00)                              7.2<L>                         9.5     )----------------(  163        84.5<H>% Neutrophils, 8.4<L>% Lymphocytes, ANC: 8.0<H>                              22.0<L>  CBC (09-03 @ 23:40)                              6.3<LL>                         7.9     )----------------(  143<L>     67.6  % Neutrophils, 18.3  % Lymphocytes, ANC: 5.4                                 19.7<LL>    BMP (09-03 @ 23:40)             142     |  106     |  65<H> 		Ca++ --      Ca 7.4<L>             ---------------------------------( 133<H>		Mg 2.5                3.5     |  24      |  2.46<H>			Ph 4.1     BMP (09-03 @ 06:41)             140     |  102     |  65<H> 		Ca++ --      Ca 8.2<L>             ---------------------------------( 128<H>		Mg --                 3.5     |  24      |  2.40<H>			Ph --        LFTs (09-03 @ 23:40)      TPro 5.5<L> / Alb 2.4<L> / TBili 0.4 / DBili -- / AST 10 / ALT <5<L> / AlkPhos 53    Coags (09-04 @ 03:59)  aPTT 27.1<L> / INR 1.36<H> / PT 15.6<H>  Coags (09-03 @ 23:40)  aPTT 106.6<H> / INR 1.40<H> / PT 16.1<H>      ABG (09-03 @ 23:37)     7.51<H> / 35 / 106 / 28 / 4.4<H> / 97<H>%     Lactate:        IMAGING STUDIES

## 2019-09-04 NOTE — CONSULT NOTE ADULT - SUBJECTIVE AND OBJECTIVE BOX
HPI: 88 yo man with CAD s/p CABG on ASA, Afib, CKD3, DVT s/p IVC Filter, Parkinson's disease initially admitted from rehab with respiratory distress. Of note, patient's dyspnea multifactorial from respiratory muscle weakness in the setting of Parkinson's disease and ischemic cardiomyopathy.     Today, patient experienced two episodes of BRBPR with hgb drop (6.3) and transient hypotension (SBP 70s) responsive to volume resuscitation with 2u prbc, 1u plts, 1L NS, 20 mcg of ddAVP. He underwent a CTA a/p that was unsuccessful in localizing hemorrhage. Patient subsequently transferred to MICU for hemodynamic monitoring. Patient reports that his last colonoscopy was approximately 6 years ago, reportedly normal. Surgery consulted for evaluation of BRBPR.     PAST MEDICAL & SURGICAL HISTORY:  Unilateral inguinal hernia, with gangrene, not specified as recurrent  Upper GI bleed: MICU admission , EGD w/ gastric ulcer/visible vessel which was cauterized  Aspiration pneumonia  Clostridium difficile colitis  Pneumonia  MI, old: age 55  UTI (urinary tract infection)  BPH (benign prostatic hyperplasia)  Parkinsons Disease  CAD (Coronary Artery Disease): s/p CABG and stents  HTN (Hypertension)  Hyperlipidemia  Presence of IVC filter: right upper arm placed last year   History of prostate surgery: 2016  Varicose veins of legs: h/o Vein stripping  S/P appendectomy  Stented coronary artery: cardiac stent x 2 - placement between  &amp; possible   Hx of CAB (age 65)      Medications (inpatient): atorvastatin 80 milliGRAM(s) Oral at bedtime  carbidopa/levodopa CR 50/200 1 Tablet(s) Oral <User Schedule>  chlorhexidine 4% Liquid 1 Application(s) Topical <User Schedule>  entacapone 200 milliGRAM(s) Oral <User Schedule>  fluticasone propionate 50 MICROgram(s)/spray Nasal Spray 1 Spray(s) Both Nostrils two times a day  levothyroxine Injectable 25 MICROGram(s) IV Push at bedtime  pantoprazole Infusion 8 mG/Hr IV Continuous <Continuous>  polyethylene glycol/electrolyte Solution. 4000 milliLiter(s) Oral once  ranolazine 1000 milliGRAM(s) Oral two times a day  tamsulosin 0.4 milliGRAM(s) Oral daily    Medications (PRN):  Allergies: Cipro (Rash)  (Intolerances: )  Social Hx:     Physical Exam  T(C): 36.4 (19 @ 02:03)  HR: 79 (19 @ 02:03) (57 - 93)  BP: 101/59 (19 @ 02:03) (94/59 - 143/71)  RR: 14 (19 @ 02:03) (14 - 18)  SpO2: 95% (19 @ 02:03) (95% - 98%)  Tmax: T(C): , Max: 36.7 (19 @ 04:57)    19  --------------------------------------------------------  IN:    Oral Fluid: 240 mL  Total IN: 240 mL    OUT:    Indwelling Catheter - Urethral: 1150 mL  Total OUT: 1150 mL    Total NET: -910 mL      19  --------------------------------------------------------  IN:    Oral Fluid: 600 mL    pantoprazole Infusion: 20 mL  Total IN: 620 mL    OUT:    Voided: 350 mL  Total OUT: 350 mL    Total NET: 270 mL        General: well developed, well nourished, NAD  Neuro: alert and oriented, no focal deficits, moves all extremities spontaneously  HEENT: NCAT, EOMI, anicteric, mucosa moist  Respiratory: airway patent, respirations unlabored  CVS: regular rate and rhythm  Chest: Sternal incision well-healed.   Abdomen: soft, nontender, nondistended. RLQ McBurney's incision well-healed. Right-sided circular scar well-healed. NGT clamped.   Rectum: No external hemorrhoids, loose rectal tone. No palpable masses on KARINA. BRBPR.   Extremities: no edema, sensation and movement grossly intact. Limited RUE motion while in sling.    Skin: warm, dry, appropriate color  Lines: 20g IV in LUE forearm, 22g in R hand, 18g in forearm but proximal vein thrombosed.     Labs:                        6.3    7.9   )-----------( 143      ( 03 Sep 2019 23:40 )             19.7     PT/INR - ( 03 Sep 2019 23:40 )   PT: 16.1 sec;   INR: 1.40 ratio         PTT - ( 03 Sep 2019 23:40 )  PTT:106.6 sec      142  |  106  |  65<H>  ----------------------------<  133<H>  3.5   |  24  |  2.46<H>    Ca    7.4<L>      03 Sep 2019 23:40  Phos  4.1       Mg     2.5         TPro  5.5<L>  /  Alb  2.4<L>  /  TBili  0.4  /  DBili  x   /  AST  10  /  ALT  <5<L>  /  AlkPhos  53        ABG - ( 03 Sep 2019 23:37 )  pH, Arterial: 7.51  pH, Blood: x     /  pCO2: 35    /  pO2: 106   / HCO3: 28    / Base Excess: 4.4   /  SaO2: 97        Imaging and other studies:  < from: CT Abdomen and Pelvis w/ IV Cont (19 @ 00:35) >    ******PRELIMINARY REPORT******    ******PRELIMINARY REPORT******          EXAM:  CT ABDOMEN AND PELVIS IC                            PROCEDURE DATE:  2019      ******PRELIMINARY REPORT******    ******PRELIMINARY REPORT******              INTERPRETATION:  linear region of hyperdensity in the rectum without   pooling in the venous phase likely represents hemorrhoid, no other   regions of irregular enhancement    images reviewed with attending radiologist Dr. Hanks and discussed with   VIRGILIO Ridley  ******PRELIMINARY REPORT******    ******PRELIMINARY REPORT******          KATHERINE ADAMSON M.D., RADIOLOGY RESIDENT     < end of copied text >

## 2019-09-04 NOTE — PROGRESS NOTE ADULT - ASSESSMENT
ASSESSMENT  89 yo man with CKD3 on ASA presenting with BRBPR with hgb drop (6.3), was hypotensive to 70s but responsive to volume resuscitation s/p 2u prbc, 1u plts, 1FFP, 1L NS and 20mg ddAVP. Unable to localize hemorrhage on CTA. Getting a third pRBC now, has started bowel prep in anticipation of C-scope today    PLAN  - Trend H/H  - NPO NGT LCWS IVF  - F/u colonoscopy   - Wean pressors (currently off)  - Agree with holding ASA  - Maintain 2 large bore IVs at all time   - Transfuse PRN for goal hgb > 8 given cardiac history; f/u post transfusion CBC  - No surgical intervention indicated at this time    Acute Care Surgery  [id number]

## 2019-09-04 NOTE — PROGRESS NOTE ADULT - PROBLEM SELECTOR PLAN 1
Awaiting Colonoscopy   Patients cardiologist is Dr. Mariano Francis. He last saw him in the office about 2 years ago. I reached out to Dr. Francis this am. He will not be able to see him as he is not in the hospital this week. He asked that I also handle his cardiology issues.   His LVEF is normal on most recent TTE and he is without any anginal symptoms. Acceptable cardiac risk to proceed with scheduled colonoscopy today. Discussed with patient.

## 2019-09-04 NOTE — PROGRESS NOTE ADULT - SUBJECTIVE AND OBJECTIVE BOX
ALSO COVERING FOR DR. BEARD       Subjective: Patient seen and examined.   Events overnight noted. I was in contact with NPs overnight.   s/p STAT CT, PRBC transfusion. Now in ICU   Comfortable   No cp or sob   Plan for colonoscopy this am     REVIEW OF SYSTEMS:    CONSTITUTIONAL:+ weakness, fevers or chills  EYES/ENT: No visual changes;  No vertigo or throat pain   NECK: No pain or stiffness  RESPIRATORY: No cough, wheezing, hemoptysis; No shortness of breath  CARDIOVASCULAR: No chest pain or palpitations  GASTROINTESTINAL: No abdominal or epigastric pain. No nausea, vomiting, or hematemesis; No diarrhea or constipation. +melena or hematochezia.  GENITOURINARY: No dysuria, frequency or hematuria  NEUROLOGICAL: No numbness or weakness  SKIN: No itching, burning, rashes, or lesions   All other review of systems is negative unless indicated above.    MEDICATIONS:  MEDICATIONS  (STANDING):  atorvastatin 80 milliGRAM(s) Oral at bedtime  carbidopa/levodopa CR 50/200 1 Tablet(s) Oral <User Schedule>  chlorhexidine 4% Liquid 1 Application(s) Topical <User Schedule>  entacapone 200 milliGRAM(s) Oral <User Schedule>  fluticasone propionate 50 MICROgram(s)/spray Nasal Spray 1 Spray(s) Both Nostrils two times a day  levothyroxine Injectable 25 MICROGram(s) IV Push at bedtime  norepinephrine Infusion 0.02 MICROgram(s)/kG/Min (2.824 mL/Hr) IV Continuous <Continuous>  pantoprazole Infusion 8 mG/Hr (10 mL/Hr) IV Continuous <Continuous>  ranolazine 1000 milliGRAM(s) Oral two times a day  tamsulosin 0.4 milliGRAM(s) Oral daily      PHYSICAL EXAM:  T(C): 36.7 (09-04-19 @ 08:30), Max: 36.7 (09-03-19 @ 21:06)  HR: 68 (09-04-19 @ 08:30) (57 - 93)  BP: 126/60 (09-04-19 @ 08:30) (79/50 - 142/72)  RR: 18 (09-04-19 @ 08:30) (12 - 18)  SpO2: 98% (09-04-19 @ 08:30) (95% - 100%)  Wt(kg): --  I&O's Summary    03 Sep 2019 07:01  -  04 Sep 2019 07:00  --------------------------------------------------------  IN: 915.6 mL / OUT: 450 mL / NET: 465.6 mL      Height (cm): 172.7 (09-04 @ 02:03)  Weight (kg): 75.3 (09-04 @ 02:03)  BMI (kg/m2): 25.2 (09-04 @ 02:03)  BSA (m2): 1.89 (09-04 @ 02:03)  Appearance: NAD  HEENT:   Normal oral mucosa, PERRL, EOMI	  Lymphatic: No lymphadenopathy , right arm/shoulder sling   Cardiovascular: Irregular rS1 S2, No JVD, No murmurs , Peripheral pulses palpable 2+ bilaterally  Respiratory: Decreased bs and effort   Gastrointestinal:  Soft, Non-tender, + BS	  Skin: No rashes, No ecchymoses, No cyanosis, warm to touch  Musculoskeletal: Decreased range of motion and  strength  Psychiatry:  Mood & affect appropriate  Ext: No edema  +lara   LABS:    CARDIAC MARKERS:      Occult Blood, Feces (09.03.19 @ 21:17)    Occult Blood, Feces: Positive                              7.2    9.5   )-----------( 163      ( 04 Sep 2019 04:00 )             22.0     09-03    142  |  106  |  65<H>  ----------------------------<  133<H>  3.5   |  24  |  2.46<H>    Ca    7.4<L>      03 Sep 2019 23:40  Phos  4.1     09-03  Mg     2.5     09-03    TPro  5.5<L>  /  Alb  2.4<L>  /  TBili  0.4  /  DBili  x   /  AST  10  /  ALT  <5<L>  /  AlkPhos  53  09-03    proBNP:   Lipid Profile:   HgA1c:   TSH:       Mixing Study - PT/APTT (09.04.19 @ 03:59)    PAT CTL 2H: 33.9 sec          TELEMETRY: 	SR    ECG:  	Afib, LBBB  RADIOLOGY: < from: CT Abdomen and Pelvis w/ IV Cont (09.04.19 @ 00:35) >    ******PRELIMINARY REPORT******    ******PRELIMINARY REPORT******          EXAM:  CT ABDOMEN AND PELVIS IC                            PROCEDURE DATE:  09/04/2019      ******PRELIMINARY REPORT******    ******PRELIMINARY REPORT******              INTERPRETATION:  linear region of hyperdensity in the rectum without   pooling in the venous phase likely represents hemorrhoid, no other   regions of irregular enhancement    images reviewed with attending radiologist Dr. Hanks and discussed with   VIRGILIO Ridley              ******PRELIMINARY REPORT******    ******PRELIMINARY REPORT******          KATHERINE ADAMSON M.D., RADIOLOGY RESIDENT                          DIAGNOSTIC TESTING:  [ ] Echocardiogram:  < from: Transthoracic Echocardiogram (08.30.19 @ 09:59) >    Patient name: RYAN FITZPATRICK  YOB: 1930   Age: 89 (M)   MR#: [id number]  Study Date: 8/30/2019  Location: [address] [place], [place] [id number]: Braden Mack RDCS  Study quality: Technically fair  Referring Physician: Jac Overton MD  Blood Pressure: 133/69 mmHg  Height: 173 cm  Weight: 87 kg  BSA: 2 m2  Heart Rate: 58 mmHg  ------------------------------------------------------------------------  PROCEDURE: Transthoracic echocardiogram with 2-D, M-Mode  and complete spectral and color flow Doppler.  INDICATION: Atherosclerotic heart disease of native  coronary artery without angina pectoris (I25.10)  ------------------------------------------------------------------------  Dimensions:    Normal Values:  LA:     5.2    2.0 - 4.0 cm  Ao:     3.2    2.0 - 3.8 cm  SEPTUM: 1.2    0.6 - 1.2 cm  PWT:    1.0    0.6 - 1.1 cm  LVIDd:  5.3    3.0 - 5.6 cm  LVIDs:  3.7    1.8 - 4.0 cm  Derived variables:  LVMI: 113 g/m2  RWT: 0.37  Fractional short: 30 %  EF (Visual Estimate): 55-60 %  Doppler Peak Velocity (m/sec): AoV=2.7  ------------------------------------------------------------------------  Observations:  Mitral Valve: Mitral annular calcification and calcified  mitral leaflets with normal diastolic opening.  Mild-moderate mitralregurgitation.  Aortic Valve/Aorta: Calcified trileaflet aortic valve with  decreased opening. Peak transaortic valve gradient equals  29 mm Hg, mean transaortic valve gradient equals 19 mm Hg,  estimated aortic valve area equals 1.5 sqcm (by continuity  equation), aortic valve velocity time integral equals 58  cm, consistent with moderate aortic stenosis. Minimal  aortic regurgitation.  Peak left ventricular outflow tract  gradient equals 5 mm Hg, mean gradient is equal to 3 mm Hg,  LVOT velocity time integral equals 25 cm.  Aortic Root: 3.2 cm.  LVOT diameter: 2.2 cm.  Left Atrium: Severely dilated left atrium.  LA volume index  = 71 cc/m2.  Left Ventricle: Overall preserved left ventricular ejection  fraction despite mild segmental wall motion abnormalities.  The basal  inferolateral wall, and the basal inferior wall  are hypokinetic.  Eccentric left ventricular hypertrophy  (dilated left ventricle with normal relative wall  thickness). Normal diastolic function  Right Heart: Normal right atrium. The right ventricle is  not well visualized; grossly normal right ventricular  systolic function. Normal tricuspid valve. Mild tricuspid  regurgitation. Pulmonic valve not well visualized, probably  normal. Mild pulmonic regurgitation.  Pericardium/Pleura: Normal pericardium with no pericardial  effusion.  Hemodynamic: Estimated right atrial pressure is 8 mm Hg.  Estimated right ventricular systolic pressure equals 35 mm  Hg, assuming right atrial pressure equals 8 mm Hg,  consistent with borderline pulmonary hypertension.  ------------------------------------------------------------------------  Conclusions:  1. Mitral annular calcification and calcified mitral  leaflets with normal diastolic opening. Mild-moderate  mitral regurgitation.  2. Calcified trileaflet aortic valve with decreased  opening. Peak transaortic valve gradient equals 29 mm Hg,  mean transaortic valve gradient equals 19 mm Hg, estimated  aortic valve area equals 1.5 sqcm (by continuity equation),  aortic valve velocity time integral equals 58 cm,  consistent with moderate aortic stenosis. Minimal aortic  regurgitation.  3. Severely dilated left atrium.  LA volume index = 71  cc/m2.  4. Eccentric left ventricular hypertrophy (dilated left  ventricle withnormal relative wall thickness).  5. Overall preserved left ventricular ejection fraction  despite mild segmental wall motion abnormalities. The basal   inferolateral wall, and the basal inferior wall are  hypokinetic.  6. The right ventricle is not well visualized; grossly  normal right ventricular systolic function.  7. Estimated right ventricular systolic pressure equals 35  mm Hg, assuming right atrial pressure equals 8 mm Hg,  consistent with borderline pulmonary hypertension.  *** Compared with echocardiogram of 10/29/2015, Aortic  Stenosis is now seen. Left ventricular function has  improved. Other findings are grossly similar.  ------------------------------------------------------------------------  Confirmed on  8/30/2019 - 12:16:01by Deepak Rome M.D.  ------------------------------------------------------------------------    < end of copied text >    [ ]  Catheterization:  [ ] Stress Test:    OTHER:

## 2019-09-04 NOTE — CHART NOTE - NSCHARTNOTEFT_GEN_A_CORE
Evaluated pt post colonoscopy. Colonoscopy procedure without events.     AVSS. Breathing comfortably. Mentating well.     Signed out to night NP team.     [ ] fu night time cbc.

## 2019-09-04 NOTE — PROGRESS NOTE ADULT - ASSESSMENT
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  Patient reported that he had local trauma to the skin of his penis following an attempt to use a hand held urinal.   Patient also notes that patient was on a ? commode and was noted to have increased scrotal oedema and redness. (28 Aug 2019 20:42)    ER VSS, no leukocytosis.  Cr 2.8.  UA (+) nit/LE.  Large bld.  WBC 6-10, >50 RBC.  Ucx and Bcx (-).  CT pelv no buttock abscess or subcut gas, increased stool in the rectum and mild stercoral colitis.  CT chest shows pulm edema with small b/l pleural effusions.  US kidney/bladder shows moderate R hydronephrosis and mild L hydronephrosis.      Pt seen by Urology for hematuria, s/p Lara replacement.  Hematuria has been improving with mild irrigation.      Pt currently on vanco/zosyn for groin cellulitis.  ID consult called for further abx managment.      r/o Groin cellulitis:    -  Skin changes suggestive of moisture associated dermatitis >> cellulitis.  s/p abx x 7 days of abx.    Keep area dry and clean of feces/urine.  Cont nystatin powder to treat for localized candidiasis.  Keep area free from soilage.     - No signs of Eliza's gangrene        Urinary retention:    - Post obstructive uropathy, b/l hydronephrosis seen.  s/p Lara.  Hematuria resolved with minimal irrigation.  ct ap shows b/l hydronephrosis - likely secondary to large stool burden.  Pt with possible stercoral colitis - pt already on abx.  Cont bowel regimen.      - Urine cultures NGTD    - STEVEN on CKD.  Cr improving post lara placement, lasix dose decreased.        GIB:    -  Pt for EGD/colonoscopy today, NPO.  On IV PPI.  GI following.     Jasmine Ramirez  [phone]

## 2019-09-04 NOTE — CHART NOTE - NSCHARTNOTEFT_GEN_A_CORE
MEDICINE PA *LATE NOTE ENTRY*    EVENT SUMMARY  Pt transferred from [facility] s/p RRT called PM of 9/3 for bloody bowel movement for which pt received 1L NS IVF, FOBT positive and was ordered for 1 unit PRBC; VSS. Upon arrival to the floor pt was noticed to have a large bloody bowel movement by the RN and found to be hypotensive with SBP to the 70s and pt c/o lightheadedness; RRT called; 1L IVF, 80 mg of Protonix IVP and 1U PRBC and 1U Plt ordered. MICU and House GI consulted by RRT team; GI recommended colonoscopy in AM and to place NGT to give bowel preparation as pt on a Dysphagia diet; MICU recommended CTA abdomen/pelvis and ? IR / Sx consult if CTA positive. D/w radiology resident of the CTA with prelim read of linear region of hyperdensity in the rectum without pooling in the venous phase likely representing hemorrhoid with no other regions of irregular enhancement. Pt received DDAVP for uremic bleeding. H/H dropped to 6.3/19.7 s/p 1 u PRBC; 2nd unit ordered by RRT was given but was hypotensive with SBP to the 70s. MICU made aware; accepted pt for GIB management. Pt refusing NGT placement; to be placed in MICU. Will notify Dr. Dias in AM.    Itzel LOONEY  #[id number]

## 2019-09-04 NOTE — RAPID RESPONSE TEAM SUMMARY - NSOTHERINTERVENTIONSRRT_GEN_ALL_CORE
MICU bedside eval with recommendations. Case discussed with Dr. Nicolas.  If patient becomes hemodynamically, can call MICU.

## 2019-09-04 NOTE — CONSULT NOTE ADULT - ATTENDING COMMENTS
89 yo M pmh CAD s/p PCI/CABG with ICM, Parkinsons with right humeral fracture presenting for aspiration PNA and hematochezia.  Required 3 units PRBC with inadequate response, but improved hgb.  Was initially on pressors on admit, but weened off in ICU.  Still finishing prep via NGT - still mushy brown stools this AM.  Will need EGD/Colon this AM after clearning/finishing rapid prep    Impression:  1) Hematochezia  2) H/o PUD 2014  3) CAD s/p CABG, PCI  4) Parkinsons with dysphagia    Plan:  1) Rapid prep additional 2 liters moviprep via NGT  2) EGD/Colon today  3) NPO otherwise  4) IV PPI

## 2019-09-04 NOTE — PROGRESS NOTE ADULT - SUBJECTIVE AND OBJECTIVE BOX
Pre-Endoscopy Evaluation      Referring Physician: dr. salvador aviles                                 Procedure: colonoscopy+/-  upper gastrointestinal endoscopy     Indication for Procedure: gib    Pertinent History: 89y male with PMH of CAD s/p CABG followed by PCI on ASA, ICM, HTN, Parkinson's Disease, and history of GI bleeding s/p multiple mechanical falls with right humeral fracture, sent to ED from [facility] for evaluation of gross hematuria, initially admitted to CCU with c/b aspiration pneumonia now acute onset of hematochezia requiring prbcs    Sedation by Anesthesia [x]    PAST MEDICAL & SURGICAL HISTORY:  Unilateral inguinal hernia, with gangrene, not specified as recurrent  Upper GI bleed: MICU admission , EGD w/ gastric ulcer/visible vessel which was cauterized  Aspiration pneumonia  Clostridium difficile colitis  Pneumonia  MI, old: age 55  UTI (urinary tract infection)  BPH (benign prostatic hyperplasia)  Parkinsons Disease  CAD (Coronary Artery Disease): s/p CABG and stents  HTN (Hypertension)  Hyperlipidemia  CKD  Presence of IVC filter: right upper arm placed last year   History of prostate surgery: 2016  Varicose veins of legs: h/o Vein stripping  S/P appendectomy  Stented coronary artery: cardiac stent x 2 - placement between  &amp; possible 2005  Hx of CAB (age 65)      PMH of Gastroparesis [ ]  Gastric Surgery [ ]  Gastric Outlet Obstruction [ ]    Allergies    Cipro (Rash)    Intolerances    Latex allergy: [ ] yes [x] no    Medications:MEDICATIONS  (STANDING):  atorvastatin 80 milliGRAM(s) Oral at bedtime  carbidopa/levodopa CR 50/200 1 Tablet(s) Oral <User Schedule>  chlorhexidine 4% Liquid 1 Application(s) Topical <User Schedule>  entacapone 200 milliGRAM(s) Oral <User Schedule>  fluticasone propionate 50 MICROgram(s)/spray Nasal Spray 1 Spray(s) Both Nostrils two times a day  levothyroxine Injectable 25 MICROGram(s) IV Push at bedtime  pantoprazole Infusion 8 mG/Hr (10 mL/Hr) IV Continuous <Continuous>  ranolazine 1000 milliGRAM(s) Oral two times a day  tamsulosin 0.4 milliGRAM(s) Oral daily    MEDICATIONS  (PRN):      Smoking: [ ] yes  [x] no    AICD/PPM: [ ] yes   [x] no    Pertinent lab data:                        7.5    8.0   )-----------( 145      ( 04 Sep 2019 08:36 )             23.1     -03    142  |  106  |  65<H>  ----------------------------<  133<H>  3.5   |  24  |  2.46<H>    Ca    7.4<L>      03 Sep 2019 23:40  Phos  4.1       Mg     2.5         TPro  5.5<L>  /  Alb  2.4<L>  /  TBili  0.4  /  DBili  x   /  AST  10  /  ALT  <5<L>  /  AlkPhos  53  03    PT/INR - ( 04 Sep 2019 08:56 )   PT: 15.6 sec;   INR: 1.36 ratio         PTT - ( 04 Sep 2019 08:36 )  PTT:26.2 sec          Physical Examination:  Daily Height in cm: 172.72 (04 Sep 2019 02:03)    Daily   Vital Signs Last 24 Hrs  T(C): 36.7 (04 Sep 2019 08:30), Max: 36.7 (03 Sep 2019 21:06)  T(F): 98.1 (04 Sep 2019 08:30), Max: 98.1 (03 Sep 2019 21:06)  HR: 61 (04 Sep 2019 12:00) (57 - 93)  BP: 116/56 (04 Sep 2019 12:00) (79/50 - 151/63)  BP(mean): 79 (04 Sep 2019 12:00) (60 - 100)  RR: 12 (04 Sep 2019 12:00) (12 - 19)  SpO2: 100% (04 Sep 2019 12:00) (95% - 100%)    Drug Dosing Weight  Height (cm): 172.7 (04 Sep 2019 02:03)  Weight (kg): 75.3 (04 Sep 2019 02:03)  BMI (kg/m2): 25.2 (04 Sep 2019 02:03)  BSA (m2): 1.89 (04 Sep 2019 02:03)    Constitutional: NAD    Neck:  No JVD    Respiratory: CTAB/L    Cardiovascular: S1 and S2    Gastrointestinal: + ngt    Extremities: No peripheral edema    Neurological: A/O x 3    : No Cortés    Skin: No rashes    Comments:      The patient is a suitable candidate for the planned procedure unless box checked [ ]  No, explain:

## 2019-09-04 NOTE — RAPID RESPONSE TEAM SUMMARY - NSADDTLFINDINGSRRT_GEN_ALL_CORE
Recommendations:  1) Follow up CTA prelim: recommend IR and surgery consult for lower GIB  2) Follow up GI recommendations: if no intervention per IR and surgery, consider bowel prep for colonscopy as recommended by GI    (would require NGT as patient with dysphagia)  3) Vital signs q4h  4) Trend CBC q6h  5) Transfuse to maintain Hgb > 8 due to cardia history or for active bleed  6) Can start protonix gtt as recommended by GI, though appears lower  7) Supplemental O2 as needed   8) If not absolute contradiction, such as new stent, would recommend holding pt's ASA in setting of new bleed

## 2019-09-04 NOTE — CHART NOTE - NSCHARTNOTEFT_GEN_A_CORE
CHIEF COMPLAINT:    HPI:  88 yo male with PMHx of HTN, HLD, CAD/ICM s/p CABG/PCI, hypothyroidism, Parkinson's disease with progressive functional impairment, chronic Afib not an AC candidate, h/o DVT s/p IVC filter not on full AC, CKD stage 3 (baseline 1.4-1.6), h/o GI bleed, multiple falls with recent admission 2019 s/p mechanical fall found to have acute on chronic right proximal humeral fracture not an operative candidate and d/c'd to [facility]. Pt presents to [facility] ED  for gross hematuria and scrotal erythema and edema s/p lara placement for urinary retention. As per documentation, Pt admitted to medicine floors for further management of acute on chronic kidney injury and urethral traumatic injury vs cystitis vs groin cellulitis. Pt was started on antibiotics empirically for groin cellulitis, urology consulted, CT pelvis revealing no evidence of buttocks abscess or subcutaneous gas and mild stercoral colitis and US Kidney and bladder with atrophic right kidney and BL kidney hydronephrosis R>L and dilatation of proximal R ureter consistent with obstructive nephropathy. Hospital course c/b CT chest revealing BL pleural effusions and pulmonary edema and pt was started on diuretics. Pt with improvement in sCr and hematuria throughout stay. Antibiotics recently de-escalated to PO doxy/augmentin as pt remained afebrile without leukocytosis with skin changes likely suggestive of moisture associated dermatitis > cellulitis.     Course further c/b by RRT called PM of 9/3 for bloody bowel movement for which pt received 1L NS IVF, remaining hemodynamically stable at the time with transfer to the telemetry monitoring unit. A second RRT was then called for large amount of bright red blood with clots per rectum with hypotension to SBP of 70s associated with complaints of lightheadedness. Another 1L of IVF was administered along with Protonix IVP and 1U PRBC and 1U Plt ordered as pt on ASA awaiting CBC results. House GI called recommending to prep for colonscopy in AM, MICU was consulted and CTA abdomen/pelvis performed with preliminary report revealing linear region of hyperdensity in the rectum without pooling in the venous phase likely representing hemorrhoid with no other regions of irregular enhancement. Pt's hemoglobin returning at 6.3 from 7.8, another unit of PRBC was ordered along with DDAVP 20mcg. Pt transferred to MICU for further management of lower GI bleed.     PAST MEDICAL & SURGICAL HISTORY:  Unilateral inguinal hernia, with gangrene, not specified as recurrent  Upper GI bleed: MICU admission , EGD w/ gastric ulcer/visible vessel which was cauterized  Aspiration pneumonia  Clostridium difficile colitis  Pneumonia  MI, old: age 55  UTI (urinary tract infection)  BPH (benign prostatic hyperplasia)  Parkinsons Disease  CAD (Coronary Artery Disease): s/p CABG and stents  HTN (Hypertension)  Hyperlipidemia  Presence of IVC filter: right upper arm placed last year   History of prostate surgery: 2016  Varicose veins of legs: h/o Vein stripping  S/P appendectomy  Stented coronary artery: cardiac stent x 2 - placement between  &amp; possible   Hx of CAB (age 65)    FAMILY HISTORY:  Family history of coronary artery disease: Father,  of MI age 55  Family history of breast cancer (Aunt): Mother    SOCIAL HISTORY:  Smoking: [ ] Never Smoked [ ] Former Smoker (__ packs x ___ years) [ ] Current Smoker  (__ packs x ___ years)  Substance Use: [ ] Never Used [ ] Used ____  EtOH Use:  Marital Status: [ ] Single [ ]  [ ]  [ ]   Sexual History:   Occupation:  Recent Travel:  Country of Birth:  Advance Directives:    Allergies    Cipro (Rash)    Intolerances      HOME MEDICATIONS:    REVIEW OF SYSTEMS:  Constitutional: [ ] fevers [ ] chills [ ] weight loss [ ] weight gain  HEENT: [ ] dry eyes [ ] eye irritation [ ] postnasal drip [ ] nasal congestion  CV: [ ] chest pain [ ] orthopnea [ ] palpitations [ ] murmur  Resp: [ ] cough [ ] shortness of breath [ ] dyspnea [ ] wheezing [ ] sputum [ ] hemoptysis  GI: [ ] nausea [ ] vomiting [ ] diarrhea [ ] constipation [ ] abd pain [ ] dysphagia   : [ ] dysuria [ ] nocturia [ ] hematuria [ ] increased urinary frequency  Musculoskeletal: [ ] back pain [ ] myalgias [ ] arthralgias [ ] fracture  Skin: [ ] rash [ ] itch  Neurological: [ ] headache [ ] dizziness [ ] syncope [ ] weakness [ ] numbness  Psychiatric: [ ] anxiety [ ] depression  Endocrine: [ ] diabetes [ ] thyroid problem  Hematologic/Lymphatic: [ ] anemia [ ] bleeding problem  Allergic/Immunologic: [ ] itchy eyes [ ] nasal discharge [ ] hives [ ] angioedema  [ ] All other systems negative  [ ] Unable to assess ROS because ________    OBJECTIVE:  ICU Vital Signs Last 24 Hrs  T(C): 36.4 (04 Sep 2019 02:03), Max: 36.7 (03 Sep 2019 04:57)  T(F): 97.6 (04 Sep 2019 02:03), Max: 98.1 (03 Sep 2019 21:06)  HR: 79 (04 Sep 2019 02:03) (57 - 93)  BP: 101/59 (04 Sep 2019 02:03) (94/59 - 143/71)  BP(mean): 76 (04 Sep 2019 02:03) (76 - 76)  ABP: --  ABP(mean): --  RR: 14 (04 Sep 2019 02:03) (14 - 18)  SpO2: 95% (04 Sep 2019 02:03) (95% - 98%)         @ 07: @ 07:00  --------------------------------------------------------  IN: 240 mL / OUT: 1150 mL / NET: -910 mL     @ 07:01  -  04 @ 02:43  --------------------------------------------------------  IN: 610 mL / OUT: 350 mL / NET: 260 mL      CAPILLARY BLOOD GLUCOSE      POCT Blood Glucose.: 143 mg/dL (03 Sep 2019 23:23)      GENERAL: NAD, well-groomed, well-developed  HEAD:  Atraumatic, Normocephalic  EYES: EOMI, PERRLA, conjunctiva and sclera clear  ENMT: No oropharyngeal exudates, erythema or lesions,  Moist mucous membranes  NECK: Supple, no cervical lymphadenopathy, no JVD  NERVOUS SYSTEM:  Alert & Oriented X3, CN II-XII intact, 4/5 BUE and 3/5 BLE motor strength, full sensation to light touch   CHEST/LUNG: Clear to auscultation bilaterally; No rales, rhonchi, wheezing, or rubs  HEART: Regular rate and rhythm; S1/S2 No murmurs, rubs, or gallops  ABDOMEN: Soft, Nontender, Nondistended; Bowel sounds present, Bladder non distended, non palpable  EXTREMITIES: Pulses palpable radial pulses 2+ bilat, DP/PT 1+/1+ bilat, without clubbing,cyanosis. Digits warm to touch with good cap refill < 3 secs  Skin: warm, dry, intact, normal color, no rash or abnormal lesions,without palpable nodes    LINES:     HOSPITAL MEDICATIONS:  MEDICATIONS  (STANDING):  atorvastatin 80 milliGRAM(s) Oral at bedtime  carbidopa/levodopa CR 50/200 1 Tablet(s) Oral <User Schedule>  chlorhexidine 4% Liquid 1 Application(s) Topical <User Schedule>  entacapone 200 milliGRAM(s) Oral <User Schedule>  fluticasone propionate 50 MICROgram(s)/spray Nasal Spray 1 Spray(s) Both Nostrils two times a day  levothyroxine Injectable 25 MICROGram(s) IV Push at bedtime  pantoprazole Infusion 8 mG/Hr (10 mL/Hr) IV Continuous <Continuous>  polyethylene glycol/electrolyte Solution. 4000 milliLiter(s) Oral once  ranolazine 1000 milliGRAM(s) Oral two times a day  tamsulosin 0.4 milliGRAM(s) Oral daily    MEDICATIONS  (PRN):      LABS:                        6.3    7.9   )-----------( 143      ( 03 Sep 2019 23:40 )             19.7     Hgb Trend: 6.3<--, 7.8<--, 8.1<--, 8.2<--, 7.8<--      142  |  106  |  65<H>  ----------------------------<  133<H>  3.5   |  24  |  2.46<H>    Ca    7.4<L>      03 Sep 2019 23:40  Phos  4.1       Mg     2.5         TPro  5.5<L>  /  Alb  2.4<L>  /  TBili  0.4  /  DBili  x   /  AST  10  /  ALT  <5<L>  /  AlkPhos  53      Creatinine Trend: 2.46<--, 2.40<--, 2.53<--, 2.52<--, 2.40<--, 2.80<--  PT/INR - ( 03 Sep 2019 23:40 )   PT: 16.1 sec;   INR: 1.40 ratio         PTT - ( 03 Sep 2019 23:40 )  PTT:106.6 sec    Arterial Blood Gas:   @ 23:37  7.51/35/106/28/97/4.4  ABG lactate: --        MICROBIOLOGY:   CULTURE RESULTS:                19 @ 17:55  Specimen Source:   Method Type:   Gram Stain:   Culture Results: Culture Results:   No growth at 5 days. (19 @ 17:55)  Culture Results:   No growth at 5 days. (19 @ 17:55)  Culture Results:   No growth (19 @ 17:52)    Bacteria: Bacteria: Few (19 @ 13:40)        RADIOLOGY:  CTA abdomen/pelvis : preliminary read:  INTERPRETATION:  linear region of hyperdensity in the rectum without   pooling in the venous phase likely represents hemorrhoid, no other   regions of irregular enhancement    CT abdomen/pelvis noncontrast :  IMPRESSION:   Stercoral colitis.  Mild to moderate bilateral hydronephrosis. No renal stones visualized.    CT Chest :  IMPRESSION:   Pulmonary edema with small bilateral pleural effusions.    US Kidneys and Bladder :  IMPRESSION: Limited study.  Atrophic right kidney with increased renal cortical echogenicity.   Moderate right hydronephrosis, with dilatation of the proximal right   ureter.  Mild left hydronephrosis.  Bilateral pleural effusions.    CT pelvis non contrast :  IMPRESSION:   No evidence of a buttocks abscess or subcutaneous gas.  Mild stercoral colitis.      EK/28  Ventricular Rate 59 BPM  Atrial Rate 64 BPM  QRS Duration 176 ms  Q-T Interval 488 ms  QTC Calculation(Bezet) 483 ms  R Axis -56 degrees  T Axis 103 degrees  Diagnosis Line ATRIAL FIBRILLATION WITH SLOW VENTRICULAR RESPONSE  LEFT BUNDLE BRANCH BLOCK  ABNORMAL ECG        Assessment:  88 yo male with PMHx of HTN, HLD, CAD/ICM s/p CABG/PCI, hypothyroidism, Parkinson's disease with progressive functional impairment, chronic Afib not an AC candidate, h/o DVT s/p IVC filter not on full AC, CKD stage 3 (baseline 1.4-1.6), h/o GI bleed, multiple falls (recent admission 2019), admitted to [facility] medical floors  with hematuria s/p lara insertion, found to have STEVEN on CKD i/s/o obstructive nephropathy, groin erythema/edema concerning for cellulitis s/p IV abx, course c/b pulmonary edema s/p diuresis. Transferred to MICU s/p RRT pm of 9/3 for management of brisk lower GI bleed associated hypotension requiring 2U PRBC and 1U Plt.     Plan:    #Neuro:  - AAOx3, hard of hearing  - no acute issues  - continue neuro checks q4   - continue Sinemet and Comtan for Parkinson's disease    #Pulm:  as per documentation h/o Asp PNA during stay  - stable oxygenation on room air  - continue to monitor SpO2 and provider supplemental oxygen to maintain SpO2 >90%  ABG with respiratory alkalosis during RRT likely 2/2 hyperventilation i/s/o acute bleed   - ABG PRN  CT chest  with pulmonary edema and BL pleural effusions, s/p Diuretic therapy  - holding i/s/o hypotension associated with GI bleed  respiratory muscle weakness i/s/o Parkinson's disease  - continue incentive spirometry and PT as able  - aspiration precautions given h/o dysphagia     #CV:  h/o HTN, HLD, CAD/ICM s/p CABG/PCI, chronic Afib  - holding ASA i/s/o acute GI bleed  - conitnue Atorvastatin 80 mg PO qd  - continue Renexa  hypotension 2/2 acute blood loss anemia  - holding Diuretics 2/2 hypotension  - holding home Imdur   - SBP improved 2/2 PRBC infusions   - continue to monitor and maintain MAP >65     #GI/:  acute blood loss anemia 2/2 lower GI bleed  - prelim CTA abdomen/pelvis unable to locate source, revealing hyperdensity in rectum without pooling likely hemorrhoid and stercoral colitis  - GI consult - plan for colonoscopy in AM to aid in localization  - NGT placement for normal saline lavage to r/o upper GI source of bleed and subsequently for administration of Golytely bowel prep   - monitor bowel movements   - continue Protonix infusion  - IR consult  - Surgery consult   - transfuse as needed of goal Hgb >7  - CBC q4 hrs   - maintain NPO  STEVEN on CKD  2/2 obstructive nephropathy likely 2/2 stool burden  - sCr improving  - s/p diuretics, continue to monitor strict I's and O's    #ID:  - s/p Abx regimen for groin cellulitis     #FEN/ENDO/HEME:  aPTT elevated at 106 without obvious etiology  - will investigate for factor deficiency and send appropriate tests  - will order 1 U FFP  acute blood loss anemia 2/2 GI bleed  - CBC q4 hrs and transfuse as necessary to maintain Hgb >7 CHIEF COMPLAINT:    HPI:  89 yo male with PMHx of HTN, HLD, CAD/ICM s/p CABG/PCI, hypothyroidism, Parkinson's disease with progressive functional impairment, chronic Afib not an AC candidate, h/o DVT s/p IVC filter not on full AC, CKD stage 3 (baseline 1.4-1.6), h/o GI bleed, multiple falls with recent admission 2019 s/p mechanical fall found to have acute on chronic right proximal humeral fracture not an operative candidate and d/c'd to [facility]. Pt presents to [facility] ED  for gross hematuria and scrotal erythema and edema s/p lara placement for urinary retention. As per documentation, Pt admitted to medicine floors for further management of acute on chronic kidney injury and urethral traumatic injury vs cystitis vs groin cellulitis. Pt was started on antibiotics empirically for groin cellulitis, urology consulted, CT pelvis revealing no evidence of buttocks abscess or subcutaneous gas and mild stercoral colitis and US Kidney and bladder with atrophic right kidney and BL kidney hydronephrosis R>L and dilatation of proximal R ureter consistent with obstructive nephropathy. Hospital course c/b CT chest revealing BL pleural effusions and pulmonary edema and pt was started on diuretics. Pt with improvement in sCr and hematuria throughout stay. Antibiotics recently de-escalated to PO doxy/augmentin as pt remained afebrile without leukocytosis with skin changes likely suggestive of moisture associated dermatitis > cellulitis.     Course further c/b by RRT called PM of 9/3 for bloody bowel movement for which pt received 1L NS IVF, remaining hemodynamically stable at the time with transfer to the telemetry monitoring unit. A second RRT was then called for large amount of bright red blood with clots per rectum with hypotension to SBP of 70s associated with complaints of lightheadedness. Another 1L of IVF was administered along with Protonix IVP and 1U PRBC and 1U Plt ordered as pt on ASA awaiting CBC results. House GI called recommending to prep for colonscopy in AM, MICU was consulted and CTA abdomen/pelvis performed with preliminary report revealing linear region of hyperdensity in the rectum without pooling in the venous phase likely representing hemorrhoid with no other regions of irregular enhancement. Pt's hemoglobin returning at 6.3 from 7.8, another unit of PRBC was ordered along with DDAVP 20mcg. Pt transferred to MICU for further management of lower GI bleed.     PAST MEDICAL & SURGICAL HISTORY:  Unilateral inguinal hernia, with gangrene, not specified as recurrent  Upper GI bleed: MICU admission , EGD w/ gastric ulcer/visible vessel which was cauterized  Aspiration pneumonia  Clostridium difficile colitis  Pneumonia  MI, old: age 55  UTI (urinary tract infection)  BPH (benign prostatic hyperplasia)  Parkinsons Disease  CAD (Coronary Artery Disease): s/p CABG and stents  HTN (Hypertension)  Hyperlipidemia  Presence of IVC filter: right upper arm placed last year   History of prostate surgery: 2016  Varicose veins of legs: h/o Vein stripping  S/P appendectomy  Stented coronary artery: cardiac stent x 2 - placement between  &amp; possible   Hx of CAB (age 65)    FAMILY HISTORY:  Family history of coronary artery disease: Father,  of MI age 55  Family history of breast cancer (Aunt): Mother    SOCIAL HISTORY:  Smoking: [ X ] Never Smoked [ ] Former Smoker (__ packs x ___ years) [ ] Current Smoker  (__ packs x ___ years)  Substance Use: [ ] Never Used [ ] Used ____  EtOH Use:  Marital Status: [ ] Single [ ]  [ ]  [ ]   Sexual History:   Occupation: retired businessman  Recent Travel:  Country of Birth:  Advance Directives: full code    Allergies    Cipro (Rash)    Intolerances      HOME MEDICATIONS:    REVIEW OF SYSTEMS:  Constitutional: [ ] fevers [ ] chills [ ] weight loss [ ] weight gain  HEENT: [ ] dry eyes [ ] eye irritation [ ] postnasal drip [ ] nasal congestion  CV: [ ] chest pain [ ] orthopnea [ ] palpitations [ ] murmur  Resp: [ ] cough [ ] shortness of breath [ ] dyspnea [ ] wheezing [ ] sputum [ ] hemoptysis  GI: [ ] nausea [ ] vomiting [ ] diarrhea [ X] BRBPR [ ] constipation [ X ] abd pain "some spots" [ ] dysphagia   : [ ] dysuria [ ] nocturia [ ] hematuria [ ] increased urinary frequency  Musculoskeletal: [ ] back pain [ ] myalgias [ ] arthralgias [ ] fracture  Skin: [ ] rash [ ] itch  Neurological: [ ] headache [ ] dizziness [ ] syncope [ ] weakness [ ] numbness  Psychiatric: [ ] anxiety [ ] depression  Endocrine: [ ] diabetes [X ] thyroid problem  Hematologic/Lymphatic: [ X ] anemia [ X ] bleeding problem  Allergic/Immunologic: [ ] itchy eyes [ ] nasal discharge [ ] hives [ ] angioedema  [ ] All other systems negative  [ ] Unable to assess ROS because ________    OBJECTIVE:  ICU Vital Signs Last 24 Hrs  T(C): 36.4 (04 Sep 2019 02:03), Max: 36.7 (03 Sep 2019 04:57)  T(F): 97.6 (04 Sep 2019 02:03), Max: 98.1 (03 Sep 2019 21:06)  HR: 79 (04 Sep 2019 02:03) (57 - 93)  BP: 101/59 (04 Sep 2019 02:03) (94/59 - 143/71)  BP(mean): 76 (04 Sep 2019 02:03) (76 - 76)  ABP: --  ABP(mean): --  RR: 14 (04 Sep 2019 02:03) (14 - 18)  SpO2: 95% (04 Sep 2019 02:03) (95% - 98%)         @ 07:03 @ 07:00  --------------------------------------------------------  IN: 240 mL / OUT: 1150 mL / NET: -910 mL     @ 07:01  -  0904 @ 02:43  --------------------------------------------------------  IN: 610 mL / OUT: 350 mL / NET: 260 mL      CAPILLARY BLOOD GLUCOSE      POCT Blood Glucose.: 143 mg/dL (03 Sep 2019 23:23)      GENERAL: NAD, well-groomed, well-developed  HEAD:  Atraumatic, Normocephalic  EYES: EOMI, PERRLA, conjunctiva and sclera clear  ENMT: No oropharyngeal exudates, erythema or lesions,  Moist mucous membranes, hard of hearing  NECK: Supple, no cervical lymphadenopathy, no JVD  NERVOUS SYSTEM:  Alert & Oriented X3, CN II-XII intact, 4/5 BUE and 3/5 BLE motor strength, full sensation to light touch   CHEST/LUNG: Clear to auscultation bilaterally; No rales, rhonchi, wheezing, or rubs  HEART: Regular rate and rhythm; S1/S2 No murmurs, rubs, or gallops  ABDOMEN: Soft, Nontender, Nondistended; Bowel sounds present, Bladder non distended, non palpable  EXTREMITIES: Pulses palpable radial pulses 2+ bilat, DP/PT 1+/1+ bilat, without clubbing,cyanosis. Digits warm to touch with good cap refill < 3 secs, R arm in sling   Skin: warm, dry, intact, normal color, no rash or abnormal lesions, without palpable nodes    LINES:     HOSPITAL MEDICATIONS:  MEDICATIONS  (STANDING):  atorvastatin 80 milliGRAM(s) Oral at bedtime  carbidopa/levodopa CR 50/200 1 Tablet(s) Oral <User Schedule>  chlorhexidine 4% Liquid 1 Application(s) Topical <User Schedule>  entacapone 200 milliGRAM(s) Oral <User Schedule>  fluticasone propionate 50 MICROgram(s)/spray Nasal Spray 1 Spray(s) Both Nostrils two times a day  levothyroxine Injectable 25 MICROGram(s) IV Push at bedtime  pantoprazole Infusion 8 mG/Hr (10 mL/Hr) IV Continuous <Continuous>  polyethylene glycol/electrolyte Solution. 4000 milliLiter(s) Oral once  ranolazine 1000 milliGRAM(s) Oral two times a day  tamsulosin 0.4 milliGRAM(s) Oral daily    MEDICATIONS  (PRN):      LABS:                        6.3    7.9   )-----------( 143      ( 03 Sep 2019 23:40 )             19.7     Hgb Trend: 6.3<--, 7.8<--, 8.1<--, 8.2<--, 7.8<--      142  |  106  |  65<H>  ----------------------------<  133<H>  3.5   |  24  |  2.46<H>    Ca    7.4<L>      03 Sep 2019 23:40  Phos  4.1       Mg     2.5         TPro  5.5<L>  /  Alb  2.4<L>  /  TBili  0.4  /  DBili  x   /  AST  10  /  ALT  <5<L>  /  AlkPhos  53      Creatinine Trend: 2.46<--, 2.40<--, 2.53<--, 2.52<--, 2.40<--, 2.80<--  PT/INR - ( 03 Sep 2019 23:40 )   PT: 16.1 sec;   INR: 1.40 ratio         PTT - ( 03 Sep 2019 23:40 )  PTT:106.6 sec    Arterial Blood Gas:   @ 23:37  7.51/35/106/28/97/4.4  ABG lactate: --        MICROBIOLOGY:   CULTURE RESULTS:                19 @ 17:55  Specimen Source:   Method Type:   Gram Stain:   Culture Results: Culture Results:   No growth at 5 days. (19 @ 17:55)  Culture Results:   No growth at 5 days. (19 @ 17:55)  Culture Results:   No growth (19 @ 17:52)    Bacteria: Bacteria: Few (19 @ 13:40)        RADIOLOGY:  CTA abdomen/pelvis : preliminary read:  INTERPRETATION:  linear region of hyperdensity in the rectum without   pooling in the venous phase likely represents hemorrhoid, no other   regions of irregular enhancement    CT abdomen/pelvis noncontrast :  IMPRESSION:   Stercoral colitis.  Mild to moderate bilateral hydronephrosis. No renal stones visualized.    CT Chest :  IMPRESSION:   Pulmonary edema with small bilateral pleural effusions.    US Kidneys and Bladder :  IMPRESSION: Limited study.  Atrophic right kidney with increased renal cortical echogenicity.   Moderate right hydronephrosis, with dilatation of the proximal right   ureter.  Mild left hydronephrosis.  Bilateral pleural effusions.    CT pelvis non contrast :  IMPRESSION:   No evidence of a buttocks abscess or subcutaneous gas.  Mild stercoral colitis.      EK/28  Ventricular Rate 59 BPM  Atrial Rate 64 BPM  QRS Duration 176 ms  Q-T Interval 488 ms  QTC Calculation(Bezet) 483 ms  R Axis -56 degrees  T Axis 103 degrees  Diagnosis Line ATRIAL FIBRILLATION WITH SLOW VENTRICULAR RESPONSE  LEFT BUNDLE BRANCH BLOCK  ABNORMAL ECG        Assessment:  89 yo male with PMHx of HTN, HLD, CAD/ICM s/p CABG/PCI, hypothyroidism, Parkinson's disease with progressive functional impairment, chronic Afib not an AC candidate, h/o DVT s/p IVC filter not on full AC, CKD stage 3 (baseline 1.4-1.6), h/o GI bleed, multiple falls (recent admission 2019), admitted to [facility] medical floors  with hematuria s/p lara insertion, found to have STEVEN on CKD i/s/o obstructive nephropathy, groin erythema/edema concerning for cellulitis s/p IV abx, course c/b pulmonary edema s/p diuresis. Transferred to MICU s/p RRT pm of 9/3 for management of brisk lower GI bleed associated hypotension requiring 2U PRBC and 1U Plt.     Plan:    #Neuro:  - AAOx3, hard of hearing  - no acute issues  - continue neuro checks q4   - continue Sinemet and Comtan for Parkinson's disease    #Pulm:  as per documentation h/o Asp PNA during stay  - stable oxygenation on room air  - continue to monitor SpO2 and provider supplemental oxygen to maintain SpO2 >90%  ABG with respiratory alkalosis during RRT likely 2/2 hyperventilation i/s/o acute bleed   - ABG PRN  - CXR  CT chest  with pulmonary edema and BL pleural effusions, s/p Diuretic therapy  - holding i/s/o hypotension associated with GI bleed  respiratory muscle weakness i/s/o Parkinson's disease  - continue incentive spirometry and PT as able  - aspiration precautions given h/o dysphagia     #CV:  h/o HTN, HLD, CAD/ICM s/p CABG/PCI, chronic Afib  - holding ASA i/s/o acute GI bleed  - conitnue Atorvastatin 80 mg PO qd  - continue Renexa  - f/u cardiology reccs  hypotension 2/2 acute blood loss anemia  - holding Diuretics 2/2 hypotension  - holding home Imdur   - SBP improved 2/2 PRBC infusions   - continue to monitor and maintain MAP >65      #GI/:  acute blood loss anemia 2/2 lower GI bleed  - prelim CTA abdomen/pelvis unable to locate source, revealing hyperdensity in rectum without pooling likely hemorrhoid and stercoral colitis  - GI consult - plan for colonoscopy in AM to aid in localization and pending intervention, appreciate reccs  - NGT placement for normal saline lavage to r/o upper GI source of bleed and subsequently for administration of Golytely bowel prep   - monitor bowel movements   - continue Protonix infusion  - IR consult  - Surgery consult   - transfuse as needed of goal Hgb >7  - CBC q4 hrs   - maintain NPO  STEVEN on CKD  2/2 obstructive nephropathy likely 2/2 stool burden  - sCr improving  - s/p diuretics, continue to monitor strict I's and O's  - f/u Urology reccs     #ID:  s/p IV Abx regimen for groin cellulitis   - ID following, recently de-escalated in light of ruling out cellulitis as etiology of groin erythema/edema  - remaining afebrile without leukocytosis, continue to monitor vital signs and WBC  - monitor off antibiotics for now    #FEN/ENDO/HEME:  aPTT elevated at 106 without obvious etiology, not on AC and h/o bleeding disorders now with acute lower GI bleed  - will investigate for factor deficiency and send Factor VIII Assay and mixing study  - will order 1 U FFP and f/u aPTT/PT/INR  acute blood loss anemia 2/2 GI bleed  - CBC q4 hrs and transfuse as necessary to maintain Hgb >7 CHIEF COMPLAINT:    HPI:  88 yo male with PMHx of HTN, HLD, CAD/ICM s/p CABG/PCI, hypothyroidism, Parkinson's disease with progressive functional impairment, chronic Afib not an AC candidate, h/o DVT s/p IVC filter not on full AC, CKD stage 3 (baseline 1.4-1.6), h/o GI bleed, multiple falls with recent admission 2019 s/p mechanical fall found to have acute on chronic right proximal humeral fracture not an operative candidate and d/c'd to [facility]. Pt presents to [facility] ED  for gross hematuria and scrotal erythema and edema s/p lara placement for urinary retention. As per documentation, Pt admitted to medicine floors for further management of acute on chronic kidney injury and urethral traumatic injury vs cystitis vs groin cellulitis. Pt was started on antibiotics empirically for groin cellulitis, urology consulted, CT pelvis revealing no evidence of buttocks abscess or subcutaneous gas and mild stercoral colitis and US Kidney and bladder with atrophic right kidney and BL kidney hydronephrosis R>L and dilatation of proximal R ureter consistent with obstructive nephropathy. Hospital course c/b CT chest revealing BL pleural effusions and pulmonary edema and pt was started on diuretics. Pt with improvement in sCr and hematuria throughout stay. Antibiotics recently de-escalated to PO doxy/augmentin as pt remained afebrile without leukocytosis with skin changes likely suggestive of moisture associated dermatitis > cellulitis.     Course further c/b by RRT called PM of 9/3 for bloody bowel movement for which pt received 1L NS IVF, remaining hemodynamically stable at the time with transfer to the telemetry monitoring unit. A second RRT was then called for large amount of bright red blood with clots per rectum with hypotension to SBP of 70s associated with complaints of lightheadedness. Another 1L of IVF was administered along with Protonix IVP and 1U PRBC and 1U Plt ordered as pt on ASA awaiting CBC results. House GI called recommending to prep for colonscopy in AM, MICU was consulted and CTA abdomen/pelvis performed with preliminary report revealing linear region of hyperdensity in the rectum without pooling in the venous phase likely representing hemorrhoid with no other regions of irregular enhancement. Pt's hemoglobin returning at 6.3 from 7.8, another unit of PRBC was ordered along with DDAVP 20mcg. Pt transferred to MICU for further management of lower GI bleed.     PAST MEDICAL & SURGICAL HISTORY:  Unilateral inguinal hernia, with gangrene, not specified as recurrent  Upper GI bleed: MICU admission , EGD w/ gastric ulcer/visible vessel which was cauterized  Aspiration pneumonia  Clostridium difficile colitis  Pneumonia  MI, old: age 55  UTI (urinary tract infection)  BPH (benign prostatic hyperplasia)  Parkinsons Disease  CAD (Coronary Artery Disease): s/p CABG and stents  HTN (Hypertension)  Hyperlipidemia  Presence of IVC filter: right upper arm placed last year   History of prostate surgery: 2016  Varicose veins of legs: h/o Vein stripping  S/P appendectomy  Stented coronary artery: cardiac stent x 2 - placement between  &amp; possible   Hx of CAB (age 65)    FAMILY HISTORY:  Family history of coronary artery disease: Father,  of MI age 55  Family history of breast cancer (Aunt): Mother    SOCIAL HISTORY:  Smoking: [ X ] Never Smoked [ ] Former Smoker (__ packs x ___ years) [ ] Current Smoker  (__ packs x ___ years)  Substance Use: [ ] Never Used [ ] Used ____  EtOH Use:  Marital Status: [ ] Single [ ]  [ ]  [ ]   Sexual History:   Occupation: retired businessman  Recent Travel:  Country of Birth:  Advance Directives: full code    Allergies    Cipro (Rash)    Intolerances      HOME MEDICATIONS:    REVIEW OF SYSTEMS:  Constitutional: [ ] fevers [ ] chills [ ] weight loss [ ] weight gain  HEENT: [ ] dry eyes [ ] eye irritation [ ] postnasal drip [ ] nasal congestion  CV: [ ] chest pain [ ] orthopnea [ ] palpitations [ ] murmur  Resp: [ ] cough [ ] shortness of breath [ ] dyspnea [ ] wheezing [ ] sputum [ ] hemoptysis  GI: [ ] nausea [ ] vomiting [ ] diarrhea [ X] BRBPR [ ] constipation [ X ] abd pain "some spots" [ ] dysphagia   : [ ] dysuria [ ] nocturia [ ] hematuria [ ] increased urinary frequency  Musculoskeletal: [ ] back pain [ ] myalgias [ ] arthralgias [ ] fracture  Skin: [ ] rash [ ] itch  Neurological: [ ] headache [ ] dizziness [ ] syncope [ ] weakness [ ] numbness  Psychiatric: [ ] anxiety [ ] depression  Endocrine: [ ] diabetes [X ] thyroid problem  Hematologic/Lymphatic: [ X ] anemia [ X ] bleeding problem  Allergic/Immunologic: [ ] itchy eyes [ ] nasal discharge [ ] hives [ ] angioedema  [ ] All other systems negative  [ ] Unable to assess ROS because ________    OBJECTIVE:  ICU Vital Signs Last 24 Hrs  T(C): 36.4 (04 Sep 2019 02:03), Max: 36.7 (03 Sep 2019 04:57)  T(F): 97.6 (04 Sep 2019 02:03), Max: 98.1 (03 Sep 2019 21:06)  HR: 79 (04 Sep 2019 02:03) (57 - 93)  BP: 101/59 (04 Sep 2019 02:03) (94/59 - 143/71)  BP(mean): 76 (04 Sep 2019 02:03) (76 - 76)  ABP: --  ABP(mean): --  RR: 14 (04 Sep 2019 02:03) (14 - 18)  SpO2: 95% (04 Sep 2019 02:03) (95% - 98%)         @ 07:03 @ 07:00  --------------------------------------------------------  IN: 240 mL / OUT: 1150 mL / NET: -910 mL     @ 07:01  -  0904 @ 02:43  --------------------------------------------------------  IN: 610 mL / OUT: 350 mL / NET: 260 mL      CAPILLARY BLOOD GLUCOSE      POCT Blood Glucose.: 143 mg/dL (03 Sep 2019 23:23)      GENERAL: NAD, well-groomed, well-developed  HEAD:  Atraumatic, Normocephalic  EYES: EOMI, PERRLA, conjunctiva and sclera clear  ENMT: No oropharyngeal exudates, erythema or lesions,  Moist mucous membranes, hard of hearing  NECK: Supple, no cervical lymphadenopathy, no JVD  NERVOUS SYSTEM:  Alert & Oriented X3, CN II-XII intact, 4/5 BUE and 3/5 BLE motor strength, full sensation to light touch   CHEST/LUNG: Clear to auscultation bilaterally; No rales, rhonchi, wheezing, or rubs  HEART: Regular rate and rhythm; S1/S2 No murmurs, rubs, or gallops  ABDOMEN: Soft, Nontender, Nondistended; Bowel sounds present, Bladder non distended, non palpable  EXTREMITIES: Pulses palpable radial pulses 2+ bilat, DP/PT 1+/1+ bilat, without clubbing,cyanosis. Digits warm to touch with good cap refill < 3 secs, R arm in sling   Skin: warm, dry, intact, normal color, no rash or abnormal lesions, without palpable nodes    LINES:     HOSPITAL MEDICATIONS:  MEDICATIONS  (STANDING):  atorvastatin 80 milliGRAM(s) Oral at bedtime  carbidopa/levodopa CR 50/200 1 Tablet(s) Oral <User Schedule>  chlorhexidine 4% Liquid 1 Application(s) Topical <User Schedule>  entacapone 200 milliGRAM(s) Oral <User Schedule>  fluticasone propionate 50 MICROgram(s)/spray Nasal Spray 1 Spray(s) Both Nostrils two times a day  levothyroxine Injectable 25 MICROGram(s) IV Push at bedtime  pantoprazole Infusion 8 mG/Hr (10 mL/Hr) IV Continuous <Continuous>  polyethylene glycol/electrolyte Solution. 4000 milliLiter(s) Oral once  ranolazine 1000 milliGRAM(s) Oral two times a day  tamsulosin 0.4 milliGRAM(s) Oral daily    MEDICATIONS  (PRN):      LABS:                        6.3    7.9   )-----------( 143      ( 03 Sep 2019 23:40 )             19.7     Hgb Trend: 6.3<--, 7.8<--, 8.1<--, 8.2<--, 7.8<--      142  |  106  |  65<H>  ----------------------------<  133<H>  3.5   |  24  |  2.46<H>    Ca    7.4<L>      03 Sep 2019 23:40  Phos  4.1       Mg     2.5         TPro  5.5<L>  /  Alb  2.4<L>  /  TBili  0.4  /  DBili  x   /  AST  10  /  ALT  <5<L>  /  AlkPhos  53      Creatinine Trend: 2.46<--, 2.40<--, 2.53<--, 2.52<--, 2.40<--, 2.80<--  PT/INR - ( 03 Sep 2019 23:40 )   PT: 16.1 sec;   INR: 1.40 ratio         PTT - ( 03 Sep 2019 23:40 )  PTT:106.6 sec    Arterial Blood Gas:   @ 23:37  7.51/35/106/28/97/4.4  ABG lactate: --        MICROBIOLOGY:   CULTURE RESULTS:                19 @ 17:55  Specimen Source:   Method Type:   Gram Stain:   Culture Results: Culture Results:   No growth at 5 days. (19 @ 17:55)  Culture Results:   No growth at 5 days. (19 @ 17:55)  Culture Results:   No growth (19 @ 17:52)    Bacteria: Bacteria: Few (19 @ 13:40)        RADIOLOGY:  CTA abdomen/pelvis : preliminary read:  INTERPRETATION:  linear region of hyperdensity in the rectum without   pooling in the venous phase likely represents hemorrhoid, no other   regions of irregular enhancement    CT abdomen/pelvis noncontrast :  IMPRESSION:   Stercoral colitis.  Mild to moderate bilateral hydronephrosis. No renal stones visualized.    CT Chest :  IMPRESSION:   Pulmonary edema with small bilateral pleural effusions.    US Kidneys and Bladder :  IMPRESSION: Limited study.  Atrophic right kidney with increased renal cortical echogenicity.   Moderate right hydronephrosis, with dilatation of the proximal right   ureter.  Mild left hydronephrosis.  Bilateral pleural effusions.    CT pelvis non contrast :  IMPRESSION:   No evidence of a buttocks abscess or subcutaneous gas.  Mild stercoral colitis.      EK/28  Ventricular Rate 59 BPM  Atrial Rate 64 BPM  QRS Duration 176 ms  Q-T Interval 488 ms  QTC Calculation(Bezet) 483 ms  R Axis -56 degrees  T Axis 103 degrees  Diagnosis Line ATRIAL FIBRILLATION WITH SLOW VENTRICULAR RESPONSE  LEFT BUNDLE BRANCH BLOCK  ABNORMAL ECG        Assessment:  88 yo male with PMHx of HTN, HLD, CAD/ICM s/p CABG/PCI, hypothyroidism, Parkinson's disease with progressive functional impairment, chronic Afib not an AC candidate, h/o DVT s/p IVC filter not on full AC, CKD stage 3 (baseline 1.4-1.6), h/o GI bleed, multiple falls (recent admission 2019), admitted to [facility] medical floors  with hematuria s/p lara insertion, found to have STEVEN on CKD i/s/o obstructive nephropathy, groin erythema/edema concerning for cellulitis s/p IV abx, course c/b pulmonary edema s/p diuresis. Transferred to MICU s/p RRT pm of 9/3 for management of brisk lower GI bleed associated hypotension requiring 2U PRBC and 1U Plt.     Plan:    #Neuro:  - AAOx3, hard of hearing  - no acute issues  - continue neuro checks q4   - continue Sinemet and Comtan for Parkinson's disease    #Pulm:  as per documentation h/o Asp PNA during stay  - stable oxygenation on room air  - continue to monitor SpO2 and provider supplemental oxygen to maintain SpO2 >90%  ABG with respiratory alkalosis during RRT likely 2/2 hyperventilation i/s/o acute bleed   - ABG PRN  - CXR  CT chest  with pulmonary edema and BL pleural effusions, s/p Diuretic therapy  - holding i/s/o hypotension associated with GI bleed  respiratory muscle weakness i/s/o Parkinson's disease  - continue incentive spirometry and PT as able  - aspiration precautions given h/o dysphagia     #CV:  h/o HTN, HLD, CAD/ICM s/p CABG/PCI, chronic Afib  - holding ASA i/s/o acute GI bleed  - conitnue Atorvastatin 80 mg PO qd  - continue Renexa  - f/u cardiology reccs  hypotension 2/2 acute blood loss anemia  - holding Diuretics 2/2 hypotension  - holding home Imdur   - SBP improved 2/2 PRBC infusions   - continue to monitor and maintain MAP >65      #GI/:  acute blood loss anemia 2/2 lower GI bleed  - prelim CTA abdomen/pelvis unable to locate source, revealing hyperdensity in rectum without pooling likely hemorrhoid and stercoral colitis  - GI consult - plan for colonoscopy in AM (tentatively at 8 am) to aid in localization and pending intervention, appreciate reccs  - NGT placement for normal saline lavage to r/o upper GI source of bleed and subsequently for administration of Golytely bowel prep   - monitor bowel movements   - continue Protonix infusion  - IR consult  - Surgery consult   - transfuse as needed of goal Hgb >7  - CBC q4 hrs   - maintain NPO  STEVEN on CKD  2/2 obstructive nephropathy likely 2/2 stool burden  - sCr improving  - s/p diuretics, continue to monitor strict I's and O's  - f/u Urology reccs     #ID:  s/p IV Abx regimen for groin cellulitis   - ID following, recently de-escalated in light of ruling out cellulitis as etiology of groin erythema/edema  - remaining afebrile without leukocytosis, continue to monitor vital signs and WBC  - monitor off antibiotics for now    #FEN/ENDO/HEME:  aPTT elevated at 106 without obvious etiology, not on AC and h/o bleeding disorders now with acute lower GI bleed  - will investigate for factor deficiency and send Factor VIII Assay and mixing study  - will order 1 U FFP and f/u aPTT/PT/INR  acute blood loss anemia 2/2 GI bleed  - CBC q4 hrs and transfuse as necessary to maintain Hgb >7    #Skin  with Keratic skin lesion on anterior scalp  - Dermatology consult in AM CHIEF COMPLAINT:    HPI:  89 yo male with PMHx of HTN, HLD, CAD/ICM s/p CABG/PCI, hypothyroidism, Parkinson's disease with progressive functional impairment, chronic Afib not an AC candidate, h/o DVT s/p IVC filter not on full AC, CKD stage 3 (baseline 1.4-1.6), h/o GI bleed, multiple falls with recent admission 2019 s/p mechanical fall found to have acute on chronic right proximal humeral fracture not an operative candidate and d/c'd to [facility]. Pt presents to [facility] ED  for gross hematuria and scrotal erythema and edema s/p lara placement for urinary retention. As per documentation, Pt admitted to medicine floors for further management of acute on chronic kidney injury and urethral traumatic injury vs cystitis vs groin cellulitis. Pt was started on antibiotics empirically for groin cellulitis, urology consulted, CT pelvis revealing no evidence of buttocks abscess or subcutaneous gas and mild stercoral colitis and US Kidney and bladder with atrophic right kidney and BL kidney hydronephrosis R>L and dilatation of proximal R ureter consistent with obstructive nephropathy. Hospital course c/b CT chest revealing BL pleural effusions and pulmonary edema and pt was started on diuretics. Pt with improvement in sCr and hematuria throughout stay. Antibiotics recently de-escalated to PO doxy/augmentin as pt remained afebrile without leukocytosis with skin changes likely suggestive of moisture associated dermatitis > cellulitis.     Course further c/b by RRT called PM of 9/3 for bloody bowel movement for which pt received 1L NS IVF, remaining hemodynamically stable at the time with transfer to the telemetry monitoring unit. A second RRT was then called for large amount of bright red blood with clots per rectum with hypotension to SBP of 70s associated with complaints of lightheadedness. Another 1L of IVF was administered along with Protonix IVP and 1U PRBC and 1U Plt ordered as pt on ASA awaiting CBC results. House GI called recommending to prep for colonscopy in AM, MICU was consulted and CTA abdomen/pelvis performed with preliminary report revealing linear region of hyperdensity in the rectum without pooling in the venous phase likely representing hemorrhoid with no other regions of irregular enhancement. Pt's hemoglobin returning at 6.3 from 7.8, another unit of PRBC was ordered along with DDAVP 20mcg. Pt transferred to MICU for further management of lower GI bleed.     PAST MEDICAL & SURGICAL HISTORY:  Unilateral inguinal hernia, with gangrene, not specified as recurrent  Upper GI bleed: MICU admission , EGD w/ gastric ulcer/visible vessel which was cauterized  Aspiration pneumonia  Clostridium difficile colitis  Pneumonia  MI, old: age 55  UTI (urinary tract infection)  BPH (benign prostatic hyperplasia)  Parkinsons Disease  CAD (Coronary Artery Disease): s/p CABG and stents  HTN (Hypertension)  Hyperlipidemia  Presence of IVC filter: right upper arm placed last year   History of prostate surgery: 2016  Varicose veins of legs: h/o Vein stripping  S/P appendectomy  Stented coronary artery: cardiac stent x 2 - placement between  &amp; possible   Hx of CAB (age 65)    FAMILY HISTORY:  Family history of coronary artery disease: Father,  of MI age 55  Family history of breast cancer (Aunt): Mother    SOCIAL HISTORY:  Smoking: [ X ] Never Smoked [ ] Former Smoker (__ packs x ___ years) [ ] Current Smoker  (__ packs x ___ years)  Substance Use: [ ] Never Used [ ] Used ____  EtOH Use:  Marital Status: [ ] Single [ ]  [ ]  [ ]   Sexual History:   Occupation: retired businessman  Recent Travel:  Country of Birth:  Advance Directives: full code    Allergies    Cipro (Rash)    Intolerances      HOME MEDICATIONS:    REVIEW OF SYSTEMS:  Constitutional: [ ] fevers [ ] chills [ ] weight loss [ ] weight gain  HEENT: [ ] dry eyes [ ] eye irritation [ ] postnasal drip [ ] nasal congestion  CV: [ ] chest pain [ ] orthopnea [ ] palpitations [ ] murmur  Resp: [ ] cough [ ] shortness of breath [ ] dyspnea [ ] wheezing [ ] sputum [ ] hemoptysis  GI: [ ] nausea [ ] vomiting [ ] diarrhea [ X] BRBPR [ ] constipation [ X ] abd pain "some spots" [ ] dysphagia   : [ ] dysuria [ ] nocturia [ ] hematuria [ ] increased urinary frequency  Musculoskeletal: [ ] back pain [ ] myalgias [ ] arthralgias [ ] fracture  Skin: [ ] rash [ ] itch  Neurological: [ ] headache [ ] dizziness [ ] syncope [ ] weakness [ ] numbness  Psychiatric: [ ] anxiety [ ] depression  Endocrine: [ ] diabetes [X ] thyroid problem  Hematologic/Lymphatic: [ X ] anemia [ X ] bleeding problem  Allergic/Immunologic: [ ] itchy eyes [ ] nasal discharge [ ] hives [ ] angioedema  [ ] All other systems negative  [ ] Unable to assess ROS because ________    OBJECTIVE:  ICU Vital Signs Last 24 Hrs  T(C): 36.4 (04 Sep 2019 02:03), Max: 36.7 (03 Sep 2019 04:57)  T(F): 97.6 (04 Sep 2019 02:03), Max: 98.1 (03 Sep 2019 21:06)  HR: 79 (04 Sep 2019 02:03) (57 - 93)  BP: 101/59 (04 Sep 2019 02:03) (94/59 - 143/71)  BP(mean): 76 (04 Sep 2019 02:03) (76 - 76)  ABP: --  ABP(mean): --  RR: 14 (04 Sep 2019 02:03) (14 - 18)  SpO2: 95% (04 Sep 2019 02:03) (95% - 98%)         @ 07:03 @ 07:00  --------------------------------------------------------  IN: 240 mL / OUT: 1150 mL / NET: -910 mL     @ 07:01  -  0904 @ 02:43  --------------------------------------------------------  IN: 610 mL / OUT: 350 mL / NET: 260 mL      CAPILLARY BLOOD GLUCOSE      POCT Blood Glucose.: 143 mg/dL (03 Sep 2019 23:23)      GENERAL: NAD, well-groomed, well-developed  HEAD:  Atraumatic, Normocephalic  EYES: EOMI, PERRLA, conjunctiva and sclera clear  ENMT: No oropharyngeal exudates, erythema or lesions,  Moist mucous membranes, hard of hearing  NECK: Supple, no cervical lymphadenopathy, no JVD  NERVOUS SYSTEM:  Alert & Oriented X3, CN II-XII intact, 4/5 BUE and 3/5 BLE motor strength, full sensation to light touch   CHEST/LUNG: Clear to auscultation bilaterally; No rales, rhonchi, wheezing, or rubs  HEART: Regular rate and rhythm; S1/S2 No murmurs, rubs, or gallops  ABDOMEN: Soft, Nontender, Nondistended; Bowel sounds present, Bladder non distended, non palpable  Gastic Lavage with 60cc NS revealed normal gastric contents (food and bile) no blood.   EXTREMITIES: Pulses palpable radial pulses 2+ bilat, DP/PT 1+/1+ bilat, without clubbing,cyanosis. Digits warm to touch with good cap refill < 3 secs, R arm in sling   Skin: warm, dry, intact, normal color, no rash or abnormal lesions, without palpable nodes    LINES:     HOSPITAL MEDICATIONS:  MEDICATIONS  (STANDING):  atorvastatin 80 milliGRAM(s) Oral at bedtime  carbidopa/levodopa CR 50/200 1 Tablet(s) Oral <User Schedule>  chlorhexidine 4% Liquid 1 Application(s) Topical <User Schedule>  entacapone 200 milliGRAM(s) Oral <User Schedule>  fluticasone propionate 50 MICROgram(s)/spray Nasal Spray 1 Spray(s) Both Nostrils two times a day  levothyroxine Injectable 25 MICROGram(s) IV Push at bedtime  pantoprazole Infusion 8 mG/Hr (10 mL/Hr) IV Continuous <Continuous>  polyethylene glycol/electrolyte Solution. 4000 milliLiter(s) Oral once  ranolazine 1000 milliGRAM(s) Oral two times a day  tamsulosin 0.4 milliGRAM(s) Oral daily    MEDICATIONS  (PRN):      LABS:                        6.3    7.9   )-----------( 143      ( 03 Sep 2019 23:40 )             19.7     Hgb Trend: 6.3<--, 7.8<--, 8.1<--, 8.2<--, 7.8<--      142  |  106  |  65<H>  ----------------------------<  133<H>  3.5   |  24  |  2.46<H>    Ca    7.4<L>      03 Sep 2019 23:40  Phos  4.1       Mg     2.5         TPro  5.5<L>  /  Alb  2.4<L>  /  TBili  0.4  /  DBili  x   /  AST  10  /  ALT  <5<L>  /  AlkPhos  53      Creatinine Trend: 2.46<--, 2.40<--, 2.53<--, 2.52<--, 2.40<--, 2.80<--  PT/INR - ( 03 Sep 2019 23:40 )   PT: 16.1 sec;   INR: 1.40 ratio         PTT - ( 03 Sep 2019 23:40 )  PTT:106.6 sec    Arterial Blood Gas:   @ 23:37  7.51/35/106/28/97/4.4  ABG lactate: --        MICROBIOLOGY:   CULTURE RESULTS:                19 @ 17:55  Specimen Source:   Method Type:   Gram Stain:   Culture Results: Culture Results:   No growth at 5 days. (19 @ 17:55)  Culture Results:   No growth at 5 days. (19 @ 17:55)  Culture Results:   No growth (19 @ 17:52)    Bacteria: Bacteria: Few (19 @ 13:40)        RADIOLOGY:  CTA abdomen/pelvis : preliminary read:  INTERPRETATION:  linear region of hyperdensity in the rectum without   pooling in the venous phase likely represents hemorrhoid, no other   regions of irregular enhancement    CT abdomen/pelvis noncontrast :  IMPRESSION:   Stercoral colitis.  Mild to moderate bilateral hydronephrosis. No renal stones visualized.    CT Chest :  IMPRESSION:   Pulmonary edema with small bilateral pleural effusions.    US Kidneys and Bladder :  IMPRESSION: Limited study.  Atrophic right kidney with increased renal cortical echogenicity.   Moderate right hydronephrosis, with dilatation of the proximal right   ureter.  Mild left hydronephrosis.  Bilateral pleural effusions.    CT pelvis non contrast :  IMPRESSION:   No evidence of a buttocks abscess or subcutaneous gas.  Mild stercoral colitis.      EK/28  Ventricular Rate 59 BPM  Atrial Rate 64 BPM  QRS Duration 176 ms  Q-T Interval 488 ms  QTC Calculation(Bezet) 483 ms  R Axis -56 degrees  T Axis 103 degrees  Diagnosis Line ATRIAL FIBRILLATION WITH SLOW VENTRICULAR RESPONSE  LEFT BUNDLE BRANCH BLOCK  ABNORMAL ECG        Assessment:  89 yo male with PMHx of HTN, HLD, CAD/ICM s/p CABG/PCI, hypothyroidism, Parkinson's disease with progressive functional impairment, chronic Afib not an AC candidate, h/o DVT s/p IVC filter not on full AC, CKD stage 3 (baseline 1.4-1.6), h/o GI bleed, multiple falls (recent admission 2019), admitted to [facility] medical floors  with hematuria s/p lara insertion, found to have STEVEN on CKD i/s/o obstructive nephropathy, groin erythema/edema concerning for cellulitis s/p IV abx, course c/b pulmonary edema s/p diuresis. Transferred to MICU s/p RRT pm of 9/3 for management of brisk lower GI bleed associated hypotension requiring 2U PRBC and 1U Plt.     Plan:    #Neuro:  - AAOx3, hard of hearing  - no acute issues  - continue neuro checks q4   - continue Sinemet and Comtan for Parkinson's disease    #Pulm:  as per documentation h/o Asp PNA during stay  - stable oxygenation on room air  - continue to monitor SpO2 and provider supplemental oxygen to maintain SpO2 >90%  ABG with respiratory alkalosis during RRT likely 2/2 hyperventilation i/s/o acute bleed   - ABG PRN  - CXR  CT chest  with pulmonary edema and BL pleural effusions, s/p Diuretic therapy  - holding i/s/o hypotension associated with GI bleed  respiratory muscle weakness i/s/o Parkinson's disease  - continue incentive spirometry and PT as able  - aspiration precautions given h/o dysphagia     #CV:  h/o HTN, HLD, CAD/ICM s/p CABG/PCI, chronic Afib  - holding ASA i/s/o acute GI bleed  - conitnue Atorvastatin 80 mg PO qd  - continue Renexa  - f/u cardiology reccs  hypotension 2/2 acute blood loss anemia  - holding Diuretics 2/2 hypotension  - holding home Imdur   - SBP improved 2/2 PRBC infusions   - continue to monitor and maintain MAP >65      #GI/: Negative CTA abdomen, Negative Lavage for upper GIB.   acute blood loss anemia 2/2 lower GI bleed  - prelim CTA abdomen/pelvis unable to locate source, revealing hyperdensity in rectum without pooling likely hemorrhoid and stercoral colitis  - GI consult - plan for colonoscopy in AM (tentatively at 8 am) to aid in localization and pending intervention, appreciate reccs  - NGT placement for normal saline lavage to r/o upper GI source of bleed and subsequently for administration of Golytely bowel prep   - monitor bowel movements   - continue Protonix infusion  - IR consult  - Surgery consult   - transfuse as needed of goal Hgb >7  - CBC q4 hrs   - maintain NPO  STEVEN on CKD  2/2 obstructive nephropathy likely 2/2 stool burden  - sCr improving  - s/p diuretics, continue to monitor strict I's and O's  - f/u Urology reccs     #ID:  s/p IV Abx regimen for groin cellulitis   - ID following, recently de-escalated in light of ruling out cellulitis as etiology of groin erythema/edema  - remaining afebrile without leukocytosis, continue to monitor vital signs and WBC  - monitor off antibiotics for now    #FEN/ENDO/HEME:  aPTT elevated at 106 without obvious etiology, not on AC and h/o bleeding disorders now with acute lower GI bleed  - will investigate for factor deficiency and send Factor VIII Assay and mixing study  - will order 1 U FFP and f/u aPTT/PT/INR  acute blood loss anemia 2/2 GI bleed  - CBC q4 hrs and transfuse as necessary to maintain Hgb >7    #Skin  with Keratic skin lesion on anterior scalp  - Dermatology consult in AM      #DVT PPX with SCDs    ------Attending note:  Aggree with above note, patient seen and examined with the NP at [facility], I have edited the above note where appropriate.  89 yo man with hx as above presenting to MICU with rapid large volume symptomatic LGIB. CTA negative unable to localize, will Need colonoscopy,  Will need prep via NGT.    - continue to replete blood FFP with next unit.  Pt appears to have aquired coagulopathy.  Consumptive from bleeding vs acquired factor deficiency.   - FFP now pending 1:1 study and factori 8 assay.   - appreaciate surgery input, unfortunatly CTA was negative at this time.   Critically ill and requiring MICU care  >35min critical care time spent. CHIEF COMPLAINT:    HPI:  88 yo male with PMHx of HTN, HLD, CAD/ICM s/p CABG/PCI, hypothyroidism, Parkinson's disease with progressive functional impairment, chronic Afib not an AC candidate, h/o DVT s/p IVC filter not on full AC, CKD stage 3 (baseline 1.4-1.6), h/o GI bleed, multiple falls with recent admission 2019 s/p mechanical fall found to have acute on chronic right proximal humeral fracture not an operative candidate and d/c'd to [facility]. Pt presents to [facility] ED  for gross hematuria and scrotal erythema and edema s/p lara placement for urinary retention. As per documentation, Pt admitted to medicine floors for further management of acute on chronic kidney injury and urethral traumatic injury vs cystitis vs groin cellulitis. Pt was started on antibiotics empirically for groin cellulitis, urology consulted, CT pelvis revealing no evidence of buttocks abscess or subcutaneous gas and mild stercoral colitis and US Kidney and bladder with atrophic right kidney and BL kidney hydronephrosis R>L and dilatation of proximal R ureter consistent with obstructive nephropathy. Hospital course c/b CT chest revealing BL pleural effusions and pulmonary edema and pt was started on diuretics. Pt with improvement in sCr and hematuria throughout stay. Antibiotics recently de-escalated to PO doxy/augmentin as pt remained afebrile without leukocytosis with skin changes likely suggestive of moisture associated dermatitis > cellulitis.     Course further c/b by RRT called PM of 9/3 for bloody bowel movement for which pt received 1L NS IVF, remaining hemodynamically stable at the time with transfer to the telemetry monitoring unit. A second RRT was then called for large amount of bright red blood with clots per rectum with hypotension to SBP of 70s associated with complaints of lightheadedness. Another 1L of IVF was administered along with Protonix IVP and 1U PRBC and 1U Plt ordered as pt on ASA awaiting CBC results. House GI called recommending to prep for colonscopy in AM, MICU was consulted and CTA abdomen/pelvis performed with preliminary report revealing linear region of hyperdensity in the rectum without pooling in the venous phase likely representing hemorrhoid with no other regions of irregular enhancement. Pt's hemoglobin returning at 6.3 from 7.8, another unit of PRBC was ordered along with DDAVP 20mcg. Pt transferred to MICU for further management of lower GI bleed.     PAST MEDICAL & SURGICAL HISTORY:  Unilateral inguinal hernia, with gangrene, not specified as recurrent  Upper GI bleed: MICU admission , EGD w/ gastric ulcer/visible vessel which was cauterized  Aspiration pneumonia  Clostridium difficile colitis  Pneumonia  MI, old: age 55  UTI (urinary tract infection)  BPH (benign prostatic hyperplasia)  Parkinsons Disease  CAD (Coronary Artery Disease): s/p CABG and stents  HTN (Hypertension)  Hyperlipidemia  Presence of IVC filter: right upper arm placed last year   History of prostate surgery: 2016  Varicose veins of legs: h/o Vein stripping  S/P appendectomy  Stented coronary artery: cardiac stent x 2 - placement between  &amp; possible   Hx of CAB (age 65)    FAMILY HISTORY:  Family history of coronary artery disease: Father,  of MI age 55  Family history of breast cancer (Aunt): Mother    SOCIAL HISTORY:  Smoking: [ X ] Never Smoked [ ] Former Smoker (__ packs x ___ years) [ ] Current Smoker  (__ packs x ___ years)  Substance Use: [ ] Never Used [ ] Used ____  EtOH Use:  Marital Status: [ ] Single [ ]  [ ]  [ ]   Sexual History:   Occupation: retired businessman  Recent Travel:  Country of Birth:  Advance Directives: full code    Allergies    Cipro (Rash)    Intolerances      HOME MEDICATIONS:    REVIEW OF SYSTEMS:  Constitutional: [ ] fevers [ ] chills [ ] weight loss [ ] weight gain  HEENT: [ ] dry eyes [ ] eye irritation [ ] postnasal drip [ ] nasal congestion  CV: [ ] chest pain [ ] orthopnea [ ] palpitations [ ] murmur  Resp: [ ] cough [ ] shortness of breath [ ] dyspnea [ ] wheezing [ ] sputum [ ] hemoptysis  GI: [ ] nausea [ ] vomiting [ ] diarrhea [ X] BRBPR [ ] constipation [ X ] abd pain "some spots" [ ] dysphagia   : [ ] dysuria [ ] nocturia [ ] hematuria [ ] increased urinary frequency  Musculoskeletal: [ ] back pain [ ] myalgias [ ] arthralgias [ ] fracture  Skin: [ ] rash [ ] itch  Neurological: [ ] headache [ ] dizziness [ ] syncope [ ] weakness [ ] numbness  Psychiatric: [ ] anxiety [ ] depression  Endocrine: [ ] diabetes [X ] thyroid problem  Hematologic/Lymphatic: [ X ] anemia [ X ] bleeding problem  Allergic/Immunologic: [ ] itchy eyes [ ] nasal discharge [ ] hives [ ] angioedema  [ ] All other systems negative  [ ] Unable to assess ROS because ________    OBJECTIVE:  ICU Vital Signs Last 24 Hrs  T(C): 36.4 (04 Sep 2019 02:03), Max: 36.7 (03 Sep 2019 04:57)  T(F): 97.6 (04 Sep 2019 02:03), Max: 98.1 (03 Sep 2019 21:06)  HR: 79 (04 Sep 2019 02:03) (57 - 93)  BP: 101/59 (04 Sep 2019 02:03) (94/59 - 143/71)  BP(mean): 76 (04 Sep 2019 02:03) (76 - 76)  ABP: --  ABP(mean): --  RR: 14 (04 Sep 2019 02:03) (14 - 18)  SpO2: 95% (04 Sep 2019 02:03) (95% - 98%)         @ 07:03 @ 07:00  --------------------------------------------------------  IN: 240 mL / OUT: 1150 mL / NET: -910 mL     @ 07:01  -  0904 @ 02:43  --------------------------------------------------------  IN: 610 mL / OUT: 350 mL / NET: 260 mL      CAPILLARY BLOOD GLUCOSE      POCT Blood Glucose.: 143 mg/dL (03 Sep 2019 23:23)      GENERAL: NAD, well-groomed, well-developed  HEAD:  Atraumatic, Normocephalic, keratotic lesion on anterior scalp  EYES: EOMI, PERRLA, conjunctiva and sclera clear  ENMT: No oropharyngeal exudates, erythema or lesions,  Moist mucous membranes, hard of hearing  NECK: Supple, no cervical lymphadenopathy, no JVD  NERVOUS SYSTEM:  Alert & Oriented X3, CN II-XII intact, 4/5 BUE and 3/5 BLE motor strength, full sensation to light touch   CHEST/LUNG: Clear to auscultation bilaterally; No rales, rhonchi, wheezing, or rubs  HEART: Regular rate and rhythm; S1/S2 No murmurs, rubs, or gallops  ABDOMEN: Soft, Nontender, Nondistended; Bowel sounds present, Bladder non distended, non palpable  Gastic Lavage with 60cc NS revealed normal gastric contents (food and bile) no blood.   EXTREMITIES: Pulses palpable radial pulses 2+ bilat, DP/PT 1+/1+ bilat, without clubbing,cyanosis. Digits warm to touch with good cap refill < 3 secs, R arm in sling   Skin: warm, dry, intact, normal color, no rash or abnormal lesions, without palpable nodes    LINES: 2 20g PIVs and 1 arrow extended dwell IV in [facility]    HOSPITAL MEDICATIONS:  MEDICATIONS  (STANDING):  atorvastatin 80 milliGRAM(s) Oral at bedtime  carbidopa/levodopa CR 50/200 1 Tablet(s) Oral <User Schedule>  chlorhexidine 4% Liquid 1 Application(s) Topical <User Schedule>  entacapone 200 milliGRAM(s) Oral <User Schedule>  fluticasone propionate 50 MICROgram(s)/spray Nasal Spray 1 Spray(s) Both Nostrils two times a day  levothyroxine Injectable 25 MICROGram(s) IV Push at bedtime  pantoprazole Infusion 8 mG/Hr (10 mL/Hr) IV Continuous <Continuous>  polyethylene glycol/electrolyte Solution. 4000 milliLiter(s) Oral once  ranolazine 1000 milliGRAM(s) Oral two times a day  tamsulosin 0.4 milliGRAM(s) Oral daily    MEDICATIONS  (PRN):      LABS:                        6.3    7.9   )-----------( 143      ( 03 Sep 2019 23:40 )             19.7     Hgb Trend: 6.3<--, 7.8<--, 8.1<--, 8.2<--, 7.8<--      142  |  106  |  65<H>  ----------------------------<  133<H>  3.5   |  24  |  2.46<H>    Ca    7.4<L>      03 Sep 2019 23:40  Phos  4.1       Mg     2.5         TPro  5.5<L>  /  Alb  2.4<L>  /  TBili  0.4  /  DBili  x   /  AST  10  /  ALT  <5<L>  /  AlkPhos  53      Creatinine Trend: 2.46<--, 2.40<--, 2.53<--, 2.52<--, 2.40<--, 2.80<--  PT/INR - ( 03 Sep 2019 23:40 )   PT: 16.1 sec;   INR: 1.40 ratio         PTT - ( 03 Sep 2019 23:40 )  PTT:106.6 sec    Arterial Blood Gas:   @ 23:37  7.51/35/106/28/97/4.4  ABG lactate: --        MICROBIOLOGY:   CULTURE RESULTS:                19 @ 17:55  Specimen Source:   Method Type:   Gram Stain:   Culture Results: Culture Results:   No growth at 5 days. (19 @ 17:55)  Culture Results:   No growth at 5 days. (19 @ 17:55)  Culture Results:   No growth (19 @ 17:52)    Bacteria: Bacteria: Few (19 @ 13:40)        RADIOLOGY:  CTA abdomen/pelvis : preliminary read:  INTERPRETATION:  linear region of hyperdensity in the rectum without   pooling in the venous phase likely represents hemorrhoid, no other   regions of irregular enhancement    CT abdomen/pelvis noncontrast :  IMPRESSION:   Stercoral colitis.  Mild to moderate bilateral hydronephrosis. No renal stones visualized.    CT Chest :  IMPRESSION:   Pulmonary edema with small bilateral pleural effusions.    US Kidneys and Bladder :  IMPRESSION: Limited study.  Atrophic right kidney with increased renal cortical echogenicity.   Moderate right hydronephrosis, with dilatation of the proximal right   ureter.  Mild left hydronephrosis.  Bilateral pleural effusions.    CT pelvis non contrast :  IMPRESSION:   No evidence of a buttocks abscess or subcutaneous gas.  Mild stercoral colitis.      EK/28  Ventricular Rate 59 BPM  Atrial Rate 64 BPM  QRS Duration 176 ms  Q-T Interval 488 ms  QTC Calculation(Bezet) 483 ms  R Axis -56 degrees  T Axis 103 degrees  Diagnosis Line ATRIAL FIBRILLATION WITH SLOW VENTRICULAR RESPONSE  LEFT BUNDLE BRANCH BLOCK  ABNORMAL ECG        Assessment:  88 yo male with PMHx of HTN, HLD, CAD/ICM s/p CABG/PCI, hypothyroidism, Parkinson's disease with progressive functional impairment, chronic Afib not an AC candidate, h/o DVT s/p IVC filter not on full AC, CKD stage 3 (baseline 1.4-1.6), h/o GI bleed, multiple falls (recent admission 2019), admitted to [facility] medical floors  with hematuria s/p lara insertion, found to have STEVEN on CKD i/s/o obstructive nephropathy, groin erythema/edema concerning for cellulitis s/p IV abx, course c/b pulmonary edema s/p diuresis. Transferred to MICU s/p RRT pm of 9/3 for management of brisk lower GI bleed associated hypotension requiring 2U PRBC and 1U Plt.     Plan:    #Neuro:  - AAOx3, hard of hearing  - no acute issues  - continue neuro checks q4   - continue Sinemet and Comtan for Parkinson's disease    #Pulm:  as per documentation h/o Asp PNA during stay  - stable oxygenation on room air  - continue to monitor SpO2 and provider supplemental oxygen to maintain SpO2 >90%  ABG with respiratory alkalosis during RRT likely 2/2 hyperventilation i/s/o acute bleed   - ABG PRN  - CXR  CT chest  with pulmonary edema and BL pleural effusions, s/p Diuretic therapy  - holding i/s/o hypotension associated with GI bleed  respiratory muscle weakness i/s/o Parkinson's disease  - continue incentive spirometry and PT as able  - aspiration precautions given h/o dysphagia     #CV:  h/o HTN, HLD, CAD/ICM s/p CABG/PCI, chronic Afib  - holding ASA i/s/o acute GI bleed  - conitnue Atorvastatin 80 mg PO qd  - continue Renexa  - f/u cardiology reccs  hypotension 2/2 acute blood loss anemia  - holding Diuretics 2/2 hypotension  - holding home Imdur   - SBP improved 2/2 PRBC infusions   - continue to monitor and maintain MAP >65      #GI/: Negative CTA abdomen, Negative Lavage for upper GIB.   acute blood loss anemia 2/2 lower GI bleed  - prelim CTA abdomen/pelvis unable to locate source, revealing hyperdensity in rectum without pooling likely hemorrhoid and stercoral colitis  - GI consult - plan for colonoscopy in AM (tentatively at 8 am) to aid in localization and pending intervention, appreciate reccs  - NGT placement for normal saline lavage to r/o upper GI source of bleed and subsequently for administration of Golytely bowel prep   - monitor bowel movements   - continue Protonix infusion  - IR consult  - Surgery consult   - transfuse as needed of goal Hgb >8 given cardiac history, f/u post transfusion CBC  - CBC q4 hrs   - maintain NPO  STEVEN on CKD  2/2 obstructive nephropathy likely 2/2 stool burden  - sCr improving  - s/p diuretics, continue to monitor strict I's and O's  - f/u Urology reccs     #ID:  s/p IV Abx regimen for groin cellulitis   - ID following, recently de-escalated in light of ruling out cellulitis as etiology of groin erythema/edema  - remaining afebrile without leukocytosis, continue to monitor vital signs and WBC  - monitor off antibiotics for now    #FEN/ENDO/HEME:  aPTT elevated at 106 without obvious etiology, not on AC and h/o bleeding disorders now with acute lower GI bleed  - will investigate for factor deficiency and send Factor VIII Assay and mixing study  - will order 1 U FFP and f/u aPTT/PT/INR  acute blood loss anemia 2/2 GI bleed  - CBC q4 hrs and transfuse as necessary to maintain Hgb >8 given cardiac history, f/u post transfusion cbc    #Skin  with Keratic skin lesion on anterior scalp  - Dermatology consult in AM      #DVT PPX with SCDs    ------Attending note:  Aggree with above note, patient seen and examined with the NP at [facility], I have edited the above note where appropriate.  88 yo man with hx as above presenting to MICU with rapid large volume symptomatic LGIB. CTA negative unable to localize, will Need colonoscopy,  Will need prep via NGT.    - continue to replete blood FFP with next unit.  Pt appears to have aquired coagulopathy.  Consumptive from bleeding vs acquired factor deficiency.   - FFP now pending 1:1 study and factori 8 assay.   - appreaciate surgery input, unfortunatly CTA was negative at this time.   Critically ill and requiring MICU care  >35min critical care time spent.

## 2019-09-04 NOTE — PROGRESS NOTE ADULT - ASSESSMENT
89 yo male with CAD s/p CABG, Afib, Parkinsons disease, found to have BRBPR overnight. Now admitted to the ICU and awaiting Colonoscopy

## 2019-09-04 NOTE — PROGRESS NOTE ADULT - SUBJECTIVE AND OBJECTIVE BOX
Infectious Diseases progress note:    Subjective: Events noted.  s/p RRT on 9/3 for BRBPR and hypotension.  Pt transferred to MICU for managment of lower GIB.  s/ CTA a/p.  s/p NGT placement.  Surgery and IR following.  Pt planned for colonoscopy.    Abx course completed for groin rash.         ROS:  CONSTITUTIONAL:  No fever, chills, rigors  CARDIOVASCULAR:  No chest pain or palpitations  RESPIRATORY:   No SOB, cough, dyspnea on exertion.  No wheezing  GASTROINTESTINAL:  No abd pain, N/V, diarrhea/constipation  EXTREMITIES:  No swelling or joint pain  GENITOURINARY:  No burning on urination, increased frequency or urgency.  No flank pain  NEUROLOGIC:  No HA, visual disturbances  SKIN: No rashes    Allergies    Cipro (Rash)    Intolerances        ANTIBIOTICS/RELEVANT:  antimicrobials    immunologic:    OTHER:  atorvastatin 80 milliGRAM(s) Oral at bedtime  carbidopa/levodopa CR 50/200 1 Tablet(s) Oral <User Schedule>  chlorhexidine 4% Liquid 1 Application(s) Topical <User Schedule>  entacapone 200 milliGRAM(s) Oral <User Schedule>  fluticasone propionate 50 MICROgram(s)/spray Nasal Spray 1 Spray(s) Both Nostrils two times a day  levothyroxine Injectable 25 MICROGram(s) IV Push at bedtime  pantoprazole Infusion 8 mG/Hr IV Continuous <Continuous>  polyethylene glycol 3350 17 Gram(s) Oral two times a day  ranolazine 1000 milliGRAM(s) Oral two times a day  tamsulosin 0.4 milliGRAM(s) Oral daily      Objective:  Vital Signs Last 24 Hrs  T(C): 36.7 (04 Sep 2019 08:30), Max: 36.7 (03 Sep 2019 21:06)  T(F): 98.1 (04 Sep 2019 08:30), Max: 98.1 (03 Sep 2019 21:06)  HR: 61 (04 Sep 2019 12:00) (57 - 93)  BP: 116/56 (04 Sep 2019 12:00) (79/50 - 151/63)  BP(mean): 79 (04 Sep 2019 12:00) (60 - 100)  RR: 12 (04 Sep 2019 12:00) (12 - 19)  SpO2: 100% (04 Sep 2019 12:00) (95% - 100%)    PHYSICAL EXAM:  Constitutional:NAD  Eyes:FLORY, EOMI  Ear/Nose/Throat: no thrush, mucositis.  Moist mucous membranes	  Neck:no JVD, no lymphadenopathy, supple  Respiratory: CTA tran  Cardiovascular: S1S2 RRR, no murmurs  Gastrointestinal:soft, nontender,  nondistended (+) BS, ng tube  Extremities:no e/e/c  Skin:  groin and perineal rash improved.          LABS:                        7.5    8.0   )-----------( 145      ( 04 Sep 2019 08:36 )             23.1     09-03    142  |  106  |  65<H>  ----------------------------<  133<H>  3.5   |  24  |  2.46<H>    Ca    7.4<L>      03 Sep 2019 23:40  Phos  4.1     09-03  Mg     2.5     09-03    TPro  5.5<L>  /  Alb  2.4<L>  /  TBili  0.4  /  DBili  x   /  AST  10  /  ALT  <5<L>  /  AlkPhos  53  09-03    PT/INR - ( 04 Sep 2019 08:56 )   PT: 15.6 sec;   INR: 1.36 ratio         PTT - ( 04 Sep 2019 08:36 )  PTT:26.2 sec                        MICROBIOLOGY:    Culture - Blood (08.28.19 @ 17:55)    Specimen Source: .Blood    Culture Results:   No growth at 5 days.    Culture - Blood (08.28.19 @ 17:55)    Specimen Source: .Blood    Culture Results:   No growth at 5 days.    Culture - Urine (08.28.19 @ 17:52)    Specimen Source: .Urine    Culture Results:   No growth          RADIOLOGY & ADDITIONAL STUDIES:    < from: Xray Chest 1 View- PORTABLE-Urgent (09.04.19 @ 03:29) >    Impression:    The heart is enlarged.The lungs are clear. NG tube is in the stomach.   Status post sternotomy. No pneumothorax.    < end of copied text >      < from: CT Abdomen and Pelvis w/ IV Cont (09.04.19 @ 00:35) >  IMPRESSION:    Study limited by artifact and delayed arterial phase.    Interval disimpaction of the rectal vault with marked rectal wall edema,   consistent with proctitis, with linear enhancement in craniocaudal   orientation at the posterior central rectal wall with extension to the   anus, not significantly changed from arterial to venous phase imaging. In   the setting of recent stercoral colitis, findings may be on the basis of   a bleeding ulcer/rectal fissure. Differential also includes rectal   varix/hemorrhoid with or without active bleeding. Recommend colonoscopy.    Chronic right renal atrophy with unchanged moderate hydroureteronephrosis   to the level of the UVJ since 2016. Additionally, a 1.5 cm higher than   fluid attenuation indeterminate right renal lower pole exophytic lesion.   Recommend further evaluation with nonurgent ultrasound or MR.    < end of copied text >

## 2019-09-04 NOTE — PROGRESS NOTE ADULT - ASSESSMENT
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 - mild left kidney hydronephrosis -> kidney function improving    urinary retention - lara catheter placement now with hematuria without infection and penile swelling/cellulitis    CAD/MI/CABG/PCI - ischemic cardiomyopathy    GIB- currently stable clinically    PLAN/RECOMMENDATIONS:  MICU monitoring with close f/u of clinical status,  hemodynamics, H/H  GI follow up  observe off pulmonary medications- can use prn robitussin if necessary  diurese as tolerated by renal function and hemodynamics  continue dyspnagia 2 diet-intermittent microaspiration certainly possible.  no pneumonia at present  cardiac meds:   neuro meds:   incentive spirometry  PT/activity as able  Heidi Yip MD, [facility]  [phone]  Pulmonary Medicine

## 2019-09-04 NOTE — CONSULT NOTE ADULT - ASSESSMENT
88 yo man with CKD3 on ASA presenting with BRBPR with hgb drop (6.3). Patient transiently hypotensive but responsive to volume resuscitation s/p 2u prbc, 1u plts, 1L NS and 20mg ddAVP. Unable to localize hemorrhage on CTA. Patient currently hemodynamically stable without ongoing hemorrhage.     - Rule out UGIB with NGT lavage   - GI consultation for colonoscopy   - Agree with holding ASA  - Maintain 2 large bore IVs at all time (communicated to RN)  - Transfuse PRN for goal hgb > 8 given cardiac history; f/u post transfusion CBC  - No role for surgical intervention at this time    Patient discussed with Surgical Attending, Dr. AMANDA Garsia.   Above plans communicated to MICU team.     RIOS Santamaria MD [id number]  [id number]

## 2019-09-05 LAB
ALBUMIN SERPL ELPH-MCNC: 2.7 G/DL — LOW (ref 3.3–5)
ALP SERPL-CCNC: 43 U/L — SIGNIFICANT CHANGE UP (ref 40–120)
ALT FLD-CCNC: <5 U/L — LOW (ref 10–45)
ANION GAP SERPL CALC-SCNC: 12 MMOL/L — SIGNIFICANT CHANGE UP (ref 5–17)
ANION GAP SERPL CALC-SCNC: 14 MMOL/L — SIGNIFICANT CHANGE UP (ref 5–17)
AST SERPL-CCNC: 16 U/L — SIGNIFICANT CHANGE UP (ref 10–40)
BILIRUB SERPL-MCNC: 0.5 MG/DL — SIGNIFICANT CHANGE UP (ref 0.2–1.2)
BUN SERPL-MCNC: 47 MG/DL — HIGH (ref 7–23)
BUN SERPL-MCNC: 50 MG/DL — HIGH (ref 7–23)
CALCIUM SERPL-MCNC: 7.6 MG/DL — LOW (ref 8.4–10.5)
CALCIUM SERPL-MCNC: 7.9 MG/DL — LOW (ref 8.4–10.5)
CHLORIDE SERPL-SCNC: 108 MMOL/L — SIGNIFICANT CHANGE UP (ref 96–108)
CHLORIDE SERPL-SCNC: 109 MMOL/L — HIGH (ref 96–108)
CO2 SERPL-SCNC: 24 MMOL/L — SIGNIFICANT CHANGE UP (ref 22–31)
CO2 SERPL-SCNC: 25 MMOL/L — SIGNIFICANT CHANGE UP (ref 22–31)
CREAT SERPL-MCNC: 2.15 MG/DL — HIGH (ref 0.5–1.3)
CREAT SERPL-MCNC: 2.2 MG/DL — HIGH (ref 0.5–1.3)
GLUCOSE SERPL-MCNC: 101 MG/DL — HIGH (ref 70–99)
GLUCOSE SERPL-MCNC: 130 MG/DL — HIGH (ref 70–99)
HCT VFR BLD CALC: 20 % — CRITICAL LOW (ref 39–50)
HCT VFR BLD CALC: 24.2 % — LOW (ref 39–50)
HGB BLD-MCNC: 6.5 G/DL — CRITICAL LOW (ref 13–17)
HGB BLD-MCNC: 7.9 G/DL — LOW (ref 13–17)
MAGNESIUM SERPL-MCNC: 2.5 MG/DL — SIGNIFICANT CHANGE UP (ref 1.6–2.6)
MCHC RBC-ENTMCNC: 29.7 PG — SIGNIFICANT CHANGE UP (ref 27–34)
MCHC RBC-ENTMCNC: 29.9 PG — SIGNIFICANT CHANGE UP (ref 27–34)
MCHC RBC-ENTMCNC: 32.5 GM/DL — SIGNIFICANT CHANGE UP (ref 32–36)
MCHC RBC-ENTMCNC: 32.6 GM/DL — SIGNIFICANT CHANGE UP (ref 32–36)
MCV RBC AUTO: 91.3 FL — SIGNIFICANT CHANGE UP (ref 80–100)
MCV RBC AUTO: 91.7 FL — SIGNIFICANT CHANGE UP (ref 80–100)
PHOSPHATE SERPL-MCNC: 3.7 MG/DL — SIGNIFICANT CHANGE UP (ref 2.5–4.5)
PLATELET # BLD AUTO: 133 K/UL — LOW (ref 150–400)
PLATELET # BLD AUTO: 140 K/UL — LOW (ref 150–400)
POTASSIUM SERPL-MCNC: 2.8 MMOL/L — CRITICAL LOW (ref 3.5–5.3)
POTASSIUM SERPL-MCNC: 3.3 MMOL/L — LOW (ref 3.5–5.3)
POTASSIUM SERPL-SCNC: 2.8 MMOL/L — CRITICAL LOW (ref 3.5–5.3)
POTASSIUM SERPL-SCNC: 3.3 MMOL/L — LOW (ref 3.5–5.3)
PROT SERPL-MCNC: 5.5 G/DL — LOW (ref 6–8.3)
RBC # BLD: 2.19 M/UL — LOW (ref 4.2–5.8)
RBC # BLD: 2.64 M/UL — LOW (ref 4.2–5.8)
RBC # FLD: 14.7 % — HIGH (ref 10.3–14.5)
RBC # FLD: 15 % — HIGH (ref 10.3–14.5)
SODIUM SERPL-SCNC: 145 MMOL/L — SIGNIFICANT CHANGE UP (ref 135–145)
SODIUM SERPL-SCNC: 147 MMOL/L — HIGH (ref 135–145)
WBC # BLD: 6.32 K/UL — SIGNIFICANT CHANGE UP (ref 3.8–10.5)
WBC # BLD: 8.15 K/UL — SIGNIFICANT CHANGE UP (ref 3.8–10.5)
WBC # FLD AUTO: 6.32 K/UL — SIGNIFICANT CHANGE UP (ref 3.8–10.5)
WBC # FLD AUTO: 8.15 K/UL — SIGNIFICANT CHANGE UP (ref 3.8–10.5)

## 2019-09-05 PROCEDURE — 99232 SBSQ HOSP IP/OBS MODERATE 35: CPT

## 2019-09-05 RX ORDER — POTASSIUM CHLORIDE 20 MEQ
10 PACKET (EA) ORAL ONCE
Refills: 0 | Status: COMPLETED | OUTPATIENT
Start: 2019-09-05 | End: 2019-09-05

## 2019-09-05 RX ORDER — POTASSIUM CHLORIDE 20 MEQ
10 PACKET (EA) ORAL
Refills: 0 | Status: COMPLETED | OUTPATIENT
Start: 2019-09-05 | End: 2019-09-05

## 2019-09-05 RX ORDER — POTASSIUM CHLORIDE 20 MEQ
20 PACKET (EA) ORAL ONCE
Refills: 0 | Status: COMPLETED | OUTPATIENT
Start: 2019-09-05 | End: 2019-09-05

## 2019-09-05 RX ORDER — POTASSIUM CHLORIDE 20 MEQ
40 PACKET (EA) ORAL ONCE
Refills: 0 | Status: COMPLETED | OUTPATIENT
Start: 2019-09-05 | End: 2019-09-05

## 2019-09-05 RX ADMIN — CARBIDOPA AND LEVODOPA 1 TABLET(S): 25; 100 TABLET ORAL at 16:58

## 2019-09-05 RX ADMIN — CARBIDOPA AND LEVODOPA 1 TABLET(S): 25; 100 TABLET ORAL at 13:37

## 2019-09-05 RX ADMIN — TAMSULOSIN HYDROCHLORIDE 0.4 MILLIGRAM(S): 0.4 CAPSULE ORAL at 11:41

## 2019-09-05 RX ADMIN — Medication 1 SPRAY(S): at 05:03

## 2019-09-05 RX ADMIN — ENTACAPONE 200 MILLIGRAM(S): 200 TABLET, FILM COATED ORAL at 16:57

## 2019-09-05 RX ADMIN — CARBIDOPA AND LEVODOPA 1 TABLET(S): 25; 100 TABLET ORAL at 09:48

## 2019-09-05 RX ADMIN — POLYETHYLENE GLYCOL 3350 17 GRAM(S): 17 POWDER, FOR SOLUTION ORAL at 09:24

## 2019-09-05 RX ADMIN — ENTACAPONE 200 MILLIGRAM(S): 200 TABLET, FILM COATED ORAL at 13:37

## 2019-09-05 RX ADMIN — RANOLAZINE 1000 MILLIGRAM(S): 500 TABLET, FILM COATED, EXTENDED RELEASE ORAL at 11:40

## 2019-09-05 RX ADMIN — Medication 1 SPRAY(S): at 17:00

## 2019-09-05 RX ADMIN — Medication 100 MILLIEQUIVALENT(S): at 11:36

## 2019-09-05 RX ADMIN — Medication 100 MILLIEQUIVALENT(S): at 09:28

## 2019-09-05 RX ADMIN — ENTACAPONE 200 MILLIGRAM(S): 200 TABLET, FILM COATED ORAL at 22:35

## 2019-09-05 RX ADMIN — RANOLAZINE 1000 MILLIGRAM(S): 500 TABLET, FILM COATED, EXTENDED RELEASE ORAL at 22:43

## 2019-09-05 RX ADMIN — POLYETHYLENE GLYCOL 3350 17 GRAM(S): 17 POWDER, FOR SOLUTION ORAL at 22:37

## 2019-09-05 RX ADMIN — ATORVASTATIN CALCIUM 80 MILLIGRAM(S): 80 TABLET, FILM COATED ORAL at 22:36

## 2019-09-05 RX ADMIN — PANTOPRAZOLE SODIUM 10 MG/HR: 20 TABLET, DELAYED RELEASE ORAL at 05:04

## 2019-09-05 RX ADMIN — CHLORHEXIDINE GLUCONATE 1 APPLICATION(S): 213 SOLUTION TOPICAL at 06:17

## 2019-09-05 RX ADMIN — CARBIDOPA AND LEVODOPA 1 TABLET(S): 25; 100 TABLET ORAL at 22:36

## 2019-09-05 RX ADMIN — Medication 25 MICROGRAM(S): at 22:37

## 2019-09-05 RX ADMIN — Medication 100 MILLIEQUIVALENT(S): at 16:57

## 2019-09-05 RX ADMIN — Medication 20 MILLIEQUIVALENT(S): at 09:24

## 2019-09-05 RX ADMIN — ENTACAPONE 200 MILLIGRAM(S): 200 TABLET, FILM COATED ORAL at 09:24

## 2019-09-05 NOTE — PROGRESS NOTE ADULT - SUBJECTIVE AND OBJECTIVE BOX
Trauma/ACS Progress Note    Interval: No acute overnight events.    SUBJECTIVE: Patient seen and examined at the bedside. Feeling well this morning. Tolerating regular diet without pain, nausea or vomiting. LAst bloody bowel movement was yesterday prior to colonoscopy. Found to have rectal ulcers on colonoscopy.     VITALS  T(C): 36.6 (09-05-19 @ 09:14), Max: 36.9 (09-05-19 @ 01:25)  HR: 62 (09-05-19 @ 09:14) (62 - 79)  BP: 123/56 (09-05-19 @ 09:14) (119/63 - 129/84)  RR: 18 (09-05-19 @ 09:14) (18 - 18)  SpO2: 97% (09-05-19 @ 09:14) (96% - 100%)    Is/Os    09-04 @ 07:01  -  09-05 @ 07:00  --------------------------------------------------------  IN:    Oral Fluid: 1000 mL    pantoprazole Infusion: 40 mL  Total IN: 1040 mL    OUT:    Indwelling Catheter - Urethral: 1000 mL  Total OUT: 1000 mL    Total NET: 40 mL      09-05 @ 07:01  -  09-05 @ 12:31  --------------------------------------------------------  IN:    Oral Fluid: 240 mL  Total IN: 240 mL    OUT:    Indwelling Catheter - Urethral: 550 mL  Total OUT: 550 mL    Total NET: -310 mL    PHYSICAL EXAM:   General: NAD, Lying in bed comfortably, alert, oriented x3  Pulm: Non-labored breathing on RA  GI/Abd: Soft, NT/ND    MEDICATIONS (PRN):  LABS  CBC (09-05 @ 09:42)                              6.5<LL>                         6.32    )----------------(  140<L>     --    % Neutrophils, --    % Lymphocytes, ANC: --                                  20.0<LL>  CBC (09-04 @ 23:28)                              7.2<L>                         8.5     )----------------(  132<L>     --    % Neutrophils, --    % Lymphocytes, ANC: --                                  21.9<L>    BMP (09-05 @ 06:59)             147<H>  |  109<H>  |  50<H> 		Ca++ --      Ca 7.6<L>             ---------------------------------( 101<H>		Mg 2.5                2.8<LL>  |  24      |  2.20<H>			Ph 3.7       LFTs (09-05 @ 06:59)      TPro 5.5<L> / Alb 2.7<L> / TBili 0.5 / DBili -- / AST 16 / ALT <5<L> / AlkPhos 43    Coags (09-04 @ 08:56)  aPTT -- / INR 1.36<H> / PT 15.6<H>  Coags (09-04 @ 08:36)  aPTT 26.2<L> / INR 1.27<H> / PT 14.7<H>    IMAGING STUDIES  < from: Colonoscopy (09.04.19 @ 09:23) >                                                                                          Findings:       The visualized terminal ileum appeared normal.       Multiple small and large-mouthed diverticula were found in the sigmoid colon and in the     ascending colon.       Three sessile polyps were found in the ascending colon. The polyps were 8 to 12 mm in size.       Two rectal ulcers with adherent blood clots and underlying flat pigmented spot which were        removed with irrigation were present in the rectum. An ulcerated area of mucosa was also        noted in the anal canal. This is the likely source of the patient's bleeding. May represent        sterocoral colitis versus anorectal malignancy.                                                                  Impression:          - Diverticulosis in the sigmoid colon and in the ascending colon.                       - Three 8 to 12 mm polyps in the ascending colon.                       - Large rectal ulcers  Recommendation:      - [facility].                       - Aggressive bowel regimen - can start with miralax BID and titrate up as                        necessary                       - No rectal tube                       - Repeat colonoscopy within 6 months for polyp removal if patient is                        agreeable.                       - Will need repeat Flex Sig vs. Colorectal Anoscopy to assess ulcer healing     and fdor biopsy of ulcers if persisting    < end of copied text >  < from: Upper Endoscopy (09.04.19 @ 09:26) >                                                                                                Findings:       A 2 cm hiatus hernia was present.       The exam of the esophagus was otherwise normal.       The entire examined stomach was normal.       The duodenal bulb and 2nd part of the duodenum were normal.                                                                                                        Impression:          - Hiatal Hernia, otherwise normal EGD  Recommendation:      - Perform a colonoscopy today.    < end of copied text >

## 2019-09-05 NOTE — CHART NOTE - NSCHARTNOTEFT_GEN_A_CORE
informed by RN pt with potassium level 2.8, hemoglobin 6.5  Patient is a 89y old  Male who presents with a chief complaint of Sent in from [facility] for gross haematuria following Cortés placement.  Groin erythema, oedema (05 Sep 2019 09:59)      Vital Signs Last 24 Hrs  T(C): 36.6 (05 Sep 2019 09:14), Max: 36.9 (05 Sep 2019 01:25)  T(F): 97.8 (05 Sep 2019 09:14), Max: 98.4 (05 Sep 2019 01:25)  HR: 62 (05 Sep 2019 09:14) (61 - 79)  BP: 123/56 (05 Sep 2019 09:14) (115/61 - 129/84)  BP(mean): 79 (04 Sep 2019 12:00) (79 - 80)  RR: 18 (05 Sep 2019 09:14) (12 - 18)  SpO2: 97% (05 Sep 2019 09:14) (96% - 100%)                        6.5    6.32  )-----------( 140      ( 05 Sep 2019 09:42 )             20.0     09-05    147<H>  |  109<H>  |  50<H>  ----------------------------<  101<H>  2.8<LL>   |  24  |  2.20<H>    Ca    7.6<L>      05 Sep 2019 06:59  Phos  3.7     09-05  Mg     2.5     09-05    TPro  5.5<L>  /  Alb  2.7<L>  /  TBili  0.5  /  DBili  x   /  AST  16  /  ALT  <5<L>  /  AlkPhos  43  09-05    PT/INR - ( 04 Sep 2019 08:56 )   PT: 15.6 sec;   INR: 1.36 ratio         PTT - ( 04 Sep 2019 08:36 )  PTT:26.2 sec      CV: RRR, S1S2, no murmur  Abdominal: Soft, NT, ND   MSK: No edema, + peripheral pulses, FROM all 4 extremity    A/P informed by RN pt with abnormal lab result from AM lab;  potassium level 2.8, low hemoglobin 6.5 and low hematocrit 20.0  Patient is a 89y old  Male who presents with a chief complaint of Sent in from [facility] for gross haematuria following Lara placement.    pt with no c/o nausea/vomiting or diarrhea; no rectal bleeding reported; denies any CP/SOB      Vital Signs Last 24 Hrs  T(C): 36.6 (05 Sep 2019 09:14), Max: 36.9 (05 Sep 2019 01:25)  T(F): 97.8 (05 Sep 2019 09:14), Max: 98.4 (05 Sep 2019 01:25)  HR: 62 (05 Sep 2019 09:14) (61 - 79)  BP: 123/56 (05 Sep 2019 09:14) (115/61 - 129/84)  BP(mean): 79 (04 Sep 2019 12:00) (79 - 80)  RR: 18 (05 Sep 2019 09:14) (12 - 18)  SpO2: 97% (05 Sep 2019 09:14) (96% - 100%)                        6.5    6.32  )-----------( 140      ( 05 Sep 2019 09:42 )             20.0     09-05    147<H>  |  109<H>  |  50<H>  ----------------------------<  101<H>  2.8<LL>   |  24  |  2.20<H>    Ca    7.6<L>      05 Sep 2019 06:59  Phos  3.7     09-05  Mg     2.5     09-05    TPro  5.5<L>  /  Alb  2.7<L>  /  TBili  0.5  /  DBili  x   /  AST  16  /  ALT  <5<L>  /  AlkPhos  43  09-05    PT/INR - ( 04 Sep 2019 08:56 )   PT: 15.6 sec;   INR: 1.36 ratio         PTT - ( 04 Sep 2019 08:36 )  PTT:26.2 sec      CV: RRR, S1S2, no murmur  Abdominal: Soft, NT, ND ; bowel sounds present  MSK: right upper extremity in arm sling;   -lara cath-no hematuria    A/P    89 y/o M w/ hx of CAD s/p CABG s/p PCI, ICM, and Parkinson's disease with recent right humeral fracture, with admission for hematuria c/b aspiration pneumonia,  hematochezia;  # Acute blood loss anemia with hematochezia: Currently with no active bleeding,  Colonoscopy on 9/4/19 revealed multiple rectal ulcers and ulcerated mucosa in the anal canal - may be secondary to fecal impaction versus malignancy. had multiple PRBC to keep hb >7;  failed TOV; has lara cath for urinary retention;  f/u by cardiology/GI/pulm/renal;  pt with abnormal lab result, hypokalemia-seen by renal-potassium supplement as per renal; f/u BMP post potassium depletion  low hemoglobin/low hematocrit; per d/w Dr. Dias, 1 unit PRBC to keep hb >7 per GI; post transfusion PRBC  monitor for any bleeding  d/w pt plan of care  Lawanda Resendiz(NP)  [address] [place], [phone]

## 2019-09-05 NOTE — PROGRESS NOTE ADULT - SUBJECTIVE AND OBJECTIVE BOX
No pain, no shortness of breath      VITAL:  T(C): , Max: 36.9 (09-05-19 @ 01:25)  T(F): , Max: 98.4 (09-05-19 @ 01:25)  HR: 62 (09-05-19 @ 04:48)  BP: 119/74 (09-05-19 @ 04:48)  BP(mean): 79 (09-04-19 @ 12:00)  RR: 18 (09-05-19 @ 04:48)  SpO2: 99% (09-05-19 @ 04:48)      PHYSICAL EXAM:  Constitutional: NAD; mild psychomotor retardation  HEENT: DMM; (+)NGT  Neck: Supple, No JVD  Respiratory: CTA-b/l  Cardiovascular: irreg s1s2  Gastrointestinal: BS+, soft, NT/ND  Extremities: No peripheral edema b/l  : no lara  Neuro: (+)coarse tremor LUE      LABS:                        7.2    8.5   )-----------( 132      ( 04 Sep 2019 23:28 )             21.9     Na(147)/K(2.8)/Cl(109)/HCO3(24)/BUN(50)/Cr(2.20)Glu(101)/Ca(7.6)/Mg(2.5)/PO4(3.7)    09-05 @ 06:59  Na(142)/K(3.5)/Cl(106)/HCO3(24)/BUN(65)/Cr(2.46)Glu(133)/Ca(7.4)/Mg(2.5)/PO4(4.1)    09-03 @ 23:40  Na(140)/K(3.5)/Cl(102)/HCO3(24)/BUN(65)/Cr(2.40)Glu(128)/Ca(8.2)/Mg(--)/PO4(--)    09-03 @ 06:41      IMPRESSION: 89M w/ HTN, DVT-IVCF, Parkinson's disease, CAD-CABG, and CKD3, 8/28/19 a/w urinary retention/STEVEN  (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 At least in part due to past obstructive nephropathy, with resultant atrophy of his right kidney.    (2) - STEVEN from admission at least in part from obstructive uropathy. Failed trial of void within past 24hours - lara just reinserted with 1000cc residual    (3)Hypokalemia - indicated for repletion    (4)Anemia - GIB - diverticulosis and large rectal ulcers on colonoscopy. H/H appears to have stabilized      RECOMMEND:  (1)KCl 20meq po and [id number] IV  (2)Lara to gravity -  assistance as inpatient versus outpatient  (3)Dose new meds for GFR 20-30ml/min (present dosing acceptable)              Abdi Vieira MD  [facility]  [phone] No pain, no shortness of breath      VITAL:  T(C): , Max: 36.9 (09-05-19 @ 01:25)  T(F): , Max: 98.4 (09-05-19 @ 01:25)  HR: 62 (09-05-19 @ 04:48)  BP: 119/74 (09-05-19 @ 04:48)  BP(mean): 79 (09-04-19 @ 12:00)  RR: 18 (09-05-19 @ 04:48)  SpO2: 99% (09-05-19 @ 04:48)      PHYSICAL EXAM:  Constitutional: NAD; mild psychomotor retardation  HEENT: DMM  Neck: Supple, No JVD  Respiratory: CTA-b/l  Cardiovascular: irreg s1s2  Gastrointestinal: BS+, soft, NT/ND  Extremities: No peripheral edema b/l  : no lara  Neuro: (+)coarse tremor LUE      LABS:                        7.2    8.5   )-----------( 132      ( 04 Sep 2019 23:28 )             21.9     Na(147)/K(2.8)/Cl(109)/HCO3(24)/BUN(50)/Cr(2.20)Glu(101)/Ca(7.6)/Mg(2.5)/PO4(3.7)    09-05 @ 06:59  Na(142)/K(3.5)/Cl(106)/HCO3(24)/BUN(65)/Cr(2.46)Glu(133)/Ca(7.4)/Mg(2.5)/PO4(4.1)    09-03 @ 23:40  Na(140)/K(3.5)/Cl(102)/HCO3(24)/BUN(65)/Cr(2.40)Glu(128)/Ca(8.2)/Mg(--)/PO4(--)    09-03 @ 06:41      IMPRESSION: 89M w/ HTN, DVT-IVCF, Parkinson's disease, CAD-CABG, and CKD3, 8/28/19 a/w urinary retention/STEVEN  (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 At least in part due to past obstructive nephropathy, with resultant atrophy of his right kidney.    (2) - STEVEN from admission at least in part from obstructive uropathy. Failed trial of void within past 24hours - lara just reinserted with 1000cc residual    (3)Hypokalemia - indicated for repletion    (4)Anemia - GIB - diverticulosis and large rectal ulcers on colonoscopy. H/H appears to have stabilized      RECOMMEND:  (1)KCl 20meq po and [id number] IV  (2)Lara to gravity -  assistance as inpatient versus outpatient  (3)Dose new meds for GFR 20-30ml/min (present dosing acceptable)              Abdi Vieira MD  [facility]  [phone] No pain, no shortness of breath      VITAL:  T(C): , Max: 36.9 (09-05-19 @ 01:25)  T(F): , Max: 98.4 (09-05-19 @ 01:25)  HR: 62 (09-05-19 @ 04:48)  BP: 119/74 (09-05-19 @ 04:48)  BP(mean): 79 (09-04-19 @ 12:00)  RR: 18 (09-05-19 @ 04:48)  SpO2: 99% (09-05-19 @ 04:48)      PHYSICAL EXAM:  Constitutional: NAD; mild psychomotor retardation  HEENT: DMM  Neck: Supple, No JVD  Respiratory: CTA-b/l  Cardiovascular: irreg s1s2  Gastrointestinal: BS+, soft, NT/ND  Extremities: No peripheral edema b/l  : (+)lara  Neuro: (+)coarse tremor LUE      LABS:                        7.2    8.5   )-----------( 132      ( 04 Sep 2019 23:28 )             21.9     Na(147)/K(2.8)/Cl(109)/HCO3(24)/BUN(50)/Cr(2.20)Glu(101)/Ca(7.6)/Mg(2.5)/PO4(3.7)    09-05 @ 06:59  Na(142)/K(3.5)/Cl(106)/HCO3(24)/BUN(65)/Cr(2.46)Glu(133)/Ca(7.4)/Mg(2.5)/PO4(4.1)    09-03 @ 23:40  Na(140)/K(3.5)/Cl(102)/HCO3(24)/BUN(65)/Cr(2.40)Glu(128)/Ca(8.2)/Mg(--)/PO4(--)    09-03 @ 06:41      IMPRESSION: 89M w/ HTN, DVT-IVCF, Parkinson's disease, CAD-CABG, and CKD3, 8/28/19 a/w urinary retention/STEVEN  (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 At least in part due to past obstructive nephropathy, with resultant atrophy of his right kidney.    (2) - STEVEN from admission at least in part from obstructive uropathy. Failed trial of void within past 24hours - lara just reinserted with 1000cc residual    (3)Hypokalemia - indicated for repletion    (4)Anemia - GIB - diverticulosis and large rectal ulcers on colonoscopy. H/H appears to have stabilized      RECOMMEND:  (1)KCl 20meq po and [id number] IV  (2)Lara to gravity -  assistance as inpatient versus outpatient  (3)Dose new meds for GFR 20-30ml/min (present dosing acceptable)              Abdi Vieira MD  [facility]  [phone]

## 2019-09-05 NOTE — PROGRESS NOTE ADULT - ASSESSMENT
ASSESSMENT  89 yo man with CKD3 on ASA presenting with BRBPR found to have rectal ulcers on c-scope. HAs had a total of 3uPRBCs without rise in H/H but H/H has not dropped since transfusions were started, he is being transfused one more unit now.    PLAN  - Trend H/H  - Agree with holding ASA  - Maintain 2 large bore IVs at all time   - Transfuse PRN for goal hgb > 8 given cardiac history; f/u post transfusion CBC  - No surgical intervention indicated at this time  - Source of bleeding identified and being managed appropriately by GI and IM  - Surgery to sign off for now, reconsult as needed    Acute Care Surgery  [id number]

## 2019-09-05 NOTE — CHART NOTE - NSCHARTNOTEFT_GEN_A_CORE
follow up-spoke with ortho resident; pt with s/p right humerus fracture; advised to continue NWB right upper extremity with arm sling and f/u with Dr. Cohn as outpatient.  Lawanda Resendiz(NP)  [facility], [phone]

## 2019-09-05 NOTE — PROGRESS NOTE ADULT - ASSESSMENT
89 y/o M w/ hx of CAD s/p CABG s/p PCI, ICM, and Parkinson's disease with recent right humeral fracture, with admission for hematuria c/b aspiration pneumonia, now with hematochezia.    Impression:  # Acute blood loss anemia with hematochezia: Currently with no active bleeding, HD stable, and with Hgb stable > 7.0 Colonoscopy on 9/4/19 revealed multiple rectal ulcers and ulcerated mucosa in the anal canal - may be secondary to fecal impaction versus malignancy.  # CAD s/p CABG s/p PCI  # ICM  # Parkinson's disease    Recommendations:  - Aggressive bowel regimen - Miralax PO BID  - Repeat Flex Sig versus anoscopy by Colorectal Surgery to assess ulcer healing in 1 week  - Patient declining repeat colonoscopy for polyp removal  - Monitor CBCs daily  - Monitor bowel movements  - Transfuse for goal Hgb >/= 7.0  - Stop pantoprazole infusion  - Rest of care per primary team  - Please call GI ([phone]) if there are any additional questions or concerns. Please call on-call GI fellow after 5pm and before 8am, and on weekends.    Constantine Rose MD  Gastroenterology Fellow  Pager number: [phone] / [phone] y/o M w/ hx of CAD s/p CABG s/p PCI, ICM, and Parkinson's disease with recent right humeral fracture, with admission for hematuria c/b aspiration pneumonia, now with hematochezia.    Impression:  # Acute blood loss anemia with hematochezia: Currently with no active bleeding, HD stable, and with Hgb stable > 7.0 Colonoscopy on 9/4/19 revealed multiple rectal ulcers and ulcerated mucosa in the anal canal - may be secondary to fecal impaction versus malignancy.  # CAD s/p CABG s/p PCI  # ICM  # Parkinson's disease    Recommendations:  - Aggressive bowel regimen - Miralax PO BID  - No rectal tube  - Repeat Flex Sig versus anoscopy by Colorectal Surgery to assess ulcer healing in 1 week  - Patient declining repeat colonoscopy for polyp removal  - Monitor CBCs daily  - Monitor bowel movements  - Transfuse for goal Hgb >/= 7.0  - Stop pantoprazole infusion  - Rest of care per primary team  - Please call GI ([phone]) if there are any additional questions or concerns. Please call on-call GI fellow after 5pm and before 8am, and on weekends.    Constantine Rose MD  Gastroenterology Fellow  Pager number: [phone] / [phone] y/o M w/ hx of CAD s/p CABG s/p PCI, ICM, and Parkinson's disease with recent right humeral fracture, with admission for hematuria c/b aspiration pneumonia, now with hematochezia.    Impression:  # Acute blood loss anemia with hematochezia: Currently with no active bleeding, HD stable, and with Hgb stable > 7.0 Colonoscopy on 9/4/19 revealed multiple rectal ulcers and ulcerated mucosa in the anal canal - may be secondary to fecal impaction versus malignancy.  # CAD s/p CABG s/p PCI  # ICM  # Parkinson's disease    Recommendations:  - Aggressive bowel regimen - Miralax PO BID  - No rectal tube  - Repeat Flex Sig versus anoscopy by Colorectal Surgery to assess ulcer healing in 2 to 4 weeks  - Patient declining repeat colonoscopy for polyp removal  - Monitor CBCs daily  - Monitor bowel movements  - Transfuse for goal Hgb >/= 7.0  - Stop pantoprazole infusion  - Rest of care per primary team  - Please call GI ([phone]) if there are any additional questions or concerns. Please call on-call GI fellow after 5pm and before 8am, and on weekends.    Constantine Rose MD  Gastroenterology Fellow  Pager number: [phone] / [phone]

## 2019-09-05 NOTE — CHART NOTE - NSCHARTNOTEFT_GEN_A_CORE
Notified by RN that pt has not voided since last night.   Bladder scan with > 999mL urine.   Pt repositioned in bed and assisted to use urinal, however unable to void.     On exam able to palpate bladder fullness.     Pt now with urinary retention.     16Fr Cortés placed with sterile technique per policy and tolerated well by patient.   Draining clear yellow urine.  monitor intake and output  trend Cr  f/u with primary team in am   Renal - Dr. Vieira notified.     Marisel Choe ANP-BC  [id number]

## 2019-09-05 NOTE — PROGRESS NOTE ADULT - SUBJECTIVE AND OBJECTIVE BOX
Chief Complaint: Hematuria    Interval Events:   - The patient underwent upper endoscopy and colonoscopy yesterday (Please see full report in Results section of [facility])  - Colonoscopy revealed multiple rectal ulcers and an ulcer in the anal canal which is the likely source of the patient's bleeding  - The patient did not have any bowel movements overnight    Allergies:  Cipro (Rash)    Hospital Medications:  atorvastatin 80 milliGRAM(s) Oral at bedtime  carbidopa/levodopa CR 50/200 1 Tablet(s) Oral <User Schedule>  chlorhexidine 4% Liquid 1 Application(s) Topical <User Schedule>  entacapone 200 milliGRAM(s) Oral <User Schedule>  fluticasone propionate 50 MICROgram(s)/spray Nasal Spray 1 Spray(s) Both Nostrils two times a day  levothyroxine Injectable 25 MICROGram(s) IV Push at bedtime  pantoprazole Infusion 8 mG/Hr IV Continuous <Continuous>  polyethylene glycol 3350 17 Gram(s) Oral two times a day  potassium chloride   Powder 20 milliEquivalent(s) Oral once  potassium chloride  10 mEq/100 mL IVPB 10 milliEquivalent(s) IV Intermittent once  ranolazine 1000 milliGRAM(s) Oral two times a day  tamsulosin 0.4 milliGRAM(s) Oral daily    PMHX/PSHX:  Unilateral inguinal hernia, with gangrene, not specified as recurrent  Upper GI bleed  Aspiration pneumonia  Clostridium difficile colitis  Pneumonia  MI, old  UTI (urinary tract infection)  BPH (benign prostatic hyperplasia)  Parkinsons Disease  CAD (Coronary Artery Disease)  HTN (Hypertension)  Hyperlipidemia  Presence of IVC filter  History of prostate surgery  Varicose veins of legs  S/P appendectomy  Stented coronary artery  Hx of CABG  Hyperlipidemia    Family history:  Family history of coronary artery disease  Family history of breast cancer (Aunt)    ROS:     General:  No wt loss, fevers, chills, night sweats, + fatigue  Eyes:  Good vision, no reported pain  ENT:  No sore throat, pain, runny nose, dysphagia  CV:  No pain, palpitations, hypo/hypertension  Pulm:  No dyspnea, cough, tachypnea, wheezing  GI:  No pain, No nausea, No vomiting, No diarrhea, No constipation, No weight loss, No fever, No pruritis, No rectal bleeding, No tarry stools, No dysphagia  :  No pain, bleeding, incontinence, nocturia  Muscle:  No pain, + weakness  Neuro:  + weakness, tingling, memory problems  Psych:  + fatigue, insomnia, mood problems, depression  Endocrine:  No polyuria, polydipsia, cold/heat intolerance  Heme:  No petechiae, ecchymosis, easy bruisability  Skin:  No rash, tattoos, scars, edema    PHYSICAL EXAM:   Vital Signs:  Vital Signs Last 24 Hrs  T(C): 36.4 (05 Sep 2019 04:48), Max: 36.9 (05 Sep 2019 01:25)  T(F): 97.6 (05 Sep 2019 04:48), Max: 98.4 (05 Sep 2019 01:25)  HR: 62 (05 Sep 2019 04:48) (61 - 79)  BP: 119/74 (05 Sep 2019 04:48) (104/56 - 151/63)  BP(mean): 79 (04 Sep 2019 12:00) (76 - 91)  RR: 18 (05 Sep 2019 04:48) (12 - 19)  SpO2: 99% (05 Sep 2019 04:48) (96% - 100%)  Daily Weight in k.4 (05 Sep 2019 08:00)    GENERAL:  No acute distress  HEENT:  Normocephalic/atraumatic,  no scleral icterus  CHEST:  Normal effort, no accessory muscle use  HEART:  Regular rate and rhythm, no murmurs/rubs/gallops  ABDOMEN:  Soft, non-tender, non-distended, normoactive bowel sounds  EXTREMITIES:  No cyanosis, clubbing, or edema  SKIN:  No rash/erythema  NEURO:  Alert and oriented x 3    LABS:                        7.2    8.5   )-----------( 132      ( 04 Sep 2019 23:28 )             21.9     Mean Cell Volume: 92.5 fl (19 @ 23:28)        147<H>  |  109<H>  |  50<H>  ----------------------------<  101<H>  2.8<LL>   |  24  |  2.20<H>    Ca    7.6<L>      05 Sep 2019 06:59  Phos  3.7       Mg     2.5         TPro  5.5<L>  /  Alb  2.7<L>  /  TBili  0.5  /  DBili  x   /  AST  16  /  ALT  <5<L>  /  AlkPhos  43  09-05    LIVER FUNCTIONS - ( 05 Sep 2019 06:59 )  Alb: 2.7 g/dL / Pro: 5.5 g/dL / ALK PHOS: 43 U/L / ALT: <5 U/L / AST: 16 U/L / GGT: x           PT/INR - ( 04 Sep 2019 08:56 )   PT: 15.6 sec;   INR: 1.36 ratio         PTT - ( 04 Sep 2019 08:36 )  PTT:26.2 sec               7.2    8.5   )-----------( 132      ( 04 Sep 2019 23:28 )             21.9                         7.5    8.0   )-----------( 145      ( 04 Sep 2019 08:36 )             23.1                         7.2    9.5   )-----------( 163      ( 04 Sep 2019 04:00 )             22.0                         6.3    7.9   )-----------( 143      ( 03 Sep 2019 23:40 )             19.7                         7.8    7.6   )-----------( 128      ( 03 Sep 2019 21:17 )             23.5     Imaging:    No new imaging    Procedures:    < from: Upper Endoscopy (19 @ 09:26) >    [facility]  ____________________________________________________________________________________________________  Patient Name: Zacarias Moya                 MRN: [id number]  Account Number: [id number]                     YOB: 1930  Room: Endoscopy Room 1                           Gender: Male  Attending MD: Nathan Noel ,                  Procedure Date No Time: 2019  ____________________________________________________________________________________________________     Procedure:           Upper GI endoscopy  Indications:         Hematochezia  Providers:           Constantine Charlton (Fellow)  Medicines:           Monitored Anesthesia Care  Complications:       No immediate complications. Estimated blood loss: None.  ____________________________________________________________________________________________________  Procedure:           Pre-Anesthesia Assessment:                       - Universal Protocol:          - Pre-procedure Verification: Prior to the procedure, the patient's identity                        was verified by full name, date of birth and medical record number. The                        patient's identity was verified on all pertinent medical records, including                        History and Physical, nursing assessment and pre-anesthesia assessment. Also                        prior to the procedure, a History and Physical was performed, and patient                        medications, allergies and sensitivities were reviewed. The patient's                        tolerance of previous anesthesia was reviewed. The risks and benefits of the                        procedure and the sedation options and risks were discussed with the patient.                        All questions were answered and informed consent was obtained.                       - Marking: The endoscopic procedure was visually marked on a patient wrist                        band delineating the patient name, proposed procedure and endoscopist's                        initials.                       - Time-Out: Prior to the start of the procedure, the patient's                        identification, proposed procedure, accurate signed consent, correctly                        labeled images and records, and need for prophylactic antibiotics were                        verified by the physician, the nurse, the anesthesiologist and the                        anesthetist in the pre-procedure area in the procedure room.                       After obtaining informed consent, the endoscope was passed under direct                        vision. Throughout the procedure, the patient's blood pressure, pulse, and                        oxygen saturations were monitored continuously. The Endoscope was introduced                        through the mouth, and advanced to the second part of duodenum. The upper GI                        endoscopy was accomplished without difficulty. The patient tolerated the                        procedure well.                                                                                                        Findings:       A 2 cm hiatus hernia was present.       The exam of the esophagus was otherwise normal.       The entire examined stomach was normal.       The duodenal bulb and 2nd part of the duodenum were normal.                                                                                                        Impression:          - Hiatal Hernia, otherwise normal EGD  Recommendation:      - Perform a colonoscopy today.                                                                                                        Attending Participation:       I was present and participated duringthe entire procedure, including non-key portions.                                                                                                          _________________  Nathan Noel,   2019 5:55:09 PM  Number of Addenda: 0    Note Initiated On: 2019 9:26 AM    < end of copied text >    < from: Colonoscopy (19 @ 09:23) >    [facility]  ____________________________________________________________________________________________________  Patient Name: Zacarias Moya                 MRN: [id number]  Account Number: [id number]                     YOB: 1930  Room: Endoscopy Room 1                           Gender: Male  Attending MD: Nathan Noel ,                  Procedure Date No Time: 2019  ____________________________________________________________________________________________________     Procedure:           Colonoscopy  Indications:         Hematochezia  Providers:           Constantine Charlton (Fellow)  Medicines:           Monitored Anesthesia Care  Complications:       No immediate complications. Estimated blood loss: None.  ____________________________________________________________________________________________________  Procedure:           Pre-Anesthesia Assessment:                       - Universal Protocol:   - Pre-procedure Verification: Prior to the procedure, the patient's identity                        was verified by full name, date of birth and medical record number. The                        patient's identity was verified on all pertinent medical records, including                        History and Physical, nursing assessment and pre-anesthesia assessment. Also                        prior to the procedure, a History and Physical was performed, and patient                        medications, allergies and sensitivities were reviewed. The patient's                        tolerance of previous anesthesia was reviewed. The risks and benefits of the                        procedure and the sedation options and risks were discussed with the patient.                        All questions were answered and informed consent was obtained.                       - Marking: The endoscopic procedure was visually marked on a patient wrist                        band delineating the patient name,proposed procedure and endoscopist's                        initials.                       - Time-Out: Prior to the start of the procedure, the patient's                        identification, proposed procedure, accurate signed consent, correctly                        labeled images and records, and need for prophylactic antibiotics were                        verified by the physician, the nurse, the anesthesiologist and the                        anesthetist in the pre-procedure area in the procedure room.                       After I obtained informed consent, the scope was passed under direct vision.                        Throughout the procedure, the patient's blood pressure, pulse, and oxygen                        saturations weremonitored continuously. The Colonoscope was introduced                        through the anus and advanced to the terminal ileum. The colonoscopy was                        performed without difficulty. The patient tolerated the procedure well. The                        quality of the bowel preparation was evaluated using the BBPS ([place] Bowel                        Preparation Scale) with scores of: Right Colon = 3 (entire mucosa seen well                        with no residual staining, small fragments of stool or opaque liquid),                        Transverse Colon = 2 (minor amount of residual staining, small fragments of                        stool and/or opaque liquid, but mucosa seen well) and Left Colon = 1 (portion               of mucosa seen, but other areas not well seen due to staining, residual stool                        and/or opaque liquid). The total BBPS score equals 6. The quality of the                        bowel preparation was fair.                                                                                           Findings:       The visualized terminal ileum appeared normal.       Multiple small and large-mouthed diverticula were found in the sigmoid colon and in the     ascending colon.       Three sessile polyps were found in the ascending colon. The polyps were 8 to 12 mm in size.       Two rectal ulcers with adherent blood clots and underlying flat pigmented spot which were        removed with irrigation were present in the rectum. An ulcerated area of mucosa was also        noted in the anal canal. This is the likely source of the patient's bleeding. May represent        sterocoral colitis versus anorectal malignancy.                                                                  Impression:          - Diverticulosis in the sigmoid colon and in the ascending colon.                       - Three 8 to 12 mm polyps in the ascending colon.                       - Large rectal ulcers  Recommendation:      - [facility].                       - Aggressive bowel regimen - can start with miralax BID and titrate up as                        necessary                       - No rectal tube                       - Repeat colonoscopy within 6 months for polyp removal if patient is                        agreeable.                       - Will need repeat Flex Sig vs. Colorectal Anoscopy to assess ulcer healing     and fdor biopsy of ulcers if persisting                                                                                                        Attending Participation:       I was present and participated during the entire procedure, including non-key portions.                                                                                                          _________________  Nathan Noel,   2019 6:01:01 PM  Number of Addenda: 0    Note Initiated On: 2019 9:23 AM    < end of copied text >

## 2019-09-05 NOTE — PROGRESS NOTE ADULT - SUBJECTIVE AND OBJECTIVE BOX
Also covering for Dr. Ayala     Subjective: Patient seen and examined. No new events except as noted.   moved out of ICU   s/p EGD and colonoscopy   feels ok     REVIEW OF SYSTEMS:    CONSTITUTIONAL+ weakness, fevers or chills  EYES/ENT: No visual changes;  No vertigo or throat pain   NECK: No pain or stiffness  RESPIRATORY: No cough, wheezing, hemoptysis; No shortness of breath  CARDIOVASCULAR: No chest pain or palpitations  GASTROINTESTINAL: No abdominal or epigastric pain. No nausea, vomiting, or hematemesis; No diarrhea or constipation. No melena or hematochezia.  GENITOURINARY: No dysuria, frequency or hematuria  NEUROLOGICAL: No numbness or weakness  SKIN: No itching, burning, rashes, or lesions   All other review of systems is negative unless indicated above.    MEDICATIONS:  MEDICATIONS  (STANDING):  atorvastatin 80 milliGRAM(s) Oral at bedtime  carbidopa/levodopa CR 50/200 1 Tablet(s) Oral <User Schedule>  chlorhexidine 4% Liquid 1 Application(s) Topical <User Schedule>  entacapone 200 milliGRAM(s) Oral <User Schedule>  fluticasone propionate 50 MICROgram(s)/spray Nasal Spray 1 Spray(s) Both Nostrils two times a day  levothyroxine Injectable 25 MICROGram(s) IV Push at bedtime  pantoprazole Infusion 8 mG/Hr (10 mL/Hr) IV Continuous <Continuous>  polyethylene glycol 3350 17 Gram(s) Oral two times a day  potassium chloride  10 mEq/100 mL IVPB 10 milliEquivalent(s) IV Intermittent every 1 hour  potassium chloride  10 mEq/100 mL IVPB 10 milliEquivalent(s) IV Intermittent once  ranolazine 1000 milliGRAM(s) Oral two times a day  tamsulosin 0.4 milliGRAM(s) Oral daily      PHYSICAL EXAM:  T(C): 36.6 (09-05-19 @ 09:14), Max: 36.9 (09-05-19 @ 01:25)  HR: 62 (09-05-19 @ 09:14) (61 - 79)  BP: 123/56 (09-05-19 @ 09:14) (104/56 - 129/84)  RR: 18 (09-05-19 @ 09:14) (12 - 18)  SpO2: 97% (09-05-19 @ 09:14) (96% - 100%)  Wt(kg): --  I&O's Summary    04 Sep 2019 07:01  -  05 Sep 2019 07:00  --------------------------------------------------------  IN: 1040 mL / OUT: 1000 mL / NET: 40 mL        Height (cm): 172.7 (09-04 @ 02:03)  Weight (kg): 75.3 (09-04 @ 02:03)  BMI (kg/m2): 25.2 (09-04 @ 02:03)  BSA (m2): 1.89 (09-04 @ 02:03)        Appearance: NAD  HEENT:   Normal oral mucosa, PERRL, EOMI	  Lymphatic: No lymphadenopathy , right arm/shoulder sling   Cardiovascular: Irregular rS1 S2, No JVD, No murmurs , Peripheral pulses palpable 2+ bilaterally  Respiratory: Decreased bs and effort   Gastrointestinal:  Soft, Non-tender, + BS	  Skin: No rashes, No ecchymoses, No cyanosis, warm to touch  Musculoskeletal: Decreased range of motion and  strength  Psychiatry:  Mood & affect appropriate  Ext: No edema  +lara     LABS:    CARDIAC MARKERS:                                7.2    8.5   )-----------( 132      ( 04 Sep 2019 23:28 )             21.9     09-05    147<H>  |  109<H>  |  50<H>  ----------------------------<  101<H>  2.8<LL>   |  24  |  2.20<H>    Ca    7.6<L>      05 Sep 2019 06:59  Phos  3.7     09-05  Mg     2.5     09-05    TPro  5.5<L>  /  Alb  2.7<L>  /  TBili  0.5  /  DBili  x   /  AST  16  /  ALT  <5<L>  /  AlkPhos  43  09-05    proBNP:   Lipid Profile:   HgA1c:   TSH:             TELEMETRY: 	    ECG:  	  RADIOLOGY:   DIAGNOSTIC TESTING:  [ ] Echocardiogram:  [ ]  Catheterization:  [ ] Stress Test:    OTHER: 	    < from: Colonoscopy (09.04.19 @ 09:23) [facility]  ____________________________________________________________________________________________________  Patient Name: Zacarias Moya                 MRN: [id number]  Account Number: [id number]                     YOB: 1930  Room: Endoscopy Room 1                           Gender: Male  Attending MD: Nathan Noel ,                  Procedure Date No Time: 9/4/2019  ____________________________________________________________________________________________________     Procedure:           Colonoscopy  Indications:         Hematochezia  Providers:           Constantine Charlton (Fellow)  Medicines:           Monitored Anesthesia Care  Complications:       No immediate complications. Estimated blood loss: None.  ____________________________________________________________________________________________________  Procedure:           Pre-Anesthesia Assessment:                       - Universal Protocol:   - Pre-procedure Verification: Prior to the procedure, the patient's identity                        was verified by full name, date of birth and medical record number. The                        patient's identity was verified on all pertinent medical records, including                        History and Physical, nursing assessment and pre-anesthesia assessment. Also                        prior to the procedure, a History and Physical was performed, and patient                        medications, allergies and sensitivities were reviewed. The patient's                        tolerance of previous anesthesia was reviewed. The risks and benefits of the                        procedure and the sedation options and risks were discussed with the patient.                        All questions were answered and informed consent was obtained.                       - Marking: The endoscopic procedure was visually marked on a patient wrist                        band delineating the patient name,proposed procedure and endoscopist's                        initials.                       - Time-Out: Prior to the start of the procedure, the patient's                        identification, proposed procedure, accurate signed consent, correctly                        labeled images and records, and need for prophylactic antibiotics were                        verified by the physician, the nurse, the anesthesiologist and the                        anesthetist in the pre-procedure area in the procedure room.                       After I obtained informed consent, the scope was passed under direct vision.                        Throughout the procedure, the patient's blood pressure, pulse, and oxygen                        saturations weremonitored continuously. The Colonoscope was introduced                        through the anus and advanced to the terminal ileum. The colonoscopy was                        performed without difficulty. The patient tolerated the procedure well. The                        quality of the bowel preparation was evaluated using the BBPS ([place] Bowel                        Preparation Scale) with scores of: Right Colon = 3 (entire mucosa seen well                        with no residual staining, small fragments of stool or opaque liquid),                        Transverse Colon = 2 (minor amount of residual staining, small fragments of                        stool and/or opaque liquid, but mucosa seen well) and Left Colon = 1 (portion               of mucosa seen, but other areas not well seen due to staining, residual stool                        and/or opaque liquid). The total BBPS score equals 6. The quality of the                        bowel preparation was fair.                                                                                           Findings:       The visualized terminal ileum appeared normal.       Multiple small and large-mouthed diverticula were found in the sigmoid colon and in the     ascending colon.       Three sessile polyps were found in the ascending colon. The polyps were 8 to 12 mm in size.       Two rectal ulcers with adherent blood clots and underlying flat pigmented spot which were        removed with irrigation were present in the rectum. An ulcerated area of mucosa was also        noted in the anal canal. This is the likely source of the patient's bleeding. May represent        sterocoral colitis versus anorectal malignancy.                                                                  Impression:          - Diverticulosis in the sigmoid colon and in the ascending colon.                       - Three 8 to 12 mm polyps in the ascending colon.                       - Large rectal ulcers  Recommendation:      - [facility].                       - Aggressive bowel regimen - can start with miralax BID and titrate up as                        necessary                       - No rectal tube                       - Repeat colonoscopy within 6 months for polyp removal if patient is                        agreeable.                       - Will need repeat Flex Sig vs. Colorectal Anoscopy to assess ulcer healing     and fdor biopsy of ulcers if persisting                                                                                                        Attending Participation:       I was present and participated during the entire procedure, including non-key portions.                                                                                                          _________________  Nathan Noel,   9/4/2019 6:01:01 PM  Number of Addenda: 0    Note Initiated On: 9/4/2019 9:23 AM    < from: Upper Endoscopy (09.04.19 @ 09:26) >    [facility]  ____________________________________________________________________________________________________  Patient Name: Zacarias Moya                 MRN: [id number]  Account Number: [id number]                     YOB: 1930  Room: Endoscopy Room 1                           Gender: Male  Attending MD: Nathan Noel ,                  Procedure Date No Time: 9/4/2019  ____________________________________________________________________________________________________     Procedure:           Upper GI endoscopy  Indications:         Hematochezia  Providers:           Constantine Charlton (Fellow)  Medicines:           Monitored Anesthesia Care  Complications:       No immediate complications. Estimated blood loss: None.  ____________________________________________________________________________________________________  Procedure:           Pre-Anesthesia Assessment:                       - Universal Protocol:          - Pre-procedure Verification: Prior to the procedure, the patient's identity                        was verified by full name, date of birth and medical record number. The                        patient's identity was verified on all pertinent medical records, including                        History and Physical, nursing assessment and pre-anesthesia assessment. Also                        prior to the procedure, a History and Physical was performed, and patient                        medications, allergies and sensitivities were reviewed. The patient's                        tolerance of previous anesthesia was reviewed. The risks and benefits of the                        procedure and the sedation options and risks were discussed with the patient.                        All questions were answered and informed consent was obtained.                       - Marking: The endoscopic procedure was visually marked on a patient wrist                        band delineating the patient name, proposed procedure and endoscopist's                        initials.                       - Time-Out: Prior to the start of the procedure, the patient's                        identification, proposed procedure, accurate signed consent, correctly                        labeled images and records, and need for prophylactic antibiotics were                        verified by the physician, the nurse, the anesthesiologist and the                        anesthetist in the pre-procedure area in the procedure room.                       After obtaining informed consent, the endoscope was passed under direct                        vision. Throughout the procedure, the patient's blood pressure, pulse, and                        oxygen saturations were monitored continuously. The Endoscope was introduced                        through the mouth, and advanced to the second part of duodenum. The upper GI                        endoscopy was accomplished without difficulty. The patient tolerated the                        procedure well.                                                                                                        Findings:       A 2 cm hiatus hernia was present.       The exam of the esophagus was otherwise normal.       The entire examined stomach was normal.       The duodenal bulb and 2nd part of the duodenum were normal.                                                                                                        Impression:          - Hiatal Hernia, otherwise normal EGD  Recommendation:      - Perform a colonoscopy today.                                                                                                        Attending Participation:       I was present and participated duringthe entire procedure, including non-key portions.                                                                                                          _________________  Nathan Noel,   9/4/2019 5:55:09 PM  Number of Addenda: 0    Note Initiated On: 9/4/2019 9:26 AM    < end of copied text >

## 2019-09-05 NOTE — PROGRESS NOTE ADULT - SUBJECTIVE AND OBJECTIVE BOX
Follow-up Pulm Progress Note    No new respiratory events overnight.  Denies increased SOB, chest pain, cough or mucus.    Medications:  MEDICATIONS  (STANDING):  atorvastatin 80 milliGRAM(s) Oral at bedtime  carbidopa/levodopa CR 50/200 1 Tablet(s) Oral <User Schedule>  chlorhexidine 4% Liquid 1 Application(s) Topical <User Schedule>  entacapone 200 milliGRAM(s) Oral <User Schedule>  fluticasone propionate 50 MICROgram(s)/spray Nasal Spray 1 Spray(s) Both Nostrils two times a day  levothyroxine Injectable 25 MICROGram(s) IV Push at bedtime  pantoprazole Infusion 8 mG/Hr (10 mL/Hr) IV Continuous <Continuous>  polyethylene glycol 3350 17 Gram(s) Oral two times a day  potassium chloride   Powder 20 milliEquivalent(s) Oral once  potassium chloride  10 mEq/100 mL IVPB 10 milliEquivalent(s) IV Intermittent every 1 hour  potassium chloride  10 mEq/100 mL IVPB 10 milliEquivalent(s) IV Intermittent once  ranolazine 1000 milliGRAM(s) Oral two times a day  tamsulosin 0.4 milliGRAM(s) Oral daily    MEDICATIONS  (PRN):      Vent settings (if applicable)      Vital Signs Last 24 Hrs  T(C): 36.4 (05 Sep 2019 04:48), Max: 36.9 (05 Sep 2019 01:25)  T(F): 97.6 (05 Sep 2019 04:48), Max: 98.4 (05 Sep 2019 01:25)  HR: 62 (05 Sep 2019 04:48) (61 - 79)  BP: 119/74 (05 Sep 2019 04:48) (104/56 - 151/63)  BP(mean): 79 (04 Sep 2019 12:00) (76 - 91)  RR: 18 (05 Sep 2019 04:48) (12 - 19)  SpO2: 99% (05 Sep 2019 04:48) (96% - 100%)    ABG - ( 03 Sep 2019 23:37 )  pH, Arterial: 7.51  pH, Blood: x     /  pCO2: 35    /  pO2: 106   / HCO3: 28    / Base Excess: 4.4   /  SaO2: 97                    09-04 @ 07:01  -  09-05 @ 07:00  --------------------------------------------------------  IN: 1040 mL / OUT: 1000 mL / NET: 40 mL          LABS:                        7.2    8.5   )-----------( 132      ( 04 Sep 2019 23:28 )             21.9     09-05    147<H>  |  109<H>  |  50<H>  ----------------------------<  101<H>  2.8<LL>   |  24  |  2.20<H>    Ca    7.6<L>      05 Sep 2019 06:59  Phos  3.7     09-05  Mg     2.5     09-05    TPro  5.5<L>  /  Alb  2.7<L>  /  TBili  0.5  /  DBili  x   /  AST  16  /  ALT  <5<L>  /  AlkPhos  43  09-05          CAPILLARY BLOOD GLUCOSE      POCT Blood Glucose.: 143 mg/dL (03 Sep 2019 23:23)    PT/INR - ( 04 Sep 2019 08:56 )   PT: 15.6 sec;   INR: 1.36 ratio         PTT - ( 04 Sep 2019 08:36 )  PTT:26.2 sec          CULTURES:        Physical Examination:  asleep, arousable, generally comfortable  HEENT: unremarkable  PULM: Clear to auscultation bilaterally, no significant sputum production  CVS: Regular rate and rhythm, no murmurs, rubs, or gallops  Abd:  soft, non tender  Extrem: No CCE    RADIOLOGY REVIEWED  CXR:    CT chest:

## 2019-09-05 NOTE — PROGRESS NOTE ADULT - ASSESSMENT
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  no pneumonia at present  aspiration precautions  cardiac meds:   neuro meds:   incentive spirometry  PT/activity as able    Hedii Yip MD, [facility]  [phone]  Pulmonary Medicine

## 2019-09-06 DIAGNOSIS — D48.9 NEOPLASM OF UNCERTAIN BEHAVIOR, UNSPECIFIED: ICD-10-CM

## 2019-09-06 LAB
ANION GAP SERPL CALC-SCNC: 13 MMOL/L — SIGNIFICANT CHANGE UP (ref 5–17)
BASOPHILS # BLD AUTO: 0.02 K/UL — SIGNIFICANT CHANGE UP (ref 0–0.2)
BASOPHILS NFR BLD AUTO: 0.3 % — SIGNIFICANT CHANGE UP (ref 0–2)
BUN SERPL-MCNC: 46 MG/DL — HIGH (ref 7–23)
CALCIUM SERPL-MCNC: 8 MG/DL — LOW (ref 8.4–10.5)
CHLORIDE SERPL-SCNC: 109 MMOL/L — HIGH (ref 96–108)
CO2 SERPL-SCNC: 23 MMOL/L — SIGNIFICANT CHANGE UP (ref 22–31)
CREAT SERPL-MCNC: 1.85 MG/DL — HIGH (ref 0.5–1.3)
EOSINOPHIL # BLD AUTO: 0.29 K/UL — SIGNIFICANT CHANGE UP (ref 0–0.5)
EOSINOPHIL NFR BLD AUTO: 4.2 % — SIGNIFICANT CHANGE UP (ref 0–6)
GLUCOSE SERPL-MCNC: 114 MG/DL — HIGH (ref 70–99)
HCT VFR BLD CALC: 22.5 % — LOW (ref 39–50)
HGB BLD-MCNC: 7.2 G/DL — LOW (ref 13–17)
IMM GRANULOCYTES NFR BLD AUTO: 1 % — SIGNIFICANT CHANGE UP (ref 0–1.5)
LYMPHOCYTES # BLD AUTO: 0.82 K/UL — LOW (ref 1–3.3)
LYMPHOCYTES # BLD AUTO: 11.8 % — LOW (ref 13–44)
MAGNESIUM SERPL-MCNC: 2.7 MG/DL — HIGH (ref 1.6–2.6)
MCHC RBC-ENTMCNC: 29.6 PG — SIGNIFICANT CHANGE UP (ref 27–34)
MCHC RBC-ENTMCNC: 32 GM/DL — SIGNIFICANT CHANGE UP (ref 32–36)
MCV RBC AUTO: 92.6 FL — SIGNIFICANT CHANGE UP (ref 80–100)
MONOCYTES # BLD AUTO: 0.65 K/UL — SIGNIFICANT CHANGE UP (ref 0–0.9)
MONOCYTES NFR BLD AUTO: 9.3 % — SIGNIFICANT CHANGE UP (ref 2–14)
NEUTROPHILS # BLD AUTO: 5.12 K/UL — SIGNIFICANT CHANGE UP (ref 1.8–7.4)
NEUTROPHILS NFR BLD AUTO: 73.4 % — SIGNIFICANT CHANGE UP (ref 43–77)
PLATELET # BLD AUTO: 128 K/UL — LOW (ref 150–400)
POTASSIUM SERPL-MCNC: 3.4 MMOL/L — LOW (ref 3.5–5.3)
POTASSIUM SERPL-SCNC: 3.4 MMOL/L — LOW (ref 3.5–5.3)
RBC # BLD: 2.43 M/UL — LOW (ref 4.2–5.8)
RBC # FLD: 14.7 % — HIGH (ref 10.3–14.5)
SODIUM SERPL-SCNC: 145 MMOL/L — SIGNIFICANT CHANGE UP (ref 135–145)
WBC # BLD: 6.97 K/UL — SIGNIFICANT CHANGE UP (ref 3.8–10.5)
WBC # FLD AUTO: 6.97 K/UL — SIGNIFICANT CHANGE UP (ref 3.8–10.5)

## 2019-09-06 PROCEDURE — 99232 SBSQ HOSP IP/OBS MODERATE 35: CPT

## 2019-09-06 PROCEDURE — 99233 SBSQ HOSP IP/OBS HIGH 50: CPT

## 2019-09-06 RX ORDER — SENNA PLUS 8.6 MG/1
2 TABLET ORAL
Refills: 0 | Status: DISCONTINUED | OUTPATIENT
Start: 2019-09-06 | End: 2019-09-11

## 2019-09-06 RX ORDER — POTASSIUM CHLORIDE 20 MEQ
20 PACKET (EA) ORAL ONCE
Refills: 0 | Status: COMPLETED | OUTPATIENT
Start: 2019-09-06 | End: 2019-09-06

## 2019-09-06 RX ORDER — POLYETHYLENE GLYCOL 3350 17 G/17G
17 POWDER, FOR SOLUTION ORAL
Refills: 0 | Status: DISCONTINUED | OUTPATIENT
Start: 2019-09-06 | End: 2019-09-11

## 2019-09-06 RX ORDER — LEVOTHYROXINE SODIUM 125 MCG
50 TABLET ORAL DAILY
Refills: 0 | Status: DISCONTINUED | OUTPATIENT
Start: 2019-09-06 | End: 2019-09-11

## 2019-09-06 RX ORDER — PANTOPRAZOLE SODIUM 20 MG/1
40 TABLET, DELAYED RELEASE ORAL
Refills: 0 | Status: DISCONTINUED | OUTPATIENT
Start: 2019-09-06 | End: 2019-09-11

## 2019-09-06 RX ADMIN — PANTOPRAZOLE SODIUM 10 MG/HR: 20 TABLET, DELAYED RELEASE ORAL at 00:31

## 2019-09-06 RX ADMIN — Medication 1 SPRAY(S): at 18:09

## 2019-09-06 RX ADMIN — CARBIDOPA AND LEVODOPA 1 TABLET(S): 25; 100 TABLET ORAL at 18:09

## 2019-09-06 RX ADMIN — Medication 1 SPRAY(S): at 06:20

## 2019-09-06 RX ADMIN — CARBIDOPA AND LEVODOPA 1 TABLET(S): 25; 100 TABLET ORAL at 09:33

## 2019-09-06 RX ADMIN — CARBIDOPA AND LEVODOPA 1 TABLET(S): 25; 100 TABLET ORAL at 21:29

## 2019-09-06 RX ADMIN — CARBIDOPA AND LEVODOPA 1 TABLET(S): 25; 100 TABLET ORAL at 13:13

## 2019-09-06 RX ADMIN — PANTOPRAZOLE SODIUM 40 MILLIGRAM(S): 20 TABLET, DELAYED RELEASE ORAL at 14:52

## 2019-09-06 RX ADMIN — Medication 20 MILLIEQUIVALENT(S): at 14:52

## 2019-09-06 RX ADMIN — CHLORHEXIDINE GLUCONATE 1 APPLICATION(S): 213 SOLUTION TOPICAL at 06:20

## 2019-09-06 RX ADMIN — POLYETHYLENE GLYCOL 3350 17 GRAM(S): 17 POWDER, FOR SOLUTION ORAL at 21:30

## 2019-09-06 RX ADMIN — ENTACAPONE 200 MILLIGRAM(S): 200 TABLET, FILM COATED ORAL at 13:13

## 2019-09-06 RX ADMIN — RANOLAZINE 1000 MILLIGRAM(S): 500 TABLET, FILM COATED, EXTENDED RELEASE ORAL at 13:14

## 2019-09-06 RX ADMIN — Medication 50 MICROGRAM(S): at 14:53

## 2019-09-06 RX ADMIN — ENTACAPONE 200 MILLIGRAM(S): 200 TABLET, FILM COATED ORAL at 21:30

## 2019-09-06 RX ADMIN — ENTACAPONE 200 MILLIGRAM(S): 200 TABLET, FILM COATED ORAL at 09:32

## 2019-09-06 RX ADMIN — SENNA PLUS 2 TABLET(S): 8.6 TABLET ORAL at 21:39

## 2019-09-06 RX ADMIN — Medication 40 MILLIEQUIVALENT(S): at 00:31

## 2019-09-06 RX ADMIN — ENTACAPONE 200 MILLIGRAM(S): 200 TABLET, FILM COATED ORAL at 18:09

## 2019-09-06 RX ADMIN — ATORVASTATIN CALCIUM 80 MILLIGRAM(S): 80 TABLET, FILM COATED ORAL at 21:29

## 2019-09-06 RX ADMIN — TAMSULOSIN HYDROCHLORIDE 0.4 MILLIGRAM(S): 0.4 CAPSULE ORAL at 13:14

## 2019-09-06 RX ADMIN — POLYETHYLENE GLYCOL 3350 17 GRAM(S): 17 POWDER, FOR SOLUTION ORAL at 13:14

## 2019-09-06 RX ADMIN — RANOLAZINE 1000 MILLIGRAM(S): 500 TABLET, FILM COATED, EXTENDED RELEASE ORAL at 23:32

## 2019-09-06 NOTE — PROGRESS NOTE ADULT - SUBJECTIVE AND OBJECTIVE BOX
Chief Complaint: Bloody urine    Interval Events:   - The patient was transfused 1 unit pRBCs overnight  - No BRBPR was noted by nursing  - The patient denies nausea/vomiting, abdominal pain. He does not know the color of his stool.    Allergies:  Cipro (Rash)    Hospital Medications:  atorvastatin 80 milliGRAM(s) Oral at bedtime  carbidopa/levodopa CR 50/200 1 Tablet(s) Oral <User Schedule>  chlorhexidine 4% Liquid 1 Application(s) Topical <User Schedule>  entacapone 200 milliGRAM(s) Oral <User Schedule>  fluticasone propionate 50 MICROgram(s)/spray Nasal Spray 1 Spray(s) Both Nostrils two times a day  levothyroxine Injectable 25 MICROGram(s) IV Push at bedtime  pantoprazole Infusion 8 mG/Hr IV Continuous <Continuous>  polyethylene glycol 3350 17 Gram(s) Oral two times a day  ranolazine 1000 milliGRAM(s) Oral two times a day  tamsulosin 0.4 milliGRAM(s) Oral daily      PMHX/PSHX:  Unilateral inguinal hernia, with gangrene, not specified as recurrent  Upper GI bleed  Aspiration pneumonia  Clostridium difficile colitis  Pneumonia  MI, old  UTI (urinary tract infection)  BPH (benign prostatic hyperplasia)  Parkinsons Disease  CAD (Coronary Artery Disease)  HTN (Hypertension)  Hyperlipidemia  Presence of IVC filter  History of prostate surgery  Varicose veins of legs  S/P appendectomy  Stented coronary artery  Hx of CABG  Hyperlipidemia      Family history:  Family history of coronary artery disease  Family history of breast cancer (Aunt)  No pertinent family history in first degree relatives      ROS:     General:  No wt loss, fevers, chills, night sweats, + fatigue  Eyes:  Good vision, no reported pain  ENT:  No sore throat, pain, runny nose, dysphagia  CV:  No pain, palpitations, hypo/hypertension  Pulm:  No dyspnea, cough, tachypnea, wheezing  GI:  No pain, No nausea, No vomiting, No diarrhea, No constipation, No weight loss, No fever, No pruritis, No rectal bleeding, No tarry stools, No dysphagia  :  No pain, bleeding, incontinence, nocturia  Muscle:  No pain, + weakness  Neuro:  + weakness, tingling, memory problems  Psych:  + fatigue, insomnia, mood problems, depression  Endocrine:  No polyuria, polydipsia, cold/heat intolerance  Heme:  No petechiae, ecchymosis, easy bruisability  Skin:  No rash, tattoos, scars, edema      PHYSICAL EXAM:   Vital Signs:  Vital Signs Last 24 Hrs  T(C): 36.3 (06 Sep 2019 05:00), Max: 36.9 (06 Sep 2019 00:23)  T(F): 97.4 (06 Sep 2019 05:00), Max: 98.4 (06 Sep 2019 00:23)  HR: 63 (06 Sep 2019 05:00) (61 - 65)  BP: 137/73 (06 Sep 2019 05:00) (123/56 - 166/56)  BP(mean): --  RR: 18 (06 Sep 2019 05:00) (18 - 18)  SpO2: 98% (06 Sep 2019 05:00) (96% - 98%)  Daily     Daily Weight in k.1 (06 Sep 2019 01:05)    GENERAL:  No acute distress  HEENT:  Normocephalic/atraumatic,  no scleral icterus  CHEST:  Normal effort, no accessory muscle use  HEART:  Regular rate and rhythm, no murmurs/rubs/gallops  ABDOMEN:  Soft, non-tender, non-distended, normoactive bowel sounds  EXTREMITIES:  No cyanosis, clubbing, or edema  SKIN:  No rash/erythema  NEURO:  Alert and oriented x 3    LABS:                        7.9    8.15  )-----------( 133      ( 05 Sep 2019 22:28 )             24.2     Mean Cell Volume: 91.7 fl (19 @ 22:28)        145  |  108  |  47<H>  ----------------------------<  130<H>  3.3<L>   |  25  |  2.15<H>    Ca    7.9<L>      05 Sep 2019 18:30  Phos  3.7       Mg     2.5         TPro  5.5<L>  /  Alb  2.7<L>  /  TBili  0.5  /  DBili  x   /  AST  16  /  ALT  <5<L>  /  AlkPhos  43      LIVER FUNCTIONS - ( 05 Sep 2019 06:59 )  Alb: 2.7 g/dL / Pro: 5.5 g/dL / ALK PHOS: 43 U/L / ALT: <5 U/L / AST: 16 U/L / GGT: x           PT/INR - ( 04 Sep 2019 08:56 )   PT: 15.6 sec;   INR: 1.36 ratio         PTT - ( 04 Sep 2019 08:36 )  PTT:26.2 sec                            7.9    8.15  )-----------( 133      ( 05 Sep 2019 22:28 )             24.2                         6.5    6.32  )-----------( 140      ( 05 Sep 2019 09:42 )             20.0                         7.2    8.5   )-----------( 132      ( 04 Sep 2019 23:28 )             21.9                         7.5    8.0   )-----------( 145      ( 04 Sep 2019 08:36 )             23.1                         7.2    9.5   )-----------( 163      ( 04 Sep 2019 04:00 )             22.0       Imaging:    No new imaging Chief Complaint: Bloody urine    Interval Events:   - The patient was transfused 1 unit pRBCs overnight  - No BRBPR was noted by nursing  - The patient denies nausea/vomiting, abdominal pain. He has yet to have a bowel movement since his colonoscopy.    Allergies:  Cipro (Rash)    Hospital Medications:  atorvastatin 80 milliGRAM(s) Oral at bedtime  carbidopa/levodopa CR 50/200 1 Tablet(s) Oral <User Schedule>  chlorhexidine 4% Liquid 1 Application(s) Topical <User Schedule>  entacapone 200 milliGRAM(s) Oral <User Schedule>  fluticasone propionate 50 MICROgram(s)/spray Nasal Spray 1 Spray(s) Both Nostrils two times a day  levothyroxine Injectable 25 MICROGram(s) IV Push at bedtime  pantoprazole Infusion 8 mG/Hr IV Continuous <Continuous>  polyethylene glycol 3350 17 Gram(s) Oral two times a day  ranolazine 1000 milliGRAM(s) Oral two times a day  tamsulosin 0.4 milliGRAM(s) Oral daily      PMHX/PSHX:  Unilateral inguinal hernia, with gangrene, not specified as recurrent  Upper GI bleed  Aspiration pneumonia  Clostridium difficile colitis  Pneumonia  MI, old  UTI (urinary tract infection)  BPH (benign prostatic hyperplasia)  Parkinsons Disease  CAD (Coronary Artery Disease)  HTN (Hypertension)  Hyperlipidemia  Presence of IVC filter  History of prostate surgery  Varicose veins of legs  S/P appendectomy  Stented coronary artery  Hx of CABG  Hyperlipidemia      Family history:  Family history of coronary artery disease  Family history of breast cancer (Aunt)  No pertinent family history in first degree relatives      ROS:     General:  No wt loss, fevers, chills, night sweats, + fatigue  Eyes:  Good vision, no reported pain  ENT:  No sore throat, pain, runny nose, dysphagia  CV:  No pain, palpitations, hypo/hypertension  Pulm:  No dyspnea, cough, tachypnea, wheezing  GI:  No pain, No nausea, No vomiting, No diarrhea, No constipation, No weight loss, No fever, No pruritis, No rectal bleeding, No tarry stools, No dysphagia  :  No pain, bleeding, incontinence, nocturia  Muscle:  No pain, + weakness  Neuro:  + weakness, tingling, memory problems  Psych:  + fatigue, insomnia, mood problems, depression  Endocrine:  No polyuria, polydipsia, cold/heat intolerance  Heme:  No petechiae, ecchymosis, easy bruisability  Skin:  No rash, tattoos, scars, edema      PHYSICAL EXAM:   Vital Signs:  Vital Signs Last 24 Hrs  T(C): 36.3 (06 Sep 2019 05:00), Max: 36.9 (06 Sep 2019 00:23)  T(F): 97.4 (06 Sep 2019 05:00), Max: 98.4 (06 Sep 2019 00:23)  HR: 63 (06 Sep 2019 05:00) (61 - 65)  BP: 137/73 (06 Sep 2019 05:00) (123/56 - 166/56)  BP(mean): --  RR: 18 (06 Sep 2019 05:00) (18 - 18)  SpO2: 98% (06 Sep 2019 05:00) (96% - 98%)  Daily     Daily Weight in k.1 (06 Sep 2019 01:05)    GENERAL:  No acute distress  HEENT:  Normocephalic/atraumatic,  no scleral icterus  CHEST:  Normal effort, no accessory muscle use  HEART:  Regular rate and rhythm, no murmurs/rubs/gallops  ABDOMEN:  Soft, non-tender, non-distended, normoactive bowel sounds  EXTREMITIES:  No cyanosis, clubbing, or edema  SKIN:  No rash/erythema  NEURO:  Alert and oriented x 3    LABS:                        7.9    8.15  )-----------( 133      ( 05 Sep 2019 22:28 )             24.2     Mean Cell Volume: 91.7 fl (- @ 22:28)        145  |  108  |  47<H>  ----------------------------<  130<H>  3.3<L>   |  25  |  2.15<H>    Ca    7.9<L>      05 Sep 2019 18:30  Phos  3.7       Mg     2.5         TPro  5.5<L>  /  Alb  2.7<L>  /  TBili  0.5  /  DBili  x   /  AST  16  /  ALT  <5<L>  /  AlkPhos  43      LIVER FUNCTIONS - ( 05 Sep 2019 06:59 )  Alb: 2.7 g/dL / Pro: 5.5 g/dL / ALK PHOS: 43 U/L / ALT: <5 U/L / AST: 16 U/L / GGT: x           PT/INR - ( 04 Sep 2019 08:56 )   PT: 15.6 sec;   INR: 1.36 ratio         PTT - ( 04 Sep 2019 08:36 )  PTT:26.2 sec                            7.9    8.15  )-----------( 133      ( 05 Sep 2019 22:28 )             24.2                         6.5    6.32  )-----------( 140      ( 05 Sep 2019 09:42 )             20.0                         7.2    8.5   )-----------( 132      ( 04 Sep 2019 23:28 )             21.9                         7.5    8.0   )-----------( 145      ( 04 Sep 2019 08:36 )             23.1                         7.2    9.5   )-----------( 163      ( 04 Sep 2019 04:00 )             22.0       Imaging:    No new imaging

## 2019-09-06 NOTE — PROGRESS NOTE ADULT - SUBJECTIVE AND OBJECTIVE BOX
Patient seen and examined in bed. No pain, no SOB.      MEDICATIONS  (STANDING):  atorvastatin 80 milliGRAM(s) Oral at bedtime  carbidopa/levodopa CR 50/200 1 Tablet(s) Oral <User Schedule>  chlorhexidine 4% Liquid 1 Application(s) Topical <User Schedule>  entacapone 200 milliGRAM(s) Oral <User Schedule>  fluticasone propionate 50 MICROgram(s)/spray Nasal Spray 1 Spray(s) Both Nostrils two times a day  levothyroxine 50 MICROGram(s) Oral daily  pantoprazole    Tablet 40 milliGRAM(s) Oral before breakfast  polyethylene glycol 3350 17 Gram(s) Oral <User Schedule>  potassium chloride   Powder 20 milliEquivalent(s) Oral once  ranolazine 1000 milliGRAM(s) Oral two times a day  senna 2 Tablet(s) Oral two times a day  tamsulosin 0.4 milliGRAM(s) Oral daily      VITAL:  T(C): , Max: 36.9 (09-06-19 @ 00:23)  T(F): , Max: 98.4 (09-06-19 @ 00:23)  HR: 66 (09-06-19 @ 12:09)  BP: 137/71 (09-06-19 @ 12:09)  RR: 18 (09-06-19 @ 12:09)  SpO2: 98% (09-06-19 @ 12:09)    I and O's:    09-05 @ 07:01  -  09-06 @ 07:00  --------------------------------------------------------  IN: 1734 mL / OUT: 1050 mL / NET: 684 mL    09-06 @ 07:01  -  09-06 @ 13:28  --------------------------------------------------------  IN: 240 mL / OUT: 250 mL / NET: -10 mL          PHYSICAL EXAM:    Constitutional: NAD  Neck:  No JVD  Respiratory: CTAB/L  Cardiovascular: S1 and S2  Gastrointestinal: BS+, soft, NT/ND  Extremities: No peripheral edema  : + Lara  Skin: No rashes    LABS:                        7.2    6.97  )-----------( 128      ( 06 Sep 2019 10:33 )             22.5     09-06    145  |  109<H>  |  46<H>  ----------------------------<  114<H>  3.4<L>   |  23  |  1.85<H>    Ca    8.0<L>      06 Sep 2019 07:09  Phos  3.7     09-05  Mg     2.7     09-06    TPro  5.5<L>  /  Alb  2.7<L>  /  TBili  0.5  /  DBili  x   /  AST  16  /  ALT  <5<L>  /  AlkPhos  43  09-05      IMPRESSION: 89M w/ HTN, DVT-IVCF, Parkinson's disease, CAD-CABG, and CKD3, 8/28/19 a/w urinary retention/STEVEN  (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 At least in part due to past obstructive nephropathy, with resultant atrophy of his right kidney.  (2) - STEVEN from admission at least in part from obstructive uropathy. Failed trial of void within past 24hours - azotemia improving s/p lara reinsertion  (3)Hypokalemia - indicated for repletion  (4)Anemia - GIB - diverticulosis and large rectal ulcers on colonoscopy. s/p one unit PRBC yesterday, Hgb improving this AM    RECOMMEND:  (1)a/w KCl 20meq po x1  (2)Lara to gravity -  assistance as inpatient versus outpatient  (3)Dose new meds for GFR 30-40 ml/min        Ciara Garcia NP-C  [facility]  [phone] Patient seen and examined in bed. No pain, no SOB.      MEDICATIONS  (STANDING):  atorvastatin 80 milliGRAM(s) Oral at bedtime  carbidopa/levodopa CR 50/200 1 Tablet(s) Oral <User Schedule>  chlorhexidine 4% Liquid 1 Application(s) Topical <User Schedule>  entacapone 200 milliGRAM(s) Oral <User Schedule>  fluticasone propionate 50 MICROgram(s)/spray Nasal Spray 1 Spray(s) Both Nostrils two times a day  levothyroxine 50 MICROGram(s) Oral daily  pantoprazole    Tablet 40 milliGRAM(s) Oral before breakfast  polyethylene glycol 3350 17 Gram(s) Oral <User Schedule>  potassium chloride   Powder 20 milliEquivalent(s) Oral once  ranolazine 1000 milliGRAM(s) Oral two times a day  senna 2 Tablet(s) Oral two times a day  tamsulosin 0.4 milliGRAM(s) Oral daily      VITAL:  T(C): , Max: 36.9 (09-06-19 @ 00:23)  T(F): , Max: 98.4 (09-06-19 @ 00:23)  HR: 66 (09-06-19 @ 12:09)  BP: 137/71 (09-06-19 @ 12:09)  RR: 18 (09-06-19 @ 12:09)  SpO2: 98% (09-06-19 @ 12:09)    I and O's:    09-05 @ 07:01  -  09-06 @ 07:00  --------------------------------------------------------  IN: 1734 mL / OUT: 1050 mL / NET: 684 mL    09-06 @ 07:01  -  09-06 @ 13:28  --------------------------------------------------------  IN: 240 mL / OUT: 250 mL / NET: -10 mL          PHYSICAL EXAM:    Constitutional: NAD  Neck:  No JVD  Respiratory: CTAB/L  Cardiovascular: S1 and S2  Gastrointestinal: BS+, soft, NT/ND  Extremities: No peripheral edema  : + Lara  Skin: No rashes    LABS:                        7.2    6.97  )-----------( 128      ( 06 Sep 2019 10:33 )             22.5     09-06    145  |  109<H>  |  46<H>  ----------------------------<  114<H>  3.4<L>   |  23  |  1.85<H>    Ca    8.0<L>      06 Sep 2019 07:09  Phos  3.7     09-05  Mg     2.7     09-06    TPro  5.5<L>  /  Alb  2.7<L>  /  TBili  0.5  /  DBili  x   /  AST  16  /  ALT  <5<L>  /  AlkPhos  43  09-05      IMPRESSION: 89M w/ HTN, DVT-IVCF, Parkinson's disease, CAD-CABG, and CKD3, 8/28/19 a/w urinary retention/STEVEN  (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 At least in part due to past obstructive nephropathy, with resultant atrophy of his right kidney.  (2) - STEVEN from admission at least in part from obstructive uropathy. Failed trial of void within past 24hours - azotemia improving s/p lara reinsertion  (3)Hypokalemia - indicated for repletion  (4)Anemia - GIB - diverticulosis and large rectal ulcers on colonoscopy. s/p one unit PRBC yesterday    RECOMMEND:  (1)a/w KCl 20meq po x1  (2)Lara to gravity -  assistance as inpatient versus outpatient  (3)Dose new meds for GFR 30-40 ml/min        Ciara Garcia NP-C  [facility]  [phone]

## 2019-09-06 NOTE — CONSULT NOTE ADULT - SUBJECTIVE AND OBJECTIVE BOX
HPI:  NIGHT HOSPITALIST:   Patient UNKNOWN to me previously, assigned to me at this point via the ER and by Dr. 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  Patient reported that he had local trauma to the skin of his penis following an attempt to use a hand held urinal.  Patient found to have GI bleed 2/2 rectal ulcerations requiring multiple transfusions.    Dermatology consulted for red and black scalp lesion for one year. Initially small but has been growing more rapidly recently.  No bleeding, pain, itchiness. At rehab and has difficulty seeing doctors.       PAST MEDICAL & SURGICAL HISTORY:  Unilateral inguinal hernia, with gangrene, not specified as recurrent  Upper GI bleed: MICU admission , EGD w/ gastric ulcer/visible vessel which was cauterized  Aspiration pneumonia  Clostridium difficile colitis  Pneumonia  MI, old: age 55  UTI (urinary tract infection)  BPH (benign prostatic hyperplasia)  Parkinsons Disease  CAD (Coronary Artery Disease): s/p CABG and stents  HTN (Hypertension)  Hyperlipidemia  Presence of IVC filter: right upper arm placed last year   History of prostate surgery: 2016  Varicose veins of legs: h/o Vein stripping  S/P appendectomy  Stented coronary artery: cardiac stent x 2 - placement between  &amp; possible   Hx of CAB (age 65)      REVIEW OF SYSTEMS      General: no fevers/chills, no lethary	    Skin/Breast: see HPI  	  Ophthalmologic: no eye pain or change in vision  	  ENMT: no dysphagia or change in hearing    Respiratory and Thorax: no SOB or cough  	  Cardiovascular: no palpitations or chest pain    Gastrointestinal: no abdomenal pain or blood in stool     Genitourinary: no dysuria or frequency    Musculoskeletal: no joint pains or weakness	    Neurological:no weakness, numbness , or tingling    MEDICATIONS  (STANDING):  atorvastatin 80 milliGRAM(s) Oral at bedtime  carbidopa/levodopa CR 50/200 1 Tablet(s) Oral <User Schedule>  chlorhexidine 4% Liquid 1 Application(s) Topical <User Schedule>  entacapone 200 milliGRAM(s) Oral <User Schedule>  fluticasone propionate 50 MICROgram(s)/spray Nasal Spray 1 Spray(s) Both Nostrils two times a day  levothyroxine 50 MICROGram(s) Oral daily  pantoprazole    Tablet 40 milliGRAM(s) Oral before breakfast  polyethylene glycol 3350 17 Gram(s) Oral <User Schedule>  ranolazine 1000 milliGRAM(s) Oral two times a day  senna 2 Tablet(s) Oral two times a day  tamsulosin 0.4 milliGRAM(s) Oral daily    MEDICATIONS  (PRN):      Allergies    Cipro (Rash)    Intolerances        SOCIAL HISTORY:    FAMILY HISTORY:  Family history of coronary artery disease: Father,  of MI age 55  Family history of breast cancer (Aunt): Mother      Vital Signs Last 24 Hrs  T(C): 36.4 (06 Sep 2019 12:09), Max: 36.9 (06 Sep 2019 00:23)  T(F): 97.5 (06 Sep 2019 12:09), Max: 98.4 (06 Sep 2019 00:23)  HR: 66 (06 Sep 2019 12:09) (63 - 66)  BP: 137/71 (06 Sep 2019 12:09) (137/71 - 148/83)  BP(mean): --  RR: 18 (06 Sep 2019 12:09) (18 - 18)  SpO2: 98% (06 Sep 2019 12:09) (96% - 98%)    PHYSICAL EXAM:     The patient was alert and oriented X 3, well nourished, and in no  apparent distress.  OP showed no ulcerations  There was no visible lymphadenopathy.  Conjunctiva were non injected  There was no clubbing or edema of extremities.  The scalp, hair, face, eyebrows, lips, OP, neck, chest, back,   extremities X 4, nails were examined.  There was no hyperhidrosis or bromhidrosis.    Of note on skin exam: 2-3 cm crateriform erythematous plaque with keratin core on frontal scalp    LABS:                        7.2    6.97  )-----------( 128      ( 06 Sep 2019 10:33 )             22.5     09-    145  |  109<H>  |  46<H>  ----------------------------<  114<H>  3.4<L>   |  23  |  1.85<H>    Ca    8.0<L>      06 Sep 2019 07:09  Phos  3.7     -05  Mg     2.7     -    TPro  5.5<L>  /  Alb  2.7<L>  /  TBili  0.5  /  DBili  x   /  AST  16  /  ALT  <5<L>  /  AlkPhos  43  -          RADIOLOGY & ADDITIONAL STUDIES:

## 2019-09-06 NOTE — PROGRESS NOTE ADULT - SUBJECTIVE AND OBJECTIVE BOX
Subjective: Patient seen and examined. No new events except as noted.   resting comfortably   s/p PRBC transfusion yesterday     REVIEW OF SYSTEMS:    CONSTITUTIONAL:+ weakness, fevers or chills  EYES/ENT: No visual changes;  No vertigo or throat pain   NECK: No pain or stiffness  RESPIRATORY: No cough, wheezing, hemoptysis; No shortness of breath  CARDIOVASCULAR: No chest pain or palpitations  GASTROINTESTINAL: No abdominal or epigastric pain. No nausea, vomiting, or hematemesis; No diarrhea or constipation. No melena or hematochezia.  GENITOURINARY: No dysuria, frequency or hematuria  NEUROLOGICAL: No numbness or weakness  SKIN: No itching, burning, rashes, or lesions   All other review of systems is negative unless indicated above.    MEDICATIONS:  MEDICATIONS  (STANDING):  atorvastatin 80 milliGRAM(s) Oral at bedtime  carbidopa/levodopa CR 50/200 1 Tablet(s) Oral <User Schedule>  chlorhexidine 4% Liquid 1 Application(s) Topical <User Schedule>  entacapone 200 milliGRAM(s) Oral <User Schedule>  fluticasone propionate 50 MICROgram(s)/spray Nasal Spray 1 Spray(s) Both Nostrils two times a day  levothyroxine 50 MICROGram(s) Oral daily  polyethylene glycol 3350 17 Gram(s) Oral <User Schedule>  ranolazine 1000 milliGRAM(s) Oral two times a day  senna 2 Tablet(s) Oral two times a day  tamsulosin 0.4 milliGRAM(s) Oral daily      PHYSICAL EXAM:  T(C): 36.3 (09-06-19 @ 05:00), Max: 36.9 (09-06-19 @ 00:23)  HR: 63 (09-06-19 @ 05:00) (61 - 65)  BP: 137/73 (09-06-19 @ 05:00) (137/73 - 166/56)  RR: 18 (09-06-19 @ 05:00) (18 - 18)  SpO2: 98% (09-06-19 @ 05:00) (96% - 98%)  Wt(kg): --  I&O's Summary    05 Sep 2019 07:01  -  06 Sep 2019 07:00  --------------------------------------------------------  IN: 1734 mL / OUT: 1050 mL / NET: 684 mL        Appearance: NAD  HEENT:   Normal oral mucosa, PERRL, EOMI	  Lymphatic: No lymphadenopathy , right arm/shoulder sling   Cardiovascular: Irregular rS1 S2, No JVD, No murmurs , Peripheral pulses palpable 2+ bilaterally  Respiratory: Decreased bs and effort   Gastrointestinal:  Soft, Non-tender, + BS	  Skin: No rashes, No ecchymoses, No cyanosis, warm to touch  Musculoskeletal: Decreased range of motion and  strength  Psychiatry:  Mood & affect appropriate  Ext: No edema  +lara     LABS:    CARDIAC MARKERS:                                7.9    8.15  )-----------( 133      ( 05 Sep 2019 22:28 )             24.2     09-06    145  |  109<H>  |  46<H>  ----------------------------<  114<H>  3.4<L>   |  23  |  1.85<H>    Ca    8.0<L>      06 Sep 2019 07:09  Phos  3.7     09-05  Mg     2.7     09-06    TPro  5.5<L>  /  Alb  2.7<L>  /  TBili  0.5  /  DBili  x   /  AST  16  /  ALT  <5<L>  /  AlkPhos  43  09-05    proBNP:   Lipid Profile:   HgA1c:   TSH:             TELEMETRY: 	    ECG:  	  RADIOLOGY:   DIAGNOSTIC TESTING:  [ ] Echocardiogram:  [ ]  Catheterization:  [ ] Stress Test:    OTHER:

## 2019-09-06 NOTE — PROGRESS NOTE ADULT - ASSESSMENT
88 yo male with CAD s/p CABG, Afib, Parkinsons disease, found to have BRBPR overnight. Now admitted to the ICU and awaiting Colonoscopy

## 2019-09-06 NOTE — PROGRESS NOTE ADULT - ASSESSMENT
89 y/o M w/ hx of CAD s/p CABG s/p PCI, ICM, and Parkinson's disease with recent right humeral fracture, with admission for hematuria c/b aspiration pneumonia, now with hematochezia.    Impression:  # Acute blood loss anemia with hematochezia: Currently with no active bleeding, HD stable, and with Hgb of 7.9 up from 6.5 after 1 unit pRBCs. Colonoscopy on 9/4/19 revealed multiple rectal ulcers and ulcerated mucosa in the anal canal - may be secondary to fecal impaction versus malignancy.  # CAD s/p CABG s/p PCI  # ICM  # Parkinson's disease    Recommendations:  - Aggressive bowel regimen - Miralax PO BID  - No rectal tube  - Repeat Flex Sig versus anoscopy by Colorectal Surgery to assess ulcer healing in 2 to 4 weeks  - Patient declining repeat colonoscopy for polyp removal  - Monitor CBCs daily  - Monitor bowel movements  - Transfuse for goal Hgb >/= 7.0  - Rest of care per primary team  - Please call GI ([phone]) if there are any additional questions or concerns. Please call on-call GI fellow after 5pm and before 8am, and on weekends.    Constantine Rose MD  Gastroenterology Fellow  Pager number: [phone] / [phone] 88 y/o M w/ hx of CAD s/p CABG s/p PCI, ICM, and Parkinson's disease with recent right humeral fracture, with admission for hematuria c/b aspiration pneumonia, now with hematochezia.    Impression:  # Acute blood loss anemia with hematochezia: Currently with no active bleeding, HD stable, and with Hgb of 7.9 up from 6.5 after 1 unit pRBCs. Colonoscopy on 9/4/19 revealed multiple rectal ulcers and ulcerated mucosa in the anal canal - may be secondary to fecal impaction versus malignancy.  # CAD s/p CABG s/p PCI  # ICM  # Parkinson's disease    Recommendations:  - Aggressive bowel regimen, senna and Miralax PO TID  - No rectal tube  - Repeat Flex Sig versus anoscopy by Colorectal Surgery to assess ulcer healing in 2 to 4 weeks  - Patient declining repeat colonoscopy for polyp removal  - Monitor CBCs daily  - Monitor bowel movements  - Transfuse for goal Hgb >/= 7.0  - Rest of care per primary team  - Please call GI ([phone]) if there are any additional questions or concerns. Please call on-call GI fellow after 5pm and before 8am, and on weekends.    Constantine Rose MD  Gastroenterology Fellow  Pager number: [phone] / [phone]

## 2019-09-06 NOTE — CONSULT NOTE ADULT - ASSESSMENT
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  Recommend out-patient biopsy upon discharge  Do not recommend in-patient biopsy as concern for follow up and definitive treatment   Discussed importance of biopsy and treating lesion given concern for NMSC.     Patient staffed with Dr. Patel    Derm will sign off. Please page [phone] with questions or if patient's skin concern needs to be re-evaluated.    Patient can follow up with dermatology at [address] [place] [address]. Patient should call [phone] to schedule appointment with Dr. Patel.     Mary Dove MD PGY-3  [facility] Dermatology  Pager: [phone]  Office: [phone]

## 2019-09-06 NOTE — PROGRESS NOTE ADULT - ASSESSMENT
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 - mild left kidney hydronephrosis -> kidney function improving    urinary retention - lara catheter placement now with hematuria without infection and penile swelling/cellulitis    CAD/MI/CABG/PCI - ischemic cardiomyopathy    GIB- currently stable clinically    PLAN/RECOMMENDATIONS:  GI/surgery follow up noted  observe off pulmonary medications- can use prn robitussin if necessary  attention to fluid status  continue dyspnagia 2 diet-intermittent microaspiration certainly possible.  no pneumonia at present  aspiration precautions  cardiac meds:   neuro meds:   incentive spirometry  PT as able  will not actively follow.  please call if we can assist    Heidi Yip MD, [facility]  [phone]  Pulmonary Medicine

## 2019-09-06 NOTE — PROGRESS NOTE ADULT - SUBJECTIVE AND OBJECTIVE BOX
Follow-up Pulm Progress Note    No new respiratory events overnight.  Denies increased SOB, chest pain, cough or mucus.    Medications:  MEDICATIONS  (STANDING):  atorvastatin 80 milliGRAM(s) Oral at bedtime  carbidopa/levodopa CR 50/200 1 Tablet(s) Oral <User Schedule>  chlorhexidine 4% Liquid 1 Application(s) Topical <User Schedule>  entacapone 200 milliGRAM(s) Oral <User Schedule>  fluticasone propionate 50 MICROgram(s)/spray Nasal Spray 1 Spray(s) Both Nostrils two times a day  levothyroxine 50 MICROGram(s) Oral daily  polyethylene glycol 3350 17 Gram(s) Oral <User Schedule>  ranolazine 1000 milliGRAM(s) Oral two times a day  senna 2 Tablet(s) Oral two times a day  tamsulosin 0.4 milliGRAM(s) Oral daily    MEDICATIONS  (PRN):      Vent settings (if applicable)      Vital Signs Last 24 Hrs  T(C): 36.3 (06 Sep 2019 05:00), Max: 36.9 (06 Sep 2019 00:23)  T(F): 97.4 (06 Sep 2019 05:00), Max: 98.4 (06 Sep 2019 00:23)  HR: 63 (06 Sep 2019 05:00) (61 - 65)  BP: 137/73 (06 Sep 2019 05:00) (137/73 - 166/56)  BP(mean): --  RR: 18 (06 Sep 2019 05:00) (18 - 18)  SpO2: 98% (06 Sep 2019 05:00) (96% - 98%)          09-05 @ 07:01  -  09-06 @ 07:00  --------------------------------------------------------  IN: 1734 mL / OUT: 1050 mL / NET: 684 mL          LABS:                        7.9    8.15  )-----------( 133      ( 05 Sep 2019 22:28 )             24.2     09-06    145  |  109<H>  |  46<H>  ----------------------------<  114<H>  3.4<L>   |  23  |  1.85<H>    Ca    8.0<L>      06 Sep 2019 07:09  Phos  3.7     09-05  Mg     2.7     09-06    TPro  5.5<L>  /  Alb  2.7<L>  /  TBili  0.5  /  DBili  x   /  AST  16  /  ALT  <5<L>  /  AlkPhos  43  09-05          CAPILLARY BLOOD GLUCOSE                  CULTURES:        Physical Examination:  Awake and alert, generally comfortable  HEENT: unremarkable  PULM: Clear to auscultation bilaterally, no significant sputum production  CVS: Regular rate and rhythm, no murmurs, rubs, or gallops  Abd:  soft, non tender  Extrem: No CCE    RADIOLOGY REVIEWED  CXR:    CT chest:

## 2019-09-06 NOTE — PROGRESS NOTE ADULT - SUBJECTIVE AND OBJECTIVE BOX
[facility]      Seen earlier today, and examined.  - Today, patient is without complaints.           *****MEDICATIONS: Current medication reviewed and documented.    MEDICATIONS  (STANDING):  atorvastatin 80 milliGRAM(s) Oral at bedtime  carbidopa/levodopa CR 50/200 1 Tablet(s) Oral <User Schedule>  chlorhexidine 4% Liquid 1 Application(s) Topical <User Schedule>  entacapone 200 milliGRAM(s) Oral <User Schedule>  fluticasone propionate 50 MICROgram(s)/spray Nasal Spray 1 Spray(s) Both Nostrils two times a day  levothyroxine 50 MICROGram(s) Oral daily  polyethylene glycol 3350 17 Gram(s) Oral <User Schedule>  ranolazine 1000 milliGRAM(s) Oral two times a day  senna 2 Tablet(s) Oral two times a day  tamsulosin 0.4 milliGRAM(s) Oral daily    MEDICATIONS  (PRN):          ***** VITAL SIGNS:  T(F): 97.5 (19 @ 12:09), Max: 98.4 (19 @ 00:23)  HR: 66 (19 @ 12:09) (61 - 66)  BP: 137/71 (19 @ 12:09) (137/71 - 166/56)  RR: 18 (19 @ 12:09) (18 - 18)  SpO2: 98% (19 @ 12:09) (96% - 98%)  Wt(kg): --  ,   I&O's Summary    05 Sep 2019 07:  -  06 Sep 2019 07:00  --------------------------------------------------------  IN: 1734 mL / OUT: 1050 mL / NET: 684 mL    06 Sep 2019 07:  -  06 Sep 2019 12:34  --------------------------------------------------------  IN: 240 mL / OUT: 250 mL / NET: -10 mL             *****PHYSICAL EXAM: Alert oriented x 2  Attention comprehension are fair. Able to name, repeat  without any difficulty.   Able to follow 1-2  step commands.     EOMI fundi not visualized,  VFF to confrontration  No facial asymmetry   Tongue is midline   Palate elevates symmetrically   Moving all 4 ext symmetrically no pronator drift  limited eval of rue due to sling      sensation is grossly symmetric             *****LAB AND IMAGIN.2    6.97  )-----------( 128      ( 06 Sep 2019 10:33 )             22.5               -    145  |  109<H>  |  46<H>  ----------------------------<  114<H>  3.4<L>   |  23  |  1.85<H>    Ca    8.0<L>      06 Sep 2019 07:09  Phos  3.7     -  Mg     2.7     -    TPro  5.5<L>  /  Alb  2.7<L>  /  TBili  0.5  /  DBili  x   /  AST  16  /  ALT  <5<L>  /  AlkPhos  43  -05                         [All pertinent recent Imaging/Reports reviewed]           *****A S S E S S M E N T   A N D   P L A N :    Excerpt from H&P, 88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  Patient reported that he had local trauma to the skin of his penis following an attempt to use a hand held urinal.   Patient also notes that patient was on a ? commode and was noted to have increased scrotal oedema and redness. called to manage his parkinsons dementia. unable to tell me the name of his neurologist.  He reports having hallucinations, reports hearing his daughter and friend planning to get him into rehab, however it appears that they both were not there.   He is not sure if these are dreams.   He doesn't know if he act out his dreams.          Problem/Recommendations 1: Parkinson's   continue sinemet/entacapone  ? hallucinations related to parkinson's vs. metabolic encephalopathy due to STEVEN   will continue to monitor closely   regulate sleep wake cycle.   consider melatonin for sleep augmentation.     Problem/Recommendations 2:  Falls likely related to parkinsons +/- deconditioning +/- microvascular disease.   fall precautions.   pt consult  appreciated recommend jeannie       Thank you for allowing me to participate in the care of this patient. Please do not hesitate to call me if you have any  questions.        ________________  Farideh Irvin MD  [facility] ([facility]  [phone]      30 minutes spent on total encounter; more than 50 % of the visit was  spent counseling about plan of care, compliance to diet/exercise and medication regimen and or  coordinating care by the attending physician.      It is advised that stroke patients follow up with GUADALUPE Viera @ [phone] in 1- 2 weeks.   Others please follow up with Dr. Michael Nissenbaum [phone]

## 2019-09-06 NOTE — PROGRESS NOTE ADULT - PROBLEM SELECTOR PLAN 1
s/p EGD and Colon.   Multiple large rectal ulcers. Started on Miralax BID   GI following  s/p PRBC transfusion again yesterday

## 2019-09-07 LAB
ANION GAP SERPL CALC-SCNC: 10 MMOL/L — SIGNIFICANT CHANGE UP (ref 5–17)
BASOPHILS # BLD AUTO: 0.02 K/UL — SIGNIFICANT CHANGE UP (ref 0–0.2)
BASOPHILS NFR BLD AUTO: 0.2 % — SIGNIFICANT CHANGE UP (ref 0–2)
BLD GP AB SCN SERPL QL: NEGATIVE — SIGNIFICANT CHANGE UP
BUN SERPL-MCNC: 40 MG/DL — HIGH (ref 7–23)
CALCIUM SERPL-MCNC: 8.3 MG/DL — LOW (ref 8.4–10.5)
CHLORIDE SERPL-SCNC: 109 MMOL/L — HIGH (ref 96–108)
CO2 SERPL-SCNC: 25 MMOL/L — SIGNIFICANT CHANGE UP (ref 22–31)
CREAT SERPL-MCNC: 1.69 MG/DL — HIGH (ref 0.5–1.3)
EOSINOPHIL # BLD AUTO: 0.24 K/UL — SIGNIFICANT CHANGE UP (ref 0–0.5)
EOSINOPHIL NFR BLD AUTO: 2.8 % — SIGNIFICANT CHANGE UP (ref 0–6)
GLUCOSE SERPL-MCNC: 118 MG/DL — HIGH (ref 70–99)
HCT VFR BLD CALC: 23.2 % — LOW (ref 39–50)
HGB BLD-MCNC: 7.3 G/DL — LOW (ref 13–17)
IMM GRANULOCYTES NFR BLD AUTO: 0.8 % — SIGNIFICANT CHANGE UP (ref 0–1.5)
LYMPHOCYTES # BLD AUTO: 0.78 K/UL — LOW (ref 1–3.3)
LYMPHOCYTES # BLD AUTO: 9.1 % — LOW (ref 13–44)
MAGNESIUM SERPL-MCNC: 2.6 MG/DL — SIGNIFICANT CHANGE UP (ref 1.6–2.6)
MCHC RBC-ENTMCNC: 29.6 PG — SIGNIFICANT CHANGE UP (ref 27–34)
MCHC RBC-ENTMCNC: 31.5 GM/DL — LOW (ref 32–36)
MCV RBC AUTO: 93.9 FL — SIGNIFICANT CHANGE UP (ref 80–100)
MONOCYTES # BLD AUTO: 0.68 K/UL — SIGNIFICANT CHANGE UP (ref 0–0.9)
MONOCYTES NFR BLD AUTO: 7.9 % — SIGNIFICANT CHANGE UP (ref 2–14)
NEUTROPHILS # BLD AUTO: 6.81 K/UL — SIGNIFICANT CHANGE UP (ref 1.8–7.4)
NEUTROPHILS NFR BLD AUTO: 79.2 % — HIGH (ref 43–77)
PLATELET # BLD AUTO: 135 K/UL — LOW (ref 150–400)
POTASSIUM SERPL-MCNC: 3.7 MMOL/L — SIGNIFICANT CHANGE UP (ref 3.5–5.3)
POTASSIUM SERPL-SCNC: 3.7 MMOL/L — SIGNIFICANT CHANGE UP (ref 3.5–5.3)
RBC # BLD: 2.47 M/UL — LOW (ref 4.2–5.8)
RBC # FLD: 15 % — HIGH (ref 10.3–14.5)
RH IG SCN BLD-IMP: POSITIVE — SIGNIFICANT CHANGE UP
SODIUM SERPL-SCNC: 144 MMOL/L — SIGNIFICANT CHANGE UP (ref 135–145)
WBC # BLD: 8.6 K/UL — SIGNIFICANT CHANGE UP (ref 3.8–10.5)
WBC # FLD AUTO: 8.6 K/UL — SIGNIFICANT CHANGE UP (ref 3.8–10.5)

## 2019-09-07 RX ADMIN — SENNA PLUS 2 TABLET(S): 8.6 TABLET ORAL at 17:49

## 2019-09-07 RX ADMIN — ENTACAPONE 200 MILLIGRAM(S): 200 TABLET, FILM COATED ORAL at 08:59

## 2019-09-07 RX ADMIN — ATORVASTATIN CALCIUM 80 MILLIGRAM(S): 80 TABLET, FILM COATED ORAL at 21:02

## 2019-09-07 RX ADMIN — TAMSULOSIN HYDROCHLORIDE 0.4 MILLIGRAM(S): 0.4 CAPSULE ORAL at 13:42

## 2019-09-07 RX ADMIN — CARBIDOPA AND LEVODOPA 1 TABLET(S): 25; 100 TABLET ORAL at 18:59

## 2019-09-07 RX ADMIN — ENTACAPONE 200 MILLIGRAM(S): 200 TABLET, FILM COATED ORAL at 21:02

## 2019-09-07 RX ADMIN — ENTACAPONE 200 MILLIGRAM(S): 200 TABLET, FILM COATED ORAL at 17:49

## 2019-09-07 RX ADMIN — Medication 1 SPRAY(S): at 06:42

## 2019-09-07 RX ADMIN — PANTOPRAZOLE SODIUM 40 MILLIGRAM(S): 20 TABLET, DELAYED RELEASE ORAL at 06:47

## 2019-09-07 RX ADMIN — ENTACAPONE 200 MILLIGRAM(S): 200 TABLET, FILM COATED ORAL at 13:41

## 2019-09-07 RX ADMIN — POLYETHYLENE GLYCOL 3350 17 GRAM(S): 17 POWDER, FOR SOLUTION ORAL at 06:47

## 2019-09-07 RX ADMIN — CARBIDOPA AND LEVODOPA 1 TABLET(S): 25; 100 TABLET ORAL at 13:41

## 2019-09-07 RX ADMIN — POLYETHYLENE GLYCOL 3350 17 GRAM(S): 17 POWDER, FOR SOLUTION ORAL at 13:42

## 2019-09-07 RX ADMIN — Medication 1 SPRAY(S): at 17:49

## 2019-09-07 RX ADMIN — POLYETHYLENE GLYCOL 3350 17 GRAM(S): 17 POWDER, FOR SOLUTION ORAL at 21:02

## 2019-09-07 RX ADMIN — CHLORHEXIDINE GLUCONATE 1 APPLICATION(S): 213 SOLUTION TOPICAL at 06:42

## 2019-09-07 RX ADMIN — CARBIDOPA AND LEVODOPA 1 TABLET(S): 25; 100 TABLET ORAL at 08:59

## 2019-09-07 RX ADMIN — RANOLAZINE 1000 MILLIGRAM(S): 500 TABLET, FILM COATED, EXTENDED RELEASE ORAL at 13:42

## 2019-09-07 RX ADMIN — CARBIDOPA AND LEVODOPA 1 TABLET(S): 25; 100 TABLET ORAL at 21:18

## 2019-09-07 RX ADMIN — SENNA PLUS 2 TABLET(S): 8.6 TABLET ORAL at 06:47

## 2019-09-07 RX ADMIN — Medication 50 MICROGRAM(S): at 06:47

## 2019-09-07 NOTE — PROGRESS NOTE ADULT - SUBJECTIVE AND OBJECTIVE BOX
NEPHROLOGY    Patient seen and examined.    MEDICATIONS  (STANDING):  atorvastatin 80 milliGRAM(s) Oral at bedtime  carbidopa/levodopa CR 50/200 1 Tablet(s) Oral <User Schedule>  chlorhexidine 4% Liquid 1 Application(s) Topical <User Schedule>  entacapone 200 milliGRAM(s) Oral <User Schedule>  fluticasone propionate 50 MICROgram(s)/spray Nasal Spray 1 Spray(s) Both Nostrils two times a day  levothyroxine 50 MICROGram(s) Oral daily  pantoprazole    Tablet 40 milliGRAM(s) Oral before breakfast  polyethylene glycol 3350 17 Gram(s) Oral <User Schedule>  ranolazine 1000 milliGRAM(s) Oral two times a day  senna 2 Tablet(s) Oral two times a day  tamsulosin 0.4 milliGRAM(s) Oral daily    VITALS:  T(C): , Max: 37.3 (09-07-19 @ 00:44)  T(F): , Max: 99.1 (09-07-19 @ 00:44)  HR: 61 (09-07-19 @ 14:01)  BP: 104/57 (09-07-19 @ 14:01)  BP(mean): --  RR: 18 (09-07-19 @ 14:01)  SpO2: 98% (09-07-19 @ 14:01)  Wt(kg): --  I and O's:    09-06 @ 07:01  -  09-07 @ 07:00  --------------------------------------------------------  IN: 960 mL / OUT: 900 mL / NET: 60 mL    09-07 @ 07:01  -  09-07 @ 15:53  --------------------------------------------------------  IN: 240 mL / OUT: 275 mL / NET: -35 mL          REVIEW OF SYSTEMS:  Full ROS done and were negative unless otherwise indicated in HPI/assessment.     PHYSICAL EXAM:  Constitutional: NAD  Respiratory: CTA B/L  Cardiovascular: S1 and S2  Gastrointestinal: + BS, soft, NT, ND  Extremities: no peripheral edema  Neurological: AAO x 3  : no lara  Access: HD tunneled catheter, temp HD catheter, AVG/AVF    LABS:                        7.3    8.60  )-----------( 135      ( 07 Sep 2019 10:33 )             23.2     09-07    144  |  109<H>  |  40<H>  ----------------------------<  118<H>  3.7   |  25  |  1.69<H>    Ca    8.3<L>      07 Sep 2019 07:42  Mg     2.6     09-07        Urine Studies:        RADIOLOGY & ADDITIONAL STUDIES:      ASSESSMENT    NNEKA AlvaresC  [facility]  [phone] NEPHROLOGY    Patient seen and examined.    MEDICATIONS  (STANDING):  atorvastatin 80 milliGRAM(s) Oral at bedtime  carbidopa/levodopa CR 50/200 1 Tablet(s) Oral <User Schedule>  chlorhexidine 4% Liquid 1 Application(s) Topical <User Schedule>  entacapone 200 milliGRAM(s) Oral <User Schedule>  fluticasone propionate 50 MICROgram(s)/spray Nasal Spray 1 Spray(s) Both Nostrils two times a day  levothyroxine 50 MICROGram(s) Oral daily  pantoprazole    Tablet 40 milliGRAM(s) Oral before breakfast  polyethylene glycol 3350 17 Gram(s) Oral <User Schedule>  ranolazine 1000 milliGRAM(s) Oral two times a day  senna 2 Tablet(s) Oral two times a day  tamsulosin 0.4 milliGRAM(s) Oral daily    VITALS:  T(C): , Max: 37.3 (09-07-19 @ 00:44)  T(F): , Max: 99.1 (09-07-19 @ 00:44)  HR: 61 (09-07-19 @ 14:01)  BP: 104/57 (09-07-19 @ 14:01)  BP(mean): --  RR: 18 (09-07-19 @ 14:01)  SpO2: 98% (09-07-19 @ 14:01)  Wt(kg): --  I and O's:    09-06 @ 07:01  -  09-07 @ 07:00  --------------------------------------------------------  IN: 960 mL / OUT: 900 mL / NET: 60 mL    09-07 @ 07:01  -  09-07 @ 15:53  --------------------------------------------------------  IN: 240 mL / OUT: 275 mL / NET: -35 mL          REVIEW OF SYSTEMS:  Full ROS done and were negative unless otherwise indicated in HPI/assessment.     PHYSICAL EXAM:  Constitutional: NAD  Neck:  No JVD  Respiratory: CTAB/L  Cardiovascular: S1 and S2  Gastrointestinal: BS+, soft, NT/ND  Extremities: No peripheral edema  : + Lara  Skin: No rashes      LABS:                        7.3    8.60  )-----------( 135      ( 07 Sep 2019 10:33 )             23.2     09-07    144  |  109<H>  |  40<H>  ----------------------------<  118<H>  3.7   |  25  |  1.69<H>    Ca    8.3<L>      07 Sep 2019 07:42  Mg     2.6     09-07        Urine Studies:        RADIOLOGY & ADDITIONAL STUDIES:      ASSESSMENT  89M w/ HTN, DVT-IVCF, Parkinson's disease, CAD-CABG, and CKD3, 8/28/19 a/w urinary retention/STEVEN  -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 At least in part due to past obstructive nephropathy, with resultant atrophy of his right kidney.  - - STEVEN from admission at least in part from obstructive uropathy. Failed trial of void within past 24hours - azotemia improving s/p lara reinsertion  -Hypokalemia - resolved  -Anemia - GIB - diverticulosis and large rectal ulcers on colonoscopy. s/p one unit PRBC yesterday    RECOMMEND:  -daily PO Protonix as ordered  -BMP daily  -Lara to gravity -  assistance as inpatient versus outpatient  -Dose new meds for GFR 30-40 ml/min      Lacey Dangelo NP-C  [facility]  [phone]

## 2019-09-07 NOTE — PROGRESS NOTE ADULT - ASSESSMENT
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  Patient reported that he had local trauma to the skin of his penis following an attempt to use a hand held urinal.   Patient also notes that patient was on a ? commode and was noted to have increased scrotal oedema and redness. (28 Aug 2019 20:42)    ER VSS, no leukocytosis.  Cr 2.8.  UA (+) nit/LE.  Large bld.  WBC 6-10, >50 RBC.  Ucx and Bcx (-).  CT pelv no buttock abscess or subcut gas, increased stool in the rectum and mild stercoral colitis.  CT chest shows pulm edema with small b/l pleural effusions.  US kidney/bladder shows moderate R hydronephrosis and mild L hydronephrosis.      Pt seen by Urology for hematuria, s/p Lara replacement.  Hematuria has been improving with mild irrigation.      Pt currently on vanco/zosyn for groin cellulitis.  ID consult called for further abx managment.      r/o Groin cellulitis:    -  Skin changes suggestive of moisture associated dermatitis >> cellulitis.  s/p abx x 7 days of abx.    Keep area dry and clean of feces/urine.  s/p nystatin powder to treat for localized candidiasis.  Keep area free from soilage.     - No signs of Eliza's gangrene    * Pt remains stable off abx.         Urinary retention:    - Post obstructive uropathy, b/l hydronephrosis seen.  s/p Lara.  Hematuria resolved with minimal irrigation.  ct ap shows b/l hydronephrosis - likely secondary to large stool burden.  Pt with possible stercoral colitis - pt already completed abx.  Cont bowel regimen    - Urine cultures NGTD    - STEVEN on CKD.  Cr improving post lara placement, off lasix.  Cont tamsulosin.  Pt failed TOV.      GIB:    - s/p colonoscopy 9/4 revealing multiple rectal ulcers and ulcerated mucosa in anal canal.  Pt with fecal impaction - continued on aggressive bowel regimen.    - s/p transfusion pRBCs, no new BRPBR.  No abd pain.  Awaiting BM post colonoscopy.  for possible repeat flex sig/anoscopy.     - GI and colorectal surgery following      No acute ID issues at this time.         Jasmine Ramirez  [phone]

## 2019-09-07 NOTE — PROGRESS NOTE ADULT - SUBJECTIVE AND OBJECTIVE BOX
Infectious Diseases progress note:    Subjective:  Events noted.  Pt comfortably, reading newspaper.  No fever, chills, rigors, cp, abd pain, diarrhea.  Pt seen by derm for scalp lesion.  Groin rash and penile swelling improved.  Pt failed TOV on 9/5, lara replaced.     ROS:  CONSTITUTIONAL:  No fever, chills, rigors  CARDIOVASCULAR:  No chest pain or palpitations  RESPIRATORY:   No SOB, cough, dyspnea on exertion.  No wheezing  GASTROINTESTINAL:  No abd pain, N/V, diarrhea/constipation  EXTREMITIES:  No swelling or joint pain  GENITOURINARY:  No burning on urination, increased frequency or urgency.  No flank pain  NEUROLOGIC:  No HA, visual disturbances  SKIN: No rashes    Allergies    Cipro (Rash)    Intolerances        ANTIBIOTICS/RELEVANT:  antimicrobials    immunologic:    OTHER:  atorvastatin 80 milliGRAM(s) Oral at bedtime  carbidopa/levodopa CR 50/200 1 Tablet(s) Oral <User Schedule>  chlorhexidine 4% Liquid 1 Application(s) Topical <User Schedule>  entacapone 200 milliGRAM(s) Oral <User Schedule>  fluticasone propionate 50 MICROgram(s)/spray Nasal Spray 1 Spray(s) Both Nostrils two times a day  levothyroxine 50 MICROGram(s) Oral daily  pantoprazole    Tablet 40 milliGRAM(s) Oral before breakfast  polyethylene glycol 3350 17 Gram(s) Oral <User Schedule>  ranolazine 1000 milliGRAM(s) Oral two times a day  senna 2 Tablet(s) Oral two times a day  tamsulosin 0.4 milliGRAM(s) Oral daily      Objective:  Vital Signs Last 24 Hrs  T(C): 36.4 (07 Sep 2019 06:39), Max: 37.3 (07 Sep 2019 00:44)  T(F): 97.5 (07 Sep 2019 06:39), Max: 99.1 (07 Sep 2019 00:44)  HR: 66 (07 Sep 2019 06:39) (60 - 68)  BP: 128/52 (07 Sep 2019 06:39) (128/52 - 144/68)  BP(mean): --  RR: 18 (07 Sep 2019 06:39) (18 - 18)  SpO2: 95% (07 Sep 2019 06:39) (95% - 99%)    PHYSICAL EXAM:  Constitutional:NAD  Eyes:FLORY, EOMI  Ear/Nose/Throat: no thrush, mucositis.  Moist mucous membranes	  Neck:no JVD, no lymphadenopathy, supple  Respiratory: CTA tran  Cardiovascular: S1S2 RRR, no murmurs  Gastrointestinal:soft, nontender,  nondistended (+) BS  Extremities:no e/e/c  Skin:  no rashes, open wounds or ulcerations, groin and lower back rash resolving.   :  penile swelling resolving.  Lara draining clear yellow urine        LABS:                        7.3    8.60  )-----------( 135      ( 07 Sep 2019 10:33 )             23.2     09-07    144  |  109<H>  |  40<H>  ----------------------------<  118<H>  3.7   |  25  |  1.69<H>    Ca    8.3<L>      07 Sep 2019 07:42  Mg     2.6     09-07          MICROBIOLOGY:    Culture - Blood (08.28.19 @ 17:55)    Specimen Source: .Blood    Culture Results:   No growth at 5 days.    Culture - Blood (08.28.19 @ 17:55)    Specimen Source: .Blood    Culture Results:   No growth at 5 days.    Culture - Urine (08.28.19 @ 17:52)    Specimen Source: .Urine    Culture Results:   No growth          RADIOLOGY & ADDITIONAL STUDIES:    < from: Xray Chest 1 View- PORTABLE-Urgent (09.04.19 @ 03:29) >  Impression:    The heart is enlarged.The lungs are clear. NG tube is in the stomach.   Status post sternotomy. No pneumothorax.    < end of copied text >        < from: CT Abdomen and Pelvis w/ IV Cont (09.04.19 @ 00:35) >    IMPRESSION:    Study limited by artifact and delayed arterial phase.    Interval disimpaction of the rectal vault with marked rectal wall edema,   consistent with proctitis, with linear enhancement in craniocaudal   orientation at the posterior central rectal wall with extension to the   anus, not significantly changed from arterial to venous phase imaging. In   the setting of recent stercoral colitis, findings may be on the basis of   a bleeding ulcer/rectal fissure. Differential also includes rectal   varix/hemorrhoid with or without active bleeding. Recommend colonoscopy.    Chronic right renal atrophy with unchanged moderate hydroureteronephrosis   to the level of the UVJ since 2016. Additionally, a 1.5 cm higher than   fluid attenuation indeterminate right renal lower pole exophytic lesion.   Recommend further evaluation with nonurgent ultrasound or MR.    < end of copied text >

## 2019-09-07 NOTE — PROGRESS NOTE ADULT - PROBLEM SELECTOR PLAN 1
s/p EGD and Colon.   Multiple large rectal ulcers.  Miralax BID   GI following  s/p PRBC transfusion

## 2019-09-07 NOTE — PROGRESS NOTE ADULT - SUBJECTIVE AND OBJECTIVE BOX
Subjective: Patient seen and examined. No new events except as noted.   resting comfortably       REVIEW OF SYSTEMS:    CONSTITUTIONAL: + weakness, fevers or chills  EYES/ENT: No visual changes;  No vertigo or throat pain   NECK: No pain or stiffness  RESPIRATORY: No cough, wheezing, hemoptysis; No shortness of breath  CARDIOVASCULAR: No chest pain or palpitations  GASTROINTESTINAL: No abdominal or epigastric pain. No nausea, vomiting, or hematemesis; No diarrhea or constipation. No melena or hematochezia.  GENITOURINARY: No dysuria, frequency or hematuria  NEUROLOGICAL: No numbness or weakness  SKIN: No itching, burning, rashes, or lesions   All other review of systems is negative unless indicated above.    MEDICATIONS:  MEDICATIONS  (STANDING):  atorvastatin 80 milliGRAM(s) Oral at bedtime  carbidopa/levodopa CR 50/200 1 Tablet(s) Oral <User Schedule>  chlorhexidine 4% Liquid 1 Application(s) Topical <User Schedule>  entacapone 200 milliGRAM(s) Oral <User Schedule>  fluticasone propionate 50 MICROgram(s)/spray Nasal Spray 1 Spray(s) Both Nostrils two times a day  levothyroxine 50 MICROGram(s) Oral daily  pantoprazole    Tablet 40 milliGRAM(s) Oral before breakfast  polyethylene glycol 3350 17 Gram(s) Oral <User Schedule>  ranolazine 1000 milliGRAM(s) Oral two times a day  senna 2 Tablet(s) Oral two times a day  tamsulosin 0.4 milliGRAM(s) Oral daily      PHYSICAL EXAM:  T(C): 36.5 (09-07-19 @ 20:44), Max: 37.3 (09-07-19 @ 00:44)  HR: 67 (09-07-19 @ 20:44) (61 - 68)  BP: 156/73 (09-07-19 @ 20:44) (104/57 - 156/73)  RR: 18 (09-07-19 @ 20:44) (18 - 18)  SpO2: 99% (09-07-19 @ 20:44) (95% - 99%)  Wt(kg): --  I&O's Summary    06 Sep 2019 07:01  -  07 Sep 2019 07:00  --------------------------------------------------------  IN: 960 mL / OUT: 900 mL / NET: 60 mL    07 Sep 2019 07:01  -  07 Sep 2019 21:00  --------------------------------------------------------  IN: 240 mL / OUT: 275 mL / NET: -35 mL          Appearance: NAD  HEENT:   Normal oral mucosa, PERRL, EOMI	  Lymphatic: No lymphadenopathy , right arm/shoulder sling   Cardiovascular: Irregular rS1 S2, No JVD, No murmurs , Peripheral pulses palpable 2+ bilaterally  Respiratory: Decreased bs and effort   Gastrointestinal:  Soft, Non-tender, + BS	  Skin: No rashes, No ecchymoses, No cyanosis, warm to touch  Musculoskeletal: Decreased range of motion and  strength  Psychiatry:  Mood & affect appropriate  Ext: No edema  +lara       LABS:    CARDIAC MARKERS:                                7.3    8.60  )-----------( 135      ( 07 Sep 2019 10:33 )             23.2     09-07    144  |  109<H>  |  40<H>  ----------------------------<  118<H>  3.7   |  25  |  1.69<H>    Ca    8.3<L>      07 Sep 2019 07:42  Mg     2.6     09-07      proBNP:   Lipid Profile:   HgA1c:   TSH:             TELEMETRY: 	    ECG:  	  RADIOLOGY:   DIAGNOSTIC TESTING:  [ ] Echocardiogram:  [ ]  Catheterization:  [ ] Stress Test:    OTHER:

## 2019-09-08 DIAGNOSIS — R33.9 RETENTION OF URINE, UNSPECIFIED: ICD-10-CM

## 2019-09-08 DIAGNOSIS — N17.9 ACUTE KIDNEY FAILURE, UNSPECIFIED: ICD-10-CM

## 2019-09-08 LAB
ANION GAP SERPL CALC-SCNC: 12 MMOL/L — SIGNIFICANT CHANGE UP (ref 5–17)
BUN SERPL-MCNC: 39 MG/DL — HIGH (ref 7–23)
CALCIUM SERPL-MCNC: 7.8 MG/DL — LOW (ref 8.4–10.5)
CHLORIDE SERPL-SCNC: 107 MMOL/L — SIGNIFICANT CHANGE UP (ref 96–108)
CO2 SERPL-SCNC: 23 MMOL/L — SIGNIFICANT CHANGE UP (ref 22–31)
CREAT SERPL-MCNC: 1.8 MG/DL — HIGH (ref 0.5–1.3)
GLUCOSE SERPL-MCNC: 113 MG/DL — HIGH (ref 70–99)
HCT VFR BLD CALC: 22.6 % — LOW (ref 39–50)
HCT VFR BLD CALC: 24 % — LOW (ref 39–50)
HGB BLD-MCNC: 7 G/DL — CRITICAL LOW (ref 13–17)
HGB BLD-MCNC: 8 G/DL — LOW (ref 13–17)
MCHC RBC-ENTMCNC: 28.8 PG — SIGNIFICANT CHANGE UP (ref 27–34)
MCHC RBC-ENTMCNC: 31 GM/DL — LOW (ref 32–36)
MCHC RBC-ENTMCNC: 31.4 PG — SIGNIFICANT CHANGE UP (ref 27–34)
MCHC RBC-ENTMCNC: 33.2 GM/DL — SIGNIFICANT CHANGE UP (ref 32–36)
MCV RBC AUTO: 93 FL — SIGNIFICANT CHANGE UP (ref 80–100)
MCV RBC AUTO: 94.5 FL — SIGNIFICANT CHANGE UP (ref 80–100)
PLATELET # BLD AUTO: 123 K/UL — LOW (ref 150–400)
PLATELET # BLD AUTO: 131 K/UL — LOW (ref 150–400)
POTASSIUM SERPL-MCNC: 3.9 MMOL/L — SIGNIFICANT CHANGE UP (ref 3.5–5.3)
POTASSIUM SERPL-SCNC: 3.9 MMOL/L — SIGNIFICANT CHANGE UP (ref 3.5–5.3)
RBC # BLD: 2.43 M/UL — LOW (ref 4.2–5.8)
RBC # BLD: 2.53 M/UL — LOW (ref 4.2–5.8)
RBC # FLD: 13.6 % — SIGNIFICANT CHANGE UP (ref 10.3–14.5)
RBC # FLD: 15 % — HIGH (ref 10.3–14.5)
SODIUM SERPL-SCNC: 142 MMOL/L — SIGNIFICANT CHANGE UP (ref 135–145)
WBC # BLD: 7.49 K/UL — SIGNIFICANT CHANGE UP (ref 3.8–10.5)
WBC # BLD: 8.2 K/UL — SIGNIFICANT CHANGE UP (ref 3.8–10.5)
WBC # FLD AUTO: 7.49 K/UL — SIGNIFICANT CHANGE UP (ref 3.8–10.5)
WBC # FLD AUTO: 8.2 K/UL — SIGNIFICANT CHANGE UP (ref 3.8–10.5)

## 2019-09-08 RX ADMIN — CARBIDOPA AND LEVODOPA 1 TABLET(S): 25; 100 TABLET ORAL at 18:32

## 2019-09-08 RX ADMIN — Medication 1 SPRAY(S): at 05:12

## 2019-09-08 RX ADMIN — RANOLAZINE 1000 MILLIGRAM(S): 500 TABLET, FILM COATED, EXTENDED RELEASE ORAL at 13:43

## 2019-09-08 RX ADMIN — ATORVASTATIN CALCIUM 80 MILLIGRAM(S): 80 TABLET, FILM COATED ORAL at 21:39

## 2019-09-08 RX ADMIN — POLYETHYLENE GLYCOL 3350 17 GRAM(S): 17 POWDER, FOR SOLUTION ORAL at 05:11

## 2019-09-08 RX ADMIN — SENNA PLUS 2 TABLET(S): 8.6 TABLET ORAL at 05:11

## 2019-09-08 RX ADMIN — ENTACAPONE 200 MILLIGRAM(S): 200 TABLET, FILM COATED ORAL at 13:44

## 2019-09-08 RX ADMIN — PANTOPRAZOLE SODIUM 40 MILLIGRAM(S): 20 TABLET, DELAYED RELEASE ORAL at 05:13

## 2019-09-08 RX ADMIN — TAMSULOSIN HYDROCHLORIDE 0.4 MILLIGRAM(S): 0.4 CAPSULE ORAL at 13:44

## 2019-09-08 RX ADMIN — ENTACAPONE 200 MILLIGRAM(S): 200 TABLET, FILM COATED ORAL at 21:39

## 2019-09-08 RX ADMIN — Medication 1 SPRAY(S): at 18:32

## 2019-09-08 RX ADMIN — RANOLAZINE 1000 MILLIGRAM(S): 500 TABLET, FILM COATED, EXTENDED RELEASE ORAL at 23:12

## 2019-09-08 RX ADMIN — CARBIDOPA AND LEVODOPA 1 TABLET(S): 25; 100 TABLET ORAL at 21:39

## 2019-09-08 RX ADMIN — ENTACAPONE 200 MILLIGRAM(S): 200 TABLET, FILM COATED ORAL at 08:19

## 2019-09-08 RX ADMIN — CARBIDOPA AND LEVODOPA 1 TABLET(S): 25; 100 TABLET ORAL at 13:44

## 2019-09-08 RX ADMIN — CARBIDOPA AND LEVODOPA 1 TABLET(S): 25; 100 TABLET ORAL at 08:22

## 2019-09-08 RX ADMIN — SENNA PLUS 2 TABLET(S): 8.6 TABLET ORAL at 18:33

## 2019-09-08 RX ADMIN — POLYETHYLENE GLYCOL 3350 17 GRAM(S): 17 POWDER, FOR SOLUTION ORAL at 13:44

## 2019-09-08 RX ADMIN — Medication 50 MICROGRAM(S): at 05:12

## 2019-09-08 RX ADMIN — ENTACAPONE 200 MILLIGRAM(S): 200 TABLET, FILM COATED ORAL at 18:31

## 2019-09-08 RX ADMIN — RANOLAZINE 1000 MILLIGRAM(S): 500 TABLET, FILM COATED, EXTENDED RELEASE ORAL at 00:06

## 2019-09-08 RX ADMIN — CHLORHEXIDINE GLUCONATE 1 APPLICATION(S): 213 SOLUTION TOPICAL at 06:37

## 2019-09-08 NOTE — CHART NOTE - NSCHARTNOTEFT_GEN_A_CORE
Informed of critical value by RN as follow:                        7.0    7.49  )-----------( 131      ( 08 Sep 2019 08:53 )             22.6     Reason for Admission: Sent in from [facility] for gross haematuria following Cortés placement.  Groin erythema, oedema  89 M w history of CABG with subsequent PCI, Parkinson disease with progressive functional impairment, essential HTN< past DVT with past IVC filter placement not on full AC, chronic kidney disease stage 3, past GI bleed, with multiple past falls with a recent admission to [place] in July 2019 following a presumed mechanical fall, noted to have an acute on chronic RIGHT proximal humeral fracture not felt to be operative with plans for secondary healing, with patient referred to [facility] with patient referred following gross haematuria noted on Cortés placement following urinary retention.      Patient is followed by GI; S/p upper endoscopy & colonoscopy; EGD negative for bleeding; Colonoscopy found three 8 to 12 mm polyps in the ascending colon and Large rectal ulcers. GI recc transfuse for goal Hgb >/= 7.0. The last prbc transfused on 9/5 for Hg 6.5    P/E:            INCOMPLETE Informed of critical value by RN as follow:                        7.0    7.49  )-----------( 131      ( 08 Sep 2019 08:53 )             22.6     Reason for Admission: Sent in from [facility] for gross haematuria following Cortés placement.  Groin erythema, oedema  89 M w history of CABG with subsequent PCI, Parkinson disease with progressive functional impairment, essential HTN< past DVT with past IVC filter placement not on full AC, chronic kidney disease stage 3, past GI bleed, with multiple past falls with a recent admission to [place] in July 2019 following a presumed mechanical fall, noted to have an acute on chronic RIGHT proximal humeral fracture not felt to be operative with plans for secondary healing, with patient referred to [facility] with patient referred following gross haematuria noted on Cortés placement following urinary retention.      Patient is followed by GI; S/p upper endoscopy & colonoscopy; EGD negative for bleeding; Colonoscopy found three 8 to 12 mm polyps in the ascending colon and Large rectal ulcers. GI recc transfuse for goal Hgb >/= 7.0. The last prbc transfused on 9/5 for Hg 6.5    Patient denies black/ red stools at this time; states his LBM was 2d ago    P/E:  AA&O x2-3; conversant & appropriate  Mild coarse rhonchi to MARLIN o/w LCTA B/L; No dyspnea observed  S1S2 RRR  Cortés draining clear abdiaziz urine to bedside bag    Vital Signs Last 24 Hrs  T(C): 36.7 (08 Sep 2019 04:27), Max: 37 (08 Sep 2019 00:18)  T(F): 98.1 (08 Sep 2019 04:27), Max: 98.6 (08 Sep 2019 00:18)  HR: 63 (08 Sep 2019 04:27) (60 - 67)  BP: 125/71 (08 Sep 2019 04:27) (104/57 - 156/73)  BP(mean): --  RR: 18 (08 Sep 2019 04:27) (18 - 18)  SpO2: 95% (08 Sep 2019 04:27) (95% - 100%)    A/P:  Anemia 2/2 recent hematuria  Rectal ulcers (No bldg found on colonoscopy)  Will check cbc @5p and if decreased will d/w Medical Attdg  Will continue to monitor overall status/ condition    GUADALUPE Ridley [id number]

## 2019-09-08 NOTE — PROVIDER CONTACT NOTE (CRITICAL VALUE NOTIFICATION) - BACKGROUND
Pt s/p RRT for bloody stool, hypotension; admitted for cellulitis of buttock.
MI, upper GI bleed; HTN, UTI, Hyperlipid; Afib.

## 2019-09-08 NOTE — PROGRESS NOTE ADULT - PROBLEM SELECTOR PLAN 1
s/p EGD and Colon.   Multiple large rectal ulcers.  probably 2/2 stercoral colitis, completed ABx, on bowel regimen, HH stable, GI following  s/p PRBC transfusion

## 2019-09-08 NOTE — PROGRESS NOTE ADULT - SUBJECTIVE AND OBJECTIVE BOX
Patient is a 89y old  Male who presents with a chief complaint of Sent in from [facility] for gross haematuria following Cortés placement.  Groin erythema, oedema (08 Sep 2019 10:22)      SUBJECTIVE / OVERNIGHT EVENTS: lethargic, no acute events    MEDICATIONS  (STANDING):  atorvastatin 80 milliGRAM(s) Oral at bedtime  carbidopa/levodopa CR 50/200 1 Tablet(s) Oral <User Schedule>  chlorhexidine 4% Liquid 1 Application(s) Topical <User Schedule>  entacapone 200 milliGRAM(s) Oral <User Schedule>  fluticasone propionate 50 MICROgram(s)/spray Nasal Spray 1 Spray(s) Both Nostrils two times a day  levothyroxine 50 MICROGram(s) Oral daily  pantoprazole    Tablet 40 milliGRAM(s) Oral before breakfast  polyethylene glycol 3350 17 Gram(s) Oral <User Schedule>  ranolazine 1000 milliGRAM(s) Oral two times a day  senna 2 Tablet(s) Oral two times a day  tamsulosin 0.4 milliGRAM(s) Oral daily    MEDICATIONS  (PRN):      Vital Signs Last 24 Hrs  T(F): 97.5 (09-08-19 @ 19:38), Max: 98.6 (09-08-19 @ 00:18)  HR: 64 (09-08-19 @ 19:38) (56 - 64)  BP: 134/57 (09-08-19 @ 19:38) (105/58 - 149/73)  RR: 18 (09-08-19 @ 19:38) (18 - 18)  SpO2: 99% (09-08-19 @ 19:38) (95% - 100%)  Telemetry:   CAPILLARY BLOOD GLUCOSE        I&O's Summary    07 Sep 2019 07:01  -  08 Sep 2019 07:00  --------------------------------------------------------  IN: 720 mL / OUT: 825 mL / NET: -105 mL    08 Sep 2019 07:01  -  08 Sep 2019 22:38  --------------------------------------------------------  IN: 240 mL / OUT: 600 mL / NET: -360 mL        PHYSICAL EXAM:  GENERAL: NAD, well-developed  HEAD:  Atraumatic, Normocephalic  EYES: EOMI, PERRLA, conjunctiva and sclera clear  NECK: Supple, No JVD  CHEST/LUNG: Clear to auscultation bilaterally; No wheeze  HEART: Regular rate and rhythm; No murmurs, rubs, or gallops  ABDOMEN: Soft, Nontender, Nondistended; Bowel sounds present  EXTREMITIES:  2+ Peripheral Pulses, No clubbing, cyanosis, or edema  PSYCH: AAOx3  NEUROLOGY: non-focal  SKIN: No rashes or lesions    LABS:                        8.0    8.2   )-----------( 123      ( 08 Sep 2019 17:43 )             24.0     09-08    142  |  107  |  39<H>  ----------------------------<  113<H>  3.9   |  23  |  1.80<H>    Ca    7.8<L>      08 Sep 2019 07:07  Mg     2.6     09-07                RADIOLOGY & ADDITIONAL TESTS:    Imaging Personally Reviewed:    Consultant(s) Notes Reviewed:      Care Discussed with Consultants/Other Providers:

## 2019-09-08 NOTE — PROVIDER CONTACT NOTE (CRITICAL VALUE NOTIFICATION) - ASSESSMENT
VSS, AO2, Pt asymptomatic, no complaints of SOB, chest pain, dizziness, or palpitations, 3LNC.
Patient vss

## 2019-09-08 NOTE — PROGRESS NOTE ADULT - ASSESSMENT
89 yo male with CAD s/p CABG, Afib, CKD3, Parkinsons disease, found to have BRBPR overnight. Initially admitted to  ICU

## 2019-09-08 NOTE — PROVIDER CONTACT NOTE (CRITICAL VALUE NOTIFICATION) - SITUATION
H/H 6.3, 19.7
Patient with lower GI bleed; Hx of upper GI bleed
Pt admitted with hematuria & on NGT for 2 days.

## 2019-09-08 NOTE — PROGRESS NOTE ADULT - SUBJECTIVE AND OBJECTIVE BOX
Subjective: Patient seen and examined. No new events except as noted.   resting comfortably   sleepy     REVIEW OF SYSTEMS:    CONSTITUTIONAL+ weakness, fevers or chills  EYES/ENT: No visual changes;  No vertigo or throat pain   NECK: No pain or stiffness  RESPIRATORY: No cough, wheezing, hemoptysis; No shortness of breath  CARDIOVASCULAR: No chest pain or palpitations  GASTROINTESTINAL: No abdominal or epigastric pain. No nausea, vomiting, or hematemesis; No diarrhea or constipation. No melena or hematochezia.  GENITOURINARY: No dysuria, frequency or hematuria  NEUROLOGICAL: No numbness or weakness  SKIN: No itching, burning, rashes, or lesions   All other review of systems is negative unless indicated above.    MEDICATIONS:  MEDICATIONS  (STANDING):  atorvastatin 80 milliGRAM(s) Oral at bedtime  carbidopa/levodopa CR 50/200 1 Tablet(s) Oral <User Schedule>  chlorhexidine 4% Liquid 1 Application(s) Topical <User Schedule>  entacapone 200 milliGRAM(s) Oral <User Schedule>  fluticasone propionate 50 MICROgram(s)/spray Nasal Spray 1 Spray(s) Both Nostrils two times a day  levothyroxine 50 MICROGram(s) Oral daily  pantoprazole    Tablet 40 milliGRAM(s) Oral before breakfast  polyethylene glycol 3350 17 Gram(s) Oral <User Schedule>  ranolazine 1000 milliGRAM(s) Oral two times a day  senna 2 Tablet(s) Oral two times a day  tamsulosin 0.4 milliGRAM(s) Oral daily      PHYSICAL EXAM:  T(C): 36.7 (09-08-19 @ 04:27), Max: 37 (09-08-19 @ 00:18)  HR: 63 (09-08-19 @ 04:27) (60 - 67)  BP: 125/71 (09-08-19 @ 04:27) (104/57 - 156/73)  RR: 18 (09-08-19 @ 04:27) (18 - 18)  SpO2: 95% (09-08-19 @ 04:27) (95% - 100%)  Wt(kg): --  I&O's Summary    07 Sep 2019 07:01  -  08 Sep 2019 07:00  --------------------------------------------------------  IN: 720 mL / OUT: 825 mL / NET: -105 mL            Appearance: NAD  HEENT:   Normal oral mucosa, PERRL, EOMI	  Lymphatic: No lymphadenopathy , right arm/shoulder sling   Cardiovascular: Irregular rS1 S2, No JVD, No murmurs , Peripheral pulses palpable 2+ bilaterally  Respiratory: Decreased bs and effort   Gastrointestinal:  Soft, Non-tender, + BS	  Skin: No rashes, No ecchymoses, No cyanosis, warm to touch  Musculoskeletal: Decreased range of motion and  strength  Psychiatry:  Mood & affect appropriate  Ext: No edema  +lara       LABS:    CARDIAC MARKERS:                                7.0    7.49  )-----------( 131      ( 08 Sep 2019 08:53 )             22.6     09-08    142  |  107  |  39<H>  ----------------------------<  113<H>  3.9   |  23  |  1.80<H>    Ca    7.8<L>      08 Sep 2019 07:07  Mg     2.6     09-07      proBNP:   Lipid Profile:   HgA1c:   TSH:             TELEMETRY: 	    ECG:  	  RADIOLOGY:   DIAGNOSTIC TESTING:  [ ] Echocardiogram:  [ ]  Catheterization:  [ ] Stress Test:    OTHER:

## 2019-09-09 DIAGNOSIS — L03.818 CELLULITIS OF OTHER SITES: ICD-10-CM

## 2019-09-09 LAB
ANION GAP SERPL CALC-SCNC: 10 MMOL/L — SIGNIFICANT CHANGE UP (ref 5–17)
BUN SERPL-MCNC: 37 MG/DL — HIGH (ref 7–23)
CALCIUM SERPL-MCNC: 7.5 MG/DL — LOW (ref 8.4–10.5)
CHLORIDE SERPL-SCNC: 103 MMOL/L — SIGNIFICANT CHANGE UP (ref 96–108)
CO2 SERPL-SCNC: 24 MMOL/L — SIGNIFICANT CHANGE UP (ref 22–31)
CREAT SERPL-MCNC: 1.6 MG/DL — HIGH (ref 0.5–1.3)
GLUCOSE SERPL-MCNC: 104 MG/DL — HIGH (ref 70–99)
HCT VFR BLD CALC: 21.6 % — LOW (ref 39–50)
HGB BLD-MCNC: 6.9 G/DL — CRITICAL LOW (ref 13–17)
MCHC RBC-ENTMCNC: 30.1 PG — SIGNIFICANT CHANGE UP (ref 27–34)
MCHC RBC-ENTMCNC: 31.9 GM/DL — LOW (ref 32–36)
MCV RBC AUTO: 94.3 FL — SIGNIFICANT CHANGE UP (ref 80–100)
PLATELET # BLD AUTO: 129 K/UL — LOW (ref 150–400)
POTASSIUM SERPL-MCNC: 4 MMOL/L — SIGNIFICANT CHANGE UP (ref 3.5–5.3)
POTASSIUM SERPL-SCNC: 4 MMOL/L — SIGNIFICANT CHANGE UP (ref 3.5–5.3)
RBC # BLD: 2.29 M/UL — LOW (ref 4.2–5.8)
RBC # FLD: 15.1 % — HIGH (ref 10.3–14.5)
SODIUM SERPL-SCNC: 137 MMOL/L — SIGNIFICANT CHANGE UP (ref 135–145)
WBC # BLD: 6.08 K/UL — SIGNIFICANT CHANGE UP (ref 3.8–10.5)
WBC # FLD AUTO: 6.08 K/UL — SIGNIFICANT CHANGE UP (ref 3.8–10.5)

## 2019-09-09 PROCEDURE — 99232 SBSQ HOSP IP/OBS MODERATE 35: CPT | Mod: GC

## 2019-09-09 RX ORDER — IRON SUCROSE 20 MG/ML
200 INJECTION, SOLUTION INTRAVENOUS EVERY 24 HOURS
Refills: 0 | Status: DISCONTINUED | OUTPATIENT
Start: 2019-09-10 | End: 2019-09-11

## 2019-09-09 RX ADMIN — CARBIDOPA AND LEVODOPA 1 TABLET(S): 25; 100 TABLET ORAL at 21:40

## 2019-09-09 RX ADMIN — CARBIDOPA AND LEVODOPA 1 TABLET(S): 25; 100 TABLET ORAL at 08:09

## 2019-09-09 RX ADMIN — TAMSULOSIN HYDROCHLORIDE 0.4 MILLIGRAM(S): 0.4 CAPSULE ORAL at 11:32

## 2019-09-09 RX ADMIN — PANTOPRAZOLE SODIUM 40 MILLIGRAM(S): 20 TABLET, DELAYED RELEASE ORAL at 06:00

## 2019-09-09 RX ADMIN — CHLORHEXIDINE GLUCONATE 1 APPLICATION(S): 213 SOLUTION TOPICAL at 08:09

## 2019-09-09 RX ADMIN — Medication 1 SPRAY(S): at 17:23

## 2019-09-09 RX ADMIN — ENTACAPONE 200 MILLIGRAM(S): 200 TABLET, FILM COATED ORAL at 13:34

## 2019-09-09 RX ADMIN — POLYETHYLENE GLYCOL 3350 17 GRAM(S): 17 POWDER, FOR SOLUTION ORAL at 13:34

## 2019-09-09 RX ADMIN — Medication 50 MICROGRAM(S): at 06:00

## 2019-09-09 RX ADMIN — RANOLAZINE 1000 MILLIGRAM(S): 500 TABLET, FILM COATED, EXTENDED RELEASE ORAL at 11:31

## 2019-09-09 RX ADMIN — ENTACAPONE 200 MILLIGRAM(S): 200 TABLET, FILM COATED ORAL at 17:23

## 2019-09-09 RX ADMIN — ENTACAPONE 200 MILLIGRAM(S): 200 TABLET, FILM COATED ORAL at 08:09

## 2019-09-09 RX ADMIN — CARBIDOPA AND LEVODOPA 1 TABLET(S): 25; 100 TABLET ORAL at 17:23

## 2019-09-09 RX ADMIN — CARBIDOPA AND LEVODOPA 1 TABLET(S): 25; 100 TABLET ORAL at 13:34

## 2019-09-09 RX ADMIN — ATORVASTATIN CALCIUM 80 MILLIGRAM(S): 80 TABLET, FILM COATED ORAL at 21:41

## 2019-09-09 RX ADMIN — SENNA PLUS 2 TABLET(S): 8.6 TABLET ORAL at 17:23

## 2019-09-09 RX ADMIN — ENTACAPONE 200 MILLIGRAM(S): 200 TABLET, FILM COATED ORAL at 21:40

## 2019-09-09 RX ADMIN — Medication 1 SPRAY(S): at 06:00

## 2019-09-09 NOTE — CONSULT NOTE ADULT - ATTENDING COMMENTS
I have seen and evaluated patient and discussed with team.  Chart and data reviewed.  Maintain aggressive bowel regimen to maintain soft BMs daily.  Patient should follow up with me in my office for re-evaluation of anorectal ulcers.  Re-evaluation in hospital if severe bleeding despite soft BMs    Baldo Ace MD  [phone]

## 2019-09-09 NOTE — PROGRESS NOTE ADULT - ASSESSMENT
89 y/o M w/ hx of CAD s/p CABG s/p PCI, ICM, and Parkinson's disease with recent right humeral fracture, with admission for hematuria c/b aspiration pneumonia, now with hematochezia.    Impression:  # Acute blood loss anemia with hematochezia: Currently with minimal hematochezia, HD stable, and with Hgb of 6.9 this morning. Colonoscopy on 9/4/19 revealed multiple rectal ulcers and ulcerated mucosa in the anal canal which is the likely source of the patient's bleeding - may be secondary to fecal impaction versus malignancy.  # CAD s/p CABG s/p PCI  # ICM  # Parkinson's disease    Recommendations:  - Monitor CBCs daily  - Monitor bowel movements  - Transfuse for goal Hgb >/= 7.0  - Aggressive bowel regimen, senna and Miralax PO TID  - No rectal tube  - Repeat Flex Sig versus anoscopy by Colorectal Surgery to assess ulcer healing in 2 to 4 weeks  - Patient is declining repeat colonoscopy for polyp removal  - Rest of care per primary team    Constantine Rose MD  Gastroenterology Fellow  Pager number: [phone] / [phone] 88 y/o M w/ hx of CAD s/p CABG s/p PCI, ICM, and Parkinson's disease with recent right humeral fracture, with admission for hematuria c/b aspiration pneumonia, now with hematochezia.    Impression:  # Acute blood loss anemia with hematochezia: Currently with minimal hematochezia, HD stable, and with Hgb of 6.9 this morning. Colonoscopy on 9/4/19 revealed multiple rectal ulcers and ulcerated mucosa in the anal canal which is the likely source of the patient's bleeding - may be secondary to fecal impaction versus malignancy.  # CAD s/p CABG s/p PCI  # ICM  # Parkinson's disease    Recommendations:  - Monitor CBCs daily  - Monitor bowel movements  - Transfuse for goal Hgb >/= 7.0  - Aggressive bowel regimen, senna and Miralax PO TID  - No rectal tube  - Repeat Flex Sig versus anoscopy by Colorectal Surgery to assess ulcer healing in 2 to 4 weeks  - If bleeding continues, can consider repeat flex sig.  - Patient is declining repeat colonoscopy for polyp removal  - Rest of care per primary team    Constantine Rose MD  Gastroenterology Fellow  Pager number: [phone] / [phone]

## 2019-09-09 NOTE — PROVIDER CONTACT NOTE (CRITICAL VALUE NOTIFICATION) - ACTION/TREATMENT ORDERED:
NP made aware. 1 unit PRBC ordered
we will transfuse one unit of PRBC.
NP made aware. KCL 10mEq x3 IVSS & PO ordered
Stat 1UPRBC, will con't to monitor.
Will assess further; will continue to monitor.

## 2019-09-09 NOTE — PROGRESS NOTE ADULT - SUBJECTIVE AND OBJECTIVE BOX
No pain, no shortness of breath      VITAL:  T(C): , Max: 36.6 (09-09-19 @ 04:38)  T(F): , Max: 97.8 (09-09-19 @ 04:38)  HR: 52 (09-09-19 @ 11:45)  BP: 123/62 (09-09-19 @ 11:45)  RR: 18 (09-09-19 @ 11:45)  SpO2: 100% (09-09-19 @ 11:45)      PHYSICAL EXAM:  Constitutional: NAD; mild psychomotor retardation  HEENT: DMM  Neck: Supple, No JVD  Respiratory: CTA-b/l  Cardiovascular: irreg s1s2  Gastrointestinal: BS+, soft, NT/ND  Extremities: No peripheral edema b/l  : (+)lara  Neuro: (+)coarse tremor LUE      LABS:                        6.9    6.08  )-----------( 129      ( 09 Sep 2019 10:10 )             21.6     Na(137)/K(4.0)/Cl(103)/HCO3(24)/BUN(37)/Cr(1.60)Glu(104)/Ca(7.5)/Mg(--)/PO4(--)    09-09 @ 07:02  Na(142)/K(3.9)/Cl(107)/HCO3(23)/BUN(39)/Cr(1.80)Glu(113)/Ca(7.8)/Mg(--)/PO4(--)    09-08 @ 07:07  Na(144)/K(3.7)/Cl(109)/HCO3(25)/BUN(40)/Cr(1.69)Glu(118)/Ca(8.3)/Mg(2.6)/PO4(--)    09-07 @ 07:42      IMPRESSION: 89M w/ HTN, DVT-IVCF, Parkinson's disease, CAD-CABG, and CKD3, 8/28/19 a/w urinary retention/STEVEN    (1)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 At least in part due to past obstructive nephropathy, with resultant atrophy of his right kidney. Resolved superimposed STEVEN from obstruction    (2)Anemia - GIB - diverticulosis and large rectal ulcers on colonoscopy. H/H downtrending as of today      RECOMMEND:  (1)Dose new meds for GFR 30-40ml/min (present dosing is acceptable)  (2)GI f/u; PRBCs per primary team/GI                Abdi Vieira MD  [facility]  [phone] No pain, no shortness of breath      VITAL:  T(C): , Max: 36.6 (09-09-19 @ 04:38)  T(F): , Max: 97.8 (09-09-19 @ 04:38)  HR: 52 (09-09-19 @ 11:45)  BP: 123/62 (09-09-19 @ 11:45)  RR: 18 (09-09-19 @ 11:45)  SpO2: 100% (09-09-19 @ 11:45)      PHYSICAL EXAM:  Constitutional: NAD; mild psychomotor retardation  HEENT: DMM  Neck: Supple, No JVD  Respiratory: CTA-b/l  Cardiovascular: irreg s1s2  Gastrointestinal: BS+, soft, NT/ND  Extremities: No peripheral edema b/l  : (+)lara-dark urine  Neuro: (+)coarse tremor LUE; right arm in sling      LABS:                        6.9    6.08  )-----------( 129      ( 09 Sep 2019 10:10 )             21.6     Na(137)/K(4.0)/Cl(103)/HCO3(24)/BUN(37)/Cr(1.60)Glu(104)/Ca(7.5)/Mg(--)/PO4(--)    09-09 @ 07:02  Na(142)/K(3.9)/Cl(107)/HCO3(23)/BUN(39)/Cr(1.80)Glu(113)/Ca(7.8)/Mg(--)/PO4(--)    09-08 @ 07:07  Na(144)/K(3.7)/Cl(109)/HCO3(25)/BUN(40)/Cr(1.69)Glu(118)/Ca(8.3)/Mg(2.6)/PO4(--)    09-07 @ 07:42      IMPRESSION: 89M w/ HTN, DVT-IVCF, Parkinson's disease, CAD-CABG, and CKD3, 8/28/19 a/w urinary retention/STEVEN    (1)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 At least in part due to past obstructive nephropathy, with resultant atrophy of his right kidney. Resolved superimposed STEVEN from obstruction    (2)Anemia - GIB - diverticulosis and large rectal ulcers on colonoscopy. H/H downtrending as of today      RECOMMEND:  (1)Dose new meds for GFR 30-40ml/min (present dosing is acceptable)  (2)GI f/u; PRBCs per primary team/GI                Abdi Vieira MD  [facility]  [phone]

## 2019-09-09 NOTE — PROGRESS NOTE ADULT - SUBJECTIVE AND OBJECTIVE BOX
Subjective: Patient seen and examined.   Events noted   No cp or sob       REVIEW OF SYSTEMS:    CONSTITUTIONAL:+ weakness, fevers or chills  EYES/ENT: No visual changes;  No vertigo or throat pain   NECK: No pain or stiffness  RESPIRATORY: No cough, wheezing, hemoptysis; No shortness of breath  CARDIOVASCULAR: No chest pain or palpitations  GASTROINTESTINAL: No abdominal or epigastric pain. No nausea, vomiting, or hematemesis; No diarrhea or constipation.+ melena or hematochezia.  GENITOURINARY: No dysuria, frequency or hematuria  NEUROLOGICAL: No numbness or weakness  SKIN: No itching, burning, rashes, or lesions   All other review of systems is negative unless indicated above.    MEDICATIONS:  MEDICATIONS  (STANDING):  atorvastatin 80 milliGRAM(s) Oral at bedtime  carbidopa/levodopa CR 50/200 1 Tablet(s) Oral <User Schedule>  chlorhexidine 4% Liquid 1 Application(s) Topical <User Schedule>  entacapone 200 milliGRAM(s) Oral <User Schedule>  fluticasone propionate 50 MICROgram(s)/spray Nasal Spray 1 Spray(s) Both Nostrils two times a day  levothyroxine 50 MICROGram(s) Oral daily  pantoprazole    Tablet 40 milliGRAM(s) Oral before breakfast  polyethylene glycol 3350 17 Gram(s) Oral <User Schedule>  ranolazine 1000 milliGRAM(s) Oral two times a day  senna 2 Tablet(s) Oral two times a day  tamsulosin 0.4 milliGRAM(s) Oral daily      PHYSICAL EXAM:  T(C): 36.3 (09-09-19 @ 08:05), Max: 36.6 (09-09-19 @ 04:38)  HR: 51 (09-09-19 @ 08:05) (51 - 68)  BP: 108/57 (09-09-19 @ 08:05) (105/58 - 134/57)  RR: 18 (09-09-19 @ 08:05) (18 - 18)  SpO2: 99% (09-09-19 @ 08:05) (96% - 100%)  Wt(kg): --  I&O's Summary    08 Sep 2019 07:01  -  09 Sep 2019 07:00  --------------------------------------------------------  IN: 460 mL / OUT: 1000 mL / NET: -540 mL          Appearance: NAD  HEENT:   Normal oral mucosa, PERRL, EOMI	  Lymphatic: No lymphadenopathy , right arm/shoulder sling   Cardiovascular: Irregular rS1 S2, No JVD, No murmurs , Peripheral pulses palpable 2+ bilaterally  Respiratory: Decreased bs and effort   Gastrointestinal:  Soft, Non-tender, + BS	  Skin: No rashes, No ecchymoses, No cyanosis, warm to touch  Musculoskeletal: Decreased range of motion and  strength  Psychiatry:  Mood & affect appropriate  Ext: No edema  +lara     LABS:    CARDIAC MARKERS:                                8.0    8.2   )-----------( 123      ( 08 Sep 2019 17:43 )             24.0     09-09    137  |  103  |  37<H>  ----------------------------<  104<H>  4.0   |  24  |  1.60<H>    Ca    7.5<L>      09 Sep 2019 07:02      proBNP:   Lipid Profile:   HgA1c:   TSH:             TELEMETRY: 	    ECG:  	  RADIOLOGY:   DIAGNOSTIC TESTING:  [ ] Echocardiogram:  [ ]  Catheterization:  [ ] Stress Test:    OTHER:

## 2019-09-09 NOTE — PROGRESS NOTE ADULT - PROBLEM SELECTOR PLAN 1
s/p EGD and Colon.   Multiple large rectal ulcers.  probably 2/2 stercoral colitis, completed ABx, on bowel regimen, HH stable, GI following  HH still dropping, will transfuse w 2 U PRBC, start IV Venofer tomorrow

## 2019-09-09 NOTE — PROGRESS NOTE ADULT - SUBJECTIVE AND OBJECTIVE BOX
Patient is a 89y old  Male who presents with a chief complaint of Hematuria (09 Sep 2019 15:38)      SUBJECTIVE / OVERNIGHT EVENTS: dropped HH, awaiting CRS consult, transfuse w 2U PRBC, start IV Venofer    MEDICATIONS  (STANDING):  atorvastatin 80 milliGRAM(s) Oral at bedtime  carbidopa/levodopa CR 50/200 1 Tablet(s) Oral <User Schedule>  chlorhexidine 4% Liquid 1 Application(s) Topical <User Schedule>  entacapone 200 milliGRAM(s) Oral <User Schedule>  fluticasone propionate 50 MICROgram(s)/spray Nasal Spray 1 Spray(s) Both Nostrils two times a day  levothyroxine 50 MICROGram(s) Oral daily  pantoprazole    Tablet 40 milliGRAM(s) Oral before breakfast  polyethylene glycol 3350 17 Gram(s) Oral <User Schedule>  ranolazine 1000 milliGRAM(s) Oral two times a day  senna 2 Tablet(s) Oral two times a day  tamsulosin 0.4 milliGRAM(s) Oral daily    MEDICATIONS  (PRN):      Vital Signs Last 24 Hrs  T(F): 97.2 (09-09-19 @ 11:45), Max: 97.8 (09-09-19 @ 04:38)  HR: 52 (09-09-19 @ 11:45) (51 - 68)  BP: 123/62 (09-09-19 @ 11:45) (108/57 - 134/57)  RR: 18 (09-09-19 @ 11:45) (18 - 18)  SpO2: 100% (09-09-19 @ 11:45) (97% - 100%)  Telemetry:   CAPILLARY BLOOD GLUCOSE        I&O's Summary    08 Sep 2019 07:01  -  09 Sep 2019 07:00  --------------------------------------------------------  IN: 460 mL / OUT: 1000 mL / NET: -540 mL    09 Sep 2019 07:01  -  09 Sep 2019 16:31  --------------------------------------------------------  IN: 1180 mL / OUT: 225 mL / NET: 955 mL        PHYSICAL EXAM:  GENERAL: NAD, well-developed  HEAD:  Atraumatic, Normocephalic  EYES: EOMI, PERRLA, conjunctiva and sclera clear  NECK: Supple, No JVD  CHEST/LUNG: Clear to auscultation bilaterally; No wheeze  HEART: Regular rate and rhythm; No murmurs, rubs, or gallops  ABDOMEN: Soft, Nontender, Nondistended; Bowel sounds present  EXTREMITIES:  2+ Peripheral Pulses, No clubbing, cyanosis, or edema  PSYCH: AAOx3  NEUROLOGY: non-focal  SKIN: No rashes or lesions    LABS:                        6.9    6.08  )-----------( 129      ( 09 Sep 2019 10:10 )             21.6     09-09    137  |  103  |  37<H>  ----------------------------<  104<H>  4.0   |  24  |  1.60<H>    Ca    7.5<L>      09 Sep 2019 07:02                RADIOLOGY & ADDITIONAL TESTS:    Imaging Personally Reviewed:    Consultant(s) Notes Reviewed:      Care Discussed with Consultants/Other Providers:

## 2019-09-09 NOTE — PROGRESS NOTE ADULT - SUBJECTIVE AND OBJECTIVE BOX
[facility]      Seen earlier today, and examined.  - Today, patient is without complaints.           *****MEDICATIONS: Current medication reviewed and documented.    MEDICATIONS  (STANDING):  atorvastatin 80 milliGRAM(s) Oral at bedtime  carbidopa/levodopa CR 50/200 1 Tablet(s) Oral <User Schedule>  chlorhexidine 4% Liquid 1 Application(s) Topical <User Schedule>  entacapone 200 milliGRAM(s) Oral <User Schedule>  fluticasone propionate 50 MICROgram(s)/spray Nasal Spray 1 Spray(s) Both Nostrils two times a day  levothyroxine 50 MICROGram(s) Oral daily  pantoprazole    Tablet 40 milliGRAM(s) Oral before breakfast  polyethylene glycol 3350 17 Gram(s) Oral <User Schedule>  ranolazine 1000 milliGRAM(s) Oral two times a day  senna 2 Tablet(s) Oral two times a day  tamsulosin 0.4 milliGRAM(s) Oral daily    MEDICATIONS  (PRN):          ***** VITAL SIGNS:  T(F): 97.3 (19 @ 08:05), Max: 97.8 (19 @ 04:38)  HR: 51 (19 @ 08:05) (51 - 68)  BP: 108/57 (19 @ 08:05) (105/58 - 134/57)  RR: 18 (19 @ 08:05) (18 - 18)  SpO2: 99% (19 @ 08:05) (96% - 100%)  Wt(kg): --  ,   I&O's Summary    08 Sep 2019 07:01  -  09 Sep 2019 07:00  --------------------------------------------------------  IN: 460 mL / OUT: 1000 mL / NET: -540 mL             *****PHYSICAL EXAM: Alert oriented x 2  Attention comprehension are fair. Able to name, repeat  without any difficulty.   Able to follow 1-2  step commands.     EOMI fundi not visualized,  VFF to confrontration  No facial asymmetry   Tongue is midline   Palate elevates symmetrically   Moving all 4 ext symmetrically no pronator drift  limited eval of rue due to sling      sensation is grossly symmetric             *****LAB AND IMAGIN.9    6.08  )-----------( 129      ( 09 Sep 2019 10:10 )             21.6                   137  |  103  |  37<H>  ----------------------------<  104<H>  4.0   |  24  |  1.60<H>    Ca    7.5<L>      09 Sep 2019 07:02                           [All pertinent recent Imaging/Reports reviewed]           *****A S S E S S M E N T   A N D   P L A N :    Excerpt from H&P, 89 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  Patient reported that he had local trauma to the skin of his penis following an attempt to use a hand held urinal.   Patient also notes that patient was on a ? commode and was noted to have increased scrotal oedema and redness. called to manage his parkinsons dementia. unable to tell me the name of his neurologist.  He reports having hallucinations, reports hearing his daughter and friend planning to get him into rehab, however it appears that they both were not there.   He is not sure if these are dreams.   He doesn't know if he act out his dreams.          Problem/Recommendations 1: Parkinson's   continue sinemet/entacapone  ? hallucinations related to parkinson's vs. metabolic encephalopathy due to STEVEN   will continue to monitor closely   regulate sleep wake cycle.   consider melatonin for sleep augmentation.     Problem/Recommendations 2:  Falls likely related to parkinsons +/- deconditioning +/- microvascular disease.   fall precautions.   pt consult  appreciated recommend jeannie         Thank you for allowing me to participate in the care of this patient. Please do not hesitate to call me if you have any  questions.        ________________  Farideh Irvin MD  [facility] ([facility]  [phone]      30 minutes spent on total encounter; more than 50 % of the visit was  spent counseling about plan of care, compliance to diet/exercise and medication regimen and or  coordinating care by the attending physician.      It is advised that stroke patients follow up with GUADALUPE Viera @ [phone] in 1- 2 weeks.   Others please follow up with Dr. Michael Nissenbaum [phone]

## 2019-09-09 NOTE — CHART NOTE - NSCHARTNOTEFT_GEN_A_CORE
informed by RN pt  with hb 6.9 from AM lab  Patient is a 89y old  Male who presents with a chief complaint of Sent in from [facility] for gross haematuria following Cortés placement.  Groin erythema, oedema (09 Sep 2019 10:15)      Vital Signs Last 24 Hrs  T(C): 36.3 (09 Sep 2019 08:05), Max: 36.6 (09 Sep 2019 04:38)  T(F): 97.3 (09 Sep 2019 08:05), Max: 97.8 (09 Sep 2019 04:38)  HR: 51 (09 Sep 2019 08:05) (51 - 68)  BP: 108/57 (09 Sep 2019 08:05) (105/58 - 134/57)  BP(mean): --  RR: 18 (09 Sep 2019 08:05) (18 - 18)  SpO2: 99% (09 Sep 2019 08:05) (96% - 100%)                        6.9    6.08  )-----------( 129      ( 09 Sep 2019 10:10 )             21.6     09-09    137  |  103  |  37<H>  ----------------------------<  104<H>  4.0   |  24  |  1.60<H>    Ca    7.5<L>      09 Sep 2019 07:02 informed by RN pt  with hb 6.9  hct 21.6 from AM lab; per RN early AM pt had a bloody BM. no BM now; pt denies any abdominal pain, SOB, CP.  Patient is a 89y old  Male who presents with a chief complaint of [facility] following Lara placement.  Groin erythema, oedema (09 Sep 2019 10:15)      Vital Signs Last 24 Hrs  T(C): 36.3 (09 Sep 2019 08:05), Max: 36.6 (09 Sep 2019 04:38)  T(F): 97.3 (09 Sep 2019 08:05), Max: 97.8 (09 Sep 2019 04:38)  HR: 51 (09 Sep 2019 08:05) (51 - 68)  BP: 108/57 (09 Sep 2019 08:05) (105/58 - 134/57)  BP(mean): --  RR: 18 (09 Sep 2019 08:05) (18 - 18)  SpO2: 99% (09 Sep 2019 08:05) (96% - 100%)                        6.9    6.08  )-----------( 129      ( 09 Sep 2019 10:10 )             21.6     09-09    137  |  103  |  37<H>  ----------------------------<  104<H>  4.0   |  24  |  1.60<H>    Ca    7.5<L>      09 Sep 2019 07:02  pt is alert and orientedx3;  and able to answer questions  CV: RRR, S1S2, no murmur  Abdominal: Soft, NT, ND ; bowel sounds present  MSK: right upper extremity in arm sling;   -lara cath-no hematuria    AP;    88 y/o M w/ hx of CAD s/p CABG s/p PCI, ICM, and Parkinson's disease with recent right humeral fracture, with admission for hematuria c/b aspiration pneumonia,  hematochezia;  # Acute blood loss anemia with hematochezia: Currently with no active bleeding,  Colonoscopy on 9/4/19 revealed multiple rectal ulcers and ulcerated mucosa in the anal canal - may be secondary to fecal impaction versus malignancy. had multiple PRBC to keep hb >7;  failed TOV; has lara cath for urinary retention replaced on 9/5/19  f/u by cardiology/GI/pulm/renal;  low hemoglobin/low hematocrit; per d/w Dr. Dias, 1 unit PRBC to keep hb >7 per GI; post transfusion PRBC; spoke to GI and updated;   monitor for any bleeding  d/w pt plan of care  Lawanda Resendiz(NP)  3 [facility], [phone]

## 2019-09-09 NOTE — DIETITIAN INITIAL EVALUATION ADULT. - ADD RECOMMEND
add healthshakes 2x daily, assist with menus for diversity, ie, chicken salad, tuna salad, preferred honey shan drinks

## 2019-09-09 NOTE — CONSULT NOTE ADULT - ASSESSMENT
ASSESSMENT  89M with CAD s/p PCI  on ASA, Afib, CKD3, DVT s/p IVC Filter, Parkinsons Disease,  w/ hospital course c/b BRBPR s/p colonoscopy showing rectal ulcers, slight drop in H/H this morning after "blood tinged" bowel movement.    PLAN  - Patient's H/H have been within a stable range between 9/6-9/9 (7.2/22.5 --> 7.3/23.3 --> 7.0/22.6 --> 8.0/24 --> 6.9/21.6) without significant drop since last transfusion  - No urgent surgery indicated  - Trend H/H and transfuse PRN  - GI following and also plan to watch H/H for now  - To be discussed with Surgery Fellow and Dr. Malka Mcmullen Surgery   [id number] ASSESSMENT  89M with CAD s/p PCI  on ASA, Afib, CKD3, DVT s/p IVC Filter, Parkinsons Disease,  w/ hospital course c/b BRBPR s/p colonoscopy showing rectal ulcers, slight drop in H/H this morning after "blood tinged" bowel movement.    PLAN  - Possibly stercoral vs malignant in origin, will need flex sig or repeat colonoscopy for biopsy to assess etiology  - No urgent surgery indicated  - Agree with bowel regimen  - Trend H/H and transfuse PRN  - Patient's H/H have been within a stable range between 9/6-9/9 (7.2/22.5 --> 7.3/23.3 --> 7.0/22.6 --> 8.0/24 --> 6.9/21.6) without significant drop since last transfusion  - GI following and also plan to watch H/H for now  - To be discussed with Surgery Fellow and Dr. Malka Mcmullen Surgery   [id number]

## 2019-09-09 NOTE — CHART NOTE - NSCHARTNOTEFT_GEN_A_CORE
RN report episode of blood tinged stool this AM RN report episode of blood tinged stool this AM. As per chart review patient with recent GIB s/p EGD. Patient hemodynamically stable, last H/H on 9/8 PM stable at 8. Patient seen and examined, reports pain with cleaning by the staff. Let RN know to be mindful, and assess for anal fissure and/or hemorrhoid. AM CBC pending, will continue to monitor.     Clari Dupree PA-C   [id number]

## 2019-09-09 NOTE — DIETITIAN INITIAL EVALUATION ADULT. - PROBLEM SELECTOR PLAN 3
See above.   Will cautiously continue patient's Lasix with required diuresis in the context of patient's heart disease.  Renal US>  Would consider formal renal evaluation in the AM.

## 2019-09-09 NOTE — PROGRESS NOTE ADULT - SUBJECTIVE AND OBJECTIVE BOX
Chief Complaint: Hematuria    Re-consult for evaluation of anemia / hematochezia.    Briefly, this is a 89 y/o M w/ hx of CAD s/p CABG s/p PCI, ICM, and Parkinson's disease with recent right humeral fracture, with admission for hematuria c/b aspiration pneumonia, GI initially consulted for evaluation for hematochezia with EGD and colonoscopy revealing multiple rectal ulcers.    Interval Events:   - The patient had a blood tinged bowel movement this morning, but otherwise had no black tarry stools or bloody emesis  - The patient otherwise feels well    Allergies:  Cipro (Rash)    Hospital Medications:  atorvastatin 80 milliGRAM(s) Oral at bedtime  carbidopa/levodopa CR 50/200 1 Tablet(s) Oral <User Schedule>  chlorhexidine 4% Liquid 1 Application(s) Topical <User Schedule>  entacapone 200 milliGRAM(s) Oral <User Schedule>  fluticasone propionate 50 MICROgram(s)/spray Nasal Spray 1 Spray(s) Both Nostrils two times a day  levothyroxine 50 MICROGram(s) Oral daily  pantoprazole    Tablet 40 milliGRAM(s) Oral before breakfast  polyethylene glycol 3350 17 Gram(s) Oral <User Schedule>  ranolazine 1000 milliGRAM(s) Oral two times a day  senna 2 Tablet(s) Oral two times a day  tamsulosin 0.4 milliGRAM(s) Oral daily    PMHX/PSHX:  Unilateral inguinal hernia, with gangrene, not specified as recurrent  Upper GI bleed  Aspiration pneumonia  Clostridium difficile colitis  Pneumonia  MI, old  UTI (urinary tract infection)  BPH (benign prostatic hyperplasia)  Parkinsons Disease  CAD (Coronary Artery Disease)  HTN (Hypertension)  Hyperlipidemia  Presence of IVC filter  History of prostate surgery  Varicose veins of legs  S/P appendectomy  Stented coronary artery  Hx of CABG  Hyperlipidemia    Family history:  Family history of coronary artery disease  Family history of breast cancer (Aunt)    ROS:     General:  No wt loss, fevers, chills, night sweats, + fatigue  Eyes:  Good vision, no reported pain  ENT:  No sore throat, pain, runny nose, dysphagia  CV:  No pain, palpitations, hypo/hypertension  Pulm:  No dyspnea, cough, tachypnea, wheezing  GI:  No pain, No nausea, No vomiting, No diarrhea, No constipation, No weight loss, No fever, No pruritis, + rectal bleeding, No tarry stools, No dysphagia  :  No pain, bleeding, incontinence, nocturia  Muscle:  No pain, + weakness  Neuro:  + weakness, tingling, memory problems  Psych:  + fatigue, insomnia, mood problems, depression  Endocrine:  No polyuria, polydipsia, cold/heat intolerance  Heme:  No petechiae, ecchymosis, easy bruisability  Skin:  No rash, tattoos, scars, edema    PHYSICAL EXAM:   Vital Signs:  Vital Signs Last 24 Hrs  T(C): 36.2 (09 Sep 2019 11:45), Max: 36.6 (09 Sep 2019 04:38)  T(F): 97.2 (09 Sep 2019 11:45), Max: 97.8 (09 Sep 2019 04:38)  HR: 52 (09 Sep 2019 11:45) (51 - 68)  BP: 123/62 (09 Sep 2019 11:45) (108/57 - 134/57)  BP(mean): --  RR: 18 (09 Sep 2019 11:45) (18 - 18)  SpO2: 100% (09 Sep 2019 11:45) (97% - 100%)   Daily Weight in k.7 (09 Sep 2019 06:00)    GENERAL:  No acute distress  HEENT:  Normocephalic/atraumatic,  no scleral icterus  CHEST:  Normal effort, no accessory muscle use  HEART:  Regular rate and rhythm, no murmurs/rubs/gallops  ABDOMEN:  Soft, non-tender, non-distended, normoactive bowel sounds  EXTREMITIES:  No cyanosis, clubbing, or edema  SKIN:  No rash/erythema  NEURO:  Alert and oriented x 3    LABS:                        6.9    6.08  )-----------( 129      ( 09 Sep 2019 10:10 )             21.6     Mean Cell Volume: 94.3 fl (19 @ 10:10)        137  |  103  |  37<H>  ----------------------------<  104<H>  4.0   |  24  |  1.60<H>    Ca    7.5<L>      09 Sep 2019 07:02               6.9    6.08  )-----------( 129      ( 09 Sep 2019 10:10 )             21.6                         8.0    8.2   )-----------( 123      ( 08 Sep 2019 17:43 )             24.0                         7.0    7.49  )-----------( 131      ( 08 Sep 2019 08:53 )             22.6                         7.3    8.60  )-----------( 135      ( 07 Sep 2019 10:33 )             23.2     Imaging:    No new imaging

## 2019-09-09 NOTE — DIETITIAN INITIAL EVALUATION ADULT. - REASON INDICATOR FOR ASSESSMENT
length of stay, admission 8/28/2019 Visited at bedside  patient complains of disliking Dysphagia 2 diet with honey thickened liquids, menu alternatives discussed. 88 yo male with CAD s/p CABG, Afib, CKD3, Parkinsons disease, found to have BRBPR overnight PTA.

## 2019-09-09 NOTE — DIETITIAN INITIAL EVALUATION ADULT. - ENERGY NEEDS
Noted BY SLP:"prior admissions pt was treated for aspiration PNA. Had objective swallow studies and was diagnosed with box-[id number]-gfxstwowua dysphagia with silent aspiration of thin liquids, nectar thick fluids, and hard solids. 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 A dysphagia diet was recommended at that time. Pt now admitted for hematuria, but IM and Pulmonary both concerned that pt may be aspirating. Based upon assessment today, pt with evidence suggestive of progression of dysphagia, with c/o pharyngeal stasis, reports of impaired secretion management and s/s aspiration on conservative p.o. textures. patient refuses feeding tube, accepts risk of aspiration"  ( as noted by SLP).    HT 5'8"   WT  79.7kg   SEI=208  lbs +/- 10%   BMI=26.6

## 2019-09-09 NOTE — PROGRESS NOTE ADULT - ASSESSMENT
88 yo male with CAD s/p CABG, Afib, CKD3, Parkinsons disease, found to have BRBPR overnight PTA. Initially admitted to  ICU

## 2019-09-09 NOTE — CONSULT NOTE ADULT - SUBJECTIVE AND OBJECTIVE BOX
COLORECTAL SURGERY CONSULT NOTE    HPI:  89M with CAD s/p CABG on ASA, Afib, CKD3, DVT s/p IVC Filter, Parkinson's disease initially admitted from rehab with respiratory distress. He had multiple mechanical falls with right humeral fracture, was sent to ED from [facility] for evaluation of gross hematuria on , initially admitted to CCU with c/b aspiration pneumonia. He then developed acute onset of hematochezia on 9/3 and was transferred to the MICU. After this first incident he received 3uPRBCs, 1 platelets and 1 plasma. HE underwent upper endoscopy and colonoscopy with GI on . Upper endoscopy did not show a source of bleeding but colonscopy showed diverticulosis in the sigmoid colon and in the ascending colon, three 8 to 12 mm polyps in the ascending colon and 2 large rectal ulcers. He did not have bloody bowel movements the next day after colonscopy but his H/H dropped on  and received an additional unit of pRBCs. His H/H was stable from - {7.2/22.5 --> 7.3/23.3 --> 7.0/22.6 --> 8.0/24.0] and then this morning he had a bowel movement which was found to be blood tinged per nursing. Subsequent H/H had decreased to 6.9/21.6. Colorectal surgery was consulted per note from GI on  stating need for outpatient follow up. In the interim, the patient has been feeling well and tolerating a diet without N/V. He did not noticed blood in his stool but has felt that the perianal skin has been irritated over his hospital stay.     PMHx: Unilateral inguinal hernia, with gangrene, not specified as recurrent  Upper GI bleed  Aspiration pneumonia  Clostridium difficile colitis  Pneumonia  MI, old  UTI (urinary tract infection)  BPH (benign prostatic hyperplasia)  Parkinsons Disease  CAD (Coronary Artery Disease)  HTN (Hypertension)  Hyperlipidemia    PSHx: Presence of IVC filter  History of prostate surgery  Varicose veins of legs  S/P appendectomy  Stented coronary artery  Hx of CABG  Hyperlipidemia    Medications (inpatient): atorvastatin 80 milliGRAM(s) Oral at bedtime  carbidopa/levodopa CR 50/200 1 Tablet(s) Oral <User Schedule>  chlorhexidine 4% Liquid 1 Application(s) Topical <User Schedule>  entacapone 200 milliGRAM(s) Oral <User Schedule>  fluticasone propionate 50 MICROgram(s)/spray Nasal Spray 1 Spray(s) Both Nostrils two times a day  levothyroxine 50 MICROGram(s) Oral daily  pantoprazole    Tablet 40 milliGRAM(s) Oral before breakfast  polyethylene glycol 3350 17 Gram(s) Oral <User Schedule>  ranolazine 1000 milliGRAM(s) Oral two times a day  senna 2 Tablet(s) Oral two times a day  tamsulosin 0.4 milliGRAM(s) Oral daily    Medications (PRN):  Allergies: Cipro (Rash)  (Intolerances: )  Social Hx:   Family Hx: Family history of coronary artery disease: Father,  of MI age 55  Family history of breast cancer (Aunt): Mother      Physical Exam  T(C): 36.2  HR: 52 (51 - 68)  BP: 123/62 (108/57 - 134/57)  RR: 18 (18 - 18)  SpO2: 100% (97% - 100%)  Tmax: T(C): , Max: 36.6 (19 @ 04:38)    19  --------------------------------------------------------  IN:    Oral Fluid: 460 mL  Total IN: 460 mL    OUT:    Indwelling Catheter - Urethral: 1000 mL  Total OUT: 1000 mL    Total NET: -540 mL      19  --------------------------------------------------------  IN:    Oral Fluid: 480 mL    Packed Red Blood Cells: 350 mL  Total IN: 830 mL    OUT:    Indwelling Catheter - Urethral: 225 mL  Total OUT: 225 mL    Total NET: 605 mL    General: well developed, well nourished, NAD  Neuro: alert and oriented, no focal deficits, moves all extremities spontaneously  Respiratory: nonlabored breathing on RA  CVS: regular rate and rhythm  Abdomen: soft, nontender, nondistended  Perirectal: No active bleeding noted from the rectum, no hemorroids appreciated  Extremities: no edema, sensation and movement grossly intact  Skin: warm, dry, appropriate color    Labs:                        6.9    6.08  )-----------( 129      ( 09 Sep 2019 10:10 )             21.6           137  |  103  |  37<H>  ----------------------------<  104<H>  4.0   |  24  |  1.60<H>    Ca    7.5<L>      09 Sep 2019 07:02      Imaging and other studies:    < from: Upper Endoscopy (19 @ 09:26) >                                                                                                      Findings:       A 2 cm hiatus hernia was present.       The exam of the esophagus was otherwise normal.       The entire examined stomach was normal.       The duodenal bulb and 2nd part of the duodenum were normal.    < from: Colonoscopy (19 @ 09:23) >  Findings:       The visualized terminal ileum appeared normal.       Multiple small and large-mouthed diverticula were found in the sigmoid colon and in the     ascending colon.       Three sessile polyps were found in the ascending colon. The polyps were 8 to 12 mm in size.       Two rectal ulcers with adherent blood clots and underlying flat pigmented spot which were        removed with irrigation were present in the rectum. An ulcerated area of mucosa was also        noted in the anal canal. This is the likely source of the patient's bleeding. May represent        sterocoral colitis versus anorectal malignancy.                                                                  Impression:          - Diverticulosis in the sigmoid colon and in the ascending colon.                       - Three 8 to 12 mm polyps in the ascending colon.                       - Large rectal ulcers

## 2019-09-10 ENCOUNTER — TRANSCRIPTION ENCOUNTER (OUTPATIENT)
Age: 84
End: 2019-09-10

## 2019-09-10 LAB
ANION GAP SERPL CALC-SCNC: 10 MMOL/L — SIGNIFICANT CHANGE UP (ref 5–17)
BUN SERPL-MCNC: 38 MG/DL — HIGH (ref 7–23)
CALCIUM SERPL-MCNC: 7.6 MG/DL — LOW (ref 8.4–10.5)
CHLORIDE SERPL-SCNC: 103 MMOL/L — SIGNIFICANT CHANGE UP (ref 96–108)
CO2 SERPL-SCNC: 25 MMOL/L — SIGNIFICANT CHANGE UP (ref 22–31)
CREAT SERPL-MCNC: 1.86 MG/DL — HIGH (ref 0.5–1.3)
GLUCOSE SERPL-MCNC: 113 MG/DL — HIGH (ref 70–99)
HCT VFR BLD CALC: 25.9 % — LOW (ref 39–50)
HCT VFR BLD CALC: 30 % — LOW (ref 39–50)
HGB BLD-MCNC: 8.4 G/DL — LOW (ref 13–17)
HGB BLD-MCNC: 9.7 G/DL — LOW (ref 13–17)
MCHC RBC-ENTMCNC: 29.6 PG — SIGNIFICANT CHANGE UP (ref 27–34)
MCHC RBC-ENTMCNC: 30.1 PG — SIGNIFICANT CHANGE UP (ref 27–34)
MCHC RBC-ENTMCNC: 32.3 GM/DL — SIGNIFICANT CHANGE UP (ref 32–36)
MCHC RBC-ENTMCNC: 32.4 GM/DL — SIGNIFICANT CHANGE UP (ref 32–36)
MCV RBC AUTO: 91.2 FL — SIGNIFICANT CHANGE UP (ref 80–100)
MCV RBC AUTO: 93.2 FL — SIGNIFICANT CHANGE UP (ref 80–100)
PLATELET # BLD AUTO: 137 K/UL — LOW (ref 150–400)
PLATELET # BLD AUTO: 149 K/UL — LOW (ref 150–400)
POTASSIUM SERPL-MCNC: 4.3 MMOL/L — SIGNIFICANT CHANGE UP (ref 3.5–5.3)
POTASSIUM SERPL-SCNC: 4.3 MMOL/L — SIGNIFICANT CHANGE UP (ref 3.5–5.3)
RBC # BLD: 2.84 M/UL — LOW (ref 4.2–5.8)
RBC # BLD: 3.22 M/UL — LOW (ref 4.2–5.8)
RBC # FLD: 15.3 % — HIGH (ref 10.3–14.5)
RBC # FLD: 15.3 % — HIGH (ref 10.3–14.5)
SODIUM SERPL-SCNC: 138 MMOL/L — SIGNIFICANT CHANGE UP (ref 135–145)
WBC # BLD: 7.55 K/UL — SIGNIFICANT CHANGE UP (ref 3.8–10.5)
WBC # BLD: 9.13 K/UL — SIGNIFICANT CHANGE UP (ref 3.8–10.5)
WBC # FLD AUTO: 7.55 K/UL — SIGNIFICANT CHANGE UP (ref 3.8–10.5)
WBC # FLD AUTO: 9.13 K/UL — SIGNIFICANT CHANGE UP (ref 3.8–10.5)

## 2019-09-10 PROCEDURE — 99232 SBSQ HOSP IP/OBS MODERATE 35: CPT | Mod: GC

## 2019-09-10 RX ADMIN — Medication 1 SPRAY(S): at 18:08

## 2019-09-10 RX ADMIN — ENTACAPONE 200 MILLIGRAM(S): 200 TABLET, FILM COATED ORAL at 21:47

## 2019-09-10 RX ADMIN — ENTACAPONE 200 MILLIGRAM(S): 200 TABLET, FILM COATED ORAL at 13:01

## 2019-09-10 RX ADMIN — Medication 50 MICROGRAM(S): at 06:04

## 2019-09-10 RX ADMIN — CARBIDOPA AND LEVODOPA 1 TABLET(S): 25; 100 TABLET ORAL at 08:01

## 2019-09-10 RX ADMIN — RANOLAZINE 1000 MILLIGRAM(S): 500 TABLET, FILM COATED, EXTENDED RELEASE ORAL at 13:00

## 2019-09-10 RX ADMIN — CARBIDOPA AND LEVODOPA 1 TABLET(S): 25; 100 TABLET ORAL at 13:01

## 2019-09-10 RX ADMIN — RANOLAZINE 1000 MILLIGRAM(S): 500 TABLET, FILM COATED, EXTENDED RELEASE ORAL at 00:06

## 2019-09-10 RX ADMIN — TAMSULOSIN HYDROCHLORIDE 0.4 MILLIGRAM(S): 0.4 CAPSULE ORAL at 13:01

## 2019-09-10 RX ADMIN — Medication 1 SPRAY(S): at 06:03

## 2019-09-10 RX ADMIN — ATORVASTATIN CALCIUM 80 MILLIGRAM(S): 80 TABLET, FILM COATED ORAL at 21:47

## 2019-09-10 RX ADMIN — CARBIDOPA AND LEVODOPA 1 TABLET(S): 25; 100 TABLET ORAL at 21:47

## 2019-09-10 RX ADMIN — ENTACAPONE 200 MILLIGRAM(S): 200 TABLET, FILM COATED ORAL at 08:01

## 2019-09-10 RX ADMIN — SENNA PLUS 2 TABLET(S): 8.6 TABLET ORAL at 18:09

## 2019-09-10 RX ADMIN — CHLORHEXIDINE GLUCONATE 1 APPLICATION(S): 213 SOLUTION TOPICAL at 08:01

## 2019-09-10 RX ADMIN — PANTOPRAZOLE SODIUM 40 MILLIGRAM(S): 20 TABLET, DELAYED RELEASE ORAL at 06:03

## 2019-09-10 RX ADMIN — IRON SUCROSE 110 MILLIGRAM(S): 20 INJECTION, SOLUTION INTRAVENOUS at 00:06

## 2019-09-10 RX ADMIN — RANOLAZINE 1000 MILLIGRAM(S): 500 TABLET, FILM COATED, EXTENDED RELEASE ORAL at 23:42

## 2019-09-10 RX ADMIN — POLYETHYLENE GLYCOL 3350 17 GRAM(S): 17 POWDER, FOR SOLUTION ORAL at 21:47

## 2019-09-10 RX ADMIN — ENTACAPONE 200 MILLIGRAM(S): 200 TABLET, FILM COATED ORAL at 18:08

## 2019-09-10 RX ADMIN — POLYETHYLENE GLYCOL 3350 17 GRAM(S): 17 POWDER, FOR SOLUTION ORAL at 13:01

## 2019-09-10 RX ADMIN — CARBIDOPA AND LEVODOPA 1 TABLET(S): 25; 100 TABLET ORAL at 18:08

## 2019-09-10 NOTE — PROGRESS NOTE ADULT - SUBJECTIVE AND OBJECTIVE BOX
Colorectal Surgery Progress Note     Subjective/24hour Events:   Patient seen and examined.   No acute events overnight.   Pain controlled.     Vital Signs:  Vital Signs Last 24 Hrs  T(C): 36.8 (09 Sep 2019 23:45), Max: 36.8 (09 Sep 2019 23:45)  T(F): 98.2 (09 Sep 2019 23:45), Max: 98.2 (09 Sep 2019 23:45)  HR: 63 (09 Sep 2019 23:45) (51 - 98)  BP: 130/78 (09 Sep 2019 23:45) (102/64 - 130/78)  BP(mean): --  RR: 18 (09 Sep 2019 23:45) (18 - 22)  SpO2: 98% (09 Sep 2019 23:45) (98% - 100%)    CAPILLARY BLOOD GLUCOSE    I&O's Detail    08 Sep 2019 07:01  -  09 Sep 2019 07:00  --------------------------------------------------------  IN:    Oral Fluid: 460 mL  Total IN: 460 mL    OUT:    Indwelling Catheter - Urethral: 1000 mL  Total OUT: 1000 mL    Total NET: -540 mL      09 Sep 2019 07:01  -  10 Sep 2019 05:31  --------------------------------------------------------  IN:    Oral Fluid: 840 mL    Packed Red Blood Cells: 700 mL  Total IN: 1540 mL    OUT:    Indwelling Catheter - Urethral: 775 mL  Total OUT: 775 mL    Total NET: 765 mL          MEDICATIONS  (STANDING):  atorvastatin 80 milliGRAM(s) Oral at bedtime  carbidopa/levodopa CR 50/200 1 Tablet(s) Oral <User Schedule>  chlorhexidine 4% Liquid 1 Application(s) Topical <User Schedule>  entacapone 200 milliGRAM(s) Oral <User Schedule>  fluticasone propionate 50 MICROgram(s)/spray Nasal Spray 1 Spray(s) Both Nostrils two times a day  iron sucrose IVPB 200 milliGRAM(s) IV Intermittent every 24 hours  levothyroxine 50 MICROGram(s) Oral daily  pantoprazole    Tablet 40 milliGRAM(s) Oral before breakfast  polyethylene glycol 3350 17 Gram(s) Oral <User Schedule>  ranolazine 1000 milliGRAM(s) Oral two times a day  senna 2 Tablet(s) Oral two times a day  tamsulosin 0.4 milliGRAM(s) Oral daily    MEDICATIONS  (PRN):      Physical Exam:  Gen: NAD.  Lungs: Non labored breathing.   Ab: Soft, nontender, nondistended.   Ext: Moves all 4 spontaneously.     Labs:    09-09    137  |  103  |  37<H>  ----------------------------<  104<H>  4.0   |  24  |  1.60<H>    Ca    7.5<L>      09 Sep 2019 07:02                        9.7    9.13  )-----------( 149      ( 10 Sep 2019 00:11 )             30.0           89M with CAD s/p PCI  on ASA, Afib, CKD3, DVT s/p IVC Filter, Parkinsons Disease,  w/ hospital course c/b BRBPR s/p colonoscopy showing rectal ulcers, slight drop in H/H this morning after "blood tinged" bowel movement.    PLAN  - Possibly stercoral vs malignant in origin, will need flex sig or repeat colonoscopy for biopsy to assess etiology  - No urgent surgery indicated  - Agree with bowel regimen  - Trend H/H and transfuse PRN  - Patient's H/H have been within a stable range between 9/6-9/9 (7.2/22.5 --> 7.3/23.3 --> 7.0/22.6 --> 8.0/24 --> 6.9/21.6) without significant drop since last transfusion  - GI following and also plan to watch H/H for now  - To be discussed with Surgery Fellow and Dr. Ace Colorectal Surgery Progress Note     Subjective/24hour Events:   Patient seen and examined.   s/p 2 pRBC with appropriate increase in Hb from 6.9 to 9.7  Subsequent BMs overnight were not melenic.  Pain controlled.     Vital Signs:  Vital Signs Last 24 Hrs  T(C): 36.8 (09 Sep 2019 23:45), Max: 36.8 (09 Sep 2019 23:45)  T(F): 98.2 (09 Sep 2019 23:45), Max: 98.2 (09 Sep 2019 23:45)  HR: 63 (09 Sep 2019 23:45) (51 - 98)  BP: 130/78 (09 Sep 2019 23:45) (102/64 - 130/78)  BP(mean): --  RR: 18 (09 Sep 2019 23:45) (18 - 22)  SpO2: 98% (09 Sep 2019 23:45) (98% - 100%)    CAPILLARY BLOOD GLUCOSE    I&O's Detail    08 Sep 2019 07:01  -  09 Sep 2019 07:00  --------------------------------------------------------  IN:    Oral Fluid: 460 mL  Total IN: 460 mL    OUT:    Indwelling Catheter - Urethral: 1000 mL  Total OUT: 1000 mL    Total NET: -540 mL      09 Sep 2019 07:01  -  10 Sep 2019 05:31  --------------------------------------------------------  IN:    Oral Fluid: 840 mL    Packed Red Blood Cells: 700 mL  Total IN: 1540 mL    OUT:    Indwelling Catheter - Urethral: 775 mL  Total OUT: 775 mL    Total NET: 765 mL          MEDICATIONS  (STANDING):  atorvastatin 80 milliGRAM(s) Oral at bedtime  carbidopa/levodopa CR 50/200 1 Tablet(s) Oral <User Schedule>  chlorhexidine 4% Liquid 1 Application(s) Topical <User Schedule>  entacapone 200 milliGRAM(s) Oral <User Schedule>  fluticasone propionate 50 MICROgram(s)/spray Nasal Spray 1 Spray(s) Both Nostrils two times a day  iron sucrose IVPB 200 milliGRAM(s) IV Intermittent every 24 hours  levothyroxine 50 MICROGram(s) Oral daily  pantoprazole    Tablet 40 milliGRAM(s) Oral before breakfast  polyethylene glycol 3350 17 Gram(s) Oral <User Schedule>  ranolazine 1000 milliGRAM(s) Oral two times a day  senna 2 Tablet(s) Oral two times a day  tamsulosin 0.4 milliGRAM(s) Oral daily    MEDICATIONS  (PRN):      Physical Exam:  Gen: NAD.  Lungs: Non labored breathing.   Ab: Soft, nontender, nondistended.   Ext: Moves all 4 spontaneously.     Labs:    09-09    137  |  103  |  37<H>  ----------------------------<  104<H>  4.0   |  24  |  1.60<H>    Ca    7.5<L>      09 Sep 2019 07:02                        9.7    9.13  )-----------( 149      ( 10 Sep 2019 00:11 )             30.0   CBC (09-10 @ 00:11)                              9.7<L>                         9.13    )----------------(  149<L>     --    % Neutrophils, --    % Lymphocytes, ANC: --                                  30.0<L>              CBC (09-09 @ 10:10)                              6.9<LL>                         6.08    )----------------(  129<L>     --    % Neutrophils, --    % Lymphocytes, ANC: --                                  21.6<L>                BMP (09-09 @ 07:02)             137     |  103     |  37<H> 		Ca++ --      Ca 7.5<L>             ---------------------------------( 104<H>		Mg --                 4.0     |  24      |  1.60<H>			Ph --        ASSESSMENT    89M with CAD s/p PCI  on ASA, Afib, CKD3, DVT s/p IVC Filter, Parkinsons Disease,  w/ hospital course c/b BRBPR s/p colonoscopy showing rectal ulcers, slight drop in H/H this morning after "blood tinged" bowel movement.    PLAN  - Possibly stercoral vs malignant in origin, will need flex sig or repeat colonoscopy for biopsy to assess etiology  - Surgical intervention is not warranted at this time.  - Agree with bowel regimen  - Trend H/H and transfuse PRN, goal Hb > 7.0 g/dL  - GI following and also plan to watch H/H for now  - To be discussed with Surgery Fellow and Dr. Ace Colorectal Surgery Progress Note     Subjective/24hour Events:   Patient seen and examined.   s/p 2 pRBC with appropriate increase in Hb from 6.9 to 9.7  Subsequent BMs overnight were not melenic.  Pain controlled.     Vital Signs:  Vital Signs Last 24 Hrs  T(C): 36.8 (09 Sep 2019 23:45), Max: 36.8 (09 Sep 2019 23:45)  T(F): 98.2 (09 Sep 2019 23:45), Max: 98.2 (09 Sep 2019 23:45)  HR: 63 (09 Sep 2019 23:45) (51 - 98)  BP: 130/78 (09 Sep 2019 23:45) (102/64 - 130/78)  BP(mean): --  RR: 18 (09 Sep 2019 23:45) (18 - 22)  SpO2: 98% (09 Sep 2019 23:45) (98% - 100%)    CAPILLARY BLOOD GLUCOSE    I&O's Detail    08 Sep 2019 07:01  -  09 Sep 2019 07:00  --------------------------------------------------------  IN:    Oral Fluid: 460 mL  Total IN: 460 mL    OUT:    Indwelling Catheter - Urethral: 1000 mL  Total OUT: 1000 mL    Total NET: -540 mL      09 Sep 2019 07:01  -  10 Sep 2019 05:31  --------------------------------------------------------  IN:    Oral Fluid: 840 mL    Packed Red Blood Cells: 700 mL  Total IN: 1540 mL    OUT:    Indwelling Catheter - Urethral: 775 mL  Total OUT: 775 mL    Total NET: 765 mL          MEDICATIONS  (STANDING):  atorvastatin 80 milliGRAM(s) Oral at bedtime  carbidopa/levodopa CR 50/200 1 Tablet(s) Oral <User Schedule>  chlorhexidine 4% Liquid 1 Application(s) Topical <User Schedule>  entacapone 200 milliGRAM(s) Oral <User Schedule>  fluticasone propionate 50 MICROgram(s)/spray Nasal Spray 1 Spray(s) Both Nostrils two times a day  iron sucrose IVPB 200 milliGRAM(s) IV Intermittent every 24 hours  levothyroxine 50 MICROGram(s) Oral daily  pantoprazole    Tablet 40 milliGRAM(s) Oral before breakfast  polyethylene glycol 3350 17 Gram(s) Oral <User Schedule>  ranolazine 1000 milliGRAM(s) Oral two times a day  senna 2 Tablet(s) Oral two times a day  tamsulosin 0.4 milliGRAM(s) Oral daily    MEDICATIONS  (PRN):      Physical Exam:  Gen: NAD.  Lungs: Non labored breathing.   Ab: Soft, nontender, nondistended.   Ext: Moves all 4 spontaneously.     Labs:    09-09    137  |  103  |  37<H>  ----------------------------<  104<H>  4.0   |  24  |  1.60<H>    Ca    7.5<L>      09 Sep 2019 07:02                        9.7    9.13  )-----------( 149      ( 10 Sep 2019 00:11 )             30.0   CBC (09-10 @ 00:11)                              9.7<L>                         9.13    )----------------(  149<L>     --    % Neutrophils, --    % Lymphocytes, ANC: --                                  30.0<L>              CBC (09-09 @ 10:10)                              6.9<LL>                         6.08    )----------------(  129<L>     --    % Neutrophils, --    % Lymphocytes, ANC: --                                  21.6<L>                BMP (09-09 @ 07:02)             137     |  103     |  37<H> 		Ca++ --      Ca 7.5<L>             ---------------------------------( 104<H>		Mg --                 4.0     |  24      |  1.60<H>			Ph --        ASSESSMENT    89M with CAD s/p PCI  on ASA, Afib, CKD3, DVT s/p IVC Filter, Parkinsons Disease,  w/ hospital course c/b BRBPR s/p colonoscopy showing rectal ulcers, slight drop in H/H this morning after "blood tinged" bowel movement.    PLAN  - Possibly stercoral vs malignant in origin, will need flex sig or repeat colonoscopy as outpatient for biopsy to assess etiology  - Surgical intervention is not warranted at this time.  - Agree with bowel regimen  - Trend H/H and transfuse PRN, goal Hb > 7.0 g/dL  - GI following and also plan to watch H/H for now  - To be discussed with Surgery Fellow and Dr. Ace Colorectal Surgery Progress Note     Subjective/24hour Events:   Patient seen and examined  Clinically stable with no acute events overnight  Denies hematochezia or melenic stools.  No acute complaints    Vital Signs:  Vital Signs Last 24 Hrs  T(C): 36.4 (11 Sep 2019 09:58), Max: 36.9 (11 Sep 2019 05:30)  T(F): 97.5 (11 Sep 2019 09:58), Max: 98.4 (11 Sep 2019 05:30)  HR: 56 (11 Sep 2019 09:58) (56 - 98)  BP: 111/65 (11 Sep 2019 09:58) (111/65 - 136/63)  BP(mean): --  RR: 18 (11 Sep 2019 09:58) (18 - 19)  SpO2: 94% (11 Sep 2019 09:58) (94% - 100%)    CAPILLARY BLOOD GLUCOSE    I&O's Detail    10 Sep 2019 07:01  -  11 Sep 2019 07:00  --------------------------------------------------------  IN:    Oral Fluid: 720 mL  Total IN: 720 mL    OUT:    Indwelling Catheter - Urethral: 1000 mL  Total OUT: 1000 mL  Total NET: -280 mL    MEDICATIONS  (STANDING):  atorvastatin 80 milliGRAM(s) Oral at bedtime  carbidopa/levodopa CR 50/200 1 Tablet(s) Oral <User Schedule>  chlorhexidine 4% Liquid 1 Application(s) Topical <User Schedule>  entacapone 200 milliGRAM(s) Oral <User Schedule>  fluticasone propionate 50 MICROgram(s)/spray Nasal Spray 1 Spray(s) Both Nostrils two times a day  iron sucrose IVPB 200 milliGRAM(s) IV Intermittent every 24 hours  levothyroxine 50 MICROGram(s) Oral daily  pantoprazole    Tablet 40 milliGRAM(s) Oral before breakfast  polyethylene glycol 3350 17 Gram(s) Oral <User Schedule>  ranolazine 1000 milliGRAM(s) Oral two times a day  senna 2 Tablet(s) Oral two times a day  tamsulosin 0.4 milliGRAM(s) Oral daily    MEDICATIONS  (PRN):    Physical Exam:  Gen: NAD.  Lungs: Non labored breathing.   Ab: Soft, nontender, nondistended.   Ext: Moves all 4 spontaneously.     Labs:  CBC (09-11 @ 09:30)                              8.4<L>                         6.38    )----------------(  149<L>     --    % Neutrophils, --    % Lymphocytes, ANC: --                                  26.6<L>  CBC (09-10 @ 09:39)                              8.4<L>                         7.55    )----------------(  137<L>     --    % Neutrophils, --    % Lymphocytes, ANC: --                                  25.9<L>    BMP (09-11 @ 07:04)             135     |  102     |  35<H> 		Ca++ --      Ca 7.6<L>             ---------------------------------( 106<H>		Mg --                 4.0     |  22      |  1.70<H>			Ph --      BMP (09-10 @ 07:13)             138     |  103     |  38<H> 		Ca++ --      Ca 7.6<L>             ---------------------------------( 113<H>		Mg --                 4.3     |  25      |  1.86<H>			Ph --

## 2019-09-10 NOTE — PROGRESS NOTE ADULT - SUBJECTIVE AND OBJECTIVE BOX
Patient is a 89y old  Male who presents with a chief complaint of [facility]atur[facility] following [facility] placement.  Groin erythema, oedema (10 Sep 2019 13:30)      SUBJECTIVE / OVERNIGHT EVENTS: no new developments, brown stools this am, refusing speech and swallow recommendations, seen by CRS, no interventions other than outptn F?u being planned. s/p 2 U prbc yesterday, HH dropped to 8.4/25.9  from 9.7/30 yesterday post transfusion. on IV Venofer, on bowel regimen    MEDICATIONS  (STANDING):  atorvastatin 80 milliGRAM(s) Oral at bedtime  carbidopa/levodopa CR 50/200 1 Tablet(s) Oral <User Schedule>  chlorhexidine 4% Liquid 1 Application(s) Topical <User Schedule>  entacapone 200 milliGRAM(s) Oral <User Schedule>  fluticasone propionate 50 MICROgram(s)/spray Nasal Spray 1 Spray(s) Both Nostrils two times a day  iron sucrose IVPB 200 milliGRAM(s) IV Intermittent every 24 hours  levothyroxine 50 MICROGram(s) Oral daily  pantoprazole    Tablet 40 milliGRAM(s) Oral before breakfast  polyethylene glycol 3350 17 Gram(s) Oral <User Schedule>  ranolazine 1000 milliGRAM(s) Oral two times a day  senna 2 Tablet(s) Oral two times a day  tamsulosin 0.4 milliGRAM(s) Oral daily    MEDICATIONS  (PRN):      Vital Signs Last 24 Hrs  T(F): 98.2 (09-10-19 @ 20:24), Max: 98.2 (09-09-19 @ 23:45)  HR: 98 (09-10-19 @ 20:24) (63 - 98)  BP: 114/72 (09-10-19 @ 20:24) (114/72 - 148/72)  RR: 19 (09-10-19 @ 20:24) (18 - 19)  SpO2: 96% (09-10-19 @ 20:24) (96% - 98%)  Telemetry:   CAPILLARY BLOOD GLUCOSE        I&O's Summary    09 Sep 2019 07:01  -  10 Sep 2019 07:00  --------------------------------------------------------  IN: 1810 mL / OUT: 825 mL / NET: 985 mL    10 Sep 2019 07:01  -  10 Sep 2019 23:39  --------------------------------------------------------  IN: 240 mL / OUT: 600 mL / NET: -360 mL        PHYSICAL EXAM:  GENERAL: NAD, well-developed  HEAD:  Atraumatic, Normocephalic  EYES: EOMI, PERRLA, conjunctiva and sclera clear  NECK: Supple, No JVD  CHEST/LUNG: Clear to auscultation bilaterally; No wheeze  HEART: Regular rate and rhythm; No murmurs, rubs, or gallops  ABDOMEN: Soft, Nontender, Nondistended; Bowel sounds present  EXTREMITIES:  2+ Peripheral Pulses, No clubbing, cyanosis, or edema  PSYCH: AAOx3  NEUROLOGY: non-focal  SKIN: No rashes or lesions    LABS:                        8.4    7.55  )-----------( 137      ( 10 Sep 2019 09:39 )             25.9     09-10    138  |  103  |  38<H>  ----------------------------<  113<H>  4.3   |  25  |  1.86<H>    Ca    7.6<L>      10 Sep 2019 07:13                RADIOLOGY & ADDITIONAL TESTS:    Imaging Personally Reviewed:    Consultant(s) Notes Reviewed:      Care Discussed with Consultants/Other Providers:

## 2019-09-10 NOTE — PROGRESS NOTE ADULT - ASSESSMENT
88 y/o M w/ hx of CAD s/p CABG s/p PCI, ICM, and Parkinson's disease with recent right humeral fracture, with admission for hematuria c/b aspiration pneumonia, now with hematochezia.    Impression:  # Acute blood loss anemia with hematochezia: Currently with minimal hematochezia, HD stable, and with Hgb of 8.4 this morning after receiving 2 units pRBCs on 9/9/19. Colonoscopy on 9/4/19 revealed multiple rectal ulcers and ulcerated mucosa in the anal canal which is the likely source of the patient's bleeding - may be secondary to fecal impaction versus malignancy.  # CAD s/p CABG s/p PCI  # ICM  # Parkinson's disease    Recommendations:  - Monitor CBCs daily  - Monitor bowel movements  - Transfuse for goal Hgb >/= 7.0  - Aggressive bowel regimen, senna and Miralax PO TID  - No rectal tube  - Repeat Flex Sig versus anoscopy by Colorectal Surgery to assess ulcer healing in 2 to 4 weeks  - If bleeding continues, can consider repeat flex sig.  - Patient is declining repeat colonoscopy for polyp removal  - Rest of care per primary team    Please call GI ([phone]) if there are any additional questions or concerns. Please call on-call GI fellow after 5pm and before 8am, and on weekends.    Constantine Rose MD  Gastroenterology Fellow  Pager number: [phone] / [phone] 89 y/o M w/ hx of CAD s/p CABG s/p PCI, ICM, and Parkinson's disease with recent right humeral fracture, with admission for hematuria c/b aspiration pneumonia, now with hematochezia.    Impression:  # Acute blood loss anemia with hematochezia: Currently with minimal hematochezia, HD stable, and with Hgb of 8.4 this morning after receiving 2 units pRBCs on 9/9/19. Colonoscopy on 9/4/19 revealed multiple rectal ulcers and ulcerated mucosa in the anal canal which is the likely source of the patient's bleeding - may be secondary to fecal impaction versus malignancy. Bleeding now resolved.  # CAD s/p CABG s/p PCI  # ICM  # Parkinson's disease    Recommendations:  - Monitor CBCs daily  - Monitor bowel movements  - Transfuse for goal Hgb >/= 7.0  - Aggressive bowel regimen, senna and Miralax PO TID  - No rectal tube  - Repeat Flex Sig versus anoscopy by Colorectal Surgery to assess ulcer healing in 2 to 4 weeks  - If bleeding continues, can consider repeat flex sig.  - Patient is declining repeat colonoscopy for polyp removal  - Rest of care per primary team    Please call GI ([phone]) if there are any additional questions or concerns. Please call on-call GI fellow after 5pm and before 8am, and on weekends.    Constantine Rose MD  Gastroenterology Fellow  Pager number: [phone] / [phone]

## 2019-09-10 NOTE — PROGRESS NOTE ADULT - SUBJECTIVE AND OBJECTIVE BOX
Chief Complaint: Hematuria    Interval Events:   - The patient endorsed a brown colored bowel movement this morning  - Per primary team, no BRBPR was reported  - The patient received 2 units pRBCs yesterday    Allergies:  Cipro (Rash)    Hospital Medications:  atorvastatin 80 milliGRAM(s) Oral at bedtime  carbidopa/levodopa CR 50/200 1 Tablet(s) Oral <User Schedule>  chlorhexidine 4% Liquid 1 Application(s) Topical <User Schedule>  entacapone 200 milliGRAM(s) Oral <User Schedule>  fluticasone propionate 50 MICROgram(s)/spray Nasal Spray 1 Spray(s) Both Nostrils two times a day  iron sucrose IVPB 200 milliGRAM(s) IV Intermittent every 24 hours  levothyroxine 50 MICROGram(s) Oral daily  pantoprazole    Tablet 40 milliGRAM(s) Oral before breakfast  polyethylene glycol 3350 17 Gram(s) Oral <User Schedule>  ranolazine 1000 milliGRAM(s) Oral two times a day  senna 2 Tablet(s) Oral two times a day  tamsulosin 0.4 milliGRAM(s) Oral daily    PMHX/PSHX:  Unilateral inguinal hernia, with gangrene, not specified as recurrent  Upper GI bleed  Aspiration pneumonia  Clostridium difficile colitis  Pneumonia  MI, old  UTI (urinary tract infection)  BPH (benign prostatic hyperplasia)  Parkinsons Disease  CAD (Coronary Artery Disease)  HTN (Hypertension)  Hyperlipidemia  Presence of IVC filter  History of prostate surgery  Varicose veins of legs  S/P appendectomy  Stented coronary artery  Hx of CABG  Hyperlipidemia    Family history:  Family history of coronary artery disease  Family history of breast cancer (Aunt)    ROS:     General:  No wt loss, fevers, chills, night sweats, + fatigue  Eyes:  Good vision, no reported pain  ENT:  No sore throat, pain, runny nose, dysphagia  CV:  No pain, palpitations, hypo/hypertension  Pulm:  No dyspnea, cough, tachypnea, wheezing  GI:  No pain, No nausea, No vomiting, No diarrhea, No constipation, No weight loss, No fever, No pruritis, + rectal bleeding, No tarry stools, No dysphagia  :  No pain, bleeding, incontinence, nocturia  Muscle:  No pain, + weakness  Neuro:  + weakness, tingling, memory problems  Psych:  + fatigue, insomnia, mood problems, depression  Endocrine:  No polyuria, polydipsia, cold/heat intolerance  Heme:  No petechiae, ecchymosis, easy bruisability  Skin:  No rash, tattoos, scars, edema    PHYSICAL EXAM:   Vital Signs:  Vital Signs Last 24 Hrs  T(C): 36.7 (10 Sep 2019 06:05), Max: 36.8 (09 Sep 2019 23:45)  T(F): 98 (10 Sep 2019 06:05), Max: 98.2 (09 Sep 2019 23:45)  HR: 66 (10 Sep 2019 06:05) (52 - 98)  BP: 132/80 (10 Sep 2019 06:05) (102/64 - 132/80)  BP(mean): --  RR: 18 (10 Sep 2019 06:05) (18 - 22)  SpO2: 98% (10 Sep 2019 06:05) (98% - 100%)  Daily Weight in k (09 Sep 2019 23:00)    GENERAL:  No acute distress  HEENT:  Normocephalic/atraumatic,  no scleral icterus  CHEST:  Normal effort, no accessory muscle use  HEART:  Regular rate and rhythm, no murmurs/rubs/gallops  ABDOMEN:  Soft, non-tender, non-distended, normoactive bowel sounds  EXTREMITIES:  No cyanosis, clubbing, or edema  SKIN:  No rash/erythema  NEURO:  Alert and oriented x 3    LABS:                        8.4    7.55  )-----------( 137      ( 10 Sep 2019 09:39 )             25.9     Mean Cell Volume: 91.2 fl (09-10- @ 09:39)    09-10    138  |  103  |  38<H>  ----------------------------<  113<H>  4.3   |  25  |  1.86<H>    Ca    7.6<L>      10 Sep 2019 07:13               8.4    7.55  )-----------( 137      ( 10 Sep 2019 09:39 )             25.9                         9.7    9.13  )-----------( 149      ( 10 Sep 2019 00:11 )             30.0                         6.9    6.08  )-----------( 129      ( 09 Sep 2019 10:10 )             21.6                         8.0    8.2   )-----------( 123      ( 08 Sep 2019 17:43 )             24.0                         7.0    7.49  )-----------( 131      ( 08 Sep 2019 08:53 )             22.6     Imaging:    No new imaging

## 2019-09-10 NOTE — PROGRESS NOTE ADULT - PROBLEM SELECTOR PLAN 1
s/p EGD and Colon.   Multiple large rectal ulcers.  probably 2/2 stercoral colitis, completed ABx, on bowel regimen, trending HH, s/p 2 U PRBC on 9/9  on  IV Venofer   will need outptn CRS follow up

## 2019-09-10 NOTE — PROGRESS NOTE ADULT - ASSESSMENT
Mr. Zbigniew Moya is an 89 y.o M with CAD s/p PCI  on ASA, Afib, CKD3, DVT s/p IVC Filter, Parkinson's Disease, w/ hospital course c/b BRBPR s/p colonoscopy showing rectal ulcers. No recurrence of hematochezia or melenic stools. H&H stable.     PLAN  - Possibly stercoral vs malignant in origin, will need flex sig or repeat colonoscopy as outpatient for biopsy to assess etiology, or if repeat episode  - Surgical intervention is not warranted at this time.  - Agree with bowel regimen  - Trend H/H and transfuse PRN, goal Hb > 7.0 g/dL  - GI following and also plan to watch H/H for now  - To be discussed with Dr. RIOS Ace

## 2019-09-10 NOTE — PROGRESS NOTE ADULT - SUBJECTIVE AND OBJECTIVE BOX
Subjective: Patient seen and examined. No new events except as noted.     REVIEW OF SYSTEMS:    CONSTITUTIONAL: No weakness, fevers or chills  EYES/ENT: No visual changes;  No vertigo or throat pain   NECK: No pain or stiffness  RESPIRATORY: No cough, wheezing, hemoptysis; No shortness of breath  CARDIOVASCULAR: No chest pain or palpitations  GASTROINTESTINAL: No abdominal or epigastric pain. No nausea, vomiting, or hematemesis; No diarrhea or constipation. No melena or hematochezia.  GENITOURINARY: No dysuria, frequency or hematuria  NEUROLOGICAL: No numbness or weakness  SKIN: No itching, burning, rashes, or lesions   All other review of systems is negative unless indicated above.    MEDICATIONS:  MEDICATIONS  (STANDING):  atorvastatin 80 milliGRAM(s) Oral at bedtime  carbidopa/levodopa CR 50/200 1 Tablet(s) Oral <User Schedule>  chlorhexidine 4% Liquid 1 Application(s) Topical <User Schedule>  entacapone 200 milliGRAM(s) Oral <User Schedule>  fluticasone propionate 50 MICROgram(s)/spray Nasal Spray 1 Spray(s) Both Nostrils two times a day  iron sucrose IVPB 200 milliGRAM(s) IV Intermittent every 24 hours  levothyroxine 50 MICROGram(s) Oral daily  pantoprazole    Tablet 40 milliGRAM(s) Oral before breakfast  polyethylene glycol 3350 17 Gram(s) Oral <User Schedule>  ranolazine 1000 milliGRAM(s) Oral two times a day  senna 2 Tablet(s) Oral two times a day  tamsulosin 0.4 milliGRAM(s) Oral daily      PHYSICAL EXAM:  T(C): 36.7 (09-10-19 @ 06:05), Max: 36.8 (09-09-19 @ 23:45)  HR: 66 (09-10-19 @ 06:05) (52 - 98)  BP: 132/80 (09-10-19 @ 06:05) (102/64 - 132/80)  RR: 18 (09-10-19 @ 06:05) (18 - 22)  SpO2: 98% (09-10-19 @ 06:05) (98% - 100%)  Wt(kg): --  I&O's Summary    09 Sep 2019 07:01  -  10 Sep 2019 07:00  --------------------------------------------------------  IN: 1810 mL / OUT: 825 mL / NET: 985 mL            Appearance: NAD  HEENT:   Normal oral mucosa, PERRL, EOMI	  Lymphatic: No lymphadenopathy , right arm/shoulder sling   Cardiovascular: Irregular rS1 S2, No JVD, No murmurs , Peripheral pulses palpable 2+ bilaterally  Respiratory: Decreased bs and effort   Gastrointestinal:  Soft, Non-tender, + BS	  Skin: No rashes, No ecchymoses, No cyanosis, warm to touch  Musculoskeletal: Decreased range of motion and  strength  Psychiatry:  Mood & affect appropriate  Ext: No edema  +lara     LABS:    CARDIAC MARKERS:                                8.4    7.55  )-----------( 137      ( 10 Sep 2019 09:39 )             25.9     09-10    138  |  103  |  38<H>  ----------------------------<  113<H>  4.3   |  25  |  1.86<H>    Ca    7.6<L>      10 Sep 2019 07:13      proBNP:   Lipid Profile:   HgA1c:   TSH:             TELEMETRY: 	    ECG:  	  RADIOLOGY:   DIAGNOSTIC TESTING:  [ ] Echocardiogram:  [ ]  Catheterization:  [ ] Stress Test:    OTHER:

## 2019-09-10 NOTE — PROGRESS NOTE ADULT - SUBJECTIVE AND OBJECTIVE BOX
No pain, no shortness of breath      VITAL:  T(C): , Max: 36.8 (09-09-19 @ 23:45)  T(F): , Max: 98.2 (09-09-19 @ 23:45)  HR: 63 (09-10-19 @ 11:50)  BP: 148/72 (09-10-19 @ 11:50)  RR: 18 (09-10-19 @ 11:50)  SpO2: 96% (09-10-19 @ 11:50)      PHYSICAL EXAM:  Constitutional: NAD; mild psychomotor retardation  HEENT: DMM  Neck: Supple, No JVD  Respiratory: CTA-b/l  Cardiovascular: irreg s1s2  Gastrointestinal: BS+, soft, NT/ND  Extremities: No peripheral edema b/l  : (+)lara-dark urine  Neuro: (+)coarse tremor LUE; right arm in sling      LABS:                        8.4    7.55  )-----------( 137      ( 10 Sep 2019 09:39 )             25.9     Na(138)/K(4.3)/Cl(103)/HCO3(25)/BUN(38)/Cr(1.86)Glu(113)/Ca(7.6)/Mg(--)/PO4(--)    09-10 @ 07:13  Na(137)/K(4.0)/Cl(103)/HCO3(24)/BUN(37)/Cr(1.60)Glu(104)/Ca(7.5)/Mg(--)/PO4(--)    09-09 @ 07:02  Na(142)/K(3.9)/Cl(107)/HCO3(23)/BUN(39)/Cr(1.80)Glu(113)/Ca(7.8)/Mg(--)/PO4(--)    09-08 @ 07:07        IMPRESSION: 89M w/ HTN, DVT-IVCF, Parkinson's disease, CAD-CABG, and CKD3, 8/28/19 a/w urinary retention/STEVEN    (1)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 At least in part due to past obstructive nephropathy, with resultant atrophy of his right kidney. Resolved superimposed STEVEN from obstruction    (2)Anemia - GIB - diverticulosis and large rectal ulcers on colonoscopy. H/H improved, s/p PRBCs      RECOMMEND:  (1)Dose new meds for GFR 30-40ml/min (present dosing is acceptable)  (2)Management of anemia per primary team/GI          Abdi Vieira MD  [facility]  [phone]

## 2019-09-11 LAB
ALBUMIN SERPL ELPH-MCNC: 2.7 G/DL — LOW (ref 3.3–5)
ALP SERPL-CCNC: 56 U/L — SIGNIFICANT CHANGE UP (ref 40–120)
ALT FLD-CCNC: <5 U/L — LOW (ref 10–45)
ANION GAP SERPL CALC-SCNC: 11 MMOL/L — SIGNIFICANT CHANGE UP (ref 5–17)
ANION GAP SERPL CALC-SCNC: 11 MMOL/L — SIGNIFICANT CHANGE UP (ref 5–17)
ANION GAP SERPL CALC-SCNC: 14 MMOL/L — SIGNIFICANT CHANGE UP (ref 5–17)
APTT BLD: 30.4 SEC — SIGNIFICANT CHANGE UP (ref 27.5–36.3)
AST SERPL-CCNC: 10 U/L — SIGNIFICANT CHANGE UP (ref 10–40)
BILIRUB SERPL-MCNC: 0.9 MG/DL — SIGNIFICANT CHANGE UP (ref 0.2–1.2)
BLD GP AB SCN SERPL QL: NEGATIVE — SIGNIFICANT CHANGE UP
BUN SERPL-MCNC: 33 MG/DL — HIGH (ref 7–23)
BUN SERPL-MCNC: 34 MG/DL — HIGH (ref 7–23)
BUN SERPL-MCNC: 35 MG/DL — HIGH (ref 7–23)
CALCIUM SERPL-MCNC: 7.4 MG/DL — LOW (ref 8.4–10.5)
CALCIUM SERPL-MCNC: 7.6 MG/DL — LOW (ref 8.4–10.5)
CALCIUM SERPL-MCNC: 7.7 MG/DL — LOW (ref 8.4–10.5)
CHLORIDE SERPL-SCNC: 102 MMOL/L — SIGNIFICANT CHANGE UP (ref 96–108)
CHLORIDE SERPL-SCNC: 102 MMOL/L — SIGNIFICANT CHANGE UP (ref 96–108)
CHLORIDE SERPL-SCNC: 104 MMOL/L — SIGNIFICANT CHANGE UP (ref 96–108)
CO2 SERPL-SCNC: 22 MMOL/L — SIGNIFICANT CHANGE UP (ref 22–31)
CO2 SERPL-SCNC: 22 MMOL/L — SIGNIFICANT CHANGE UP (ref 22–31)
CO2 SERPL-SCNC: 24 MMOL/L — SIGNIFICANT CHANGE UP (ref 22–31)
CREAT SERPL-MCNC: 1.69 MG/DL — HIGH (ref 0.5–1.3)
CREAT SERPL-MCNC: 1.7 MG/DL — HIGH (ref 0.5–1.3)
CREAT SERPL-MCNC: 1.76 MG/DL — HIGH (ref 0.5–1.3)
GAS PNL BLDA: SIGNIFICANT CHANGE UP
GLUCOSE SERPL-MCNC: 106 MG/DL — HIGH (ref 70–99)
GLUCOSE SERPL-MCNC: 120 MG/DL — HIGH (ref 70–99)
GLUCOSE SERPL-MCNC: 143 MG/DL — HIGH (ref 70–99)
HCT VFR BLD CALC: 23 % — LOW (ref 39–50)
HCT VFR BLD CALC: 26.6 % — LOW (ref 39–50)
HCT VFR BLD CALC: 26.8 % — LOW (ref 39–50)
HCT VFR BLD CALC: 26.8 % — LOW (ref 39–50)
HGB BLD-MCNC: 7.8 G/DL — LOW (ref 13–17)
HGB BLD-MCNC: 8.4 G/DL — LOW (ref 13–17)
HGB BLD-MCNC: 8.8 G/DL — LOW (ref 13–17)
HGB BLD-MCNC: 9 G/DL — LOW (ref 13–17)
INR BLD: 1.26 RATIO — HIGH (ref 0.88–1.16)
MAGNESIUM SERPL-MCNC: 2.5 MG/DL — SIGNIFICANT CHANGE UP (ref 1.6–2.6)
MCHC RBC-ENTMCNC: 29.2 PG — SIGNIFICANT CHANGE UP (ref 27–34)
MCHC RBC-ENTMCNC: 30.6 PG — SIGNIFICANT CHANGE UP (ref 27–34)
MCHC RBC-ENTMCNC: 31.6 GM/DL — LOW (ref 32–36)
MCHC RBC-ENTMCNC: 31.6 PG — SIGNIFICANT CHANGE UP (ref 27–34)
MCHC RBC-ENTMCNC: 31.9 PG — SIGNIFICANT CHANGE UP (ref 27–34)
MCHC RBC-ENTMCNC: 32.8 GM/DL — SIGNIFICANT CHANGE UP (ref 32–36)
MCHC RBC-ENTMCNC: 33.8 GM/DL — SIGNIFICANT CHANGE UP (ref 32–36)
MCHC RBC-ENTMCNC: 34.1 GM/DL — SIGNIFICANT CHANGE UP (ref 32–36)
MCV RBC AUTO: 92.4 FL — SIGNIFICANT CHANGE UP (ref 80–100)
MCV RBC AUTO: 93.1 FL — SIGNIFICANT CHANGE UP (ref 80–100)
MCV RBC AUTO: 93.4 FL — SIGNIFICANT CHANGE UP (ref 80–100)
MCV RBC AUTO: 93.5 FL — SIGNIFICANT CHANGE UP (ref 80–100)
PHOSPHATE SERPL-MCNC: 3.4 MG/DL — SIGNIFICANT CHANGE UP (ref 2.5–4.5)
PLATELET # BLD AUTO: 123 K/UL — LOW (ref 150–400)
PLATELET # BLD AUTO: 149 K/UL — LOW (ref 150–400)
PLATELET # BLD AUTO: 156 K/UL — SIGNIFICANT CHANGE UP (ref 150–400)
PLATELET # BLD AUTO: 156 K/UL — SIGNIFICANT CHANGE UP (ref 150–400)
POTASSIUM SERPL-MCNC: 4 MMOL/L — SIGNIFICANT CHANGE UP (ref 3.5–5.3)
POTASSIUM SERPL-MCNC: 4.1 MMOL/L — SIGNIFICANT CHANGE UP (ref 3.5–5.3)
POTASSIUM SERPL-MCNC: 4.3 MMOL/L — SIGNIFICANT CHANGE UP (ref 3.5–5.3)
POTASSIUM SERPL-SCNC: 4 MMOL/L — SIGNIFICANT CHANGE UP (ref 3.5–5.3)
POTASSIUM SERPL-SCNC: 4.1 MMOL/L — SIGNIFICANT CHANGE UP (ref 3.5–5.3)
POTASSIUM SERPL-SCNC: 4.3 MMOL/L — SIGNIFICANT CHANGE UP (ref 3.5–5.3)
PROT SERPL-MCNC: 5.4 G/DL — LOW (ref 6–8.3)
PROTHROM AB SERPL-ACNC: 14.5 SEC — HIGH (ref 10–12.9)
RBC # BLD: 2.46 M/UL — LOW (ref 4.2–5.8)
RBC # BLD: 2.87 M/UL — LOW (ref 4.2–5.8)
RBC # BLD: 2.88 M/UL — LOW (ref 4.2–5.8)
RBC # BLD: 2.88 M/UL — LOW (ref 4.2–5.8)
RBC # FLD: 13.7 % — SIGNIFICANT CHANGE UP (ref 10.3–14.5)
RBC # FLD: 13.8 % — SIGNIFICANT CHANGE UP (ref 10.3–14.5)
RBC # FLD: 14 % — SIGNIFICANT CHANGE UP (ref 10.3–14.5)
RBC # FLD: 15.4 % — HIGH (ref 10.3–14.5)
RH IG SCN BLD-IMP: POSITIVE — SIGNIFICANT CHANGE UP
SODIUM SERPL-SCNC: 135 MMOL/L — SIGNIFICANT CHANGE UP (ref 135–145)
SODIUM SERPL-SCNC: 137 MMOL/L — SIGNIFICANT CHANGE UP (ref 135–145)
SODIUM SERPL-SCNC: 140 MMOL/L — SIGNIFICANT CHANGE UP (ref 135–145)
WBC # BLD: 5.8 K/UL — SIGNIFICANT CHANGE UP (ref 3.8–10.5)
WBC # BLD: 6.38 K/UL — SIGNIFICANT CHANGE UP (ref 3.8–10.5)
WBC # BLD: 8.7 K/UL — SIGNIFICANT CHANGE UP (ref 3.8–10.5)
WBC # BLD: 8.8 K/UL — SIGNIFICANT CHANGE UP (ref 3.8–10.5)
WBC # FLD AUTO: 5.8 K/UL — SIGNIFICANT CHANGE UP (ref 3.8–10.5)
WBC # FLD AUTO: 6.38 K/UL — SIGNIFICANT CHANGE UP (ref 3.8–10.5)
WBC # FLD AUTO: 8.7 K/UL — SIGNIFICANT CHANGE UP (ref 3.8–10.5)
WBC # FLD AUTO: 8.8 K/UL — SIGNIFICANT CHANGE UP (ref 3.8–10.5)

## 2019-09-11 PROCEDURE — 93010 ELECTROCARDIOGRAM REPORT: CPT

## 2019-09-11 PROCEDURE — 99233 SBSQ HOSP IP/OBS HIGH 50: CPT | Mod: GC

## 2019-09-11 RX ORDER — TAMSULOSIN HYDROCHLORIDE 0.4 MG/1
0.4 CAPSULE ORAL DAILY
Refills: 0 | Status: DISCONTINUED | OUTPATIENT
Start: 2019-09-11 | End: 2019-09-17

## 2019-09-11 RX ORDER — ATORVASTATIN CALCIUM 80 MG/1
80 TABLET, FILM COATED ORAL AT BEDTIME
Refills: 0 | Status: DISCONTINUED | OUTPATIENT
Start: 2019-09-11 | End: 2019-09-17

## 2019-09-11 RX ORDER — FLUTICASONE PROPIONATE 50 MCG
1 SPRAY, SUSPENSION NASAL
Refills: 0 | Status: DISCONTINUED | OUTPATIENT
Start: 2019-09-11 | End: 2019-09-17

## 2019-09-11 RX ORDER — PANTOPRAZOLE SODIUM 20 MG/1
40 TABLET, DELAYED RELEASE ORAL DAILY
Refills: 0 | Status: DISCONTINUED | OUTPATIENT
Start: 2019-09-11 | End: 2019-09-11

## 2019-09-11 RX ORDER — RANOLAZINE 500 MG/1
1000 TABLET, FILM COATED, EXTENDED RELEASE ORAL
Refills: 0 | Status: DISCONTINUED | OUTPATIENT
Start: 2019-09-11 | End: 2019-09-12

## 2019-09-11 RX ORDER — NOREPINEPHRINE BITARTRATE/D5W 8 MG/250ML
0.05 PLASTIC BAG, INJECTION (ML) INTRAVENOUS
Qty: 8 | Refills: 0 | Status: DISCONTINUED | OUTPATIENT
Start: 2019-09-11 | End: 2019-09-11

## 2019-09-11 RX ORDER — CHLORHEXIDINE GLUCONATE 213 G/1000ML
1 SOLUTION TOPICAL
Refills: 0 | Status: DISCONTINUED | OUTPATIENT
Start: 2019-09-11 | End: 2019-09-12

## 2019-09-11 RX ORDER — FUROSEMIDE 40 MG
40 TABLET ORAL
Refills: 0 | Status: DISCONTINUED | OUTPATIENT
Start: 2019-09-11 | End: 2019-09-11

## 2019-09-11 RX ORDER — CARBIDOPA AND LEVODOPA 25; 100 MG/1; MG/1
1 TABLET ORAL
Refills: 0 | Status: DISCONTINUED | OUTPATIENT
Start: 2019-09-11 | End: 2019-09-17

## 2019-09-11 RX ORDER — SODIUM CHLORIDE 9 MG/ML
1000 INJECTION, SOLUTION INTRAVENOUS
Refills: 0 | Status: DISCONTINUED | OUTPATIENT
Start: 2019-09-11 | End: 2019-09-13

## 2019-09-11 RX ORDER — PANTOPRAZOLE SODIUM 20 MG/1
40 TABLET, DELAYED RELEASE ORAL
Refills: 0 | Status: DISCONTINUED | OUTPATIENT
Start: 2019-09-11 | End: 2019-09-17

## 2019-09-11 RX ORDER — LEVOTHYROXINE SODIUM 125 MCG
50 TABLET ORAL DAILY
Refills: 0 | Status: DISCONTINUED | OUTPATIENT
Start: 2019-09-11 | End: 2019-09-17

## 2019-09-11 RX ORDER — CALCIUM GLUCONATE 100 MG/ML
2 VIAL (ML) INTRAVENOUS ONCE
Refills: 0 | Status: COMPLETED | OUTPATIENT
Start: 2019-09-11 | End: 2019-09-11

## 2019-09-11 RX ORDER — PHENYLEPHRINE HYDROCHLORIDE 10 MG/ML
0.2 INJECTION INTRAVENOUS
Qty: 40 | Refills: 0 | Status: DISCONTINUED | OUTPATIENT
Start: 2019-09-11 | End: 2019-09-13

## 2019-09-11 RX ORDER — LEVOTHYROXINE SODIUM 125 MCG
25 TABLET ORAL AT BEDTIME
Refills: 0 | Status: DISCONTINUED | OUTPATIENT
Start: 2019-09-11 | End: 2019-09-11

## 2019-09-11 RX ADMIN — CHLORHEXIDINE GLUCONATE 1 APPLICATION(S): 213 SOLUTION TOPICAL at 05:38

## 2019-09-11 RX ADMIN — PANTOPRAZOLE SODIUM 40 MILLIGRAM(S): 20 TABLET, DELAYED RELEASE ORAL at 05:33

## 2019-09-11 RX ADMIN — POLYETHYLENE GLYCOL 3350 17 GRAM(S): 17 POWDER, FOR SOLUTION ORAL at 05:38

## 2019-09-11 RX ADMIN — CHLORHEXIDINE GLUCONATE 1 APPLICATION(S): 213 SOLUTION TOPICAL at 18:26

## 2019-09-11 RX ADMIN — CARBIDOPA AND LEVODOPA 1 TABLET(S): 25; 100 TABLET ORAL at 22:23

## 2019-09-11 RX ADMIN — Medication 1 SPRAY(S): at 05:32

## 2019-09-11 RX ADMIN — SENNA PLUS 2 TABLET(S): 8.6 TABLET ORAL at 05:35

## 2019-09-11 RX ADMIN — CARBIDOPA AND LEVODOPA 1 TABLET(S): 25; 100 TABLET ORAL at 13:38

## 2019-09-11 RX ADMIN — CARBIDOPA AND LEVODOPA 1 TABLET(S): 25; 100 TABLET ORAL at 08:27

## 2019-09-11 RX ADMIN — Medication 50 MICROGRAM(S): at 05:33

## 2019-09-11 RX ADMIN — RANOLAZINE 1000 MILLIGRAM(S): 500 TABLET, FILM COATED, EXTENDED RELEASE ORAL at 22:23

## 2019-09-11 RX ADMIN — TAMSULOSIN HYDROCHLORIDE 0.4 MILLIGRAM(S): 0.4 CAPSULE ORAL at 12:19

## 2019-09-11 RX ADMIN — Medication 40 MILLIGRAM(S): at 13:37

## 2019-09-11 RX ADMIN — POLYETHYLENE GLYCOL 3350 17 GRAM(S): 17 POWDER, FOR SOLUTION ORAL at 13:37

## 2019-09-11 RX ADMIN — ATORVASTATIN CALCIUM 80 MILLIGRAM(S): 80 TABLET, FILM COATED ORAL at 22:23

## 2019-09-11 RX ADMIN — IRON SUCROSE 110 MILLIGRAM(S): 20 INJECTION, SOLUTION INTRAVENOUS at 00:29

## 2019-09-11 RX ADMIN — ENTACAPONE 200 MILLIGRAM(S): 200 TABLET, FILM COATED ORAL at 13:38

## 2019-09-11 RX ADMIN — RANOLAZINE 1000 MILLIGRAM(S): 500 TABLET, FILM COATED, EXTENDED RELEASE ORAL at 12:10

## 2019-09-11 RX ADMIN — Medication 200 GRAM(S): at 20:25

## 2019-09-11 RX ADMIN — ENTACAPONE 200 MILLIGRAM(S): 200 TABLET, FILM COATED ORAL at 08:27

## 2019-09-11 NOTE — PROGRESS NOTE ADULT - SUBJECTIVE AND OBJECTIVE BOX
Chief Complaint: Hematuria    Interval Events:   - The patient developed profuse bright red blood per rectum this afternoon  - RRT was called  - Patient evaluated by general surgery and brought to OR for EUA     Allergies:  Cipro (Rash)    PMHX/PSHX:  Unilateral inguinal hernia, with gangrene, not specified as recurrent  Upper GI bleed  Aspiration pneumonia  Clostridium difficile colitis  Pneumonia  MI, old  UTI (urinary tract infection)  BPH (benign prostatic hyperplasia)  Parkinsons Disease  CAD (Coronary Artery Disease)  HTN (Hypertension)  Hyperlipidemia  Presence of IVC filter  History of prostate surgery  Varicose veins of legs  S/P appendectomy  Stented coronary artery  Hx of CABG  Hyperlipidemia    Family history:  Family history of coronary artery disease  Family history of breast cancer (Aunt)    ROS:     General:  No wt loss, fevers, chills, night sweats, fatigue,   Eyes:  Good vision, no reported pain  ENT:  No sore throat, pain, runny nose, dysphagia  CV:  No pain, palpitations, hypo/hypertension  Pulm:  No dyspnea, cough, tachypnea, wheezing  GI:  No pain, No nausea, No vomiting, No diarrhea, No constipation, No weight loss, No fever, No pruritis, + rectal bleeding, No tarry stools, No dysphagia  :  No pain, bleeding, incontinence, nocturia  Muscle:  No pain, weakness  Neuro:  No weakness, tingling, memory problems  Psych:  No fatigue, insomnia, mood problems, depression  Endocrine:  No polyuria, polydipsia, cold/heat intolerance  Heme:  No petechiae, ecchymosis, easy bruisability  Skin:  No rash, tattoos, scars, edema    PHYSICAL EXAM:   Vital Signs:  Vital Signs Last 24 Hrs  T(C): 36.4 (11 Sep 2019 09:58), Max: 36.9 (11 Sep 2019 05:30)  T(F): 97.5 (11 Sep 2019 09:58), Max: 98.4 (11 Sep 2019 05:30)  HR: 56 (11 Sep 2019 09:58) (56 - 98)  BP: 111/65 (11 Sep 2019 09:58) (111/65 - 136/63)  BP(mean): --  RR: 18 (11 Sep 2019 09:58) (18 - 19)  SpO2: 94% (11 Sep 2019 09:58) (94% - 100%)  Daily Height in cm: 172.72 (11 Sep 2019 16:38)    Daily Weight in k.8 (11 Sep 2019 13:44)    GENERAL:  No acute distress  HEENT:  Normocephalic/atraumatic,  no scleral icterus  CHEST: Normal effort, no accessory muscle use  ABDOMEN:  Soft, non-tender, non-distended, normoactive bowel sounds  RECTAL: Active bleeding with bright red blood from rectum  EXTREMITIES:  No cyanosis, clubbing, or edema  SKIN:  No rash/erythema  NEURO:  Alert and oriented x 3  LABS:                        9.0    5.8   )-----------( 156      ( 11 Sep 2019 15:23 )             26.8     Mean Cell Volume: 93.4 fl (19 @ 15:23)        137  |  102  |  33<H>  ----------------------------<  120<H>  4.1   |  24  |  1.69<H>    Ca    7.7<L>      11 Sep 2019 15:23               9.0    5.8   )-----------( 156      ( 11 Sep 2019 15:23 )             26.8                         8.4    6.38  )-----------( 149      ( 11 Sep 2019 09:30 )             26.6                         8.4    7.55  )-----------( 137      ( 10 Sep 2019 09:39 )             25.9                         9.7    9.13  )-----------( 149      ( 10 Sep 2019 00:11 )             30.0                         6.9    6.08  )-----------( 129      ( 09 Sep 2019 10:10 )             21.6     Imaging:    No new imaging

## 2019-09-11 NOTE — BRIEF OPERATIVE NOTE - OPERATION/FINDINGS
bleeding rectal ulcer - clear vessel pumping. ligated vessel. rigid sig done - no other bleeding noted.

## 2019-09-11 NOTE — PROGRESS NOTE ADULT - SUBJECTIVE AND OBJECTIVE BOX
MICU consulted for GI bleed.     RRT was at bedside when MICU called. Pt had perfuse bleeding from rectum. Pt was hypotensive to 70's/40's. Pt received 2u PRBC and 1L NS. Pt's BP improved after starting Levo. Pt was evaluated by surgery who decided that pt should be transferred to OR of emergent surgery.    -pt to continue care with SICU after OR  -please reconsult as necessary

## 2019-09-11 NOTE — PROGRESS NOTE ADULT - SUBJECTIVE AND OBJECTIVE BOX
Subjective: Patient seen and examined. No new events except as noted.   seen this am   Discussed with GUADALUPE Webber   REVIEW OF SYSTEMS:    CONSTITUTIONAL: +weakness, fevers or chills  EYES/ENT: No visual changes;  No vertigo or throat pain   NECK: No pain or stiffness  RESPIRATORY: No cough, wheezing, hemoptysis; No shortness of breath  CARDIOVASCULAR: No chest pain or palpitations  GASTROINTESTINAL: No abdominal or epigastric pain. No nausea, vomiting, or hematemesis; No diarrhea or constipation. No melena or hematochezia.  GENITOURINARY: No dysuria, frequency or hematuria  NEUROLOGICAL: No numbness or weakness  SKIN: No itching, burning, rashes, or lesions   All other review of systems is negative unless indicated above.    MEDICATIONS:  MEDICATIONS  (STANDING):  atorvastatin 80 milliGRAM(s) Oral at bedtime  calcium gluconate IVPB 2 Gram(s) IV Intermittent once  carbidopa/levodopa CR 50/200 1 Tablet(s) Oral <User Schedule>  chlorhexidine 4% Liquid 1 Application(s) Topical <User Schedule>  fluticasone propionate 50 MICROgram(s)/spray Nasal Spray 1 Spray(s) Both Nostrils two times a day  levothyroxine 50 MICROGram(s) Oral daily  multiple electrolytes Injection Type 1 1000 milliLiter(s) (50 mL/Hr) IV Continuous <Continuous>  pantoprazole    Tablet 40 milliGRAM(s) Oral before breakfast  ranolazine 1000 milliGRAM(s) Oral two times a day  tamsulosin 0.4 milliGRAM(s) Oral daily      PHYSICAL EXAM:  T(C): 36.4 (09-11-19 @ 17:41), Max: 36.9 (09-11-19 @ 05:30)  HR: 68 (09-11-19 @ 18:15) (56 - 98)  BP: 115/60 (09-11-19 @ 18:15) (111/65 - 148/74)  RR: 15 (09-11-19 @ 18:15) (13 - 20)  SpO2: 93% (09-11-19 @ 18:15) (93% - 100%)  Wt(kg): --  I&O's Summary    10 Sep 2019 07:01  -  11 Sep 2019 07:00  --------------------------------------------------------  IN: 720 mL / OUT: 1000 mL / NET: -280 mL    11 Sep 2019 07:01  -  11 Sep 2019 19:29  --------------------------------------------------------  IN: 0 mL / OUT: 650 mL / NET: -650 mL      Height (cm): 172.72 (09-11 @ 16:38)  Weight (kg): 75.3 (09-11 @ 16:38)  BMI (kg/m2): 25.2 (09-11 @ 16:38)  BSA (m2): 1.89 (09-11 @ 16:38)      Appearance: NAD  HEENT:   Normal oral mucosa, PERRL, EOMI	  Lymphatic: No lymphadenopathy , right arm/shoulder sling   Cardiovascular: Irregular rS1 S2, No JVD, No murmurs , Peripheral pulses palpable 2+ bilaterally  Respiratory: Decreased bs and effort   Gastrointestinal:  Soft, Non-tender, + BS	  Skin: No rashes, No ecchymoses, No cyanosis, warm to touch  Musculoskeletal: Decreased range of motion and  strength  Psychiatry:  Mood & affect appropriate  Ext: No edema  +lara         LABS:    CARDIAC MARKERS:                                8.8    8.7   )-----------( 156      ( 11 Sep 2019 18:15 )             26.8     09-11    140  |  104  |  34<H>  ----------------------------<  143<H>  4.3   |  22  |  1.76<H>    Ca    7.4<L>      11 Sep 2019 18:15  Phos  3.4     09-11  Mg     2.5     09-11    TPro  5.4<L>  /  Alb  2.7<L>  /  TBili  0.9  /  DBili  x   /  AST  10  /  ALT  <5<L>  /  AlkPhos  56  09-11    proBNP:   Lipid Profile:   HgA1c:   TSH:             TELEMETRY: 	    ECG:  	  RADIOLOGY:   DIAGNOSTIC TESTING:  [ ] Echocardiogram:  [ ]  Catheterization:  [ ] Stress Test:    OTHER:

## 2019-09-11 NOTE — CONSULT NOTE ADULT - SUBJECTIVE AND OBJECTIVE BOX
SICU CONSULT NOTE    HPI  RYAN FITZPATRICK is a 89y Male with CAD s/p CABG on ASA, Afib, CKD3, DVT s/p IVC Filter, Parkinson's disease initially admitted from rehab on 8/28 with respiratory distress. Of note, patient's dyspnea multifactorial from respiratory muscle weakness in the setting of Parkinson's disease and ischemic cardiomyopathy. On 9/4, patient experienced two episodes of BRBPR with hgb drop (6.3) and transient hypotension (SBP 70s). He underwent a CTA a/p that was unsuccessful in localizing hemorrhage. Patient subsequently transferred to MICU. Surgery consulted for evaluation of BRBPR. Found to have rectal ulcers on colonoscopy.    Today patient had an RRT call while he was having an active lower GI bleed with BRBPR. Patient was hypotensive to SBP 70s and maintained a HR in the 70s. Mentating, A&Ox3. Given 2 units of pRBCs and 1 unit of plasma on the floor, started on pressors, taken to OR.  Pt is now s/p exam under anesthesia, with finding of an actively bleeding vessel in an ulcer, ligated. Rigid sigmoidoscopy done at end of case with no other bleeding noted. Rectum re-packed with gelfoam wrapped in nu-knit and in monsal's solution. SICU consulted for monitoring.      Surgery Information: Exam under anesthesia, actively bleeding vessel within ulcer ligated. Rigid sigmoidoscopy done with no other bleeding noted.  EBL: 100 	IV Fluids: 200	Blood Products: -	Urine Output: 100  Complications: None    PAST MEDICAL HISTORY: Unilateral inguinal hernia, with gangrene, not specified as recurrent  Upper GI bleed  Aspiration pneumonia  Clostridium difficile colitis  Pneumonia  MI, old  UTI (urinary tract infection)  BPH (benign prostatic hyperplasia)  Parkinsons Disease  CAD (Coronary Artery Disease)  HTN (Hypertension)  Hyperlipidemia      PAST SURGICAL HISTORY: Presence of IVC filter  History of prostate surgery  Varicose veins of legs  S/P appendectomy  Stented coronary artery  Hx of CABG  Hyperlipidemia      FAMILY HISTORY: Family history of coronary artery disease  Family history of breast cancer (Aunt)  No pertinent family history in first degree relatives    CODE STATUS: Full code    HOME MEDICATIONS:  aspirin 81 mg oral tablet, chewable: 1 tab(s) orally once a day (29 Aug 2019 13:33)  atorvastatin 80 mg oral tablet: 1 tab(s) orally once a day (at bedtime) (29 Aug 2019 13:33)  bacitracin 500 units/g topical ointment: Apply topically to affected area 2 times a day (right elbow skin tear &amp; scrotum area) (29 Aug 2019 13:33)  bacitracin 500 units/g topical ointment: Apply topically to affected area every 6 hours (scrotal &amp; perneum) (29 Aug 2019 13:33)  biotin 5 mg oral capsule: 1 cap(s) orally once a day (29 Aug 2019 13:33)  Bisac-Evac 10 mg rectal suppository: 1 suppository(ies) rectal once a day, As Needed - for constipation (29 Aug 2019 13:33)  Calcium 500+D oral tablet, chewable: 1 tab(s) orally 2 times a day (29 Aug 2019 13:33)  carbidopa-levodopa 50 mg-200 mg oral tablet, extended release: 1 tab(s) orally 4 times a day  (1wy-3rz-9hj-10pm) (29 Aug 2019 13:33)  entacapone 200 mg oral tablet: 1 tab(s) orally 4 times a day  (7dz-6qn-9pg-10pm) (29 Aug 2019 13:33)  isosorbide mononitrate 60 mg oral tablet, extended release: 1 tab(s) orally once a day (in the morning) (29 Aug 2019 13:33)  Klor-Con M20 oral tablet, extended release: 2 tab(s) orally once a day (29 Aug 2019 13:33)  Lasix 40 mg oral tablet: 1 tab(s) orally 2 times a day (29 Aug 2019 13:33)  Melatonin 5 mg oral tablet: 1 tab(s) orally once a day (in the evening) (29 Aug 2019 13:33)  MiraLax oral powder for reconstitution: 17 gram(s) orally once a day (at bedtime) (29 Aug 2019 13:33)  Multiple Vitamins oral tablet: 1 tab(s) orally once a day (29 Aug 2019 13:33)  ranolazine 1000 mg oral tablet, extended release: 1 tab(s) orally 2 times a day (29 Aug 2019 13:33)  Senna 8.6 mg oral tablet: 2 tab(s) orally once a day (at bedtime) (29 Aug 2019 13:33)  Vitamin D3 1000 intl units oral capsule: 1 cap(s) orally 2 times a day (29 Aug 2019 13:33)      ALLERGIES: Cipro (Rash)      VITAL SIGNS:  ICU Vital Signs Last 24 Hrs  T(C): 36.6 (11 Sep 2019 23:00), Max: 36.9 (11 Sep 2019 05:30)  T(F): 97.8 (11 Sep 2019 23:00), Max: 98.4 (11 Sep 2019 05:30)  HR: 66 (12 Sep 2019 00:15) (56 - 72)  BP: 106/58 (12 Sep 2019 00:15) (84/54 - 148/74)  BP(mean): 77 (12 Sep 2019 00:15) (63 - 101)  ABP: --  ABP(mean): --  RR: 19 (12 Sep 2019 00:15) (13 - 20)  SpO2: 100% (12 Sep 2019 00:15) (93% - 100%)      NEURO  Exam: GCS 14, mild confusion, No focal Deficits   carbidopa/levodopa CR 50/200 1 Tablet(s) Oral <User Schedule>      RESPIRATORY  Exam: Nonlabored, CTABL, no wheezes, ronchi, or rales. Normal respiratory effort.   ABG - ( 11 Sep 2019 18:08 )  pH: 7.44  /  pCO2: 37    /  pO2: 67    / HCO3: 25    / Base Excess: 1.5   /  SaO2: 95      Lactate: x          CARDIOVASCULAR  Exam: Normotensive, RRR, no M/R/G     phenylephrine    Infusion 0.2 MICROgram(s)/kG/Min IV Continuous <Continuous>  ranolazine 1000 milliGRAM(s) Oral two times a day  tamsulosin 0.4 milliGRAM(s) Oral daily      GI/NUTRITION  Exam: Abdomen soft, NT/ND, no rebound or guarding   Diet: NPO  pantoprazole    Tablet 40 milliGRAM(s) Oral before breakfast      GENITOURINARY/RENAL  multiple electrolytes Injection Type 1 1000 milliLiter(s) IV Continuous <Continuous>      09-10 @ 07:01  -  09-11 @ 07:00  --------------------------------------------------------  IN:    Oral Fluid: 720 mL  Total IN: 720 mL    OUT:    Indwelling Catheter - Urethral: 1000 mL  Total OUT: 1000 mL    Total NET: -280 mL      09-11 @ 07:01  -  09-12 @ 00:25  --------------------------------------------------------  IN:    IV PiggyBack: 100 mL    multiple electrolytes Injection Type 1: 300 mL    phenylephrine   Infusion: 16.8 mL  Total IN: 416.8 mL    OUT:    Indwelling Catheter - Urethral: 650 mL  Total OUT: 650 mL    Total NET: -233.2 mL        Weight (kg): 75.3 (09-11 @ 16:38)  09-11    140  |  104  |  34<H>  ----------------------------<  143<H>  4.3   |  22  |  1.76<H>    Ca    7.4<L>      11 Sep 2019 18:15  Phos  3.4     09-11  Mg     2.5     09-11    TPro  5.4<L>  /  Alb  2.7<L>  /  TBili  0.9  /  DBili  x   /  AST  10  /  ALT  <5<L>  /  AlkPhos  56  09-11    [ ] Cortés catheter, indication: urine output monitoring in critically ill patient    HEMATOLOGIC  [ ] VTE Prophylaxis:                          7.8    8.8   )-----------( 123      ( 11 Sep 2019 22:31 )             23.0     PT/INR - ( 11 Sep 2019 18:15 )   PT: 14.5 sec;   INR: 1.26 ratio         PTT - ( 11 Sep 2019 18:15 )  PTT:30.4 sec  Transfusion: [ ] PRBC	[ ] Platelets	[ ] FFP	[ ] Cryoprecipitate      ENDOCRINE  atorvastatin 80 milliGRAM(s) Oral at bedtime  levothyroxine 50 MICROGram(s) Oral daily    CAPILLARY BLOOD GLUCOSE          PATIENT CARE ACCESS DEVICES:  [ ] Peripheral IV  [ ] Central Venous Line	[ ] R	[ ] L	[ ] IJ	[ ] Fem	[ ] SC	Placed:   [ ] Arterial Line		[ ] R	[ ] L	[ ] Fem	[ ] Rad	[ ] Ax	Placed:   [ ] PICC:					[ ] Mediport  [ ] Urinary Catheter, Date Placed:   [x] Necessity of urinary, arterial, and venous catheters discussed    OTHER MEDICATIONS: chlorhexidine 4% Liquid 1 Application(s) Topical <User Schedule>  fluticasone propionate 50 MICROgram(s)/spray Nasal Spray 1 Spray(s) Both Nostrils two times a day

## 2019-09-11 NOTE — RAPID RESPONSE TEAM SUMMARY - NSSITUATIONBACKGROUNDRRT_GEN_ALL_CORE
88 yo male with CAD s/p CABG, Afib, CKD3, Parkinsons disease, found to have BRBPR overnight PTA. Initially admitted to  ICU,  admission for hematuria c/b aspiration pneumonia, now with hematochezia. RRT called for BRBPR. Patient evaluated and noted to have approx 500cc of blood and clots in jason. BPs downtrended from 140s to 70/40s. Additional IV access was obtained immediately, two 18 gauges in the AC's bilaterally. IVF boluses started, massive transfusion protocol activated, and patient started on pressors. Patient received 1L of IVF, 2 units of pRBCs, and one unit of FFP with improvement of BP to MAP above 65. Patient evaluated by surgical team. Noted to have bleeding rectal ulcer. Taken emergently to OR. Likely SICU afterwards.
88 yo male with Parkinsons, CAD/ICM s/p CABG and Afib admitted with respiratory distress. RRT called for bloody stool. Upon arrival to RRT, patient was mentating and endorsed mild abdominal discomfort, denied dyspnea or lightheadedness/dizziness. BPs were 100s/50s. Ordered 1L IVF. Most likely 2/2 diverticulosis given BRBPR and some clots. Could also be brisk UGIB. Primary team had already sent CBC prior to RRT. Advised team to follow up and transfuse PRN, and also transfuse 1U platelets given that he is on ASA. Would also call GI to evaluate patient though may be non-emergent. Given concern for possibility of UGIB, would also add PPI bolus followed by gtt.
88 yo male with Parkinsons, CAD/ICM s/p CABG and Afib admitted with respiratory distress.  Prior RRT tonight for bloody bowel movement at which patient received 1L NS and remained hemodynamically stable. Was then transferred to [facility] for tele monitoring.    A second RRT was called for a large volume amount of bright red blood with clots per rectum. Patient was hypotensive to systolic of 71. He reported feeling lightheaded at that time.  Of note, his heart rate remained in the 60s-70s. A L of normal saline was hung immediately.  Awaiting one unit of PRBC and one unit of plt stat as patient was on ASA.  CBC, CMP, Coags sent.     Given Protonix IVP, though bleed is likely lower GI source a bright red. House GI called by night float.  Will consider colonscopy in AM and recommend [place] prep.  Case discussed with MICU attending who examined patient at bedside.  On US, patient appearing hypovolemic.  Dr. Nicolas recommending obtaining CTA abdomen/pelvis as patient with brisk lower bleed.  PA gerber patient discussed with Dr. Dias as patient with CKD, elevated Cr.  As necessary to localize bleed, okay to proceed per MD.  After first unit of PRBC, NS, and plt, BP improved to 90s-100s.  Escorted patient to CTA with gerber POOLE. Patient stable throughout, communicating coherently with staff.  Upon return, systolic .      Patient pending 2nd unit of PRBC and ddavp 20mcg per MICU recommendation.  Calcium gluconate x 1 also ordered.  Discontinued patient's Imdur for now in setting of hypotension.      Spoke with radiology resident for urgent prelim. Prelim to be given to gerber Ridley who will consult IR/Surgery based on findings.     Discussed all of above and plan with gerber Ridley. ALL FURTHER CARE PER PRIMARY TEAM.

## 2019-09-11 NOTE — CHART NOTE - NSCHARTNOTEFT_GEN_A_CORE
Called to bedside for rapid response. Patient was having an active lower GI bleed (BRBPR). Patient was hypotensive to SBP 70s and maintained a HR in the 70s. Mentating, A&Ox3. Given 2 units of pRBCs and 1 unit of plasma on the floor, started on pressors. Rectum packed with gel foam wrapped in surgicel and covered in Monsal's solution. This was done twice. Pt continued to bleed around the packing. Pressure was held. Pt was taken emergently to the operating room with Dr. Jasso to control the bleeding. Exam under anesthesia demonstrated a bleeding vessel in an ulcer. Vessel was ligated. Rigid sigmoidoscopy done at end of case. No other bleeding noted. Rectum re-packed with gelfoam wrapped in nu-knit and in monsal's solution. Transferred to SICU for monitoring.

## 2019-09-11 NOTE — CHART NOTE - NSCHARTNOTEFT_GEN_A_CORE
follow up-  lasix was on hold secondary to STEVEN; improving creatinine; per d/w cardiologist to resume home dose lasix 40mg po twice daily; pt with leg edema/weight gain; denies any SOB/CP; monitor weight daily; monitor BMP;  Lawanda Resendiz(NP)  [address], [phone]

## 2019-09-11 NOTE — PROGRESS NOTE ADULT - SUBJECTIVE AND OBJECTIVE BOX
Patient is a 89y old  Male who presents with a chief complaint of Hematuria (11 Sep 2019 17:18)      SUBJECTIVE / OVERNIGHT EVENTS: ptn w profuse LGI bleed, w hypotension, requiring surgery, s/p 2 U PRBC, 1 L NS, MICU consult, will be cared for in SICU post op    MEDICATIONS  (STANDING):  atorvastatin 80 milliGRAM(s) Oral at bedtime  calcium gluconate IVPB 2 Gram(s) IV Intermittent once  carbidopa/levodopa CR 50/200 1 Tablet(s) Oral <User Schedule>  chlorhexidine 4% Liquid 1 Application(s) Topical <User Schedule>  fluticasone propionate 50 MICROgram(s)/spray Nasal Spray 1 Spray(s) Both Nostrils two times a day  levothyroxine 50 MICROGram(s) Oral daily  multiple electrolytes Injection Type 1 1000 milliLiter(s) (50 mL/Hr) IV Continuous <Continuous>  pantoprazole    Tablet 40 milliGRAM(s) Oral before breakfast  ranolazine 1000 milliGRAM(s) Oral two times a day  tamsulosin 0.4 milliGRAM(s) Oral daily    MEDICATIONS  (PRN):      Vital Signs Last 24 Hrs  T(F): 97.6 (09-11-19 @ 15:41), Max: 98.4 (09-11-19 @ 05:30)  HR: 68 (09-11-19 @ 15:15) (56 - 98)  BP: 92/60 (09-11-19 @ 15:15)   RR: 15 (09-11-19 @ 15:15) (13 - 20)  SpO2: 93% (09-11-19 @ 15:15) (93% - 100%)  Telemetry:   CAPILLARY BLOOD GLUCOSE                PHYSICAL EXAM:  GENERAL: NAD, well-developed  HEAD:  Atraumatic, Normocephalic  EYES: EOMI, PERRLA, conjunctiva and sclera clear  NECK: Supple, No JVD  CHEST/LUNG: Clear to auscultation bilaterally; No wheeze  HEART: Regular rate and rhythm; No murmurs, rubs, or gallops  ABDOMEN: Soft, Nontender, Nondistended; Bowel sounds present  EXTREMITIES:  2+ Peripheral Pulses, No clubbing, cyanosis, or edema  PSYCH: AAOx3  NEUROLOGY: non-focal  SKIN: No rashes or lesions    LABS:                        8.8    8.7   )-----------( 156      ( 11 Sep 2019 15:15 )             26.8     09-11

## 2019-09-11 NOTE — PROGRESS NOTE ADULT - ASSESSMENT
89 y/o M w/ hx of CAD s/p CABG s/p PCI, ICM, and Parkinson's disease with recent right humeral fracture, with admission for hematuria c/b aspiration pneumonia, now with hematochezia.    Impression:  # Acute blood loss anemia with hematochezia: Currently with minimal hematochezia, HD stable, and with Hgb of 8.4 this morning after receiving 2 units pRBCs on 9/9/19. Colonoscopy on 9/4/19 revealed multiple rectal ulcers and ulcerated mucosa in the anal canal which is the likely source of the patient's bleeding - may be secondary to fecal impaction versus malignancy. Bleeding now resolved.  # CAD s/p CABG s/p PCI  # ICM  # Parkinson's disease    Recommendations:  - F/U surgery recommendations  - Can consider flexible sigmoidoscopy if surgical interventions are unsuccessful in controlling bleeding  - Monitor CBC q8h  - Transfuse for goal Hgb >/= 7.0  - Maintain active type and screen  - Two large bore IVs  - No rectal tube  - Patient is declining repeat colonoscopy for polyp removal  - Rest of care per primary team    Constantine Rose MD  Gastroenterology Fellow  Pager number: [phone] / [phone] 89 y/o M w/ hx of CAD s/p CABG s/p PCI, ICM, and Parkinson's disease with recent right humeral fracture, with admission for hematuria c/b aspiration pneumonia, now with hematochezia.    Impression:  # Acute blood loss anemia with hematochezia: Currently with minimal hematochezia, HD stable, and with Hgb of 8.4 this morning after receiving 2 units pRBCs on 9/9/19. Colonoscopy on 9/4/19 revealed multiple rectal ulcers and ulcerated mucosa in the anal canal which is the likely source of the patient's bleeding - may be secondary to fecal impaction versus malignancy. Bleeding now resolved.  # CAD s/p CABG s/p PCI  # ICM  # Parkinson's disease    Recommendations:  - F/U surgery recommendations  - Can consider flexible sigmoidoscopy if surgical interventions are unsuccessful in controlling bleeding  - Monitor CBC q8h  - Transfuse for goal Hgb >/= 7.0  - Maintain active type and screen  - Two large bore IVs  - No rectal tube  - Rest of care per primary team    Constantine Rose MD  Gastroenterology Fellow  Pager number: [phone] / [phone]

## 2019-09-11 NOTE — PROGRESS NOTE ADULT - PROBLEM SELECTOR PLAN 1
s/p EGD and Colon.   Multiple large rectal ulcers.  probably 2/2 stercoral colitis, completed ABx, on bowel regimen, trending HH, s/p 2 U PRBC on 9/9  on  IV Venofer   now w profuse LGI bleed, w hypotensive shock 2/2 acute bleeding, awaiting surgery and post op care on SICU. on Levo, s/p PRBC and IVF

## 2019-09-11 NOTE — PROGRESS NOTE ADULT - PROBLEM SELECTOR PLAN 1
s/p EGD and Colon.   Multiple large rectal ulcers.  Miralax BID   Monitor Hgb closely  GI following  s/p PRBC transfusion

## 2019-09-11 NOTE — CONSULT NOTE ADULT - PROVIDER SPECIALTY LIST ADULT
Cardiology
Colorectal Surgery
Dermatology
Gastroenterology
Infectious Disease
Neurology
SICU
Surgery
Urology
Pulmonology
Nephrology

## 2019-09-11 NOTE — CONSULT NOTE ADULT - CONSULT REQUESTED DATE/TIME
04-Sep-2019
04-Sep-2019 08:44
06-Sep-2019 15:26
09-Sep-2019 15:05
11-Sep-2019 20:00
28-Aug-2019 13:41
30-Aug-2019 12:02
30-Aug-2019 12:49
30-Aug-2019 15:00
29-Aug-2019 15:45
29-Aug-2019 14:55

## 2019-09-11 NOTE — PROGRESS NOTE ADULT - ASSESSMENT
89 yo male with CAD s/p CABG, Afib, CKD3, Parkinsons disease, found to have BRBPR overnight PTA. Initially admitted to  ICU

## 2019-09-11 NOTE — CONSULT NOTE ADULT - CONSULT REQUESTED BY NAME
Dr. Ayala
Dr. Foreman
Dr. Stephanie Ayala
ED
Larissa
MICU
Rey Nicolas
Sherly
Surgery
Jamie
Dr. Dubois

## 2019-09-11 NOTE — CONSULT NOTE ADULT - ASSESSMENT
ASSESSMENT:  89 year old male w/ lower GI bleed, s/p EUA, ligation of bleeding vessel on 9/11, admitted to SICU for hemodynamic monitoring    PLAN:    Neurologic:   - PRN Pain control  - C/w carbidopa/Levodopa, Ranolazine for parkinsons hx    Respiratory:   - Monitor respiratory status  - OOBTC daily, encourage use of incentive spirometer    Cardiovascular:   - On phenylephrine for hypotension  - Wean pressor as tolerated  - Monitor vitals     Gastrointestinal/Nutrition:   - NPO  - Monitor for re-bleeding  - C/w Protonix     Renal/Genitourinary:   - Monitor urine output  - Trend electrolytes, replete as needed  - Plasmalyte @50  - C/w flomax    Hematologic:   - Hold DVT chemoppx for GI bleed, mechanical ppx with SCDs for now  - Trend H/H, transfuse as needed    Infectious Disease:   - Monitor for fevers  - Monitor WBC    Endocrine:   - Monitor blood glucose    Musculoskeletal:  - F/u Rt arm xrays  - F/u ortho plan for fracture    Disposition:   Continued critical care management    Adams Chowdary, PGY 2  Pager [phone]

## 2019-09-11 NOTE — CONSULT NOTE ADULT - REASON FOR ADMISSION
[facility].  Groin erythema, oedema
[facility].  Groin erythema, oedema
[facility].  Groin erythema, oedema
Hematuria
[facility].  Groin erythema, oedema
[facility].  Groin erythema, oedema
[facility].  Groin erythema, oedema
[facility]  Groin erythema, oedema
[facility]  Groin erythema, oedema

## 2019-09-11 NOTE — CHART NOTE - NSCHARTNOTEFT_GEN_A_CORE
called by RN to see pt with rectal bleed  Patient is a 89y old  Male who presents with a chief complaint of Sent in from [facility] for gross haematuria following Cortés placement.  Groin erythema, oedema (10 Sep 2019 23:38)        Vital Signs Last 24 Hrs  T(C): 36.4 (11 Sep 2019 09:58), Max: 36.9 (11 Sep 2019 05:30)  T(F): 97.5 (11 Sep 2019 09:58), Max: 98.4 (11 Sep 2019 05:30)  HR: 56 (11 Sep 2019 09:58) (56 - 98)  BP: 111/65 (11 Sep 2019 09:58) (111/65 - 136/63)  BP(mean): --  RR: 18 (11 Sep 2019 09:58) (18 - 19)  SpO2: 94% (11 Sep 2019 09:58) (94% - 100%)                        8.4    6.38  )-----------( 149      ( 11 Sep 2019 09:30 )             26.6     09-11    135  |  102  |  35<H>  ----------------------------<  106<H>  4.0   |  22  |  1.70<H>    Ca    7.6<L>      11 Sep 2019 07:04 called by RN to see pt with rectal bleed and continued to have profuse bleeding with hypotension; GI/ colorectal surgery team present ; pt with worsening BP; RN called RRT  Patient is a 89y old  Male who presents with a chief complaint of Sent in from [facility] for gross haematuria following Lara placement.  Groin erythema, oedema (10 Sep 2019 23:38)        Vital Signs Last 24 Hrs  T(C): 36.4 (11 Sep 2019 09:58), Max: 36.9 (11 Sep 2019 05:30)  T(F): 97.5 (11 Sep 2019 09:58), Max: 98.4 (11 Sep 2019 05:30)  HR: 56 (11 Sep 2019 09:58) (56 - 98)  BP: 111/65 (11 Sep 2019 09:58) (111/65 - 136/63)  BP(mean): --  RR: 18 (11 Sep 2019 09:58) (18 - 19)  SpO2: 94% (11 Sep 2019 09:58) (94% - 100%)                        8.4    6.38  )-----------( 149      ( 11 Sep 2019 09:30 )             26.6     09-11    135  |  102  |  35<H>  ----------------------------<  106<H>  4.0   |  22  |  1.70<H>    Ca    7.6<L>      11 Sep 2019 07:04      CHEST/LUNG: Clear to auscultation bilaterally; No wheeze  HEART: Regular rate and rhythm;   ABDOMEN: Soft, Nontender, Nondistended; Bowel sounds present  EXTREMITIES:  right arm sling; lower extremity edema  NEUROLOGY: non-focal  - lara cath- no hematuria called by RN to see pt with rectal bleed and continued to have profuse bleeding with hypotension; GI/ colorectal surgery team present ; pt with worsening BP from 122/70 to 95/60 to SBP 73; RN called RRT for management  Patient is a 89y old  Male who [facility] a [facility] Lara [facility]  Groin erythema, oedema (10 Sep 2019 23:38)        Vital Signs Last 24 Hrs  T(C): 36.4 (11 Sep 2019 09:58), Max: 36.9 (11 Sep 2019 05:30)  T(F): 97.5 (11 Sep 2019 09:58), Max: 98.4 (11 Sep 2019 05:30)  HR: 56 (11 Sep 2019 09:58) (56 - 98)  BP: 111/65 (11 Sep 2019 09:58) (111/65 - 136/63)  BP(mean): --  RR: 18 (11 Sep 2019 09:58) (18 - 19)  SpO2: 94% (11 Sep 2019 09:58) (94% - 100%)                        8.4    6.38  )-----------( 149      ( 11 Sep 2019 09:30 )             26.6     09-11    135  |  102  |  35<H>  ----------------------------<  106<H>  4.0   |  22  |  1.70<H>    Ca    7.6<L>      11 Sep 2019     pt is alert and orientedx3;  and able to answer questions    denies CP/SOB/nausea/vomiting  CV: RRR, S1S2, no murmur  Abdominal: Soft, NT, ND ; bowel sounds present  MSK: right upper extremity in arm sling; leg edema  profuse bright blood from rectal area  - lara cath- no hematuria      AP  89 y/o M w/ hx of CAD s/p CABG s/p PCI, ICM, and Parkinson's disease with recent right humeral fracture, with admission for hematuria c/b aspiration pneumonia,  hematochezia;  s/p Acute blood loss anemia with hematochezia:  Colonoscopy on 9/4/19 revealed multiple rectal ulcers and ulcerated mucosa in the anal canal - may be secondary to fecal impaction versus malignancy. had multiple PRBC to keep hb >7;  failed TOV; has lara cath for urinary retention replaced on 9/5/19  f/u by cardiology/GI/pulm/renal;  now pt with profuse rectal bleeding with hypotension;  stat CBC/T/C; RRT team/GI/surgery team present ; management per RRT team;  npo; blood transfusion, levophed infusion with improvement in /70;  per surgery for OR eval and management; pt wife at the bedside and updated plan by team;  updated DR. Ayala, attending MD.  Lawanda Resendiz(NP)  [address] [facility] [address], [phone]

## 2019-09-12 LAB
ANION GAP SERPL CALC-SCNC: 9 MMOL/L — SIGNIFICANT CHANGE UP (ref 5–17)
APTT BLD: 29.5 SEC — SIGNIFICANT CHANGE UP (ref 27.5–36.3)
BUN SERPL-MCNC: 35 MG/DL — HIGH (ref 7–23)
CALCIUM SERPL-MCNC: 7.5 MG/DL — LOW (ref 8.4–10.5)
CHLORIDE SERPL-SCNC: 104 MMOL/L — SIGNIFICANT CHANGE UP (ref 96–108)
CO2 SERPL-SCNC: 23 MMOL/L — SIGNIFICANT CHANGE UP (ref 22–31)
CREAT SERPL-MCNC: 1.75 MG/DL — HIGH (ref 0.5–1.3)
GLUCOSE SERPL-MCNC: 114 MG/DL — HIGH (ref 70–99)
HCT VFR BLD CALC: 21 % — CRITICAL LOW (ref 39–50)
HCT VFR BLD CALC: 22.9 % — LOW (ref 39–50)
HCT VFR BLD CALC: 23.2 % — LOW (ref 39–50)
HCT VFR BLD CALC: 23.2 % — LOW (ref 39–50)
HGB BLD-MCNC: 7.3 G/DL — LOW (ref 13–17)
HGB BLD-MCNC: 7.5 G/DL — LOW (ref 13–17)
HGB BLD-MCNC: 8 G/DL — LOW (ref 13–17)
HGB BLD-MCNC: 8.2 G/DL — LOW (ref 13–17)
INR BLD: 1.25 RATIO — HIGH (ref 0.88–1.16)
MAGNESIUM SERPL-MCNC: 2.5 MG/DL — SIGNIFICANT CHANGE UP (ref 1.6–2.6)
MCHC RBC-ENTMCNC: 30.5 PG — SIGNIFICANT CHANGE UP (ref 27–34)
MCHC RBC-ENTMCNC: 32.3 GM/DL — SIGNIFICANT CHANGE UP (ref 32–36)
MCHC RBC-ENTMCNC: 32.5 PG — SIGNIFICANT CHANGE UP (ref 27–34)
MCHC RBC-ENTMCNC: 32.6 PG — SIGNIFICANT CHANGE UP (ref 27–34)
MCHC RBC-ENTMCNC: 33.3 PG — SIGNIFICANT CHANGE UP (ref 27–34)
MCHC RBC-ENTMCNC: 34.7 GM/DL — SIGNIFICANT CHANGE UP (ref 32–36)
MCHC RBC-ENTMCNC: 34.7 GM/DL — SIGNIFICANT CHANGE UP (ref 32–36)
MCHC RBC-ENTMCNC: 35.3 GM/DL — SIGNIFICANT CHANGE UP (ref 32–36)
MCV RBC AUTO: 93.7 FL — SIGNIFICANT CHANGE UP (ref 80–100)
MCV RBC AUTO: 93.7 FL — SIGNIFICANT CHANGE UP (ref 80–100)
MCV RBC AUTO: 94.3 FL — SIGNIFICANT CHANGE UP (ref 80–100)
MCV RBC AUTO: 94.4 FL — SIGNIFICANT CHANGE UP (ref 80–100)
PHOSPHATE SERPL-MCNC: 3.5 MG/DL — SIGNIFICANT CHANGE UP (ref 2.5–4.5)
PLATELET # BLD AUTO: 107 K/UL — LOW (ref 150–400)
PLATELET # BLD AUTO: 111 K/UL — LOW (ref 150–400)
PLATELET # BLD AUTO: 114 K/UL — LOW (ref 150–400)
PLATELET # BLD AUTO: 116 K/UL — LOW (ref 150–400)
POTASSIUM SERPL-MCNC: 4.4 MMOL/L — SIGNIFICANT CHANGE UP (ref 3.5–5.3)
POTASSIUM SERPL-SCNC: 4.4 MMOL/L — SIGNIFICANT CHANGE UP (ref 3.5–5.3)
PROTHROM AB SERPL-ACNC: 14.3 SEC — HIGH (ref 10–12.9)
RBC # BLD: 2.24 M/UL — LOW (ref 4.2–5.8)
RBC # BLD: 2.45 M/UL — LOW (ref 4.2–5.8)
RBC # BLD: 2.46 M/UL — LOW (ref 4.2–5.8)
RBC # BLD: 2.46 M/UL — LOW (ref 4.2–5.8)
RBC # FLD: 13.6 % — SIGNIFICANT CHANGE UP (ref 10.3–14.5)
RBC # FLD: 13.8 % — SIGNIFICANT CHANGE UP (ref 10.3–14.5)
RBC # FLD: 13.8 % — SIGNIFICANT CHANGE UP (ref 10.3–14.5)
RBC # FLD: 13.9 % — SIGNIFICANT CHANGE UP (ref 10.3–14.5)
SODIUM SERPL-SCNC: 136 MMOL/L — SIGNIFICANT CHANGE UP (ref 135–145)
WBC # BLD: 7 K/UL — SIGNIFICANT CHANGE UP (ref 3.8–10.5)
WBC # BLD: 7.9 K/UL — SIGNIFICANT CHANGE UP (ref 3.8–10.5)
WBC # BLD: 8 K/UL — SIGNIFICANT CHANGE UP (ref 3.8–10.5)
WBC # BLD: 8.4 K/UL — SIGNIFICANT CHANGE UP (ref 3.8–10.5)
WBC # FLD AUTO: 7 K/UL — SIGNIFICANT CHANGE UP (ref 3.8–10.5)
WBC # FLD AUTO: 7.9 K/UL — SIGNIFICANT CHANGE UP (ref 3.8–10.5)
WBC # FLD AUTO: 8 K/UL — SIGNIFICANT CHANGE UP (ref 3.8–10.5)
WBC # FLD AUTO: 8.4 K/UL — SIGNIFICANT CHANGE UP (ref 3.8–10.5)

## 2019-09-12 PROCEDURE — 99232 SBSQ HOSP IP/OBS MODERATE 35: CPT | Mod: GC

## 2019-09-12 PROCEDURE — 99233 SBSQ HOSP IP/OBS HIGH 50: CPT

## 2019-09-12 PROCEDURE — 73060 X-RAY EXAM OF HUMERUS: CPT | Mod: 26,RT

## 2019-09-12 RX ORDER — CHLORHEXIDINE GLUCONATE 213 G/1000ML
1 SOLUTION TOPICAL
Refills: 0 | Status: DISCONTINUED | OUTPATIENT
Start: 2019-09-12 | End: 2019-09-17

## 2019-09-12 RX ORDER — SENNA PLUS 8.6 MG/1
2 TABLET ORAL AT BEDTIME
Refills: 0 | Status: DISCONTINUED | OUTPATIENT
Start: 2019-09-12 | End: 2019-09-17

## 2019-09-12 RX ORDER — ENTACAPONE 200 MG/1
200 TABLET, FILM COATED ORAL
Refills: 0 | Status: DISCONTINUED | OUTPATIENT
Start: 2019-09-12 | End: 2019-09-17

## 2019-09-12 RX ORDER — LANOLIN ALCOHOL/MO/W.PET/CERES
5 CREAM (GRAM) TOPICAL AT BEDTIME
Refills: 0 | Status: DISCONTINUED | OUTPATIENT
Start: 2019-09-12 | End: 2019-09-17

## 2019-09-12 RX ORDER — DOCUSATE SODIUM 100 MG
100 CAPSULE ORAL THREE TIMES A DAY
Refills: 0 | Status: DISCONTINUED | OUTPATIENT
Start: 2019-09-12 | End: 2019-09-17

## 2019-09-12 RX ADMIN — CARBIDOPA AND LEVODOPA 1 TABLET(S): 25; 100 TABLET ORAL at 12:38

## 2019-09-12 RX ADMIN — TAMSULOSIN HYDROCHLORIDE 0.4 MILLIGRAM(S): 0.4 CAPSULE ORAL at 12:35

## 2019-09-12 RX ADMIN — Medication 1 SPRAY(S): at 06:26

## 2019-09-12 RX ADMIN — CARBIDOPA AND LEVODOPA 1 TABLET(S): 25; 100 TABLET ORAL at 07:27

## 2019-09-12 RX ADMIN — Medication 1 TABLET(S): at 12:35

## 2019-09-12 RX ADMIN — ENTACAPONE 200 MILLIGRAM(S): 200 TABLET, FILM COATED ORAL at 17:05

## 2019-09-12 RX ADMIN — ENTACAPONE 200 MILLIGRAM(S): 200 TABLET, FILM COATED ORAL at 12:36

## 2019-09-12 RX ADMIN — ATORVASTATIN CALCIUM 80 MILLIGRAM(S): 80 TABLET, FILM COATED ORAL at 22:38

## 2019-09-12 RX ADMIN — Medication 50 MICROGRAM(S): at 06:26

## 2019-09-12 RX ADMIN — RANOLAZINE 1000 MILLIGRAM(S): 500 TABLET, FILM COATED, EXTENDED RELEASE ORAL at 06:27

## 2019-09-12 RX ADMIN — Medication 100 MILLIGRAM(S): at 22:38

## 2019-09-12 RX ADMIN — Medication 5 MILLIGRAM(S): at 22:38

## 2019-09-12 RX ADMIN — CARBIDOPA AND LEVODOPA 1 TABLET(S): 25; 100 TABLET ORAL at 23:24

## 2019-09-12 RX ADMIN — CHLORHEXIDINE GLUCONATE 1 APPLICATION(S): 213 SOLUTION TOPICAL at 12:36

## 2019-09-12 RX ADMIN — SENNA PLUS 2 TABLET(S): 8.6 TABLET ORAL at 22:38

## 2019-09-12 RX ADMIN — CARBIDOPA AND LEVODOPA 1 TABLET(S): 25; 100 TABLET ORAL at 17:05

## 2019-09-12 RX ADMIN — ENTACAPONE 200 MILLIGRAM(S): 200 TABLET, FILM COATED ORAL at 22:38

## 2019-09-12 RX ADMIN — Medication 1 SPRAY(S): at 17:05

## 2019-09-12 RX ADMIN — ENTACAPONE 200 MILLIGRAM(S): 200 TABLET, FILM COATED ORAL at 09:13

## 2019-09-12 RX ADMIN — Medication 100 MILLIGRAM(S): at 12:39

## 2019-09-12 NOTE — PROGRESS NOTE ADULT - PROBLEM SELECTOR PLAN 1
s/p EGD and Colon.   Multiple large rectal ulcers.  probably 2/2 stercoral colitis, completed ABx, on bowel regimen,  had 1U PRBC this am, s/p severe rectal bleed 2/2 ulcers on 9/11, requiring emergent EUA w visible vessel ligation and rectal packing,  POD1, still on pressors, remains NPO

## 2019-09-12 NOTE — PROGRESS NOTE ADULT - SUBJECTIVE AND OBJECTIVE BOX
In SICU. On Phenylephrine gtt.      VITAL:  T(C): , Max: 36.7 (09-11-19 @ 19:00)  T(F): , Max: 98.1 (09-11-19 @ 19:00)  HR: 56 (09-12-19 @ 14:45)  BP: 103/51 (09-12-19 @ 14:45)  BP(mean): 72 (09-12-19 @ 14:45)  RR: 20 (09-12-19 @ 14:45)  SpO2: 100% (09-12-19 @ 14:45)  Urine output 1115cc/24h    PHYSICAL EXAM:  Constitutional: NAD; mild psychomotor retardation  HEENT: DMM  Neck: Supple, No JVD  Respiratory: CTA-b/l  Cardiovascular: irreg s1s2  Gastrointestinal: BS+, soft, NT/ND  Extremities: No peripheral edema b/l  : (+)lara-dark urine  Neuro: (+)coarse tremor LUE; right arm in sling    LABS:                        7.5    8.4   )-----------( 107      ( 12 Sep 2019 10:14 )             23.2     Na(136)/K(4.4)/Cl(104)/HCO3(23)/BUN(35)/Cr(1.75)Glu(114)/Ca(7.5)/Mg(2.5)/PO4(3.5)    09-12 @ 02:42  Na(140)/K(4.3)/Cl(104)/HCO3(22)/BUN(34)/Cr(1.76)Glu(143)/Ca(7.4)/Mg(2.5)/PO4(3.4)    09-11 @ 18:15  Na(137)/K(4.1)/Cl(102)/HCO3(24)/BUN(33)/Cr(1.69)Glu(120)/Ca(7.7)/Mg(--)/PO4(--)    09-11 @ 15:23  Na(135)/K(4.0)/Cl(102)/HCO3(22)/BUN(35)/Cr(1.70)Glu(106)/Ca(7.6)/Mg(--)/PO4(--)    09-11 @ 07:04  Na(138)/K(4.3)/Cl(103)/HCO3(25)/BUN(38)/Cr(1.86)Glu(113)/Ca(7.6)/Mg(--)/PO4(--)    09-10 @ 07:13      IMPRESSION: 89M w/ HTN, DVT-IVCF, Parkinson's disease, CAD-CABG, and CKD3, 8/28/19 a/w urinary retention/STEVEN    (1)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 At least in part due to past obstructive nephropathy, with resultant atrophy of his right kidney. Resolved superimposed STEVEN from obstruction    (2)CV - on phenylephrine gtt    (3)Surgery - POD#1 s/p surgical repair of bleeding rectal ulcer      RECOMMEND:  (1)Dose new meds for GFR 30-40ml/min (present dosing is acceptable)  (2)Management of anemia per SICU/GI            Abdi Vieira MD  [facility]  [phone]

## 2019-09-12 NOTE — PROGRESS NOTE ADULT - PROBLEM SELECTOR PLAN 1
cont with hemodynamic support   s/p EGD and Colon.   Multiple large rectal ulcers.  Miralax BID   Monitor Hgb closely  GI following  s/p PRBC transfusion

## 2019-09-12 NOTE — PROGRESS NOTE ADULT - SUBJECTIVE AND OBJECTIVE BOX
SICU Daily Progress Note  =====================================================  HPI:   RYAN FITZPATRICK is a 89y Male with CAD s/p CABG on ASA, Afib, CKD3, DVT s/p IVC Filter, Parkinson's disease initially admitted from rehab on 8/28 with respiratory distress. Of note, patient's dyspnea multifactorial from respiratory muscle weakness in the setting of Parkinson's disease and ischemic cardiomyopathy. On 9/4, patient experienced two episodes of BRBPR with hgb drop (6.3) and transient hypotension (SBP 70s). He underwent a CTA a/p that was unsuccessful in localizing hemorrhage. Patient subsequently transferred to MICU. Surgery consulted for evaluation of BRBPR. Found to have rectal ulcers on colonoscopy.    On 9/11 patient had an RRT call while he was having an active lower GI bleed with BRBPR. Patient was hypotensive to SBP 70s and maintained a HR in the 70s. Mentating, A&Ox3. Given 2 units of pRBCs and 1 unit of plasma on the floor, started on pressors, taken to OR.  Pt is now s/p exam under anesthesia, with finding of an actively bleeding vessel in an ulcer, ligated. Rigid sigmoidoscopy done at end of case with no other bleeding noted. Rectum re-packed with gelfoam wrapped in nu-knit and in monsal's solution. SICU consulted for monitoring.      Interval/Overnight Events:       Patient started on Phenylephrine for hypotension to 80-90's SBP, currently requiring 0.1 mcg/kg/m to maintain MAP>65. Continues to mentate well, does not complain of pain.   No other acute events    MEDICATIONS:   --------------------------------------------------------------------------------------  Neurologic Medications  carbidopa/levodopa CR 50/200 1 Tablet(s) Oral <User Schedule>    Respiratory Medications    Cardiovascular Medications  phenylephrine    Infusion 0.2 MICROgram(s)/kG/Min IV Continuous <Continuous>  ranolazine 1000 milliGRAM(s) Oral two times a day  tamsulosin 0.4 milliGRAM(s) Oral daily    Gastrointestinal Medications  multiple electrolytes Injection Type 1 1000 milliLiter(s) IV Continuous <Continuous>  pantoprazole    Tablet 40 milliGRAM(s) Oral before breakfast    Genitourinary Medications    Hematologic/Oncologic Medications    Antimicrobial/Immunologic Medications    Endocrine/Metabolic Medications  atorvastatin 80 milliGRAM(s) Oral at bedtime  levothyroxine 50 MICROGram(s) Oral daily    Topical/Other Medications  chlorhexidine 4% Liquid 1 Application(s) Topical <User Schedule>  fluticasone propionate 50 MICROgram(s)/spray Nasal Spray 1 Spray(s) Both Nostrils two times a day    --------------------------------------------------------------------------------------    VITAL SIGNS, INS/OUTS (last 24 hours):  --------------------------------------------------------------------------------------  T(C): 36.6 (09-11-19 @ 23:00), Max: 36.9 (09-11-19 @ 05:30)  HR: 64 (09-12-19 @ 00:45) (56 - 72)  BP: 105/59 (09-12-19 @ 00:45) (84/54 - 148/74)  BP(mean): 78 (09-12-19 @ 00:45) (63 - 101)  ABP: --  ABP(mean): --  RR: 16 (09-12-19 @ 00:45) (13 - 20)  SpO2: 100% (09-12-19 @ 00:45) (93% - 100%)  Wt(kg): --  CVP(mm Hg): --  CI: --  CAPILLARY BLOOD GLUCOSE       N/A      09-10 @ 07:01  -  09-11 @ 07:00  --------------------------------------------------------  IN:    Oral Fluid: 720 mL  Total IN: 720 mL    OUT:    Indwelling Catheter - Urethral: 1000 mL  Total OUT: 1000 mL    Total NET: -280 mL      09-11 @ 07:01  -  09-12 @ 00:57  --------------------------------------------------------  IN:    IV PiggyBack: 100 mL    multiple electrolytes Injection Type 1: 300 mL    phenylephrine   Infusion: 16.8 mL  Total IN: 416.8 mL    OUT:    Indwelling Catheter - Urethral: 650 mL  Total OUT: 650 mL    Total NET: -233.2 mL        --------------------------------------------------------------------------------------    EXAM  NEUROLOGY  RASS:  0 	GCS:  15  Exam: Normal, NAD, alert, oriented x3, no focal deficits.     RESPIRATORY  Exam: Nonlabored, CTABL, no wheezes, ronchi, or rales. Normal respiratory effort.     CARDIOVASCULAR  Exam: Normotensive on phenylephrine gtt @ 0.1mcg/kg/min, RRR, no M/R/G     GI/NUTRITION  Exam: Abdomen soft, NT/ND, no rebound or guarding.  Current Diet:  NPO    VASCULAR  Exam: Extremities warm, well-perfused. Adequate capillary refill.     HEMATOLOGIC  [x] VTE Prophylaxis: SCDs, chemoppx held for bleeding      INFECTIOUS DISEASE  Antimicrobials/Immunologic Medications: -    MUSCULOSKELETAL  RUE in sling with hx of humeral fracture      Tubes/Lines/Drains    [x] Peripheral IV  [] Central Venous Line     	[] R	[] L	[] IJ	[] Fem	[] SC	Date Placed:   [] Arterial Line		[] R	[] L	[] Fem	[] Rad	[] Ax	Date Placed:   [] PICC		[] Midline		[] Mediport  [x] Urinary Catheter		  [x] Necessity of urinary, arterial, and venous catheters discussed    LABS  --------------------------------------------------------------------------------------  CBC (09-11 @ 22:31)                              7.8<L>                         8.8     )----------------(  123<L>     --    % Neutrophils, --    % Lymphocytes, ANC: --                                  23.0<L>              CBC (09-11 @ 18:15)                              8.8<L>                         8.7     )----------------(  156        --    % Neutrophils, --    % Lymphocytes, ANC: --                                  26.8<L>                BMP (09-11 @ 18:15)             140     |  104     |  34<H> 		Ca++ --      Ca 7.4<L>             ---------------------------------( 143<H>		Mg 2.5                4.3     |  22      |  1.76<H>			Ph 3.4     BMP (09-11 @ 15:23)             137     |  102     |  33<H> 		Ca++ --      Ca 7.7<L>             ---------------------------------( 120<H>		Mg --                 4.1     |  24      |  1.69<H>			Ph --        LFTs (09-11 @ 18:15)      TPro 5.4<L> / Alb 2.7<L> / TBili 0.9 / DBili -- / AST 10 / ALT <5<L> / AlkPhos 56    Coags (09-11 @ 18:15)  aPTT 30.4 / INR 1.26<H> / PT 14.5<H>    ABG (09-11 @ 18:08)     7.44 / 37 / 67<L> / 25 / 1.5 / 95%     Lactate:           --------------------------------------------------------------------------------------

## 2019-09-12 NOTE — PROGRESS NOTE ADULT - SUBJECTIVE AND OBJECTIVE BOX
Subjective: Patient seen and examined. No new events except as noted.   Remains in ICU  started on Phenylephrine for hypotension to 80-90's SBP, currently requiring 0.1 mcg/kg/m to maintain MAP>65. Continues to mentate well, does not complain of pain.   REVIEW OF SYSTEMS:    CONSTITUTIONAL: + weakness, fevers or chills  EYES/ENT: No visual changes;  No vertigo or throat pain   NECK: No pain or stiffness  RESPIRATORY: No cough, wheezing, hemoptysis; No shortness of breath  CARDIOVASCULAR: No chest pain or palpitations  GASTROINTESTINAL: No abdominal or epigastric pain. No nausea, vomiting, or hematemesis; No diarrhea or constipation. No melena or hematochezia.  GENITOURINARY: No dysuria, frequency or hematuria  NEUROLOGICAL: No numbness or weakness  SKIN: No itching, burning, rashes, or lesions   All other review of systems is negative unless indicated above.    MEDICATIONS:  MEDICATIONS  (STANDING):  atorvastatin 80 milliGRAM(s) Oral at bedtime  carbidopa/levodopa CR 50/200 1 Tablet(s) Oral <User Schedule>  chlorhexidine 2% Cloths 1 Application(s) Topical <User Schedule>  docusate sodium 100 milliGRAM(s) Oral three times a day  entacapone 200 milliGRAM(s) Oral <User Schedule>  fluticasone propionate 50 MICROgram(s)/spray Nasal Spray 1 Spray(s) Both Nostrils two times a day  levothyroxine 50 MICROGram(s) Oral daily  melatonin 5 milliGRAM(s) Oral at bedtime  multiple electrolytes Injection Type 1 1000 milliLiter(s) (50 mL/Hr) IV Continuous <Continuous>  multivitamin 1 Tablet(s) Oral daily  pantoprazole    Tablet 40 milliGRAM(s) Oral before breakfast  phenylephrine    Infusion 0.2 MICROgram(s)/kG/Min (5.647 mL/Hr) IV Continuous <Continuous>  senna 2 Tablet(s) Oral at bedtime  tamsulosin 0.4 milliGRAM(s) Oral daily      PHYSICAL EXAM:  T(C): 36.6 (09-12-19 @ 11:00), Max: 36.7 (09-11-19 @ 19:00)  HR: 57 (09-12-19 @ 11:15) (49 - 72)  BP: 118/53 (09-12-19 @ 11:15) (84/49 - 148/74)  RR: 20 (09-12-19 @ 11:15) (12 - 22)  SpO2: 98% (09-12-19 @ 11:15) (86% - 100%)  Wt(kg): --  I&O's Summary    11 Sep 2019 07:01  -  12 Sep 2019 07:00  --------------------------------------------------------  IN: 1136.4 mL / OUT: 1115 mL / NET: 21.4 mL    12 Sep 2019 07:01  -  12 Sep 2019 12:13  --------------------------------------------------------  IN: 216.8 mL / OUT: 300 mL / NET: -83.2 mL      Height (cm): 172.72 (09-11 @ 16:38)  Weight (kg): 75.3 (09-11 @ 16:38)  BMI (kg/m2): 25.2 (09-11 @ 16:38)  BSA (m2): 1.89 (09-11 @ 16:38)    Appearance: NAD  HEENT:   Irregular  oral mucosa, PERRL, EOMI	  Lymphatic: No lymphadenopathy , right arm/shoulder sling   Cardiovascular: Irregular rS1 S2, No JVD, No murmurs , Peripheral pulses palpable 2+ bilaterally  Respiratory: Decreased bs and effort   Gastrointestinal:  Soft, Non-tender, + BS	  Skin: No rashes, No ecchymoses, No cyanosis, warm to touch  Musculoskeletal: Decreased range of motion and  strength  Psychiatry:  Mood & affect appropriate  Ext: No edema  +lara       LABS:    CARDIAC MARKERS:                                7.5    8.4   )-----------( 107      ( 12 Sep 2019 10:14 )             23.2     09-12    136  |  104  |  35<H>  ----------------------------<  114<H>  4.4   |  23  |  1.75<H>    Ca    7.5<L>      12 Sep 2019 02:42  Phos  3.5     09-12  Mg     2.5     09-12    TPro  5.4<L>  /  Alb  2.7<L>  /  TBili  0.9  /  DBili  x   /  AST  10  /  ALT  <5<L>  /  AlkPhos  56  09-11    proBNP:   Lipid Profile:   HgA1c:   TSH:             TELEMETRY: 	SR    ECG:  	  RADIOLOGY:   DIAGNOSTIC TESTING:  [ ] Echocardiogram:  [ ]  Catheterization:  [ ] Stress Test:    OTHER:

## 2019-09-12 NOTE — PROGRESS NOTE ADULT - ASSESSMENT
88yo male  POD1 s/p EUA and suture ligation of bleeding rectal ulcer after rapid response called (9/11/2019) for active lower GI bleed (BRBPR).    - PRN Pain control  - Monitor respiratory status  - OOBTC daily, encourage use of incentive spirometer  - On phenylephrine for hypotension  - Wean pressor as tolerated  - Monitor vitals   - NPO  - Continued critical care management    [facility] [phone]

## 2019-09-12 NOTE — PROGRESS NOTE ADULT - SUBJECTIVE AND OBJECTIVE BOX
[facility] Surgery Progress Note     SUMMARY: POD1 EUA and suture ligation of bleeding rectal ulcer. RR called for profuse rectal bleeding, patient transfused, BP appropriate. Emergently to OR.    SUBJECTIVE / 24H EVENTS  Patient seen and examined on morning rounds. BP 80's-90's, phenylephrine 0.1mcg/kg/min, mentation appropriate.    OBJECTIVE:    VITAL SIGNS:  T(C): 36.6 (19 @ 03:00), Max: 36.7 (19 @ 19:00)  HR: 62 (19 @ 04:30) (56 - 72)  BP: 97/55 (19 @ 04:30) (84/54 - 148/74)  RR: 18 (19 @ 04:30) (13 - 20)  SpO2: 100% (19 @ 04:30) (93% - 100%)  Daily Height in cm: 172.72 (11 Sep 2019 16:38)    Daily Weight in k.8 (12 Sep 2019 00:30)      PHYSICAL EXAM:***  Gen: NAD  LS: Respirations unlabored. CTA b/l  Card: RRR. No m/r/g.   GI: Soft. Nontender. Nondistended. BS+.  Ext: Warm, well perfused      09-10-19 @ 07:  -  19 @ 07:00  --------------------------------------------------------  IN:    Oral Fluid: 720 mL  Total IN: 720 mL    OUT:    Indwelling Catheter - Urethral: 1000 mL  Total OUT: 1000 mL    Total NET: -280 mL      19 @ 07:01  -  19 @ 05:36  --------------------------------------------------------  IN:    IV PiggyBack: 100 mL    multiple electrolytes Injection Type 1: 450 mL    phenylephrine   Infusion: 25.2 mL  Total IN: 575.2 mL    OUT:    Indwelling Catheter - Urethral: 980 mL  Total OUT: 980 mL    Total NET: -404.8 mL          LAB VALUES:      136  |  104  |  35<H>  ----------------------------<  114<H>  4.4   |  23  |  1.75<H>    Ca    7.5<L>      12 Sep 2019 02:42  Phos  3.5       Mg     2.5         TPro  5.4<L>  /  Alb  2.7<L>  /  TBili  0.9  /  DBili  x   /  AST  10  /  ALT  <5<L>  /  AlkPhos  56                                 7.3    7.0   )-----------( 116      ( 12 Sep 2019 02:42 )             21.0     LIVER FUNCTIONS - ( 11 Sep 2019 18:15 )  Alb: 2.7 g/dL / Pro: 5.4 g/dL / ALK PHOS: 56 U/L / ALT: <5 U/L / AST: 10 U/L / GGT: x           PT/INR - ( 12 Sep 2019 02:42 )   PT: 14.3 sec;   INR: 1.25 ratio         PTT - ( 12 Sep 2019 02:42 )  PTT:29.5 sec  ABG - ( 11 Sep 2019 18:08 )  pH, Arterial: 7.44  pH, Blood: x     /  pCO2: 37    /  pO2: 67    / HCO3: 25    / Base Excess: 1.5   /  SaO2: 95            MICROBIOLOGY:    No new microbiology data for review.     RADIOLOGY:    No new radiographic images for review.    MEDICATIONS  (STANDING):  atorvastatin 80 milliGRAM(s) Oral at bedtime  carbidopa/levodopa CR 50/200 1 Tablet(s) Oral <User Schedule>  chlorhexidine 4% Liquid 1 Application(s) Topical <User Schedule>  fluticasone propionate 50 MICROgram(s)/spray Nasal Spray 1 Spray(s) Both Nostrils two times a day  levothyroxine 50 MICROGram(s) Oral daily  multiple electrolytes Injection Type 1 1000 milliLiter(s) (50 mL/Hr) IV Continuous <Continuous>  pantoprazole    Tablet 40 milliGRAM(s) Oral before breakfast  phenylephrine    Infusion 0.2 MICROgram(s)/kG/Min (5.647 mL/Hr) IV Continuous <Continuous>  ranolazine 1000 milliGRAM(s) Oral two times a day  tamsulosin 0.4 milliGRAM(s) Oral daily

## 2019-09-12 NOTE — PROGRESS NOTE ADULT - ASSESSMENT
88 y/o M w/ hx of CAD s/p CABG s/p PCI, ICM, and Parkinson's disease with recent right humeral fracture, with admission for hematuria c/b aspiration pneumonia, now with hematochezia.    Impression:  # Acute blood loss anemia with hematochezia: Currently on minimal vasopressors and with Hgb of 8.2 this morning - 1 unit pRBCs documented as being given in the past 24 hours. Colonoscopy on 9/4/19 revealed multiple rectal ulcers and ulcerated mucosa in the anal canal. Patient developed active bleeding on 9/11/19 and underwent EUA with suture ligation and packing.   # CAD s/p CABG s/p PCI  # ICM  # Parkinson's disease    Recommendations:  - F/U surgery recommendations  - Can consider flexible sigmoidoscopy if patient has persistent bleeding despite surgical interventions  - Monitor CBC q8h  - Transfuse for goal Hgb >/= 7.0  - Maintain active type and screen  - Two large bore IVs  - No rectal tube  - Rest of care per primary team    Constantine Rose MD  Gastroenterology Fellow  Pager number: [phone] / [phone]    Please call GI ([phone]) if there are any additional questions or concerns. Please call on-call GI fellow after 5pm and before 8am, and on weekends.

## 2019-09-12 NOTE — PROGRESS NOTE ADULT - SUBJECTIVE AND OBJECTIVE BOX
Chief Complaint: Hematuria    Interval Events:   - The patient underwent EUA with suture ligation of a visible vessel and packing  - The patient has not had any additional bleeding per nursing    Allergies:  Cipro (Rash)    Hospital Medications:  atorvastatin 80 milliGRAM(s) Oral at bedtime  carbidopa/levodopa CR 50/200 1 Tablet(s) Oral <User Schedule>  chlorhexidine 2% Cloths 1 Application(s) Topical <User Schedule>  docusate sodium 100 milliGRAM(s) Oral three times a day  entacapone 200 milliGRAM(s) Oral <User Schedule>  fluticasone propionate 50 MICROgram(s)/spray Nasal Spray 1 Spray(s) Both Nostrils two times a day  levothyroxine 50 MICROGram(s) Oral daily  melatonin 5 milliGRAM(s) Oral at bedtime  multiple electrolytes Injection Type 1 1000 milliLiter(s) IV Continuous <Continuous>  multivitamin 1 Tablet(s) Oral daily  pantoprazole    Tablet 40 milliGRAM(s) Oral before breakfast  phenylephrine    Infusion 0.2 MICROgram(s)/kG/Min IV Continuous <Continuous>  senna 2 Tablet(s) Oral at bedtime  tamsulosin 0.4 milliGRAM(s) Oral daily      PMHX/PSHX:  Unilateral inguinal hernia, with gangrene, not specified as recurrent  Upper GI bleed  Aspiration pneumonia  Clostridium difficile colitis  Pneumonia  MI, old  UTI (urinary tract infection)  BPH (benign prostatic hyperplasia)  Parkinsons Disease  CAD (Coronary Artery Disease)  HTN (Hypertension)  Hyperlipidemia  Presence of IVC filter  History of prostate surgery  Varicose veins of legs  S/P appendectomy  Stented coronary artery  Hx of CABG  Hyperlipidemia      Family history:  Family history of coronary artery disease  Family history of breast cancer (Aunt)  No pertinent family history in first degree relatives      ROS:     General:  No wt loss, fevers, chills, night sweats, fatigue,   Eyes:  Good vision, no reported pain  ENT:  No sore throat, pain, runny nose, dysphagia  CV:  No pain, palpitations, hypo/hypertension  Pulm:  No dyspnea, cough, tachypnea, wheezing  GI:  No pain, No nausea, No vomiting, No diarrhea, No constipation, No weight loss, No fever, No pruritis, No rectal bleeding, No tarry stools, No dysphagia  :  No pain, bleeding, incontinence, nocturia  Muscle:  No pain, weakness  Neuro:  No weakness, tingling, memory problems  Psych:  No fatigue, insomnia, mood problems, depression  Endocrine:  No polyuria, polydipsia, cold/heat intolerance  Heme:  No petechiae, ecchymosis, easy bruisability  Skin:  No rash, tattoos, scars, edema      PHYSICAL EXAM:   Vital Signs:  Vital Signs Last 24 Hrs  T(C): 36.6 (12 Sep 2019 03:00), Max: 36.7 (11 Sep 2019 19:00)  T(F): 97.9 (12 Sep 2019 03:00), Max: 98.1 (11 Sep 2019 19:00)  HR: 50 (12 Sep 2019 08:30) (50 - 72)  BP: 86/53 (12 Sep 2019 08:30) (84/49 - 148/74)  BP(mean): 66 (12 Sep 2019 08:30) (62 - 101)  RR: 12 (12 Sep 2019 08:30) (12 - 20)  SpO2: 88% (12 Sep 2019 08:30) (88% - 100%)  Daily Height in cm: 172.72 (11 Sep 2019 16:38)    Daily Weight in k.8 (12 Sep 2019 00:30)    GENERAL:  No acute distress  HEENT:  Normocephalic/atraumatic,  no scleral icterus  CHEST: Normal effort, no accessory muscle use  ABDOMEN:  Soft, non-tender, non-distended, normoactive bowel sounds  EXTREMITIES:  No cyanosis, clubbing, or edema  SKIN:  No rash/erythema  NEURO:  Alert and oriented x 3    LABS:                        8.2    7.9   )-----------( 111      ( 12 Sep 2019 06:20 )             23.2     Mean Cell Volume: 94.3 fl (19 @ 06:20)        136  |  104  |  35<H>  ----------------------------<  114<H>  4.4   |  23  |  1.75<H>    Ca    7.5<L>      12 Sep 2019 02:42  Phos  3.5       Mg     2.5         TPro  5.4<L>  /  Alb  2.7<L>  /  TBili  0.9  /  DBili  x   /  AST  10  /  ALT  <5<L>  /  AlkPhos  56  09-11    LIVER FUNCTIONS - ( 11 Sep 2019 18:15 )  Alb: 2.7 g/dL / Pro: 5.4 g/dL / ALK PHOS: 56 U/L / ALT: <5 U/L / AST: 10 U/L / GGT: x           PT/INR - ( 12 Sep 2019 02:42 )   PT: 14.3 sec;   INR: 1.25 ratio      PTT - ( 12 Sep 2019 02:42 )  PTT:29.5 sec               8.2    7.9   )-----------( 111      ( 12 Sep 2019 06:20 )             23.2                         7.3    7.0   )-----------( 116      ( 12 Sep 2019 02:42 )             21.0                         7.8    8.8   )-----------( 123      ( 11 Sep 2019 22:31 )             23.0                         8.8    8.7   )-----------( 156      ( 11 Sep 2019 18:15 )             26.8                         9.0    5.8   )-----------( 156      ( 11 Sep 2019 15:23 )             26.8     Imaging:    No new imaging

## 2019-09-12 NOTE — PROGRESS NOTE ADULT - ASSESSMENT
ASSESSMENT:  89 year old male w/ lower GI bleed, s/p EUA, ligation of bleeding vessel on 9/11, admitted to SICU for hemodynamic monitoring    PLAN:    Neurologic:   - PRN Pain control  - C/w carbidopa/Levodopa, Ranolazine for parkinsons hx    Respiratory:   - Monitor respiratory status  - OOBTC daily, encourage use of incentive spirometer    Cardiovascular:   - On phenylephrine for hypotension  - Wean pressor as tolerated  - Monitor vitals     Gastrointestinal/Nutrition:   - NPO  - Monitor for re-bleeding  - C/w Protonix     Renal/Genitourinary:   - Monitor urine output  - Trend electrolytes, replete as needed  - Plasmalyte @50  - C/w flomax    Hematologic:   - Hold DVT chemoppx for GI bleed, mechanical ppx with SCDs for now  - Trend H/H, transfuse as needed    Infectious Disease:   - Monitor for fevers  - Monitor WBC    Endocrine:   - Monitor blood glucose    Musculoskeletal:  - F/u Rt arm xrays  - F/u ortho plan for fracture    Disposition:   Continued critical care management    Adams Chowdary, PGY 2  Pager [phone]

## 2019-09-13 LAB
ANION GAP SERPL CALC-SCNC: 12 MMOL/L — SIGNIFICANT CHANGE UP (ref 5–17)
APTT BLD: 29.6 SEC — SIGNIFICANT CHANGE UP (ref 27.5–36.3)
BUN SERPL-MCNC: 31 MG/DL — HIGH (ref 7–23)
CALCIUM SERPL-MCNC: 7.6 MG/DL — LOW (ref 8.4–10.5)
CHLORIDE SERPL-SCNC: 105 MMOL/L — SIGNIFICANT CHANGE UP (ref 96–108)
CO2 SERPL-SCNC: 21 MMOL/L — LOW (ref 22–31)
CREAT SERPL-MCNC: 1.7 MG/DL — HIGH (ref 0.5–1.3)
GLUCOSE SERPL-MCNC: 95 MG/DL — SIGNIFICANT CHANGE UP (ref 70–99)
HCT VFR BLD CALC: 21.9 % — LOW (ref 39–50)
HCT VFR BLD CALC: 26.5 % — LOW (ref 39–50)
HGB BLD-MCNC: 7.6 G/DL — LOW (ref 13–17)
HGB BLD-MCNC: 9 G/DL — LOW (ref 13–17)
INR BLD: 1.19 RATIO — HIGH (ref 0.88–1.16)
MAGNESIUM SERPL-MCNC: 2.4 MG/DL — SIGNIFICANT CHANGE UP (ref 1.6–2.6)
MCHC RBC-ENTMCNC: 32 PG — SIGNIFICANT CHANGE UP (ref 27–34)
MCHC RBC-ENTMCNC: 32.4 PG — SIGNIFICANT CHANGE UP (ref 27–34)
MCHC RBC-ENTMCNC: 34 GM/DL — SIGNIFICANT CHANGE UP (ref 32–36)
MCHC RBC-ENTMCNC: 34.6 GM/DL — SIGNIFICANT CHANGE UP (ref 32–36)
MCV RBC AUTO: 93.9 FL — SIGNIFICANT CHANGE UP (ref 80–100)
MCV RBC AUTO: 94 FL — SIGNIFICANT CHANGE UP (ref 80–100)
PHOSPHATE SERPL-MCNC: 3.3 MG/DL — SIGNIFICANT CHANGE UP (ref 2.5–4.5)
PLATELET # BLD AUTO: 106 K/UL — LOW (ref 150–400)
PLATELET # BLD AUTO: 122 K/UL — LOW (ref 150–400)
POTASSIUM SERPL-MCNC: 4.5 MMOL/L — SIGNIFICANT CHANGE UP (ref 3.5–5.3)
POTASSIUM SERPL-SCNC: 4.5 MMOL/L — SIGNIFICANT CHANGE UP (ref 3.5–5.3)
PROTHROM AB SERPL-ACNC: 13.7 SEC — HIGH (ref 10–12.9)
RBC # BLD: 2.34 M/UL — LOW (ref 4.2–5.8)
RBC # BLD: 2.82 M/UL — LOW (ref 4.2–5.8)
RBC # FLD: 13.8 % — SIGNIFICANT CHANGE UP (ref 10.3–14.5)
RBC # FLD: 13.8 % — SIGNIFICANT CHANGE UP (ref 10.3–14.5)
SODIUM SERPL-SCNC: 138 MMOL/L — SIGNIFICANT CHANGE UP (ref 135–145)
WBC # BLD: 6.8 K/UL — SIGNIFICANT CHANGE UP (ref 3.8–10.5)
WBC # BLD: 7.6 K/UL — SIGNIFICANT CHANGE UP (ref 3.8–10.5)
WBC # FLD AUTO: 6.8 K/UL — SIGNIFICANT CHANGE UP (ref 3.8–10.5)
WBC # FLD AUTO: 7.6 K/UL — SIGNIFICANT CHANGE UP (ref 3.8–10.5)

## 2019-09-13 PROCEDURE — 99232 SBSQ HOSP IP/OBS MODERATE 35: CPT | Mod: GC

## 2019-09-13 PROCEDURE — 99232 SBSQ HOSP IP/OBS MODERATE 35: CPT

## 2019-09-13 RX ORDER — FUROSEMIDE 40 MG
40 TABLET ORAL DAILY
Refills: 0 | Status: DISCONTINUED | OUTPATIENT
Start: 2019-09-13 | End: 2019-09-17

## 2019-09-13 RX ORDER — QUETIAPINE FUMARATE 200 MG/1
12.5 TABLET, FILM COATED ORAL AT BEDTIME
Refills: 0 | Status: DISCONTINUED | OUTPATIENT
Start: 2019-09-13 | End: 2019-09-17

## 2019-09-13 RX ORDER — QUETIAPINE FUMARATE 200 MG/1
12.5 TABLET, FILM COATED ORAL AT BEDTIME
Refills: 0 | Status: DISCONTINUED | OUTPATIENT
Start: 2019-09-13 | End: 2019-09-13

## 2019-09-13 RX ORDER — POLYETHYLENE GLYCOL 3350 17 G/17G
17 POWDER, FOR SOLUTION ORAL DAILY
Refills: 0 | Status: DISCONTINUED | OUTPATIENT
Start: 2019-09-13 | End: 2019-09-17

## 2019-09-13 RX ORDER — FUROSEMIDE 40 MG
40 TABLET ORAL DAILY
Refills: 0 | Status: DISCONTINUED | OUTPATIENT
Start: 2019-09-13 | End: 2019-09-13

## 2019-09-13 RX ORDER — ACETAMINOPHEN 500 MG
650 TABLET ORAL EVERY 6 HOURS
Refills: 0 | Status: DISCONTINUED | OUTPATIENT
Start: 2019-09-13 | End: 2019-09-17

## 2019-09-13 RX ADMIN — TAMSULOSIN HYDROCHLORIDE 0.4 MILLIGRAM(S): 0.4 CAPSULE ORAL at 13:49

## 2019-09-13 RX ADMIN — Medication 1 TABLET(S): at 13:50

## 2019-09-13 RX ADMIN — Medication 1 SPRAY(S): at 06:42

## 2019-09-13 RX ADMIN — SENNA PLUS 2 TABLET(S): 8.6 TABLET ORAL at 23:02

## 2019-09-13 RX ADMIN — PANTOPRAZOLE SODIUM 40 MILLIGRAM(S): 20 TABLET, DELAYED RELEASE ORAL at 10:15

## 2019-09-13 RX ADMIN — Medication 1 SPRAY(S): at 17:07

## 2019-09-13 RX ADMIN — CARBIDOPA AND LEVODOPA 1 TABLET(S): 25; 100 TABLET ORAL at 10:15

## 2019-09-13 RX ADMIN — CARBIDOPA AND LEVODOPA 1 TABLET(S): 25; 100 TABLET ORAL at 17:07

## 2019-09-13 RX ADMIN — ENTACAPONE 200 MILLIGRAM(S): 200 TABLET, FILM COATED ORAL at 19:23

## 2019-09-13 RX ADMIN — Medication 5 MILLIGRAM(S): at 23:02

## 2019-09-13 RX ADMIN — ENTACAPONE 200 MILLIGRAM(S): 200 TABLET, FILM COATED ORAL at 17:07

## 2019-09-13 RX ADMIN — CARBIDOPA AND LEVODOPA 1 TABLET(S): 25; 100 TABLET ORAL at 23:03

## 2019-09-13 RX ADMIN — ENTACAPONE 200 MILLIGRAM(S): 200 TABLET, FILM COATED ORAL at 23:03

## 2019-09-13 RX ADMIN — ATORVASTATIN CALCIUM 80 MILLIGRAM(S): 80 TABLET, FILM COATED ORAL at 23:03

## 2019-09-13 RX ADMIN — Medication 100 MILLIGRAM(S): at 13:50

## 2019-09-13 RX ADMIN — Medication 100 MILLIGRAM(S): at 23:03

## 2019-09-13 RX ADMIN — Medication 50 MICROGRAM(S): at 06:44

## 2019-09-13 RX ADMIN — ENTACAPONE 200 MILLIGRAM(S): 200 TABLET, FILM COATED ORAL at 13:50

## 2019-09-13 RX ADMIN — CARBIDOPA AND LEVODOPA 1 TABLET(S): 25; 100 TABLET ORAL at 13:51

## 2019-09-13 RX ADMIN — Medication 40 MILLIGRAM(S): at 17:07

## 2019-09-13 RX ADMIN — POLYETHYLENE GLYCOL 3350 17 GRAM(S): 17 POWDER, FOR SOLUTION ORAL at 13:50

## 2019-09-13 NOTE — PROGRESS NOTE ADULT - SUBJECTIVE AND OBJECTIVE BOX
SICU Daily Progress Note  =====================================================  HPI:   RYAN FITZPATRICK is a 89y Male with CAD s/p CABG on ASA, Afib, CKD3, DVT s/p IVC Filter, Parkinson's disease initially admitted from rehab on 8/28 with respiratory distress. Of note, patient's dyspnea multifactorial from respiratory muscle weakness in the setting of Parkinson's disease and ischemic cardiomyopathy. On 9/4, patient experienced two episodes of BRBPR with hgb drop (6.3) and transient hypotension (SBP 70s). He underwent a CTA a/p that was unsuccessful in localizing hemorrhage. Patient subsequently transferred to MICU. Surgery consulted for evaluation of BRBPR. Found to have rectal ulcers on colonoscopy.    On 9/11 patient had an RRT call while he was having an active lower GI bleed with BRBPR. Patient was hypotensive to SBP 70s and maintained a HR in the 70s. Mentating, A&Ox3. Given 2 units of pRBCs and 1 unit of plasma on the floor, started on pressors, taken to OR.  Pt is now s/p exam under anesthesia, with finding of an actively bleeding vessel in an ulcer, ligated. Rigid sigmoidoscopy done at end of case with no other bleeding noted. Rectum re-packed with gelfoam wrapped in nu-knit and in monsal's solution. SICU consulted for monitoring.      24 HOUR EVENTS:  - Pts home meds restarted  - received 1 U PRBC, HCT 23.2 > 22.9  - phenylephrine decreased to .2    - Diet from NPO to NPO w/ sips   - Xrays for R chronic upper extremity fracture    SUBJECTIVE/ROS:  [x] A ten-point review of systems was otherwise negative except as noted.  [ ] Due to altered mental status/intubation, subjective information were not able to be obtained from the patient. History was obtained, to the extent possible, from review of the chart and collateral sources of information.      NEURO  RASS:  0   GCS:  15   CAM ICU:  Exam: awake, alert, oriented x 3   Meds: carbidopa/levodopa CR 50/200 1 Tablet(s) Oral <User Schedule>  entacapone 200 milliGRAM(s) Oral <User Schedule>  melatonin 5 milliGRAM(s) Oral at bedtime          RESPIRATORY  RR: 15 (09-13-19 @ 00:15) (12 - 30)  SpO2: 100% (09-13-19 @ 00:15) (81% - 100%)  Wt(kg): --  Exam: unlabored, clear to auscultation bilaterally  Mechanical Ventilation:   ABG - ( 11 Sep 2019 18:08 )  pH: 7.44  /  pCO2: 37    /  pO2: 67    / HCO3: 25    / Base Excess: 1.5   /  SaO2: 95      Lactate: x                [N/A] Extubation Readiness Assessed  Meds:       CARDIOVASCULAR  HR: 63 (09-13-19 @ 00:15) (49 - 68)  BP: 123/59 (09-13-19 @ 00:15) (84/49 - 125/56)  BP(mean): 85 (09-13-19 @ 00:15) (62 - 88)  ABP: --  ABP(mean): --  Wt(kg): --  CVP(cm H2O): --      Exam: regular rate and rhythm  Cardiac Rhythm: sinus  Perfusion     [x]Adequate   [ ]Inadequate  Mentation   [x]Normal       [ ]Reduced  Extremities  [x]Warm         [ ]Cool  Volume Status [ ]Hypervolemic [x]Euvolemic [ ]Hypovolemic  Meds: phenylephrine    Infusion 0.2 MICROgram(s)/kG/Min IV Continuous <Continuous>  tamsulosin 0.4 milliGRAM(s) Oral daily        GI/NUTRITION  Exam: soft, nontender, nondistended   Diet:  Meds: docusate sodium 100 milliGRAM(s) Oral three times a day  pantoprazole    Tablet 40 milliGRAM(s) Oral before breakfast  senna 2 Tablet(s) Oral at bedtime      GENITOURINARY  I&O's Detail    09-11 @ 07:01  -  09-12 @ 07:00  --------------------------------------------------------  IN:    IV PiggyBack: 100 mL    multiple electrolytes Injection Type 1: 650 mL    Packed Red Blood Cells: 350 mL    phenylephrine   Infusion: 36.4 mL  Total IN: 1136.4 mL    OUT:    Indwelling Catheter - Urethral: 1115 mL  Total OUT: 1115 mL    Total NET: 21.4 mL      09-12 @ 07:01  -  09-13 @ 00:58  --------------------------------------------------------  IN:    multiple electrolytes Injection Type 1: 850 mL    Oral Fluid: 160 mL    phenylephrine   Infusion: 100.8 mL  Total IN: 1110.8 mL    OUT:    Indwelling Catheter - Urethral: 1000 mL  Total OUT: 1000 mL    Total NET: 110.8 mL          09-12    136  |  104  |  35<H>  ----------------------------<  114<H>  4.4   |  23  |  1.75<H>    Ca    7.5<L>      12 Sep 2019 02:42  Phos  3.5     09-12  Mg     2.5     09-12    TPro  5.4<L>  /  Alb  2.7<L>  /  TBili  0.9  /  DBili  x   /  AST  10  /  ALT  <5<L>  /  AlkPhos  56  09-11    [X ] Cortés catheter, indication: critically ill   Meds: multiple electrolytes Injection Type 1 1000 milliLiter(s) IV Continuous <Continuous>  multivitamin 1 Tablet(s) Oral daily        HEMATOLOGIC  Meds:   [x] VTE Prophylaxis                        8.0    8.0   )-----------( 114      ( 12 Sep 2019 22:34 )             22.9     PT/INR - ( 12 Sep 2019 02:42 )   PT: 14.3 sec;   INR: 1.25 ratio         PTT - ( 12 Sep 2019 02:42 )  PTT:29.5 sec  Transfusion     [ ] PRBC   [ ] Platelets   [ ] FFP   [ ] Cryoprecipitate      INFECTIOUS DISEASES  WBC Count: 8.0 K/uL (09-12 @ 22:34)  WBC Count: 8.4 K/uL (09-12 @ 10:14)  WBC Count: 7.9 K/uL (09-12 @ 06:20)  WBC Count: 7.0 K/uL (09-12 @ 02:42)    RECENT CULTURES:    Meds:       ENDOCRINE  CAPILLARY BLOOD GLUCOSE        Meds: atorvastatin 80 milliGRAM(s) Oral at bedtime  levothyroxine 50 MICROGram(s) Oral daily        ACCESS DEVICES:  [X ] Peripheral IV  [ ] Central Venous Line	[ ] R	[ ] L	[ ] IJ	[ ] Fem	[ ] SC	Placed:   [ ] Arterial Line		[ ] R	[ ] L	[ ] Fem	[ ] Rad	[ ] Ax	Placed:   [ ] PICC:					[ ] Mediport  [X] Urinary Catheter, Date Placed:   [x] Necessity of urinary, arterial, and venous catheters discussed    OTHER MEDICATIONS:  chlorhexidine 2% Cloths 1 Application(s) Topical <User Schedule>  fluticasone propionate 50 MICROgram(s)/spray Nasal Spray 1 Spray(s) Both Nostrils two times a day      CODE STATUS:      IMAGING:    Assessment and Plan:   · Assessment		  ASSESSMENT:  89 year old male w/ lower GI bleed, s/p EUA, ligation of bleeding vessel on 9/11, admitted to SICU for hemodynamic monitoring    PLAN:    Neurologic:   - PRN Pain control  - C/w carbidopa/Levodopa, Ranolazine for parkinsons hx    Respiratory:   - Monitor respiratory status  - OOBTC daily, encourage use of incentive spirometer, fluticasone    Cardiovascular:   - On phenylephrine for hypotension  - Wean pressor as tolerated  - Monitor vitals     Gastrointestinal/Nutrition:   - NPO  - Monitor for re-bleeding  - C/w Protonix     Renal/Genitourinary:   - Monitor urine output  - Trend electrolytes, replete as needed  - Plasmalyte @50  - C/w flomax    Hematologic:   - Hold DVT chemoppx for GI bleed, mechanical ppx with SCDs for now  - Trend H/H, transfuse as needed    Infectious Disease:   - Monitor for fevers  - Monitor WBC    Endocrine:   - Monitor blood glucose    Musculoskeletal:  - F/u Rt arm xrays  - F/u ortho plan for fracture  - RUE in sling    Disposition:   Continued critical care management

## 2019-09-13 NOTE — OCCUPATIONAL THERAPY INITIAL EVALUATION ADULT - RANGE OF MOTION EXAMINATION, UPPER EXTREMITY
R hand/wrist/forearm AROM WFL, elbow and shoulder N/T at this time/Left UE Active ROM was WFL (within functional limits)

## 2019-09-13 NOTE — OCCUPATIONAL THERAPY INITIAL EVALUATION ADULT - GENERAL OBSERVATIONS, REHAB EVAL
Patient received semi-supine in bed, +tele, BP cuff, pulse ox, +IV, sequentials JANELLE isaac, marco

## 2019-09-13 NOTE — PROGRESS NOTE ADULT - ASSESSMENT
89 y/o M w/ hx of CAD s/p CABG s/p PCI, ICM, and Parkinson's disease with recent right humeral fracture, with admission for hematuria c/b aspiration pneumonia, now with hematochezia.    Impression:  # Acute blood loss anemia with hematochezia: Currently on minimal vasopressors and with Hgb of 8.2 this morning - 1 unit pRBCs documented as being given in the past 24 hours. Colonoscopy on 9/4/19 revealed multiple rectal ulcers and ulcerated mucosa in the anal canal. Patient developed active bleeding on 9/11/19 and underwent EUA with suture ligation and packing.   # CAD s/p CABG s/p PCI  # ICM  # Parkinson's disease    Recommendations:  - Bowel regimen with senna and Miralax PO BID (titrate up as needed)  - Can consider flexible sigmoidoscopy if patient has persistent bleeding despite surgical interventions  - Monitor CBC q8h  - Transfuse for goal Hgb >/= 7.0  - Maintain active type and screen  - Two large bore IVs  - No rectal tube  - Rest of care per primary team    Constantine Rose MD  Gastroenterology Fellow  Pager number: [phone] / [phone]    Please call GI ([phone]) if there are any additional questions or concerns. Please call on-call GI fellow after 5pm and before 8am, and on weekends. 88 y/o M w/ hx of CAD s/p CABG s/p PCI, ICM, and Parkinson's disease with recent right humeral fracture, with admission for hematuria c/b aspiration pneumonia, now with hematochezia.    Impression:  # Acute blood loss anemia with hematochezia: Currently on minimal vasopressors and with Hgb of 8.2 this morning - 1 unit pRBCs documented as being given in the past 24 hours. Colonoscopy on 9/4/19 revealed multiple rectal ulcers and ulcerated mucosa in the anal canal. Patient developed active bleeding on 9/11/19 and underwent EUA with suture ligation and packing.   # CAD s/p CABG s/p PCI  # ICM  # Parkinson's disease    Recommendations:  - Bowel regimen with senna and Miralax PO daily (titrate up as needed)  - Can consider flexible sigmoidoscopy if patient has persistent bleeding despite surgical interventions  - Monitor CBC q12h  - Transfuse for goal Hgb >/= 7.0  - Maintain active type and screen  - Two large bore IVs  - No rectal tube  - Rest of care per primary team    Constantine Rose MD  Gastroenterology Fellow  Pager number: [phone] / [phone]    Please call GI ([phone]) if there are any additional questions or concerns. Please call on-call GI fellow after 5pm and before 8am, and on weekends. 89 y/o M w/ hx of CAD s/p CABG s/p PCI, ICM, and Parkinson's disease with recent right humeral fracture, with admission for hematuria c/b aspiration pneumonia, now with hematochezia.    Impression:  # Acute blood loss anemia with hematochezia: Currently on minimal vasopressors and with Hgb of 8.2 this morning - 1 unit pRBCs documented as being given in the past 24 hours. Colonoscopy on 9/4/19 revealed multiple rectal ulcers and ulcerated mucosa in the anal canal. Patient developed active bleeding on 9/11/19 and underwent EUA with suture ligation and packing.   # CAD s/p CABG s/p PCI  # ICM  # Parkinson's disease    Recommendations:  - Bowel regimen with colace 100mg PO TID and Miralax PO daily (titrate up as needed to BID if bowel movements are not soft)  - Can consider flexible sigmoidoscopy if patient has persistent bleeding despite surgical interventions  - Monitor CBC  - Transfuse for goal Hgb >/= 7.0  - Maintain active type and screen  - Two large bore IVs  - No rectal tube  - Rest of care per primary team    Constantine Rose MD  Gastroenterology Fellow  Pager number: [phone] / [phone]    Please call GI ([phone]) if there are any additional questions or concerns. Please call on-call GI fellow after 5pm and before 8am, and on weekends.

## 2019-09-13 NOTE — OCCUPATIONAL THERAPY INITIAL EVALUATION ADULT - PERTINENT HX OF CURRENT PROBLEM, REHAB EVAL
88 y/o M with hx Parkinson disease with progressive functional impairment, with multiple past falls with a recent admission to [facility] in July 2019 following a presumed mechanical fall, noted to have an acute on chronic RIGHT proximal humeral fracture not felt to be operative with plans for secondary healing, with patient referred Rehab with patient referred following gross haematuria noted on Cortés placement following urinary retention.

## 2019-09-13 NOTE — PROGRESS NOTE ADULT - SUBJECTIVE AND OBJECTIVE BOX
Patient seen and examined in bed. Remains in SICU. Off pressors.      MEDICATIONS  (STANDING):  atorvastatin 80 milliGRAM(s) Oral at bedtime  carbidopa/levodopa CR 50/200 1 Tablet(s) Oral <User Schedule>  chlorhexidine 2% Cloths 1 Application(s) Topical <User Schedule>  docusate sodium 100 milliGRAM(s) Oral three times a day  entacapone 200 milliGRAM(s) Oral <User Schedule>  fluticasone propionate 50 MICROgram(s)/spray Nasal Spray 1 Spray(s) Both Nostrils two times a day  levothyroxine 50 MICROGram(s) Oral daily  melatonin 5 milliGRAM(s) Oral at bedtime  multiple electrolytes Injection Type 1 1000 milliLiter(s) (50 mL/Hr) IV Continuous <Continuous>  multivitamin 1 Tablet(s) Oral daily  pantoprazole    Tablet 40 milliGRAM(s) Oral before breakfast  polyethylene glycol 3350 17 Gram(s) Oral daily  senna 2 Tablet(s) Oral at bedtime  tamsulosin 0.4 milliGRAM(s) Oral daily      VITAL:  T(C): , Max: 36.8 (09-12-19 @ 23:00)  T(F): , Max: 98.2 (09-12-19 @ 23:00)  HR: 69 (09-13-19 @ 13:00)  BP: 129/61 (09-13-19 @ 13:00)  BP(mean): 88 (09-13-19 @ 13:00)  RR: 15 (09-13-19 @ 13:00)  SpO2: 99% (09-13-19 @ 13:00)    I and O's:    09-12 @ 07:01  -  09-13 @ 07:00  --------------------------------------------------------  IN: 1463.6 mL / OUT: 1450 mL / NET: 13.6 mL          PHYSICAL EXAM:    Constitutional: NAD  Neck:  No JVD  Respiratory: CTAB/L  Cardiovascular: S1 and S2  Gastrointestinal: BS+, soft, NT/ND  Extremities: No peripheral edema  Neurological: A/O x 3, no focal deficits  Psychiatric: Normal mood, normal affect  : + Cortés  Skin: No rashes    LABS:                        7.6    6.8   )-----------( 106      ( 13 Sep 2019 04:21 )             21.9     09-13    138  |  105  |  31<H>  ----------------------------<  95  4.5   |  21<L>  |  1.70<H>    Ca    7.6<L>      13 Sep 2019 04:21  Phos  3.3     09-13  Mg     2.4     09-13    TPro  5.4<L>  /  Alb  2.7<L>  /  TBili  0.9  /  DBili  x   /  AST  10  /  ALT  <5<L>  /  AlkPhos  56  09-11      IMPRESSION: 89M w/ HTN, DVT-IVCF, Parkinson's disease, CAD-CABG, and CKD3, 8/28/19 a/w urinary retention/STEVEN    (1)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 At least in part due to past obstructive nephropathy, with resultant atrophy of his right kidney. Resolved superimposed STEVEN from obstruction    (2)CV - off phenylephrine gtt    (3)Surgery - POD#2 s/p surgical repair of bleeding rectal ulcer    (4)Anemia - Hgb < goal - ordered for one unit PRBC today    RECOMMEND:  (1)Dose new meds for GFR 30-40ml/min (present dosing is acceptable)  (2)Management of anemia per SICU/GI      Ciara Garcia NP-C  [facility]  [phone] Patient seen and examined in bed. Remains in SICU. Off pressors.      MEDICATIONS  (STANDING):  atorvastatin 80 milliGRAM(s) Oral at bedtime  carbidopa/levodopa CR 50/200 1 Tablet(s) Oral <User Schedule>  chlorhexidine 2% Cloths 1 Application(s) Topical <User Schedule>  docusate sodium 100 milliGRAM(s) Oral three times a day  entacapone 200 milliGRAM(s) Oral <User Schedule>  fluticasone propionate 50 MICROgram(s)/spray Nasal Spray 1 Spray(s) Both Nostrils two times a day  levothyroxine 50 MICROGram(s) Oral daily  melatonin 5 milliGRAM(s) Oral at bedtime  multiple electrolytes Injection Type 1 1000 milliLiter(s) (50 mL/Hr) IV Continuous <Continuous>  multivitamin 1 Tablet(s) Oral daily  pantoprazole    Tablet 40 milliGRAM(s) Oral before breakfast  polyethylene glycol 3350 17 Gram(s) Oral daily  senna 2 Tablet(s) Oral at bedtime  tamsulosin 0.4 milliGRAM(s) Oral daily      VITAL:  T(C): , Max: 36.8 (09-12-19 @ 23:00)  T(F): , Max: 98.2 (09-12-19 @ 23:00)  HR: 69 (09-13-19 @ 13:00)  BP: 129/61 (09-13-19 @ 13:00)  BP(mean): 88 (09-13-19 @ 13:00)  RR: 15 (09-13-19 @ 13:00)  SpO2: 99% (09-13-19 @ 13:00)    I and O's:    09-12 @ 07:01  -  09-13 @ 07:00  --------------------------------------------------------  IN: 1463.6 mL / OUT: 1450 mL / NET: 13.6 mL          PHYSICAL EXAM:    Constitutional: NAD  Neck:  No JVD  Respiratory: CTAB/L  Cardiovascular: S1 and S2  Gastrointestinal: BS+, soft, NT/ND  Extremities: No peripheral edema  Neurological: A/O x 3, no focal deficits  Psychiatric: Normal mood, normal affect  : + Lara  Skin: No rashes    LABS:                        7.6    6.8   )-----------( 106      ( 13 Sep 2019 04:21 )             21.9     09-13    138  |  105  |  31<H>  ----------------------------<  95  4.5   |  21<L>  |  1.70<H>    Ca    7.6<L>      13 Sep 2019 04:21  Phos  3.3     09-13  Mg     2.4     09-13    TPro  5.4<L>  /  Alb  2.7<L>  /  TBili  0.9  /  DBili  x   /  AST  10  /  ALT  <5<L>  /  AlkPhos  56  09-11      IMPRESSION: 89M w/ HTN, DVT-IVCF, Parkinson's disease, CAD-CABG, and CKD3, 8/28/19 a/w urinary retention/STEVEN    (1)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 At least in part due to past obstructive nephropathy, with resultant atrophy of his right kidney. Resolved superimposed STEVEN from obstruction    (2)CV - off phenylephrine gtt    (3)Surgery - POD#2 s/p surgical repair of bleeding rectal ulcer    (4)Anemia - Hgb < goal - ordered for one unit PRBC today    RECOMMEND:  (1)Dose new meds for GFR 30-40ml/min (present dosing is acceptable)  (2)Management of anemia per SICU/GI      Ciara Garcia NP-C  [facility]  [phone]    *******************RENAL ATTENDING**********************  Seen/examined with NP. I agree with NP assessment as above.    complains about dysphagia diet. Wife at bedside.    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 At least in part due to past obstructive nephropathy, with resultant atrophy of his right kidney. Resolved superimposed STEVEN from obstruction    (2)Dysphagia - diet being advanced today    (3)Surgery - POD#2 s/p surgical repair of bleeding rectal ulcer      RECOMMEND:  (1)Dose new meds for GFR 30-40ml/min (present dosing is acceptable)  (2)Management of anemia per SICU/GI  (3)Diet as tolerated      Abdi Vieira MD  [facility]  [phone]

## 2019-09-13 NOTE — OCCUPATIONAL THERAPY INITIAL EVALUATION ADULT - ASR WT BEARING STATUS EVAL
Right UE/On 9/11 patient had an RRT call while he was having an active lower GI bleed with BRBPR. Patient was hypotensive to SBP 70s and maintained a HR in the 70s. 2 units of pRBCs and 1 unit of plasma on the floor, started on pressors, taken to OR.

## 2019-09-13 NOTE — OCCUPATIONAL THERAPY INITIAL EVALUATION ADULT - LEVEL OF INDEPENDENCE: SIT/STAND, REHAB EVAL
deferred due to c/o pain R groin/thigh upon palpation/ROM pt to received PRBC moderate assist (50% patients effort)

## 2019-09-13 NOTE — PROGRESS NOTE ADULT - PROBLEM SELECTOR PLAN 1
s/p EGD and Colon.   Multiple large rectal ulcers.  probably 2/2 stercoral colitis, completed ABx, was improving on bowel regimen,  on 9/11 had an RRT 2/2 profuse rectal bleed  POD2, post emergent EUA w visible vessel ligation and rectal packing,    had 1U PRBC this am, now on a diet and off  pressors, bowel regimen resumed

## 2019-09-13 NOTE — PROGRESS NOTE ADULT - SUBJECTIVE AND OBJECTIVE BOX
[facility]      Seen earlier today, and examined.  - Today, patient is without complaints.           *****MEDICATIONS: Current medication reviewed and documented.    MEDICATIONS  (STANDING):  atorvastatin 80 milliGRAM(s) Oral at bedtime  carbidopa/levodopa CR 50/200 1 Tablet(s) Oral <User Schedule>  chlorhexidine 2% Cloths 1 Application(s) Topical <User Schedule>  docusate sodium 100 milliGRAM(s) Oral three times a day  entacapone 200 milliGRAM(s) Oral <User Schedule>  fluticasone propionate 50 MICROgram(s)/spray Nasal Spray 1 Spray(s) Both Nostrils two times a day  levothyroxine 50 MICROGram(s) Oral daily  melatonin 5 milliGRAM(s) Oral at bedtime  multiple electrolytes Injection Type 1 1000 milliLiter(s) (50 mL/Hr) IV Continuous <Continuous>  multivitamin 1 Tablet(s) Oral daily  pantoprazole    Tablet 40 milliGRAM(s) Oral before breakfast  senna 2 Tablet(s) Oral at bedtime  tamsulosin 0.4 milliGRAM(s) Oral daily    MEDICATIONS  (PRN):  acetaminophen   Tablet .. 650 milliGRAM(s) Oral every 6 hours PRN Mild Pain (1 - 3)          ***** VITAL SIGNS:  T(F): 98.2 (19 @ 23:00), Max: 98.2 (19 @ 23:00)  HR: 67 (19 @ 09:00) (51 - 70)  BP: 119/58 (19 @ 09:00) (89/54 - 142/64)  RR: 15 (19 @ 09:00) (12 - 30)  SpO2: 99% (19 @ 09:00) (81% - 100%)  Wt(kg): --  ,   I&O's Summary    12 Sep 2019 07:01  -  13 Sep 2019 07:00  --------------------------------------------------------  IN: 1463.6 mL / OUT: 1450 mL / NET: 13.6 mL             *****PHYSICAL EXAM: Alert oriented x 2  Attention comprehension are fair. Able to name, repeat  without any difficulty.   Able to follow 1-2  step commands.     EOMI fundi not visualized,  VFF to confrontration  No facial asymmetry   Tongue is midline   Palate elevates symmetrically   Moving all 4 ext symmetrically no pronator drift  limited eval of rue due to sling      sensation is grossly symmetric  Gait not assessed.            *****LAB AND IMAGIN.6    6.8   )-----------( 106      ( 13 Sep 2019 04:21 )             21.9               09-13    138  |  105  |  31<H>  ----------------------------<  95  4.5   |  21<L>  |  1.70<H>    Ca    7.6<L>      13 Sep 2019 04:21  Phos  3.3       Mg     2.4         TPro  5.4<L>  /  Alb  2.7<L>  /  TBili  0.9  /  DBili  x   /  AST  10  /  ALT  <5<L>  /  AlkPhos  56  09-11    PT/INR - ( 13 Sep 2019 04:21 )   PT: 13.7 sec;   INR: 1.19 ratio         PTT - ( 13 Sep 2019 04:21 )  PTT:29.6 sec                 ABG - ( 11 Sep 2019 18:08 )  pH, Arterial: 7.44  pH, Blood: x     /  pCO2: 37    /  pO2: 67    / HCO3: 25    / Base Excess: 1.5   /  SaO2: 95                  [All pertinent recent Imaging/Reports reviewed]           *****A S S E S S M E N T   A N D   P L A N :      Excerpt from H&P, 89 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  Patient reported that he had local trauma to the skin of his penis following an attempt to use a hand held urinal.   Patient also notes that patient was on a ? commode and was noted to have increased scrotal oedema and redness. called to manage his parkinsons dementia. unable to tell me the name of his neurologist.  He reports having hallucinations, reports hearing his daughter and friend planning to get him into rehab, however it appears that they both were not there.   He is not sure if these are dreams.   He doesn't know if he act out his dreams.          Problem/Recommendations 1: Parkinson's   continue sinemet/entacapone  ? hallucinations related to parkinson's vs. metabolic encephalopathy due to STEVEN   will continue to monitor closely   regulate sleep wake cycle.   consider melatonin for sleep augmentation.   seroquel 12.5 qhs bedtime due to agitation    Problem/Recommendations 2:  Falls likely related to parkinsons +/- deconditioning +/- microvascular disease.   fall precautions.   pt consult  appreciated recommend jeannie     Thank you for allowing me to participate in the care of this patient. Please do not hesitate to call me if you have any  questions.        ________________  Farideh Irvin MD  [facility] ([facility]  [phone]      30 minutes spent on total encounter; more than 50 % of the visit was  spent counseling about plan of care, compliance to diet/exercise and medication regimen and or  coordinating care by the attending physician.      It is advised that stroke patients follow up with GUADALUPE Viera @ [phone] in 1- 2 weeks.   Others please follow up with Dr. Michael Nissenbaum [phone]

## 2019-09-13 NOTE — PROGRESS NOTE ADULT - SUBJECTIVE AND OBJECTIVE BOX
Green Team Surgery Progress Note     SUMMARY: POD2 s/p EUA and suture ligation of bleeding rectal ulcer. On 2019 RR called for profuse rectal bleeding, patient transfused, BP appropriate. Emergently to OR.    SUBJECTIVE / 24H EVENTS  Patient seen and examined on morning rounds. No acute events overnight. Home meds restarted, H/H stable received 1 U PRBC, HCT 23.2 > 22.9. Phenylephrine decreased to 0.2. Patient has not had additional episodes of rectal bleeding as he had not yet had a BM.     Green Team Surgery Progress Note     SUBJECTIVE / 24H EVENTS  Patient seen and examined on morning rounds. No acute events overnight.    OBJECTIVE:    VITAL SIGNS:  T(C): 36.8 (19 @ 23:00), Max: 36.8 (19 @ 23:00)  HR: 69 (19 @ 04:15) (49 - 69)  BP: 112/55 (19 @ 04:15) (84/49 - 142/64)  RR: 14 (19 @ 04:15) (12 - 30)  SpO2: 99% (19 @ 04:15) (81% - 100%)  Daily     Daily Weight in k.6 (13 Sep 2019 00:30)      PHYSICAL EXAM:  Gen: NAD  LS: Respirations unlabored. CTA b/l  Card: RRR. No m/r/g.   GI: Soft. Nontender. Nondistended. BS+.  Ext: Warm, well perfused      19 @ 07:01  -  19 @ 07:00  --------------------------------------------------------  IN:    IV PiggyBack: 100 mL    multiple electrolytes Injection Type 1: 650 mL    Packed Red Blood Cells: 350 mL    phenylephrine   Infusion: 36.4 mL  Total IN: 1136.4 mL    OUT:    Indwelling Catheter - Urethral: 1115 mL  Total OUT: 1115 mL    Total NET: 21.4 mL      19 @ 07:01  -  19 @ 04:24  --------------------------------------------------------  IN:    multiple electrolytes Injection Type 1: 1050 mL    Oral Fluid: 160 mL    phenylephrine   Infusion: 103.6 mL  Total IN: 1313.6 mL    OUT:    Indwelling Catheter - Urethral: 1200 mL  Total OUT: 1200 mL    Total NET: 113.6 mL          LAB VALUES:      136  |  104  |  35<H>  ----------------------------<  114<H>  4.4   |  23  |  1.75<H>    Ca    7.5<L>      12 Sep 2019 02:42  Phos  3.5       Mg     2.5         TPro  5.4<L>  /  Alb  2.7<L>  /  TBili  0.9  /  DBili  x   /  AST  10  /  ALT  <5<L>  /  AlkPhos  56                                 8.0    8.0   )-----------( 114      ( 12 Sep 2019 22:34 )             22.9     LIVER FUNCTIONS - ( 11 Sep 2019 18:15 )  Alb: 2.7 g/dL / Pro: 5.4 g/dL / ALK PHOS: 56 U/L / ALT: <5 U/L / AST: 10 U/L / GGT: x           PT/INR - ( 12 Sep 2019 02:42 )   PT: 14.3 sec;   INR: 1.25 ratio         PTT - ( 12 Sep 2019 02:42 )  PTT:29.5 sec  ABG - ( 11 Sep 2019 18:08 )  pH, Arterial: 7.44  pH, Blood: x     /  pCO2: 37    /  pO2: 67    / HCO3: 25    / Base Excess: 1.5   /  SaO2: 95                        MICROBIOLOGY:    No new microbiology data for review.     RADIOLOGY:    No new radiographic images for review.    MEDICATIONS  (STANDING):  atorvastatin 80 milliGRAM(s) Oral at bedtime  carbidopa/levodopa CR 50/200 1 Tablet(s) Oral <User Schedule>  chlorhexidine 2% Cloths 1 Application(s) Topical <User Schedule>  docusate sodium 100 milliGRAM(s) Oral three times a day  entacapone 200 milliGRAM(s) Oral <User Schedule>  fluticasone propionate 50 MICROgram(s)/spray Nasal Spray 1 Spray(s) Both Nostrils two times a day  levothyroxine 50 MICROGram(s) Oral daily  melatonin 5 milliGRAM(s) Oral at bedtime  multiple electrolytes Injection Type 1 1000 milliLiter(s) (50 mL/Hr) IV Continuous <Continuous>  multivitamin 1 Tablet(s) Oral daily  pantoprazole    Tablet 40 milliGRAM(s) Oral before breakfast  phenylephrine    Infusion 0.2 MICROgram(s)/kG/Min (5.647 mL/Hr) IV Continuous <Continuous>  senna 2 Tablet(s) Oral at bedtime  tamsulosin 0.4 milliGRAM(s) Oral daily    MEDICATIONS  (PRN): Green Team Surgery Progress Note     SUMMARY: POD2 s/p EUA and suture ligation of bleeding rectal ulcer. On 2019 RR called for profuse rectal bleeding, patient transfused, BP appropriate. Emergently to OR.    SUBJECTIVE / 24H EVENTS  Patient seen and examined on morning rounds. No acute events overnight. Home meds restarted, H/H stable received 1 U PRBC, HCT 23.2 > 22.9. Phenylephrine decreased to 0.2. Patient has not had additional episodes of rectal bleeding as he had not yet had a BM.     OBJECTIVE:    VITAL SIGNS:  T(C): 36.8 (19 @ 23:00), Max: 36.8 (19 @ 23:00)  HR: 69 (19 @ 04:15) (49 - 69)  BP: 112/55 (19 @ 04:15) (84/49 - 142/64)  RR: 14 (19 @ 04:15) (12 - 30)  SpO2: 99% (19 @ 04:15) (81% - 100%)      Daily Weight in k.6 (13 Sep 2019 00:30)      PHYSICAL EXAM:  Gen: NAD  LS: Respirations unlabored. CTA b/l  Card: RRR. No m/r/g.   GI: Soft. Nontender. Nondistended. Rectal packing in place, no active bleeding  Ext: Warm, well perfused      19 @ 07:01  -  19 @ 07:00  --------------------------------------------------------  IN:    IV PiggyBack: 100 mL    multiple electrolytes Injection Type 1: 650 mL    Packed Red Blood Cells: 350 mL    phenylephrine   Infusion: 36.4 mL  Total IN: 1136.4 mL    OUT:    Indwelling Catheter - Urethral: 1115 mL  Total OUT: 1115 mL    Total NET: 21.4 mL      19 @ 07:01  -  19 @ 04:24  --------------------------------------------------------  IN:    multiple electrolytes Injection Type 1: 1050 mL    Oral Fluid: 160 mL    phenylephrine   Infusion: 103.6 mL  Total IN: 1313.6 mL    OUT:    Indwelling Catheter - Urethral: 1200 mL  Total OUT: 1200 mL    Total NET: 113.6 mL          LAB VALUES:      136  |  104  |  35<H>  ----------------------------<  114<H>  4.4   |  23  |  1.75<H>    Ca    7.5<L>      12 Sep 2019 02:42  Phos  3.5       Mg     2.5         TPro  5.4<L>  /  Alb  2.7<L>  /  TBili  0.9  /  DBili  x   /  AST  10  /  ALT  <5<L>  /  AlkPhos  56                                 8.0    8.0   )-----------( 114      ( 12 Sep 2019 22:34 )             22.9     LIVER FUNCTIONS - ( 11 Sep 2019 18:15 )  Alb: 2.7 g/dL / Pro: 5.4 g/dL / ALK PHOS: 56 U/L / ALT: <5 U/L / AST: 10 U/L / GGT: x           PT/INR - ( 12 Sep 2019 02:42 )   PT: 14.3 sec;   INR: 1.25 ratio         PTT - ( 12 Sep 2019 02:42 )  PTT:29.5 sec  ABG - ( 11 Sep 2019 18:08 )  pH, Arterial: 7.44  pH, Blood: x     /  pCO2: 37    /  pO2: 67    / HCO3: 25    / Base Excess: 1.5   /  SaO2: 95              MICROBIOLOGY:    No new microbiology data for review.     RADIOLOGY:    No new radiographic images for review.    MEDICATIONS  (STANDING):  atorvastatin 80 milliGRAM(s) Oral at bedtime  carbidopa/levodopa CR 50/200 1 Tablet(s) Oral <User Schedule>  chlorhexidine 2% Cloths 1 Application(s) Topical <User Schedule>  docusate sodium 100 milliGRAM(s) Oral three times a day  entacapone 200 milliGRAM(s) Oral <User Schedule>  fluticasone propionate 50 MICROgram(s)/spray Nasal Spray 1 Spray(s) Both Nostrils two times a day  levothyroxine 50 MICROGram(s) Oral daily  melatonin 5 milliGRAM(s) Oral at bedtime  multiple electrolytes Injection Type 1 1000 milliLiter(s) (50 mL/Hr) IV Continuous <Continuous>  multivitamin 1 Tablet(s) Oral daily  pantoprazole    Tablet 40 milliGRAM(s) Oral before breakfast  phenylephrine    Infusion 0.2 MICROgram(s)/kG/Min (5.647 mL/Hr) IV Continuous <Continuous>  senna 2 Tablet(s) Oral at bedtime  tamsulosin 0.4 milliGRAM(s) Oral daily

## 2019-09-13 NOTE — OCCUPATIONAL THERAPY INITIAL EVALUATION ADULT - PLANNED THERAPY INTERVENTIONS, OT EVAL
transfer training/bed mobility training/ROM/strengthening/balance training/cognitive, visual perceptual/ADL retraining

## 2019-09-13 NOTE — PROGRESS NOTE ADULT - SUBJECTIVE AND OBJECTIVE BOX
Subjective: Patient seen and examined. No new events except as noted.   remains in ICU   - received 1 U PRBC, HCT 23.2 > 22.9  - phenylephrine decreased to .2    - Diet from NPO to NPO w/ sips   REVIEW OF SYSTEMS:    CONSTITUTIONAL: + weakness, fevers or chills  EYES/ENT: No visual changes;  No vertigo or throat pain   NECK: No pain or stiffness  RESPIRATORY: No cough, wheezing, hemoptysis; No shortness of breath  CARDIOVASCULAR: No chest pain or palpitations  GASTROINTESTINAL: No abdominal or epigastric pain. No nausea, vomiting, or hematemesis; No diarrhea or constipation. No melena or hematochezia.  GENITOURINARY: No dysuria, frequency or hematuria  NEUROLOGICAL: No numbness or weakness  SKIN: No itching, burning, rashes, or lesions   All other review of systems is negative unless indicated above.    MEDICATIONS:  MEDICATIONS  (STANDING):  atorvastatin 80 milliGRAM(s) Oral at bedtime  carbidopa/levodopa CR 50/200 1 Tablet(s) Oral <User Schedule>  chlorhexidine 2% Cloths 1 Application(s) Topical <User Schedule>  docusate sodium 100 milliGRAM(s) Oral three times a day  entacapone 200 milliGRAM(s) Oral <User Schedule>  fluticasone propionate 50 MICROgram(s)/spray Nasal Spray 1 Spray(s) Both Nostrils two times a day  levothyroxine 50 MICROGram(s) Oral daily  melatonin 5 milliGRAM(s) Oral at bedtime  multiple electrolytes Injection Type 1 1000 milliLiter(s) (50 mL/Hr) IV Continuous <Continuous>  multivitamin 1 Tablet(s) Oral daily  pantoprazole    Tablet 40 milliGRAM(s) Oral before breakfast  polyethylene glycol 3350 17 Gram(s) Oral daily  senna 2 Tablet(s) Oral at bedtime  tamsulosin 0.4 milliGRAM(s) Oral daily      PHYSICAL EXAM:  T(C): 36.8 (09-12-19 @ 23:00), Max: 36.8 (09-12-19 @ 23:00)  HR: 70 (09-13-19 @ 12:00) (54 - 70)  BP: 132/59 (09-13-19 @ 12:00) (89/54 - 142/64)  RR: 17 (09-13-19 @ 12:00) (12 - 28)  SpO2: 99% (09-13-19 @ 12:00) (81% - 100%)  Wt(kg): --  I&O's Summary    12 Sep 2019 07:01  -  13 Sep 2019 07:00  --------------------------------------------------------  IN: 1463.6 mL / OUT: 1450 mL / NET: 13.6 mL      Appearance: NAD  HEENT:   Irregular  oral mucosa, PERRL, EOMI	  Lymphatic: No lymphadenopathy , right arm/shoulder sling   Cardiovascular: Irregular rS1 S2, No JVD, No murmurs , Peripheral pulses palpable 2+ bilaterally  Respiratory: Decreased bs and effort   Gastrointestinal:  Soft, Non-tender, + BS	  Skin: No rashes, No ecchymoses, No cyanosis, warm to touch  Musculoskeletal: Decreased range of motion and  strength  Psychiatry:  Mood & affect appropriate  Ext: No edema  +lara       LABS:    CARDIAC MARKERS:                                7.6    6.8   )-----------( 106      ( 13 Sep 2019 04:21 )             21.9     09-13    138  |  105  |  31<H>  ----------------------------<  95  4.5   |  21<L>  |  1.70<H>    Ca    7.6<L>      13 Sep 2019 04:21  Phos  3.3     09-13  Mg     2.4     09-13    TPro  5.4<L>  /  Alb  2.7<L>  /  TBili  0.9  /  DBili  x   /  AST  10  /  ALT  <5<L>  /  AlkPhos  56  09-11    proBNP:   Lipid Profile:   HgA1c:   TSH:             TELEMETRY: 	  AF  ECG:  	  RADIOLOGY: < from: Xray Humerus, Right (09.12.19 @ 14:26) >    EXAM:  HUMERUS RIGHT                            PROCEDURE DATE:  09/12/2019            INTERPRETATION:  CLINICAL INDICATION: Chronic humerus fracture    EXAM: 2 views of the right humerus    COMPARISON: 8/31/2019      IMPRESSION:   Redemonstration of comminuted proximal humerus fracture with proximal   migration of the humeral shaft. Positioning limits evaluation of the   shoulder articulation. Acromioclavicular joint is intact. Visualized   right lung is unremarkable.                    LISA MCDERMOTT, ATTENDING RADIOLOGIST  This document has been electronically signed. Sep 13 2019  9:57AM                < end of copied text >    DIAGNOSTIC TESTING:  [ ] Echocardiogram:  [ ]  Catheterization:  [ ] Stress Test:    OTHER:

## 2019-09-13 NOTE — PROGRESS NOTE ADULT - SUBJECTIVE AND OBJECTIVE BOX
Patient is a 89y old  Male who presents with a chief complaint of [facility] following [facility] placement.  Groin erythema, oedema (13 Sep 2019 15:12)      SUBJECTIVE / OVERNIGHT EVENTS: had 1U PRBC this am, tolerating a dysphagia diet, off pressors    MEDICATIONS  (STANDING):  atorvastatin 80 milliGRAM(s) Oral at bedtime  carbidopa/levodopa CR 50/200 1 Tablet(s) Oral <User Schedule>  chlorhexidine 2% Cloths 1 Application(s) Topical <User Schedule>  docusate sodium 100 milliGRAM(s) Oral three times a day  entacapone 200 milliGRAM(s) Oral <User Schedule>  fluticasone propionate 50 MICROgram(s)/spray Nasal Spray 1 Spray(s) Both Nostrils two times a day  furosemide    Tablet 40 milliGRAM(s) Oral daily  levothyroxine 50 MICROGram(s) Oral daily  melatonin 5 milliGRAM(s) Oral at bedtime  multivitamin 1 Tablet(s) Oral daily  pantoprazole    Tablet 40 milliGRAM(s) Oral before breakfast  polyethylene glycol 3350 17 Gram(s) Oral daily  senna 2 Tablet(s) Oral at bedtime  tamsulosin 0.4 milliGRAM(s) Oral daily    MEDICATIONS  (PRN):  acetaminophen   Tablet .. 650 milliGRAM(s) Oral every 6 hours PRN Mild Pain (1 - 3)  QUEtiapine 12.5 milliGRAM(s) Oral at bedtime PRN agitation      Vital Signs Last 24 Hrs  T(F): 98.1 (09-13-19 @ 15:00), Max: 98.4 (09-13-19 @ 11:00)  HR: 62 (09-13-19 @ 19:00) (62 - 72)  BP: 107/55 (09-13-19 @ 19:00) (93/46 - 147/67)  RR: 13 (09-13-19 @ 19:00) (12 - 21)  SpO2: 95% (09-13-19 @ 19:00) (81% - 100%)  Telemetry:   CAPILLARY BLOOD GLUCOSE        I&O's Summary    12 Sep 2019 07:01  -  13 Sep 2019 07:00  --------------------------------------------------------  IN: 1463.6 mL / OUT: 1450 mL / NET: 13.6 mL    13 Sep 2019 07:01  -  13 Sep 2019 19:36  --------------------------------------------------------  IN: 750 mL / OUT: 170 mL / NET: 580 mL        PHYSICAL EXAM:  GENERAL: NAD, well-developed  HEAD:  Atraumatic, Normocephalic  EYES: EOMI, PERRLA, conjunctiva and sclera clear  NECK: Supple, No JVD  CHEST/LUNG: Clear to auscultation bilaterally; No wheeze  HEART: Regular rate and rhythm; No murmurs, rubs, or gallops  ABDOMEN: Soft, Nontender, Nondistended; Bowel sounds present  EXTREMITIES:  2+ Peripheral Pulses, No clubbing, cyanosis, or edema  PSYCH: AAOx3  NEUROLOGY: non-focal  SKIN: No rashes or lesions    LABS:                        9.0    7.6   )-----------( 122      ( 13 Sep 2019 16:13 )             26.5     09-13    138  |  105  |  31<H>  ----------------------------<  95  4.5   |  21<L>  |  1.70<H>    Ca    7.6<L>      13 Sep 2019 04:21  Phos  3.3     09-13  Mg     2.4     09-13      PT/INR - ( 13 Sep 2019 04:21 )   PT: 13.7 sec;   INR: 1.19 ratio         PTT - ( 13 Sep 2019 04:21 )  PTT:29.6 sec          RADIOLOGY & ADDITIONAL TESTS:    Imaging Personally Reviewed:    Consultant(s) Notes Reviewed:      Care Discussed with Consultants/Other Providers:

## 2019-09-13 NOTE — PROGRESS NOTE ADULT - PROBLEM SELECTOR PLAN 1
cont with hemodynamic support   Orders per ICU team   s/p EGD and Colon.   Multiple large rectal ulcers.  Miralax BID   Monitor Hgb closely  GI following  s/p PRBC transfusion

## 2019-09-13 NOTE — PROGRESS NOTE ADULT - SUBJECTIVE AND OBJECTIVE BOX
Chief Complaint: Hematuria    Interval Events:     Allergies:  Cipro (Rash)    Hospital Medications:  acetaminophen   Tablet .. 650 milliGRAM(s) Oral every 6 hours PRN  atorvastatin 80 milliGRAM(s) Oral at bedtime  carbidopa/levodopa CR 50/200 1 Tablet(s) Oral <User Schedule>  chlorhexidine 2% Cloths 1 Application(s) Topical <User Schedule>  docusate sodium 100 milliGRAM(s) Oral three times a day  entacapone 200 milliGRAM(s) Oral <User Schedule>  fluticasone propionate 50 MICROgram(s)/spray Nasal Spray 1 Spray(s) Both Nostrils two times a day  levothyroxine 50 MICROGram(s) Oral daily  melatonin 5 milliGRAM(s) Oral at bedtime  multiple electrolytes Injection Type 1 1000 milliLiter(s) IV Continuous <Continuous>  multivitamin 1 Tablet(s) Oral daily  pantoprazole    Tablet 40 milliGRAM(s) Oral before breakfast  polyethylene glycol 3350 17 Gram(s) Oral daily  senna 2 Tablet(s) Oral at bedtime  tamsulosin 0.4 milliGRAM(s) Oral daily    PMHX/PSHX:  Unilateral inguinal hernia, with gangrene, not specified as recurrent  Upper GI bleed  Aspiration pneumonia  Clostridium difficile colitis  Pneumonia  MI, old  UTI (urinary tract infection)  BPH (benign prostatic hyperplasia)  Parkinsons Disease  CAD (Coronary Artery Disease)  HTN (Hypertension)  Hyperlipidemia  Presence of IVC filter  History of prostate surgery  Varicose veins of legs  S/P appendectomy  Stented coronary artery  Hx of CABG  Hyperlipidemia    Family history:  Family history of coronary artery disease  Family history of breast cancer (Aunt)    ROS:     General:  No wt loss, fevers, chills, night sweats, fatigue,   Eyes:  Good vision, no reported pain  ENT:  No sore throat, pain, runny nose, dysphagia  CV:  No pain, palpitations, hypo/hypertension  Pulm:  No dyspnea, cough, tachypnea, wheezing  GI:  No pain, No nausea, No vomiting, No diarrhea, No constipation, No weight loss, No fever, No pruritis, No rectal bleeding, No tarry stools, No dysphagia  :  No pain, bleeding, incontinence, nocturia  Muscle:  No pain, weakness  Neuro:  No weakness, tingling, memory problems  Psych:  No fatigue, insomnia, mood problems, depression  Endocrine:  No polyuria, polydipsia, cold/heat intolerance  Heme:  No petechiae, ecchymosis, easy bruisability  Skin:  No rash, tattoos, scars, edema    PHYSICAL EXAM:   Vital Signs:  Vital Signs Last 24 Hrs  T(C): 36.8 (12 Sep 2019 23:00), Max: 36.8 (12 Sep 2019 23:00)  T(F): 98.2 (12 Sep 2019 23:00), Max: 98.2 (12 Sep 2019 23:00)  HR: 70 (13 Sep 2019 12:00) (54 - 70)  BP: 132/59 (13 Sep 2019 12:00) (89/54 - 142/64)  BP(mean): 85 (13 Sep 2019 12:00) (67 - 92)  RR: 17 (13 Sep 2019 12:00) (12 - 28)  SpO2: 99% (13 Sep 2019 12:00) (81% - 100%)  Daily Weight in k.6 (13 Sep 2019 00:30)    GENERAL:  No acute distress  HEENT:  Normocephalic/atraumatic,  no scleral icterus  CHEST: Normal effort, no accessory muscle use  ABDOMEN:  Soft, non-tender, non-distended, normoactive bowel sounds  EXTREMITIES:  No cyanosis, clubbing, or edema  SKIN:  No rash/erythema  NEURO:  Alert and oriented x 3    LABS:                        7.6    6.8   )-----------( 106      ( 13 Sep 2019 04:21 )             21.9     Mean Cell Volume: 93.9 fl (-19 @ 04:21)        138  |  105  |  31<H>  ----------------------------<  95  4.5   |  21<L>  |  1.70<H>    Ca    7.6<L>      13 Sep 2019 04:21  Phos  3.3       Mg     2.4         TPro  5.4<L>  /  Alb  2.7<L>  /  TBili  0.9  /  DBili  x   /  AST  10  /  ALT  <5<L>  /  AlkPhos  56  11    LIVER FUNCTIONS - ( 11 Sep 2019 18:15 )  Alb: 2.7 g/dL / Pro: 5.4 g/dL / ALK PHOS: 56 U/L / ALT: <5 U/L / AST: 10 U/L / GGT: x           PT/INR - ( 13 Sep 2019 04:21 )   PT: 13.7 sec;   INR: 1.19 ratio      PTT - ( 13 Sep 2019 04:21 )  PTT:29.6 sec               7.6    6.8   )-----------( 106      ( 13 Sep 2019 04:21 )             21.9                         8.0    8.0   )-----------( 114      ( 12 Sep 2019 22:34 )             22.9                         7.5    8.4   )-----------( 107      ( 12 Sep 2019 10:14 )             23.2                         8.2    7.9   )-----------( 111      ( 12 Sep 2019 06:20 )             23.2                         7.3    7.0   )-----------( 116      ( 12 Sep 2019 02:42 )             21.0     Imaging:    < from: Xray Humerus, Right (19 @ 14:26) >    EXAM:  HUMERUS RIGHT                          PROCEDURE DATE:  2019      INTERPRETATION:  CLINICAL INDICATION: Chronic humerus fracture    EXAM: 2 views of the right humerus    COMPARISON: 2019    IMPRESSION:   Redemonstration of comminuted proximal humerus fracture with proximal   migration of the humeral shaft. Positioning limits evaluation of the   shoulder articulation. Acromioclavicular joint is intact. Visualized   right lung is unremarkable.    LISA MCDERMOTT, ATTENDING RADIOLOGIST  This document has been electronically signed. Sep 13 2019  9:57AM      < end of copied text > Chief Complaint: Hematuria    Interval Events:   - The patient two dark bowel movements last night per nursing  - He has not had any additional bowel movements today    Allergies:  Cipro (Rash)    Hospital Medications:  acetaminophen   Tablet .. 650 milliGRAM(s) Oral every 6 hours PRN  atorvastatin 80 milliGRAM(s) Oral at bedtime  carbidopa/levodopa CR 50/200 1 Tablet(s) Oral <User Schedule>  chlorhexidine 2% Cloths 1 Application(s) Topical <User Schedule>  docusate sodium 100 milliGRAM(s) Oral three times a day  entacapone 200 milliGRAM(s) Oral <User Schedule>  fluticasone propionate 50 MICROgram(s)/spray Nasal Spray 1 Spray(s) Both Nostrils two times a day  levothyroxine 50 MICROGram(s) Oral daily  melatonin 5 milliGRAM(s) Oral at bedtime  multiple electrolytes Injection Type 1 1000 milliLiter(s) IV Continuous <Continuous>  multivitamin 1 Tablet(s) Oral daily  pantoprazole    Tablet 40 milliGRAM(s) Oral before breakfast  polyethylene glycol 3350 17 Gram(s) Oral daily  senna 2 Tablet(s) Oral at bedtime  tamsulosin 0.4 milliGRAM(s) Oral daily    PMHX/PSHX:  Unilateral inguinal hernia, with gangrene, not specified as recurrent  Upper GI bleed  Aspiration pneumonia  Clostridium difficile colitis  Pneumonia  MI, old  UTI (urinary tract infection)  BPH (benign prostatic hyperplasia)  Parkinsons Disease  CAD (Coronary Artery Disease)  HTN (Hypertension)  Hyperlipidemia  Presence of IVC filter  History of prostate surgery  Varicose veins of legs  S/P appendectomy  Stented coronary artery  Hx of CABG  Hyperlipidemia    Family history:  Family history of coronary artery disease  Family history of breast cancer (Aunt)    ROS:     General:  No wt loss, fevers, chills, night sweats, fatigue,   Eyes:  Good vision, no reported pain  ENT:  No sore throat, pain, runny nose, dysphagia  CV:  No pain, palpitations, hypo/hypertension  Pulm:  No dyspnea, cough, tachypnea, wheezing  GI:  No pain, No nausea, No vomiting, No diarrhea, No constipation, No weight loss, No fever, No pruritis, No rectal bleeding, No tarry stools, No dysphagia  :  No pain, bleeding, incontinence, nocturia  Muscle:  No pain, weakness  Neuro:  No weakness, tingling, memory problems  Psych:  No fatigue, insomnia, mood problems, depression  Endocrine:  No polyuria, polydipsia, cold/heat intolerance  Heme:  No petechiae, ecchymosis, easy bruisability  Skin:  No rash, tattoos, scars, edema    PHYSICAL EXAM:   Vital Signs:  Vital Signs Last 24 Hrs  T(C): 36.8 (12 Sep 2019 23:00), Max: 36.8 (12 Sep 2019 23:00)  T(F): 98.2 (12 Sep 2019 23:00), Max: 98.2 (12 Sep 2019 23:00)  HR: 70 (13 Sep 2019 12:00) (54 - 70)  BP: 132/59 (13 Sep 2019 12:00) (89/54 - 142/64)  BP(mean): 85 (13 Sep 2019 12:00) (67 - 92)  RR: 17 (13 Sep 2019 12:00) (12 - 28)  SpO2: 99% (13 Sep 2019 12:00) (81% - 100%)  Daily Weight in k.6 (13 Sep 2019 00:30)    GENERAL:  No acute distress  HEENT:  Normocephalic/atraumatic,  no scleral icterus  CHEST: Normal effort, no accessory muscle use  ABDOMEN:  Soft, non-tender, non-distended, normoactive bowel sounds  EXTREMITIES:  No cyanosis, clubbing, or edema  SKIN:  No rash/erythema  NEURO:  Alert and oriented x 3    LABS:                        7.6    6.8   )-----------( 106      ( 13 Sep 2019 04:21 )             21.9     Mean Cell Volume: 93.9 fl (19 @ 04:21)        138  |  105  |  31<H>  ----------------------------<  95  4.5   |  21<L>  |  1.70<H>    Ca    7.6<L>      13 Sep 2019 04:21  Phos  3.3       Mg     2.4         TPro  5.4<L>  /  Alb  2.7<L>  /  TBili  0.9  /  DBili  x   /  AST  10  /  ALT  <5<L>  /  AlkPhos  56      LIVER FUNCTIONS - ( 11 Sep 2019 18:15 )  Alb: 2.7 g/dL / Pro: 5.4 g/dL / ALK PHOS: 56 U/L / ALT: <5 U/L / AST: 10 U/L / GGT: x           PT/INR - ( 13 Sep 2019 04:21 )   PT: 13.7 sec;   INR: 1.19 ratio      PTT - ( 13 Sep 2019 04:21 )  PTT:29.6 sec               7.6    6.8   )-----------( 106      ( 13 Sep 2019 04:21 )             21.9                         8.0    8.0   )-----------( 114      ( 12 Sep 2019 22:34 )             22.9                         7.5    8.4   )-----------( 107      ( 12 Sep 2019 10:14 )             23.2                         8.2    7.9   )-----------( 111      ( 12 Sep 2019 06:20 )             23.2                         7.3    7.0   )-----------( 116      ( 12 Sep 2019 02:42 )             21.0     Imaging:    < from: Xray Humerus, Right (19 @ 14:26) >    EXAM:  HUMERUS RIGHT                          PROCEDURE DATE:  2019      INTERPRETATION:  CLINICAL INDICATION: Chronic humerus fracture    EXAM: 2 views of the right humerus    COMPARISON: 2019    IMPRESSION:   Redemonstration of comminuted proximal humerus fracture with proximal   migration of the humeral shaft. Positioning limits evaluation of the   shoulder articulation. Acromioclavicular joint is intact. Visualized   right lung is unremarkable.    LISA MCDERMOTT, ATTENDING RADIOLOGIST  This document has been electronically signed. Sep 13 2019  9:57AM      < end of copied text >

## 2019-09-13 NOTE — OCCUPATIONAL THERAPY INITIAL EVALUATION ADULT - ANTICIPATED DISCHARGE DISPOSITION, OT EVAL
subacute rehab pending outcome of functional eval/rehabilitation facility subacute rehab/rehabilitation facility

## 2019-09-13 NOTE — OCCUPATIONAL THERAPY INITIAL EVALUATION ADULT - PHYSICAL ASSIST/NONPHYSICAL ASSIST, REHAB EVAL
verbal cues/nonverbal cues (demo/gestures)/1 person assist 2 person assist/nonverbal cues (demo/gestures)/verbal cues

## 2019-09-13 NOTE — OCCUPATIONAL THERAPY INITIAL EVALUATION ADULT - LIVES WITH, PROFILE
spouse/Pt transferred from [facility]. Pt lives with spouse in an apt, with 2-4 steps to enter. +Elevator. Uses rolling walker at home. Owns a commode, shower chair, and wheelchair per pt. Requires assist ADLs

## 2019-09-13 NOTE — OCCUPATIONAL THERAPY INITIAL EVALUATION ADULT - ADDITIONAL COMMENTS
xray R shoulder 8/29- Redemonstration of displaced fracture at the surgical neck of the humerus with overriding of fragments and rotation of humeral head fragment, unchanged.

## 2019-09-13 NOTE — OCCUPATIONAL THERAPY INITIAL EVALUATION ADULT - PRECAUTIONS/LIMITATIONS, REHAB EVAL
On 9/4, patient experienced two episodes of BRBPR with hgb drop (6.3) and transient hypotension (SBP 70s). He underwent a CTA a/p that was unsuccessful in localizing hemorrhage. Patient subsequently transferred to MICU. Surgery consulted for evaluation of BRBPR. Found to have rectal ulcers on colonoscopy

## 2019-09-13 NOTE — PROGRESS NOTE ADULT - ASSESSMENT
88yo male  POD1 s/p EUA and suture ligation of bleeding rectal ulcer after rapid response called (9/11/2019) for active lower GI bleed (BRBPR).    - PRN Pain control  - Monitor respiratory status  - OOBTC daily, encourage use of incentive spirometer  - On phenylephrine for hypotension  - Wean pressor as tolerated  - Monitor vitals   - NPO  - Continued critical care management    [facility] [phone] 89yo male  POD2 s/p EUA and suture ligation of bleeding rectal ulcer after rapid response called (9/11/2019) for active lower GI bleed (BRBPR).    - PRN Pain control  - Monitor respiratory status  - OOBTC daily, encourage use of incentive spirometer  - On phenylephrine for hypotension: 0.2mcg/kg/min  - Wean pressor as tolerated  - Monitor vitals   - NPO  - Continued critical care management    [facility] [phone] 89yo male  POD2 s/p EUA and suture ligation of bleeding rectal ulcer after rapid response called (9/11/2019) for active lower GI bleed (BRBPR). Rectal packing removed without BRBPR.    - PRN Pain control  - Monitor respiratory status  - OOBTC daily, encourage use of incentive spirometer  - On phenylephrine for hypotension: 0.2mcg/kg/min  - Wean pressor as tolerated  - Monitor vitals   - NPO  - Continued critical care management    [facility] [phone]

## 2019-09-14 LAB
ANION GAP SERPL CALC-SCNC: 11 MMOL/L — SIGNIFICANT CHANGE UP (ref 5–17)
APTT BLD: 26.4 SEC — LOW (ref 27.5–36.3)
BUN SERPL-MCNC: 30 MG/DL — HIGH (ref 7–23)
CALCIUM SERPL-MCNC: 8.2 MG/DL — LOW (ref 8.4–10.5)
CHLORIDE SERPL-SCNC: 104 MMOL/L — SIGNIFICANT CHANGE UP (ref 96–108)
CO2 SERPL-SCNC: 22 MMOL/L — SIGNIFICANT CHANGE UP (ref 22–31)
CREAT SERPL-MCNC: 1.85 MG/DL — HIGH (ref 0.5–1.3)
GLUCOSE SERPL-MCNC: 107 MG/DL — HIGH (ref 70–99)
HCT VFR BLD CALC: 26.6 % — LOW (ref 39–50)
HGB BLD-MCNC: 9.2 G/DL — LOW (ref 13–17)
INR BLD: 1.12 RATIO — SIGNIFICANT CHANGE UP (ref 0.88–1.16)
MAGNESIUM SERPL-MCNC: 2.4 MG/DL — SIGNIFICANT CHANGE UP (ref 1.6–2.6)
MCHC RBC-ENTMCNC: 32.6 PG — SIGNIFICANT CHANGE UP (ref 27–34)
MCHC RBC-ENTMCNC: 34.6 GM/DL — SIGNIFICANT CHANGE UP (ref 32–36)
MCV RBC AUTO: 94.1 FL — SIGNIFICANT CHANGE UP (ref 80–100)
PHOSPHATE SERPL-MCNC: 3.6 MG/DL — SIGNIFICANT CHANGE UP (ref 2.5–4.5)
PLATELET # BLD AUTO: 119 K/UL — LOW (ref 150–400)
POTASSIUM SERPL-MCNC: 4.4 MMOL/L — SIGNIFICANT CHANGE UP (ref 3.5–5.3)
POTASSIUM SERPL-SCNC: 4.4 MMOL/L — SIGNIFICANT CHANGE UP (ref 3.5–5.3)
PROTHROM AB SERPL-ACNC: 12.8 SEC — SIGNIFICANT CHANGE UP (ref 10–12.9)
RBC # BLD: 2.83 M/UL — LOW (ref 4.2–5.8)
RBC # FLD: 13.9 % — SIGNIFICANT CHANGE UP (ref 10.3–14.5)
SODIUM SERPL-SCNC: 137 MMOL/L — SIGNIFICANT CHANGE UP (ref 135–145)
WBC # BLD: 7.4 K/UL — SIGNIFICANT CHANGE UP (ref 3.8–10.5)
WBC # FLD AUTO: 7.4 K/UL — SIGNIFICANT CHANGE UP (ref 3.8–10.5)

## 2019-09-14 RX ADMIN — CHLORHEXIDINE GLUCONATE 1 APPLICATION(S): 213 SOLUTION TOPICAL at 06:31

## 2019-09-14 RX ADMIN — TAMSULOSIN HYDROCHLORIDE 0.4 MILLIGRAM(S): 0.4 CAPSULE ORAL at 13:05

## 2019-09-14 RX ADMIN — Medication 1 SPRAY(S): at 17:03

## 2019-09-14 RX ADMIN — ENTACAPONE 200 MILLIGRAM(S): 200 TABLET, FILM COATED ORAL at 13:05

## 2019-09-14 RX ADMIN — PANTOPRAZOLE SODIUM 40 MILLIGRAM(S): 20 TABLET, DELAYED RELEASE ORAL at 06:50

## 2019-09-14 RX ADMIN — CARBIDOPA AND LEVODOPA 1 TABLET(S): 25; 100 TABLET ORAL at 23:44

## 2019-09-14 RX ADMIN — POLYETHYLENE GLYCOL 3350 17 GRAM(S): 17 POWDER, FOR SOLUTION ORAL at 13:05

## 2019-09-14 RX ADMIN — ENTACAPONE 200 MILLIGRAM(S): 200 TABLET, FILM COATED ORAL at 17:04

## 2019-09-14 RX ADMIN — Medication 50 MICROGRAM(S): at 06:44

## 2019-09-14 RX ADMIN — ENTACAPONE 200 MILLIGRAM(S): 200 TABLET, FILM COATED ORAL at 08:12

## 2019-09-14 RX ADMIN — CARBIDOPA AND LEVODOPA 1 TABLET(S): 25; 100 TABLET ORAL at 13:05

## 2019-09-14 RX ADMIN — Medication 5 MILLIGRAM(S): at 23:08

## 2019-09-14 RX ADMIN — Medication 1 SPRAY(S): at 06:45

## 2019-09-14 RX ADMIN — Medication 1 TABLET(S): at 13:05

## 2019-09-14 RX ADMIN — Medication 40 MILLIGRAM(S): at 06:44

## 2019-09-14 RX ADMIN — CARBIDOPA AND LEVODOPA 1 TABLET(S): 25; 100 TABLET ORAL at 08:12

## 2019-09-14 RX ADMIN — ENTACAPONE 200 MILLIGRAM(S): 200 TABLET, FILM COATED ORAL at 23:43

## 2019-09-14 RX ADMIN — CARBIDOPA AND LEVODOPA 1 TABLET(S): 25; 100 TABLET ORAL at 17:03

## 2019-09-14 RX ADMIN — Medication 100 MILLIGRAM(S): at 13:05

## 2019-09-14 RX ADMIN — SENNA PLUS 2 TABLET(S): 8.6 TABLET ORAL at 23:47

## 2019-09-14 RX ADMIN — Medication 100 MILLIGRAM(S): at 23:08

## 2019-09-14 RX ADMIN — ATORVASTATIN CALCIUM 80 MILLIGRAM(S): 80 TABLET, FILM COATED ORAL at 23:08

## 2019-09-14 RX ADMIN — Medication 100 MILLIGRAM(S): at 06:44

## 2019-09-14 NOTE — PROGRESS NOTE ADULT - PROBLEM SELECTOR PLAN 1
s/p EGD and Colon.   Multiple large rectal ulcers.  probably 2/2 stercoral colitis, completed ABx, was improving on bowel regimen,  on 9/11 had an RRT 2/2 profuse rectal bleed  POD3, post emergent EUA w visible vessel ligation and rectal packing,    H/H now stable, rectal packing removed,  ptn transferred to the floor from SICU, doing well, tolerating dysphagia diet, concerned re having productive BMs 2/2 is not allowed to strain . no further LGI bleed. consider tap water enemas prn

## 2019-09-14 NOTE — PROGRESS NOTE ADULT - SUBJECTIVE AND OBJECTIVE BOX
Subjective: Patient seen and examined. No new events except as noted.   Transferred out of the UNit   started on IV lasix     REVIEW OF SYSTEMS:    CONSTITUTIONAL: + weakness, fevers or chills  EYES/ENT: No visual changes;  No vertigo or throat pain   NECK: No pain or stiffness  RESPIRATORY: No cough, wheezing, hemoptysis; No shortness of breath  CARDIOVASCULAR: No chest pain or palpitations  GASTROINTESTINAL: No abdominal or epigastric pain. No nausea, vomiting, or hematemesis; No diarrhea or constipation. No melena or hematochezia.  GENITOURINARY: No dysuria, frequency or hematuria  NEUROLOGICAL: No numbness or weakness  SKIN: No itching, burning, rashes, or lesions   All other review of systems is negative unless indicated above.    MEDICATIONS:  MEDICATIONS  (STANDING):  atorvastatin 80 milliGRAM(s) Oral at bedtime  carbidopa/levodopa CR 50/200 1 Tablet(s) Oral <User Schedule>  chlorhexidine 2% Cloths 1 Application(s) Topical <User Schedule>  docusate sodium 100 milliGRAM(s) Oral three times a day  entacapone 200 milliGRAM(s) Oral <User Schedule>  fluticasone propionate 50 MICROgram(s)/spray Nasal Spray 1 Spray(s) Both Nostrils two times a day  furosemide    Tablet 40 milliGRAM(s) Oral daily  levothyroxine 50 MICROGram(s) Oral daily  melatonin 5 milliGRAM(s) Oral at bedtime  multivitamin 1 Tablet(s) Oral daily  pantoprazole    Tablet 40 milliGRAM(s) Oral before breakfast  polyethylene glycol 3350 17 Gram(s) Oral daily  senna 2 Tablet(s) Oral at bedtime  tamsulosin 0.4 milliGRAM(s) Oral daily      PHYSICAL EXAM:  T(C): 36.3 (09-14-19 @ 14:39), Max: 37.1 (09-13-19 @ 23:00)  HR: 77 (09-14-19 @ 14:39) (54 - 77)  BP: 128/65 (09-14-19 @ 14:39) (100/57 - 135/57)  RR: 18 (09-14-19 @ 14:39) (14 - 20)  SpO2: 97% (09-14-19 @ 14:39) (96% - 100%)  Wt(kg): --  I&O's Summary    13 Sep 2019 07:01  -  14 Sep 2019 07:00  --------------------------------------------------------  IN: 1000 mL / OUT: 1230 mL / NET: -230 mL    14 Sep 2019 07:01  -  14 Sep 2019 21:07  --------------------------------------------------------  IN: 0 mL / OUT: 300 mL / NET: -300 mL          Appearance: NAD  HEENT:   Irregular  oral mucosa, PERRL, EOMI	  Lymphatic: No lymphadenopathy , right arm/shoulder sling   Cardiovascular: Irregular rS1 S2, No JVD, No murmurs , Peripheral pulses palpable 2+ bilaterally  Respiratory: Decreased bs and effort   Gastrointestinal:  Soft, Non-tender, + BS	  Skin: No rashes, No ecchymoses, No cyanosis, warm to touch  Musculoskeletal: Decreased range of motion and  strength  Psychiatry:  Mood & affect appropriate  Ext: No edema  +lara         LABS:    CARDIAC MARKERS:                                9.2    7.4   )-----------( 119      ( 14 Sep 2019 00:27 )             26.6     09-14    137  |  104  |  30<H>  ----------------------------<  107<H>  4.4   |  22  |  1.85<H>    Ca    8.2<L>      14 Sep 2019 00:27  Phos  3.6     09-14  Mg     2.4     09-14      proBNP:   Lipid Profile:   HgA1c:   TSH:             TELEMETRY: 	    ECG:  	  RADIOLOGY:   DIAGNOSTIC TESTING:  [ ] Echocardiogram:  [ ]  Catheterization:  [ ] Stress Test:    OTHER:

## 2019-09-14 NOTE — PROGRESS NOTE ADULT - SUBJECTIVE AND OBJECTIVE BOX
Patient is a 89y old  Male who presents with a chief complaint of Sent in from [facility] for gross haematuria following Cortés placement.  Groin erythema, oedema (14 Sep 2019 21:07)      SUBJECTIVE / OVERNIGHT EVENTS: ptn transferred to the floor from SICU, doing well, rectal packing removed, tolerating dysphagia diet, concerned re having productive BMs 2/2 is not allowed to strain . no further LGI bleed    MEDICATIONS  (STANDING):  atorvastatin 80 milliGRAM(s) Oral at bedtime  carbidopa/levodopa CR 50/200 1 Tablet(s) Oral <User Schedule>  chlorhexidine 2% Cloths 1 Application(s) Topical <User Schedule>  docusate sodium 100 milliGRAM(s) Oral three times a day  entacapone 200 milliGRAM(s) Oral <User Schedule>  fluticasone propionate 50 MICROgram(s)/spray Nasal Spray 1 Spray(s) Both Nostrils two times a day  furosemide    Tablet 40 milliGRAM(s) Oral daily  levothyroxine 50 MICROGram(s) Oral daily  melatonin 5 milliGRAM(s) Oral at bedtime  multivitamin 1 Tablet(s) Oral daily  pantoprazole    Tablet 40 milliGRAM(s) Oral before breakfast  polyethylene glycol 3350 17 Gram(s) Oral daily  senna 2 Tablet(s) Oral at bedtime  tamsulosin 0.4 milliGRAM(s) Oral daily    MEDICATIONS  (PRN):  acetaminophen   Tablet .. 650 milliGRAM(s) Oral every 6 hours PRN Mild Pain (1 - 3)  QUEtiapine 12.5 milliGRAM(s) Oral at bedtime PRN agitation      Vital Signs Last 24 Hrs  T(F): 97.8 (09-14-19 @ 22:00), Max: 98.6 (09-14-19 @ 03:00)  HR: 72 (09-14-19 @ 22:00) (54 - 77)  BP: 115/62 (09-14-19 @ 22:00) (100/57 - 135/57)  RR: 18 (09-14-19 @ 22:00) (14 - 20)  SpO2: 96% (09-14-19 @ 22:00) (96% - 100%)  Telemetry:   CAPILLARY BLOOD GLUCOSE        I&O's Summary    13 Sep 2019 07:01  -  14 Sep 2019 07:00  --------------------------------------------------------  IN: 1000 mL / OUT: 1230 mL / NET: -230 mL    14 Sep 2019 07:01  -  14 Sep 2019 23:04  --------------------------------------------------------  IN: 0 mL / OUT: 800 mL / NET: -800 mL        PHYSICAL EXAM:  GENERAL: NAD, well-developed  HEAD:  Atraumatic, Normocephalic  EYES: EOMI, PERRLA, conjunctiva and sclera clear  NECK: Supple, No JVD  CHEST/LUNG: Clear to auscultation bilaterally; No wheeze  HEART: Regular rate and rhythm; No murmurs, rubs, or gallops  ABDOMEN: Soft, Nontender, Nondistended; Bowel sounds present  EXTREMITIES:  2+ Peripheral Pulses, No clubbing, cyanosis, or edema  PSYCH: AAOx3  NEUROLOGY: non-focal  SKIN: No rashes or lesions    LABS:                        9.2    7.4   )-----------( 119      ( 14 Sep 2019 00:27 )             26.6     09-14    137  |  104  |  30<H>  ----------------------------<  107<H>  4.4   |  22  |  1.85<H>    Ca    8.2<L>      14 Sep 2019 00:27  Phos  3.6     09-14  Mg     2.4     09-14      PT/INR - ( 14 Sep 2019 00:27 )   PT: 12.8 sec;   INR: 1.12 ratio         PTT - ( 14 Sep 2019 00:27 )  PTT:26.4 sec          RADIOLOGY & ADDITIONAL TESTS:    Imaging Personally Reviewed:    Consultant(s) Notes Reviewed:      Care Discussed with Consultants/Other Providers:

## 2019-09-14 NOTE — PROGRESS NOTE ADULT - SUBJECTIVE AND OBJECTIVE BOX
HPI:   RYAN FITZPATRICK is a 89y Male with CAD s/p CABG on ASA, Afib, CKD3, DVT s/p IVC Filter, Parkinson's disease initially admitted from rehab on 8/28 with respiratory distress. Of note, patient's dyspnea multifactorial from respiratory muscle weakness in the setting of Parkinson's disease and ischemic cardiomyopathy. On 9/4, patient experienced two episodes of BRBPR with hgb drop (6.3) and transient hypotension (SBP 70s). He underwent a CTA a/p that was unsuccessful in localizing hemorrhage. Patient subsequently transferred to MICU. Surgery consulted for evaluation of BRBPR. Found to have rectal ulcers on colonoscopy.    On 9/11 patient had an RRT call while he was having an active lower GI bleed with BRBPR. Patient was hypotensive to SBP 70s and maintained a HR in the 70s. Mentating, A&Ox3. Given 2 units of pRBCs and 1 unit of plasma on the floor, started on pressors, taken to OR.  Pt is now s/p exam under anesthesia, with finding of an actively bleeding vessel in an ulcer, ligated. Rigid sigmoidoscopy done at end of case with no other bleeding noted. Rectum re-packed with gelfoam wrapped in nu-knit and in monsal's solution. SICU consulted for monitoring.    24 HOUR EVENTS:  - rectal packing DC'd in am  - off pressors  - received 1 U PRBC for HCT of 21/.9  - restarted 1/2 home dose lasix  - DC'd iv fluid        SUBJECTIVE/ROS:  [x] A ten-point review of systems was otherwise negative except as noted.  [ ] Due to altered mental status/intubation, subjective information were not able to be obtained from the patient. History was obtained, to the extent possible, from review of the chart and collateral sources of information.      NEURO  RASS:     GCS:  15   CAM ICU:  Exam: awake, alert, oriented  Meds: acetaminophen   Tablet .. 650 milliGRAM(s) Oral every 6 hours PRN Mild Pain (1 - 3)  carbidopa/levodopa CR 50/200 1 Tablet(s) Oral <User Schedule>  entacapone 200 milliGRAM(s) Oral <User Schedule>  melatonin 5 milliGRAM(s) Oral at bedtime  QUEtiapine 12.5 milliGRAM(s) Oral at bedtime PRN agitation    [x] Adequacy of sedation and pain control has been assessed and adjusted      RESPIRATORY  RR: 14 (09-14-19 @ 03:00) (12 - 21)  SpO2: 97% (09-14-19 @ 03:00) (85% - 100%)  Wt(kg): --  Exam: unlabored, clear to auscultation bilaterally  Mechanical Ventilation:     [N/A] Extubation Readiness Assessed  Meds:       CARDIOVASCULAR  HR: 55 (09-14-19 @ 03:00) (55 - 72)  BP: 109/53 (09-14-19 @ 03:00) (93/46 - 147/67)  BP(mean): 76 (09-14-19 @ 03:00) (67 - 96)  ABP: --  ABP(mean): --  Wt(kg): --  CVP(cm H2O): --      Exam: regular rate and rhythm  Cardiac Rhythm: sinus  Perfusion     [x]Adequate   [ ]Inadequate  Mentation   [x]Normal       [ ]Reduced  Extremities  [x]Warm         [ ]Cool  Volume Status [ ]Hypervolemic [x]Euvolemic [ ]Hypovolemic  Meds: furosemide    Tablet 40 milliGRAM(s) Oral daily  tamsulosin 0.4 milliGRAM(s) Oral daily        GI/NUTRITION  Exam: soft, nontender, nondistended  Diet: dysphagia 2 diet  Meds: docusate sodium 100 milliGRAM(s) Oral three times a day  pantoprazole    Tablet 40 milliGRAM(s) Oral before breakfast  polyethylene glycol 3350 17 Gram(s) Oral daily  senna 2 Tablet(s) Oral at bedtime      GENITOURINARY  I&O's Detail    09-12 @ 07:01  -  09-13 @ 07:00  --------------------------------------------------------  IN:    multiple electrolytes Injection Type 1multiple electrolytes Injection Type 1: 1200 mL    Oral Fluid: 160 mL    phenylephrine   Infusion: 103.6 mL  Total IN: 1463.6 mL    OUT:    Indwelling Catheter - Urethral: 1450 mL  Total OUT: 1450 mL    Total NET: 13.6 mL      09-13 @ 07:01  -  09-14 @ 03:14  --------------------------------------------------------  IN:    multiple electrolytes Injection Type 1multiple electrolytes Injection Type 1: 450 mL    Packed Red Blood Cells: 300 mL  Total IN: 750 mL    OUT:    Indwelling Catheter - Urethral: 770 mL  Total OUT: 770 mL    Total NET: -20 mL          09-14    137  |  104  |  30<H>  ----------------------------<  107<H>  4.4   |  22  |  1.85<H>    Ca    8.2<L>      14 Sep 2019 00:27  Phos  3.6     09-14  Mg     2.4     09-14      [x] Cortés catheter, indication: urine output monitoring in critically ill  Meds: multivitamin 1 Tablet(s) Oral daily        HEMATOLOGIC  Meds:   [x] VTE Prophylaxis                        9.2    7.4   )-----------( 119      ( 14 Sep 2019 00:27 )             26.6     PT/INR - ( 14 Sep 2019 00:27 )   PT: 12.8 sec;   INR: 1.12 ratio         PTT - ( 14 Sep 2019 00:27 )  PTT:26.4 sec  Transfusion     [ ] PRBC   [ ] Platelets   [ ] FFP   [ ] Cryoprecipitate      INFECTIOUS DISEASES  WBC Count: 7.4 K/uL (09-14 @ 00:27)  WBC Count: 7.6 K/uL (09-13 @ 16:13)  WBC Count: 6.8 K/uL (09-13 @ 04:21)    RECENT CULTURES:    Meds:       ENDOCRINE  CAPILLARY BLOOD GLUCOSE        Meds: atorvastatin 80 milliGRAM(s) Oral at bedtime  levothyroxine 50 MICROGram(s) Oral daily        ACCESS DEVICES:  [ ] Peripheral IV  [ ] Central Venous Line	[ ] R	[ ] L	[ ] IJ	[ ] Fem	[ ] SC	Placed:   [ ] Arterial Line		[ ] R	[ ] L	[ ] Fem	[ ] Rad	[ ] Ax	Placed:   [ ] PICC:					[ ] Mediport  [ ] Urinary Catheter, Date Placed:   [x] Necessity of urinary, arterial, and venous catheters discussed    OTHER MEDICATIONS:  chlorhexidine 2% Cloths 1 Application(s) Topical <User Schedule>  fluticasone propionate 50 MICROgram(s)/spray Nasal Spray 1 Spray(s) Both Nostrils two times a day      CODE STATUS:      IMAGING:    ASSESSMENT:  89 year old male w/ lower GI bleed, s/p EUA, ligation of bleeding vessel on 9/11, admitted to SICU for hemodynamic monitoring    PLAN:    Neurologic:   - PRN Pain control  - C/w carbidopa/Levodopa, Ranolazine for parkinsons hx    Respiratory:   - Monitor respiratory status  - OOBTC daily, encourage use of incentive spirometer, fluticasone    Cardiovascular:   - phenylephrine dc'd  - home furosemide  - Monitor vitals     Gastrointestinal/Nutrition:   - dysphagia 2 diet   - Monitor for re-bleeding  - C/w Protonix     Renal/Genitourinary:   - Monitor urine output  - Trend electrolytes, replete as needed  - C/w flomax    Hematologic:   - Hold DVT chemoppx for GI bleed, mechanical ppx with SCDs for now  - Trend H/H, transfuse as needed    Infectious Disease:   - Monitor for fevers  - Monitor WBC    Endocrine:   - Monitor blood glucose    Musculoskeletal:  - F/u Rt arm xrays  - F/u ortho plan for fracture  - RUE in sling    Disposition:   Continued critical care management

## 2019-09-14 NOTE — PROGRESS NOTE ADULT - SUBJECTIVE AND OBJECTIVE BOX
SUMMARY: POD3 s/p EUA and suture ligation of bleeding rectal ulcer. On 9/11/2019 RR called for profuse rectal bleeding, patient transfused, BP appropriate. Emergently to OR.    SUBJECTIVE / 24H EVENTS  Patient seen and examined on morning rounds. No acute events overnight.  H/H stable received 1 U PRBC, yesterday. Patient has not had additional episodes of rectal bleeding as he had not yet had a BM. Off pressors    SUBJECTIVE:  Reports no episodes of rectal bleeding, denies feeling light headed, denies weakness  Denies nausea, vomiting, diarrhea  Tolerating diet    OBJECTIVE:  Vital Signs Last 24 Hrs  T(C): 37 (14 Sep 2019 03:00), Max: 37.1 (13 Sep 2019 19:00)  T(F): 98.6 (14 Sep 2019 03:00), Max: 98.8 (13 Sep 2019 19:00)  HR: 55 (14 Sep 2019 03:00) (55 - 72)  BP: 109/53 (14 Sep 2019 03:00) (93/46 - 147/67)  BP(mean): 76 (14 Sep 2019 03:00) (67 - 96)  RR: 14 (14 Sep 2019 03:00) (13 - 21)  SpO2: 97% (14 Sep 2019 03:00) (85% - 100%)    I&O's Detail    12 Sep 2019 07:01  -  13 Sep 2019 07:00  --------------------------------------------------------  IN:    multiple electrolytes Injection Type 1multiple electrolytes Injection Type 1: 1200 mL    Oral Fluid: 160 mL    phenylephrine   Infusion: 103.6 mL  Total IN: 1463.6 mL    OUT:    Indwelling Catheter - Urethral: 1450 mL  Total OUT: 1450 mL    Total NET: 13.6 mL      13 Sep 2019 07:01  -  14 Sep 2019 05:51  --------------------------------------------------------  IN:    multiple electrolytes Injection Type 1multiple electrolytes Injection Type 1: 450 mL    Packed Red Blood Cells: 300 mL  Total IN: 750 mL    OUT:    Indwelling Catheter - Urethral: 930 mL  Total OUT: 930 mL    Total NET: -180 mL          Inpatient Medications:  MEDICATIONS  (STANDING):  atorvastatin 80 milliGRAM(s) Oral at bedtime  carbidopa/levodopa CR 50/200 1 Tablet(s) Oral <User Schedule>  chlorhexidine 2% Cloths 1 Application(s) Topical <User Schedule>  docusate sodium 100 milliGRAM(s) Oral three times a day  entacapone 200 milliGRAM(s) Oral <User Schedule>  fluticasone propionate 50 MICROgram(s)/spray Nasal Spray 1 Spray(s) Both Nostrils two times a day  furosemide    Tablet 40 milliGRAM(s) Oral daily  levothyroxine 50 MICROGram(s) Oral daily  melatonin 5 milliGRAM(s) Oral at bedtime  multivitamin 1 Tablet(s) Oral daily  pantoprazole    Tablet 40 milliGRAM(s) Oral before breakfast  polyethylene glycol 3350 17 Gram(s) Oral daily  senna 2 Tablet(s) Oral at bedtime  tamsulosin 0.4 milliGRAM(s) Oral daily    MEDICATIONS  (PRN):  acetaminophen   Tablet .. 650 milliGRAM(s) Oral every 6 hours PRN Mild Pain (1 - 3)  QUEtiapine 12.5 milliGRAM(s) Oral at bedtime PRN agitation      Physical Examination:  GEN: NAD, resting quietly  NEURO: AAOx3, CN II-XII grossly intact, no focal deficits  PULM: symmetric chest rise bilaterally, no increased WOB  ABD: soft, nontender, nondistended, incision CDI  EXTR: no cyanosis or edema, moving all extremities    LABS:                        9.2    7.4   )-----------( 119      ( 14 Sep 2019 00:27 )             26.6       09-14    137  |  104  |  30<H>  ----------------------------<  107<H>  4.4   |  22  |  1.85<H>    Ca    8.2<L>      14 Sep 2019 00:27  Phos  3.6     09-14  Mg     2.4     09-14        CULTURES:  .Blood  08-28 @ 17:55   No growth at 5 days.  --  --      .Urine  08-28 @ 17:52   No growth  --  --      .Urine  08-27 @ 21:50   No growth  --  --          IMAGING:  []

## 2019-09-14 NOTE — CHART NOTE - NSCHARTNOTEFT_GEN_A_CORE
*** incomplete note ****          Medicine Acceptance Note    CONTACT INFO   Kodi Jean-Baptiste, PGY-2  Internal Medicine, [phone] ([facility]) / [id number] ([facility])    CC: Patient is a 89y old  Male who presents with a chief complaint of [facility] for gross haematuria following Cortés placement.  Groin erythema, oedema (14 Sep 2019 23:03)      HPI:  NIGHT HOSPITALIST:   Patient UNKNOWN to me previously, assigned to me at this point via the ER and by Dr. 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 with a history of CABG with subsequent PCI, Parkinson disease with progressive functional impairment, essential HTN< past DVT with past IVC filter placement not on full AC, chronic kidney disease stage 3, past GI bleed, with multiple past falls with a recent admission to [place] in 2019 following a presumed mechanical fall, noted to have an acute on chronic RIGHT proximal humeral fracture not felt to be operative with plans for secondary healing, with patient referred to [facility] with patient referred following gross haematuria noted on Cortés placement following urinary retention.  Patient reported that he had local trauma to the skin of his penis following an attempt to use a hand held urinal.   Patient also notes that patient was on a ? commode and was noted to have increased scrotal oedema and redness. (28 Aug 2019 20:42)      Seen and examine patient at bedside. Patient reports feeling . Denied fever, chill, n/v/d, CP, SOB, or abdominal pain.       Allergies    Cipro (Rash)    Intolerances    	    PAST MEDICAL & SURGICAL HISTORY:  Unilateral inguinal hernia, with gangrene, not specified as recurrent  Upper GI bleed: MICU admission , EGD w/ gastric ulcer/visible vessel which was cauterized  Aspiration pneumonia  Clostridium difficile colitis  Pneumonia  MI, old: age 55  UTI (urinary tract infection)  BPH (benign prostatic hyperplasia)  Parkinsons Disease  CAD (Coronary Artery Disease): s/p CABG and stents  HTN (Hypertension)  Hyperlipidemia  Presence of IVC filter: right upper arm placed last year   History of prostate surgery: 2016  Varicose veins of legs: h/o Vein stripping  S/P appendectomy  Stented coronary artery: cardiac stent x 2 - placement between  &amp; possible   Hx of CAB (age 65)      FAMILY HISTORY:  Family history of coronary artery disease: Father,  of MI age 55  Family history of breast cancer (Aunt): Mother      SOCIAL HISTORY:  Tobacco: denies  EtOH: denies  Illicit drugs: denies  Lives with    MEDICATIONS:  furosemide    Tablet 40 milliGRAM(s) Oral daily  tamsulosin 0.4 milliGRAM(s) Oral daily        acetaminophen   Tablet .. 650 milliGRAM(s) Oral every 6 hours PRN  carbidopa/levodopa CR 50/200 1 Tablet(s) Oral <User Schedule>  entacapone 200 milliGRAM(s) Oral <User Schedule>  melatonin 5 milliGRAM(s) Oral at bedtime  QUEtiapine 12.5 milliGRAM(s) Oral at bedtime PRN    docusate sodium 100 milliGRAM(s) Oral three times a day  pantoprazole    Tablet 40 milliGRAM(s) Oral before breakfast  polyethylene glycol 3350 17 Gram(s) Oral daily  senna 2 Tablet(s) Oral at bedtime    atorvastatin 80 milliGRAM(s) Oral at bedtime  levothyroxine 50 MICROGram(s) Oral daily    chlorhexidine 2% Cloths 1 Application(s) Topical <User Schedule>  fluticasone propionate 50 MICROgram(s)/spray Nasal Spray 1 Spray(s) Both Nostrils two times a day  multivitamin 1 Tablet(s) Oral daily      PHYSICAL EXAM:  T(C): 36.6 (19 @ 22:00), Max: 37 (19 @ 03:00)  HR: 72 (19 @ 22:00) (54 - 77)  BP: 115/62 (19 @ 22:00) (100/57 - 135/57)  RR: 18 (19 @ 22:00) (14 - 20)  SpO2: 96% (19 @ 22:00) (96% - 100%)  Wt(kg): --  Daily     Daily Weight in k.5 (14 Sep 2019 06:00)  I&O's Summary    13 Sep 2019 07:01  -  14 Sep 2019 07:00  --------------------------------------------------------  IN: 1000 mL / OUT: 1230 mL / NET: -230 mL    14 Sep 2019 07:01  -  14 Sep 2019 23:44  --------------------------------------------------------  IN: 0 mL / OUT: 800 mL / NET: -800 mL        TELEMETRY:     Daily     Daily Weight in k.5 (14 Sep 2019 06:00)   Appearance: NAD, well-developed	  HEENT:   Normal oral mucosa, PERRL, EOMI	  Neck: No JVD, no LAD, supple, trachea in midline  Cardiovascular: Normal S1 S2, No JVD, No murmurs  Respiratory: Lungs clear to auscultation, no wheezing, rhonchi  Gastrointestinal:  Soft, Non-tender, nondistended, + BS  Skin: No rashes, No ecchymoses, No cyanosis	  MSK/Extremities: Normal range of motion, 5/5 Muscle strength bilaterally. No clubbing, cyanosis or edema  Vascular: Peripheral pulses palpable 2+ bilaterally  Neurologic: Non-focal, CN II-XII intact  Psychiatry: A & O x 3, Mood & affect appropriate    LABS:	 	                        9.2    7.4   )-----------( 119      ( 14 Sep 2019 00:27 )             26.6         137  |  104  |  30<H>  ----------------------------<  107<H>  4.4   |  22  |  1.85<H>      138  |  105  |  31<H>  ----------------------------<  95  4.5   |  21<L>  |  1.70<H>    Ca    8.2<L>      14 Sep 2019 00:27  Ca    7.6<L>      13 Sep 2019 04:21  Phos  3.6       Phos  3.3       Mg     2.4       Mg     2.4             proBNP:   Lipid Profile:   HgA1c:   TSH:   FS: CAPILLARY BLOOD GLUCOSE        BCX/UCX: .Blood  19   No growth at 5 days.  --  --      .Urine  19   No growth  --  --      .Urine  19   No growth  --  --          CARDIAC MARKERS:       COAGS:  INR: 1.12 ratio (19 @ 00:27)    	    ECG:  	  RADIOLOGY:  No new images    Imaging Personally Reviewed:  [ ] YES  [ ] NO  Consultant(s) Notes Reviewed:  [X] YES  [ ] NO  Care Discussed with Consultants/Other Providers [ ] YES  [ ] NO *** incomplete note ****            Medicine Acceptance Note    CONTACT INFO   Kodi Jean-Baptiste, PGY-2  Internal Medicine, [phone] ([facility]) / [phone] ([facility])    CC: Patient is a 89y old  Male who presents with a chief complaint of Sent in from [facility] for gross haematuria following Cortés placement.  Groin erythema, oedema (14 Sep 2019 23:03)      HPI:  89-yo M w/ PMH of CAD s/p CABG w/ subsequent PCI, Parkinson's disease, HTN, chronic afib not on AC, DVT s/p IVC, CKD stage 3, GIB, and multiple falls with a recent admission to [facility] (2019) for R proximal humerus fracture w/o surgery, who was referred to [facility], presenting with gross hematuria upon Ocrtés placement for urinary retention.       RYAN FITZPATRICK is a 89y Male with CAD s/p CABG on ASA, Afib, CKD3, DVT s/p IVC Filter, Parkinson's disease initially admitted from rehab on  with respiratory distress. Of note, patient's dyspnea multifactorial from respiratory muscle weakness in the setting of Parkinson's disease and ischemic cardiomyopathy. On , patient experienced two episodes of BRBPR with hgb drop (6.3) and transient hypotension (SBP 70s). He underwent a CTA a/p that was unsuccessful in localizing hemorrhage. Patient subsequently transferred to MICU. Surgery consulted for evaluation of BRBPR. Found to have rectal ulcers on colonoscopy.    Today patient had an RRT call while he was having an active lower GI bleed with BRBPR. Patient was hypotensive to SBP 70s and maintained a HR in the 70s. Mentating, A&Ox3. Given 2 units of pRBCs and 1 unit of plasma on the floor, started on pressors, taken to OR.  Pt is now s/p exam under anesthesia, with finding of an actively bleeding vessel in an ulcer, ligated. Rigid sigmoidoscopy done at end of case with no other bleeding noted. Rectum re-packed with gelfoam wrapped in nu-knit and in monsal's solution. SICU consulted for monitoring.            Allergies    Cipro (Rash)    Intolerances    	    PAST MEDICAL & SURGICAL HISTORY:  Unilateral inguinal hernia, with gangrene, not specified as recurrent  Upper GI bleed: MICU admission , EGD w/ gastric ulcer/visible vessel which was cauterized  Aspiration pneumonia  Clostridium difficile colitis  Pneumonia  MI, old: age 55  UTI (urinary tract infection)  BPH (benign prostatic hyperplasia)  Parkinsons Disease  CAD (Coronary Artery Disease): s/p CABG and stents  HTN (Hypertension)  Hyperlipidemia  Presence of IVC filter: right upper arm placed last year   History of prostate surgery: 2016  Varicose veins of legs: h/o Vein stripping  S/P appendectomy  Stented coronary artery: cardiac stent x 2 - placement between  &amp; possible   Hx of CAB (age 65)      FAMILY HISTORY:  Family history of coronary artery disease: Father,  of MI age 55  Family history of breast cancer (Aunt): Mother      SOCIAL HISTORY:  Tobacco: denies  EtOH: denies  Illicit drugs: denies  Lives with    MEDICATIONS:  furosemide    Tablet 40 milliGRAM(s) Oral daily  tamsulosin 0.4 milliGRAM(s) Oral daily        acetaminophen   Tablet .. 650 milliGRAM(s) Oral every 6 hours PRN  carbidopa/levodopa CR 50/200 1 Tablet(s) Oral <User Schedule>  entacapone 200 milliGRAM(s) Oral <User Schedule>  melatonin 5 milliGRAM(s) Oral at bedtime  QUEtiapine 12.5 milliGRAM(s) Oral at bedtime PRN    docusate sodium 100 milliGRAM(s) Oral three times a day  pantoprazole    Tablet 40 milliGRAM(s) Oral before breakfast  polyethylene glycol 3350 17 Gram(s) Oral daily  senna 2 Tablet(s) Oral at bedtime    atorvastatin 80 milliGRAM(s) Oral at bedtime  levothyroxine 50 MICROGram(s) Oral daily    chlorhexidine 2% Cloths 1 Application(s) Topical <User Schedule>  fluticasone propionate 50 MICROgram(s)/spray Nasal Spray 1 Spray(s) Both Nostrils two times a day  multivitamin 1 Tablet(s) Oral daily      PHYSICAL EXAM:  T(C): 36.6 (19 @ 22:00), Max: 37 (19 @ 03:00)  HR: 72 (19 @ 22:00) (54 - 77)  BP: 115/62 (19 @ 22:00) (100/57 - 135/57)  RR: 18 (19 @ 22:00) (14 - 20)  SpO2: 96% (19 @ 22:00) (96% - 100%)  Wt(kg): --  Daily     Daily Weight in k.5 (14 Sep 2019 06:00)  I&O's Summary    13 Sep 2019 07:  -  14 Sep 2019 07:00  --------------------------------------------------------  IN: 1000 mL / OUT: 1230 mL / NET: -230 mL    14 Sep 2019 07:01  -  14 Sep 2019 23:44  --------------------------------------------------------  IN: 0 mL / OUT: 800 mL / NET: -800 mL        TELEMETRY:     Daily     Daily Weight in k.5 (14 Sep 2019 06:00)   Appearance: NAD, well-developed	  HEENT:   Normal oral mucosa, PERRL, EOMI	  Neck: No JVD, no LAD, supple, trachea in midline  Cardiovascular: Normal S1 S2, No JVD, No murmurs  Respiratory: Lungs clear to auscultation, no wheezing, rhonchi  Gastrointestinal:  Soft, Non-tender, nondistended, + BS  Skin: No rashes, No ecchymoses, No cyanosis	  MSK/Extremities: Normal range of motion, 5/5 Muscle strength bilaterally. No clubbing, cyanosis or edema  Vascular: Peripheral pulses palpable 2+ bilaterally  Neurologic: Non-focal, CN II-XII intact  Psychiatry: A & O x 3, Mood & affect appropriate    LABS:	 	                        9.2    7.4   )-----------( 119      ( 14 Sep 2019 00:27 )             26.6         137  |  104  |  30<H>  ----------------------------<  107<H>  4.4   |  22  |  1.85<H>      138  |  105  |  31<H>  ----------------------------<  95  4.5   |  21<L>  |  1.70<H>    Ca    8.2<L>      14 Sep 2019 00:27  Ca    7.6<L>      13 Sep 2019 04:21  Phos  3.6       Phos  3.3       Mg     2.4       Mg     2.4             proBNP:   Lipid Profile:   HgA1c:   TSH:   FS: CAPILLARY BLOOD GLUCOSE        BCX/UCX: .Blood  19   No growth at 5 days.  --  --      .Urine  19   No growth  --  --      .Urine  19   No growth  --  --          CARDIAC MARKERS:       COAGS:  INR: 1.12 ratio (19 @ 00:27)    	    ECG:  	  RADIOLOGY:  No new images    Imaging Personally Reviewed:  [ ] YES  [ ] NO  Consultant(s) Notes Reviewed:  [X] YES  [ ] NO  Care Discussed with Consultants/Other Providers [ ] YES  [ ] NO *** incomplete note ****            Medicine Acceptance Note    CONTACT INFO   Kodi Jean-Baptiste, PGY-2  Internal Medicine, [phone] ([facility]) / [phone] ([facility])    CC: Patient is a 89y old  Male who [facility] following Cortés placement.  Groin erythema, oedema (14 Sep 2019 23:03)      HPI:  89-yo M w/ PMH of CAD s/p CABG w/ subsequent PCI, Parkinson's disease, HTN, chronic afib not on AC, DVT s/p IVC, CKD stage 3, GIB, and multiple falls with a recent admission to [facility] (2019) for R proximal humerus fracture w/o surgery, [facility], presenting with gross hematuria upon Cortés placement for urinary retention. Patient was admitted to medicine floors for management of urethral traumatic injury vs. cystitis. vs. groin cellulitis. Patient was started on empiric ABx coverage. CTAP w/ evidence of mild stercoral colitis, and US kidney & bladder with atrophic R kidney and BL hydronephrosis R>L. CT chest w/ BL pleural effusions and pulm edema. Patient was started on diuretics. Patient underwent RRT on the night of 9/3 for BRBPR. 1L NS was given, however patient remained hypotensive with continued BRBPR, for which 2nd RRT was called. 1L IVF, protonix IVP, 1u PRBC and 1u plt were given. CTAP with linear region of hyperdensity in the rectum without pooling in the venous phase, likely representing hemorrhoid. Patient was sent to MICU, and while in MICU 2u pRBC and 1u plt were given. EGD/colonoscopy was performed, revealing hiatal hernia, large rectal ulcers, three 8 to 12-mm polyps in the ascending colon, and diverticulosis in the sigmoid as well as ascending colon. Patient was sent to floors for further management. While on the floor, course was complicated by blood loss anemia, requiring 3 units of pRBC transfusion. On , RRT (3rd during current hospitalization) was called for rectal bleeding. BP downtrended from SBP 140s to 70s/40s. 2u pRBC and 1u FFP were transfused. Patient was transferred to SICU, requiring pressor support, and was transferred to OR. Bleeding ulcer was ligated and rectum packed. Patient was transferred back to SICU for further management. Patient's VS was stabilized and became off pressors. SICU course was complicated by anemia, requiring 2 more units of pRBC. Patient was deemed not requiring ICU-level support, and was sent to the medicine floor on .    Patient was seen and examined at bedside. Sleeping but easily awaken. Cooperative to exam. Denied fever, chills, nausea, vomiting, chest pain, dizziness, or headache. No acute complaints at this time.       Allergies    Cipro (Rash)    Intolerances    	    PAST MEDICAL & SURGICAL HISTORY:  Unilateral inguinal hernia, with gangrene, not specified as recurrent  Upper GI bleed: MICU admission , EGD w/ gastric ulcer/visible vessel which was cauterized  Aspiration pneumonia  Clostridium difficile colitis  Pneumonia  MI, old: age 55  UTI (urinary tract infection)  BPH (benign prostatic hyperplasia)  Parkinsons Disease  CAD (Coronary Artery Disease): s/p CABG and stents  HTN (Hypertension)  Hyperlipidemia  Presence of IVC filter: right upper arm placed last year   History of prostate surgery: 2016  Varicose veins of legs: h/o Vein stripping  S/P appendectomy  Stented coronary artery: cardiac stent x 2 - placement between  &amp; possible   Hx of CAB (age 65)      FAMILY HISTORY:  Family history of coronary artery disease: Father,  of MI age 55  Family history of breast cancer (Aunt): Mother      SOCIAL HISTORY:  Tobacco: denies  EtOH: denies  Illicit drugs: denies  Lives with    MEDICATIONS:  furosemide    Tablet 40 milliGRAM(s) Oral daily  tamsulosin 0.4 milliGRAM(s) Oral daily        acetaminophen   Tablet .. 650 milliGRAM(s) Oral every 6 hours PRN  carbidopa/levodopa CR 50/200 1 Tablet(s) Oral <User Schedule>  entacapone 200 milliGRAM(s) Oral <User Schedule>  melatonin 5 milliGRAM(s) Oral at bedtime  QUEtiapine 12.5 milliGRAM(s) Oral at bedtime PRN    docusate sodium 100 milliGRAM(s) Oral three times a day  pantoprazole    Tablet 40 milliGRAM(s) Oral before breakfast  polyethylene glycol 3350 17 Gram(s) Oral daily  senna 2 Tablet(s) Oral at bedtime    atorvastatin 80 milliGRAM(s) Oral at bedtime  levothyroxine 50 MICROGram(s) Oral daily    chlorhexidine 2% Cloths 1 Application(s) Topical <User Schedule>  fluticasone propionate 50 MICROgram(s)/spray Nasal Spray 1 Spray(s) Both Nostrils two times a day  multivitamin 1 Tablet(s) Oral daily      PHYSICAL EXAM:  Vital Signs Last 24 Hrs  T(C): 36.6 (14 Sep 2019 22:00), Max: 37 (14 Sep 2019 03:00)  T(F): 97.8 (14 Sep 2019 22:00), Max: 98.6 (14 Sep 2019 03:00)  HR: 72 (14 Sep 2019 22:00) (54 - 77)  BP: 115/62 (14 Sep 2019 22:00) (100/57 - 128/65)  BP(mean): 83 (14 Sep 2019 11:00) (73 - 90)  RR: 18 (14 Sep 2019 22:00) (14 - 20)  SpO2: 96% (14 Sep 2019 22:00) (96% - 100%)    PHYSICAL EXAM:  GENERAL: NAD, well-groomed, well-developed  HEAD: Atraumatic, Normocephalic  EYES: Refused to be examined for "sleepiness"; per recent notes by other providers, EOMI and PERRLA.  NECK: Supple, No JVD  NERVOUS SYSTEM: AOX3,   PSYCHIATRIC: Appropriate affect and mood  CHEST/LUNG: Clear to auscultation bilaterally; No rales, rhonchi, wheezing, or rubs  HEART: Irregular rate and rhythm; No murmurs, rubs, or gallops. No LE edema  ABDOMEN: Soft, Nontender, Nondistended; Bowel sounds present  EXTREMITIES:  2+ Peripheral Pulses, No clubbing, cyanosis  SKIN: No rashes or lesions            I&O's Summary    13 Sep 2019 07:  -  14 Sep 2019 07:00  --------------------------------------------------------  IN: 1000 mL / OUT: 1230 mL / NET: -230 mL    14 Sep 2019 07:  -  14 Sep 2019 23:44  --------------------------------------------------------  IN: 0 mL / OUT: 800 mL / NET: -800 mL        TELEMETRY:     Daily     Daily Weight in k.5 (14 Sep 2019 06:00)   Appearance: NAD, well-developed	  HEENT:   Normal oral mucosa, PERRL, EOMI	  Neck: No JVD, no LAD, supple, trachea in midline  Cardiovascular: Normal S1 S2, No JVD, No murmurs  Respiratory: Lungs clear to auscultation, no wheezing, rhonchi  Gastrointestinal:  Soft, Non-tender, nondistended, + BS  Skin: No rashes, No ecchymoses, No cyanosis	  MSK/Extremities: Normal range of motion, 5/5 Muscle strength bilaterally. No clubbing, cyanosis or edema  Vascular: Peripheral pulses palpable 2+ bilaterally  Neurologic: Non-focal, CN II-XII intact  Psychiatry: A & O x 3, Mood & affect appropriate    LABS:	 	                        9.2    7.4   )-----------( 119      ( 14 Sep 2019 00:27 )             26.6         137  |  104  |  30<H>  ----------------------------<  107<H>  4.4   |  22  |  1.85<H>      138  |  105  |  31<H>  ----------------------------<  95  4.5   |  21<L>  |  1.70<H>    Ca    8.2<L>      14 Sep 2019 00:27  Ca    7.6<L>      13 Sep 2019 04:21  Phos  3.6       Phos  3.3       Mg     2.4       Mg     2.4             proBNP:   Lipid Profile:   HgA1c:   TSH:   FS: CAPILLARY BLOOD GLUCOSE        BCX/UCX: .Blood  19   No growth at 5 days.  --  --      .Urine  19   No growth  --  --      .Urine  19   No growth  --  --          CARDIAC MARKERS:       COAGS:  INR: 1.12 ratio (19 @ 00:27)    	    ECG:  	  RADIOLOGY:  No new images    Imaging Personally Reviewed:  [ ] YES  [ ] NO  Consultant(s) Notes Reviewed:  [X] YES  [ ] NO  Care Discussed with Consultants/Other Providers [ ] YES  [ ] NO      ASSESSMENT AND PLAN:  89-yo M w/ PMH of CAD s/p CABG w/ subsequent PCI, Parkinson's disease, HTN, chronic afib not on AC, DVT s/p IVC filter, CKD stage 3, GIB, and multiple falls with a recent admission to [facility] (2019) for R proximal humerus fracture w/o surgery, who was referred to [facility], admitted for gross hematuria, complicated by recurrent GIB requiring several units of pRBC and platelet transfusion, MICU and SICU stays, and rectal ulcer ligation, transferred to general medicine floor for further management.    Problem 1: BRBPR  - S/p EGD/colonoscopy, showing large rectal ulceration  - Course complicated by multiple RRTs and MICU as well as SICU stays, requiring multiple units of blood transfusion.  - POD 3. OR on . S/p ligation of bleeding vessel. S/p rectal packing, now removed.   - Continue with Senna/Colace/Miralax to avoid straining  - H/H stable at this time.  - Keep type & screen active  - Transfuse to hgb 7. Goal hgb 8 with active bleeding or fever.  - Holding ASA in the setting of blood loss anemia. Will hold until cleared by GI  - No AC for bleeding.    Problem 2: Blood loss anemia  - Patient with history of GIB. Current hospitalization has been complicated by BRBPR, requiring multiple units of pRBC transfusion, and MICU and SICU stays.  - S/p ligation of bleeding ulcer in the OR. S/p rectal packing, currently removed.  - Hgb 9.2 on floor acceptance. Will continue to monitor.  - Keep type and screen active  - Transfuse to hgb 7. Goal hgb 8 with active bleeding or fever.    Problem 3: CAD  - CAD s/p CABG w/ subsequent PCI  - TTE w/ EF 55-60%.  - Holding ASA for bleeding. Will restart when cleared by GI    Problem 4: Atrial fibrillation  - Rate controlled  - Not on AC or BB at this time.    Problem 5: STEVEN on CKD  - Stage 3 CKD at baseline on chart review.  - STEVEN likely due to post-obstructive uropathy and blood loss.  - Continue to monitor  - Avoid nephrotoxins. Renally dose meds.    Problem 6: Urinary retention  - With indwelling Cortés  - Avoid manipulating Cortés at this time given blood loss anemia  - Continue to monitor    Problem 7: Cellulitis  - Groin cellulitis.  - S/p antibiotics. Currently stable  - Keep groin area dry. Monitoring WBC    Problem 8: Need for prophylactic measure  - DVT PPx: SCDs  - Full Code  - Fall precautions Medicine Acceptance Note    CONTACT INFO   Kodi Jean-Baptiste, PGY-2  Internal Medicine, [phone] ([facility]) / [phone] ([facility])    CC: Patient is a 89y old  Male who [facility] Cortés placement.  Groin erythema, oedema (14 Sep 2019 23:03)      HPI:  89-yo M w/ PMH of CAD s/p CABG w/ subsequent PCI, Parkinson's disease, HTN, chronic afib not on AC, DVT s/p IVC, CKD stage 3, GIB, and multiple falls with a recent admission to [facility] (2019) for R proximal humerus fracture w/o surgery, [facility], presenting with gross hematuria upon Cortés placement for urinary retention. Patient was admitted to medicine floors for management of urethral traumatic injury vs. cystitis. vs. groin cellulitis. Patient was started on empiric ABx coverage. CTAP w/ evidence of mild stercoral colitis, and US kidney & bladder with atrophic R kidney and BL hydronephrosis R>L. CT chest w/ BL pleural effusions and pulm edema. Patient was started on diuretics. Patient underwent RRT on the night of 9/3 for BRBPR. 1L NS was given, however patient remained hypotensive with continued BRBPR, for which 2nd RRT was called. 1L IVF, protonix IVP, 1u PRBC and 1u plt were given. CTAP with linear region of hyperdensity in the rectum without pooling in the venous phase, likely representing hemorrhoid. Patient was sent to MICU, and while in MICU 2u pRBC and 1u plt were given. EGD/colonoscopy was performed, revealing hiatal hernia, large rectal ulcers, three 8 to 12-mm polyps in the ascending colon, and diverticulosis in the sigmoid as well as ascending colon. Patient was sent to floors for further management. While on the floor, course was complicated by blood loss anemia, requiring 3 units of pRBC transfusion. On , RRT (3rd during current hospitalization) was called for rectal bleeding. BP downtrended from SBP 140s to 70s/40s. 2u pRBC and 1u FFP were transfused. Patient was transferred to SICU, requiring pressor support, and was transferred to OR. Bleeding ulcer was ligated and rectum packed. Patient was transferred back to SICU for further management. Patient's VS was stabilized and became off pressors. SICU course was complicated by anemia, requiring 2 more units of pRBC. Patient was deemed not requiring ICU-level support, and was sent to the medicine floor on .    Patient was seen and examined at bedside. Sleeping but easily awaken. Cooperative to exam. Denied fever, chills, nausea, vomiting, chest pain, dizziness, or headache. No acute complaints at this time.       Allergies    Cipro (Rash)    Intolerances    	    PAST MEDICAL & SURGICAL HISTORY:  Unilateral inguinal hernia, with gangrene, not specified as recurrent  Upper GI bleed: MICU admission , EGD w/ gastric ulcer/visible vessel which was cauterized  Aspiration pneumonia  Clostridium difficile colitis  Pneumonia  MI, old: age 55  UTI (urinary tract infection)  BPH (benign prostatic hyperplasia)  Parkinsons Disease  CAD (Coronary Artery Disease): s/p CABG and stents  HTN (Hypertension)  Hyperlipidemia  Presence of IVC filter: right upper arm placed last year   History of prostate surgery: 2016  Varicose veins of legs: h/o Vein stripping  S/P appendectomy  Stented coronary artery: cardiac stent x 2 - placement between  &amp; possible   Hx of CAB (age 65)      FAMILY HISTORY:  Family history of coronary artery disease: Father,  of MI age 55  Family history of breast cancer (Aunt): Mother      SOCIAL HISTORY:  Tobacco: denies  EtOH: denies  Illicit drugs: denies  Lives with    MEDICATIONS:  furosemide    Tablet 40 milliGRAM(s) Oral daily  tamsulosin 0.4 milliGRAM(s) Oral daily        acetaminophen   Tablet .. 650 milliGRAM(s) Oral every 6 hours PRN  carbidopa/levodopa CR 50/200 1 Tablet(s) Oral <User Schedule>  entacapone 200 milliGRAM(s) Oral <User Schedule>  melatonin 5 milliGRAM(s) Oral at bedtime  QUEtiapine 12.5 milliGRAM(s) Oral at bedtime PRN    docusate sodium 100 milliGRAM(s) Oral three times a day  pantoprazole    Tablet 40 milliGRAM(s) Oral before breakfast  polyethylene glycol 3350 17 Gram(s) Oral daily  senna 2 Tablet(s) Oral at bedtime    atorvastatin 80 milliGRAM(s) Oral at bedtime  levothyroxine 50 MICROGram(s) Oral daily    chlorhexidine 2% Cloths 1 Application(s) Topical <User Schedule>  fluticasone propionate 50 MICROgram(s)/spray Nasal Spray 1 Spray(s) Both Nostrils two times a day  multivitamin 1 Tablet(s) Oral daily      PHYSICAL EXAM:  Vital Signs Last 24 Hrs  T(C): 36.6 (14 Sep 2019 22:00), Max: 37 (14 Sep 2019 03:00)  T(F): 97.8 (14 Sep 2019 22:00), Max: 98.6 (14 Sep 2019 03:00)  HR: 72 (14 Sep 2019 22:00) (54 - 77)  BP: 115/62 (14 Sep 2019 22:00) (100/57 - 128/65)  BP(mean): 83 (14 Sep 2019 11:00) (73 - 90)  RR: 18 (14 Sep 2019 22:) (14 - 20)  SpO2: 96% (14 Sep 2019 22:) (96% - 100%)    PHYSICAL EXAM:  GENERAL: NAD, well-groomed, well-developed  HEAD: Atraumatic, Normocephalic  EYES: Refused to be examined for "sleepiness"; per recent notes by other providers, EOMI and PERRLA.  NECK: Supple, No JVD  NERVOUS SYSTEM: AOX3,   PSYCHIATRIC: Appropriate affect and mood  CHEST/LUNG: Clear to auscultation bilaterally; No rales, rhonchi, wheezing, or rubs  HEART: Irregular rate and rhythm; No murmurs, rubs, or gallops. No LE edema  ABDOMEN: Soft, Nontender, Nondistended; Bowel sounds present  EXTREMITIES:  2+ Peripheral Pulses, No clubbing, cyanosis  SKIN: No rashes or lesions            I&O's Summary    13 Sep 2019 07:  -  14 Sep 2019 07:00  --------------------------------------------------------  IN: 1000 mL / OUT: 1230 mL / NET: -230 mL    14 Sep 2019 07:  -  14 Sep 2019 23:44  --------------------------------------------------------  IN: 0 mL / OUT: 800 mL / NET: -800 mL        TELEMETRY:     Daily     Daily Weight in k.5 (14 Sep 2019 06:00)   Appearance: NAD, well-developed	  HEENT:   Normal oral mucosa, PERRL, EOMI	  Neck: No JVD, no LAD, supple, trachea in midline  Cardiovascular: Normal S1 S2, No JVD, No murmurs  Respiratory: Lungs clear to auscultation, no wheezing, rhonchi  Gastrointestinal:  Soft, Non-tender, nondistended, + BS  Skin: No rashes, No ecchymoses, No cyanosis	  MSK/Extremities: Normal range of motion, 5/5 Muscle strength bilaterally. No clubbing, cyanosis or edema  Vascular: Peripheral pulses palpable 2+ bilaterally  Neurologic: Non-focal, CN II-XII intact  Psychiatry: A & O x 3, Mood & affect appropriate    LABS:	 	                        9.2    7.4   )-----------( 119      ( 14 Sep 2019 00:27 )             26.6         137  |  104  |  30<H>  ----------------------------<  107<H>  4.4   |  22  |  1.85<H>      138  |  105  |  31<H>  ----------------------------<  95  4.5   |  21<L>  |  1.70<H>    Ca    8.2<L>      14 Sep 2019 00:27  Ca    7.6<L>      13 Sep 2019 04:21  Phos  3.6       Phos  3.3       Mg     2.4       Mg     2.4             proBNP:   Lipid Profile:   HgA1c:   TSH:   FS: CAPILLARY BLOOD GLUCOSE        BCX/UCX: .Blood  19   No growth at 5 days.  --  --      .Urine  19   No growth  --  --      .Urine  19   No growth  --  --          CARDIAC MARKERS:       COAGS:  INR: 1.12 ratio (19 @ 00:27)    	    ECG:  	  RADIOLOGY:  No new images    Imaging Personally Reviewed:  [ ] YES  [ ] NO  Consultant(s) Notes Reviewed:  [X] YES  [ ] NO  Care Discussed with Consultants/Other Providers [ ] YES  [ ] NO      ASSESSMENT AND PLAN:  89-yo M w/ PMH of CAD s/p CABG w/ subsequent PCI, Parkinson's disease, HTN, chronic afib not on AC, DVT s/p IVC filter, CKD stage 3, GIB, and multiple falls with a recent admission to [facility] (2019) for R proximal humerus fracture w/o surgery, who was referred to [facility], admitted for gross hematuria, complicated by recurrent GIB requiring several units of pRBC and platelet transfusion, MICU and SICU stays, and rectal ulcer ligation, transferred to general medicine floor for further management.    Problem 1: BRBPR  - S/p EGD/colonoscopy, showing large rectal ulceration  - Course complicated by multiple RRTs and MICU as well as SICU stays, requiring multiple units of blood transfusion.  - POD 3. OR on . S/p ligation of bleeding vessel. S/p rectal packing, now removed.   - Continue with Senna/Colace/Miralax to avoid straining  - H/H stable at this time.  - Keep type & screen active  - Transfuse to hgb 7. Goal hgb 8 with active bleeding or fever.  - Holding ASA in the setting of blood loss anemia. Will hold until cleared by GI  - No AC for bleeding.    Problem 2: Blood loss anemia  - Patient with history of GIB. Current hospitalization has been complicated by BRBPR, requiring multiple units of pRBC transfusion, and MICU and SICU stays.  - S/p ligation of bleeding ulcer in the OR. S/p rectal packing, currently removed.  - Hgb 9.2 on floor acceptance. Will continue to monitor.  - Keep type and screen active  - Transfuse to hgb 7. Goal hgb 8 with active bleeding or fever.    Problem 3: CAD  - CAD s/p CABG w/ subsequent PCI  - TTE w/ EF 55-60%.  - Holding ASA for bleeding. Will restart when cleared by GI    Problem 4: Atrial fibrillation  - Rate controlled  - Not on AC or BB at this time.    Problem 5: STEVEN on CKD  - Stage 3 CKD at baseline on chart review.  - STEVEN likely due to post-obstructive uropathy and blood loss.  - Continue to monitor  - Avoid nephrotoxins. Renally dose meds.    Problem 6: Urinary retention  - With indwelling Cortés  - Avoid manipulating Cortés at this time given blood loss anemia  - Continue to monitor    Problem 7: Cellulitis  - Groin cellulitis.  - S/p antibiotics. Currently stable  - Keep groin area dry. Monitoring WBC    Problem 8: Need for prophylactic measure  - DVT PPx: SCDs  - Full Code  - Fall precautions Medicine Acceptance Note    CONTACT INFO   Kodi Jean-Baptiste, PGY-2  Internal Medicine, [phone] ([facility]) / [id number] ([facility])    CC: Patient is a 89y old  Male who presents with a chief complaint of [facility]atur[facility] following Cortés placement.  Groin erythema, oedema (14 Sep 2019 23:03)      HPI:  89-yo M w/ PMH of CAD s/p CABG w/ subsequent PCI, Parkinson's disease, HTN, chronic afib not on AC, DVT s/p IVC, CKD stage 3, GIB, and multiple falls with a recent admission to [facility] (2019) for R proximal humerus fracture w/o surgery, who [facility], presenting with gross hematuria upon Cortés placement for urinary retention. Patient was admitted to medicine floors for management of urethral traumatic injury vs. cystitis. vs. groin cellulitis. Patient was started on empiric ABx coverage. CTAP w/ evidence of mild stercoral colitis, and US kidney & bladder with atrophic R kidney and BL hydronephrosis R>L. CT chest w/ BL pleural effusions and pulm edema. Patient was started on diuretics. Patient underwent RRT on the night of 9/3 for BRBPR. 1L NS was given, however patient remained hypotensive with continued BRBPR, for which 2nd RRT was called. 1L IVF, protonix IVP, 1u PRBC and 1u plt were given. CTAP with linear region of hyperdensity in the rectum without pooling in the venous phase, likely representing hemorrhoid. Patient was sent to MICU, and while in MICU 2u pRBC and 1u plt were given. EGD/colonoscopy was performed, revealing hiatal hernia, large rectal ulcers, three 8 to 12-mm polyps in the ascending colon, and diverticulosis in the sigmoid as well as ascending colon. Patient was sent to floors for further management. While on the floor, course was complicated by blood loss anemia, requiring 3 units of pRBC transfusion. On , RRT (3rd during current hospitalization) was called for rectal bleeding. BP downtrended from SBP 140s to 70s/40s. 2u pRBC and 1u FFP were transfused. Patient was transferred to SICU, requiring pressor support, and was transferred to OR. Bleeding ulcer was ligated and rectum packed. Patient was transferred back to SICU for further management. Patient's VS was stabilized and became off pressors. SICU course was complicated by anemia, requiring 2 more units of pRBC. Patient was deemed not requiring ICU-level support, and was sent to the medicine floor on .    Patient was seen and examined at bedside. Sleeping but easily awaken. Cooperative to exam. Denied fever, chills, nausea, vomiting, chest pain, dizziness, or headache. No acute complaints at this time.       Allergies    Cipro (Rash)    Intolerances    	    PAST MEDICAL & SURGICAL HISTORY:  Unilateral inguinal hernia, with gangrene, not specified as recurrent  Upper GI bleed: MICU admission , EGD w/ gastric ulcer/visible vessel which was cauterized  Aspiration pneumonia  Clostridium difficile colitis  Pneumonia  MI, old: age 55  UTI (urinary tract infection)  BPH (benign prostatic hyperplasia)  Parkinsons Disease  CAD (Coronary Artery Disease): s/p CABG and stents  HTN (Hypertension)  Hyperlipidemia  Presence of IVC filter: right upper arm placed last year   History of prostate surgery: 2016  Varicose veins of legs: h/o Vein stripping  S/P appendectomy  Stented coronary artery: cardiac stent x 2 - placement between  &amp; possible   Hx of CAB (age 65)      FAMILY HISTORY:  Family history of coronary artery disease: Father,  of MI age 55  Family history of breast cancer (Aunt): Mother      SOCIAL HISTORY:  Tobacco: denies  EtOH: denies  Illicit drugs: denies  Lives with    MEDICATIONS:  furosemide    Tablet 40 milliGRAM(s) Oral daily  tamsulosin 0.4 milliGRAM(s) Oral daily        acetaminophen   Tablet .. 650 milliGRAM(s) Oral every 6 hours PRN  carbidopa/levodopa CR 50/200 1 Tablet(s) Oral <User Schedule>  entacapone 200 milliGRAM(s) Oral <User Schedule>  melatonin 5 milliGRAM(s) Oral at bedtime  QUEtiapine 12.5 milliGRAM(s) Oral at bedtime PRN    docusate sodium 100 milliGRAM(s) Oral three times a day  pantoprazole    Tablet 40 milliGRAM(s) Oral before breakfast  polyethylene glycol 3350 17 Gram(s) Oral daily  senna 2 Tablet(s) Oral at bedtime    atorvastatin 80 milliGRAM(s) Oral at bedtime  levothyroxine 50 MICROGram(s) Oral daily    chlorhexidine 2% Cloths 1 Application(s) Topical <User Schedule>  fluticasone propionate 50 MICROgram(s)/spray Nasal Spray 1 Spray(s) Both Nostrils two times a day  multivitamin 1 Tablet(s) Oral daily      PHYSICAL EXAM:  Vital Signs Last 24 Hrs  T(C): 36.6 (14 Sep 2019 22:00), Max: 37 (14 Sep 2019 03:00)  T(F): 97.8 (14 Sep 2019 22:00), Max: 98.6 (14 Sep 2019 03:00)  HR: 72 (14 Sep 2019 22:00) (54 - 77)  BP: 115/62 (14 Sep 2019 22:00) (100/57 - 128/65)  BP(mean): 83 (14 Sep 2019 11:00) (73 - 90)  RR: 18 (14 Sep 2019 22:) (14 - 20)  SpO2: 96% (14 Sep 2019 22:) (96% - 100%)    PHYSICAL EXAM:  GENERAL: NAD, well-groomed, well-developed  HEAD: Atraumatic, Normocephalic  EYES: Refused to be examined for "sleepiness"; per recent notes by other providers, EOMI and PERRLA.  NECK: Supple, No JVD  NERVOUS SYSTEM: AOX3,   PSYCHIATRIC: Appropriate affect and mood  CHEST/LUNG: Clear to auscultation bilaterally; No rales, rhonchi, wheezing, or rubs  HEART: Irregular rate and rhythm; No murmurs, rubs, or gallops. No LE edema  ABDOMEN: Soft, Nontender, Nondistended; Bowel sounds present  EXTREMITIES:  2+ Peripheral Pulses, No clubbing, cyanosis  SKIN: No rashes or lesions            I&O's Summary    13 Sep 2019 07:  -  14 Sep 2019 07:00  --------------------------------------------------------  IN: 1000 mL / OUT: 1230 mL / NET: -230 mL    14 Sep 2019 07:  -  14 Sep 2019 23:44  --------------------------------------------------------  IN: 0 mL / OUT: 800 mL / NET: -800 mL        TELEMETRY:     Daily     Daily Weight in k.5 (14 Sep 2019 06:00)   Appearance: NAD, well-developed	  HEENT:   Normal oral mucosa, PERRL, EOMI	  Neck: No JVD, no LAD, supple, trachea in midline  Cardiovascular: Normal S1 S2, No JVD, No murmurs  Respiratory: Lungs clear to auscultation, no wheezing, rhonchi  Gastrointestinal:  Soft, Non-tender, nondistended, + BS  Skin: No rashes, No ecchymoses, No cyanosis	  MSK/Extremities: Normal range of motion, 5/5 Muscle strength bilaterally. No clubbing, cyanosis or edema  Vascular: Peripheral pulses palpable 2+ bilaterally  Neurologic: Non-focal, CN II-XII intact  Psychiatry: A & O x 3, Mood & affect appropriate    LABS:	 	                        9.2    7.4   )-----------( 119      ( 14 Sep 2019 00:27 )             26.6         137  |  104  |  30<H>  ----------------------------<  107<H>  4.4   |  22  |  1.85<H>      138  |  105  |  31<H>  ----------------------------<  95  4.5   |  21<L>  |  1.70<H>    Ca    8.2<L>      14 Sep 2019 00:27  Ca    7.6<L>      13 Sep 2019 04:21  Phos  3.6       Phos  3.3       Mg     2.4       Mg     2.4             proBNP:   Lipid Profile:   HgA1c:   TSH:   FS: CAPILLARY BLOOD GLUCOSE        BCX/UCX: .Blood  19   No growth at 5 days.  --  --      .Urine  19   No growth  --  --      .Urine  19   No growth  --  --          CARDIAC MARKERS:       COAGS:  INR: 1.12 ratio (19 @ 00:27)    	    ECG:  	  RADIOLOGY:  No new images    Imaging Personally Reviewed:  [ ] YES  [ ] NO  Consultant(s) Notes Reviewed:  [X] YES  [ ] NO  Care Discussed with Consultants/Other Providers [ ] YES  [ ] NO      ASSESSMENT AND PLAN:  89-yo M w/ PMH of CAD s/p CABG w/ subsequent PCI, Parkinson's disease, HTN, chronic afib not on AC, DVT s/p IVC filter, CKD stage 3, GIB, and multiple falls with a recent admission to [facility] (2019) for R proximal humerus fracture w/o surgery, who was referred to [facility], admitted for gross hematuria, complicated by recurrent GIB requiring several units of pRBC and platelet transfusion, MICU and SICU stays, and rectal ulcer ligation, transferred to general medicine floor for further management.    Problem 1: BRBPR  - S/p EGD/colonoscopy, showing large rectal ulceration  - Course complicated by multiple RRTs and MICU as well as SICU stays, requiring multiple units of blood transfusion.  - POD 3. OR on . S/p ligation of bleeding vessel. S/p rectal packing, now removed.   - Continue with Senna/Colace/Miralax to avoid straining  - H/H stable at this time.  - Keep type & screen active  - Transfuse to hgb 7. Goal hgb 8 with active bleeding or fever.  - Holding ASA in the setting of blood loss anemia. Will hold until cleared by GI  - No AC for bleeding.    Problem 2: Blood loss anemia  - Patient with history of GIB. Current hospitalization has been complicated by BRBPR, requiring multiple units of pRBC transfusion, and MICU and SICU stays.  - S/p ligation of bleeding ulcer in the OR. S/p rectal packing, currently removed.  - Hgb 9.2 on floor acceptance. Will continue to monitor.  - Keep type and screen active  - Transfuse to hgb 7. Goal hgb 8 with active bleeding or fever.    Problem 3: CAD  - CAD s/p CABG w/ subsequent PCI  - TTE w/ EF 55-60%.  - Holding ASA for bleeding. Will restart when cleared by GI    Problem 4: Atrial fibrillation  - Rate controlled  - Not on AC or BB at this time.    Problem 5: STEVEN on CKD  - Stage 3 CKD at baseline on chart review.  - STEVEN likely due to post-obstructive uropathy and blood loss.  - Continue to monitor  - Avoid nephrotoxins. Renally dose meds.    Problem 6: Urinary retention  - With indwelling Cortés  - Avoid manipulating Cortés at this time given blood loss anemia  - Continue to monitor    Problem 7: Cellulitis  - Groin cellulitis.  - S/p antibiotics. Currently stable  - Keep groin area dry. Monitoring WBC    Problem 8: Need for prophylactic measure  - DVT PPx: SCDs  - Full Code  - Diet: Dysphagia 2 Honey Consistency (Low Na)  - Fall precautions

## 2019-09-15 LAB
ALBUMIN SERPL ELPH-MCNC: 2.5 G/DL — LOW (ref 3.3–5)
ALP SERPL-CCNC: 55 U/L — SIGNIFICANT CHANGE UP (ref 40–120)
ALT FLD-CCNC: <5 U/L — LOW (ref 10–45)
ANION GAP SERPL CALC-SCNC: 12 MMOL/L — SIGNIFICANT CHANGE UP (ref 5–17)
APTT BLD: 28.9 SEC — SIGNIFICANT CHANGE UP (ref 27.5–36.3)
AST SERPL-CCNC: 20 U/L — SIGNIFICANT CHANGE UP (ref 10–40)
BASOPHILS # BLD AUTO: 0 K/UL — SIGNIFICANT CHANGE UP (ref 0–0.2)
BASOPHILS NFR BLD AUTO: 0.4 % — SIGNIFICANT CHANGE UP (ref 0–2)
BILIRUB SERPL-MCNC: 0.5 MG/DL — SIGNIFICANT CHANGE UP (ref 0.2–1.2)
BLD GP AB SCN SERPL QL: NEGATIVE — SIGNIFICANT CHANGE UP
BUN SERPL-MCNC: 30 MG/DL — HIGH (ref 7–23)
CALCIUM SERPL-MCNC: 7.3 MG/DL — LOW (ref 8.4–10.5)
CHLORIDE SERPL-SCNC: 105 MMOL/L — SIGNIFICANT CHANGE UP (ref 96–108)
CO2 SERPL-SCNC: 22 MMOL/L — SIGNIFICANT CHANGE UP (ref 22–31)
CREAT SERPL-MCNC: 1.55 MG/DL — HIGH (ref 0.5–1.3)
EOSINOPHIL # BLD AUTO: 0.3 K/UL — SIGNIFICANT CHANGE UP (ref 0–0.5)
EOSINOPHIL NFR BLD AUTO: 6 % — SIGNIFICANT CHANGE UP (ref 0–6)
GLUCOSE SERPL-MCNC: 108 MG/DL — HIGH (ref 70–99)
HCT VFR BLD CALC: 24 % — LOW (ref 39–50)
HGB BLD-MCNC: 8.1 G/DL — LOW (ref 13–17)
INR BLD: 1.16 RATIO — SIGNIFICANT CHANGE UP (ref 0.88–1.16)
LYMPHOCYTES # BLD AUTO: 0.9 K/UL — LOW (ref 1–3.3)
LYMPHOCYTES # BLD AUTO: 15.7 % — SIGNIFICANT CHANGE UP (ref 13–44)
MAGNESIUM SERPL-MCNC: 2.2 MG/DL — SIGNIFICANT CHANGE UP (ref 1.6–2.6)
MCHC RBC-ENTMCNC: 32.1 PG — SIGNIFICANT CHANGE UP (ref 27–34)
MCHC RBC-ENTMCNC: 33.7 GM/DL — SIGNIFICANT CHANGE UP (ref 32–36)
MCV RBC AUTO: 95.2 FL — SIGNIFICANT CHANGE UP (ref 80–100)
MONOCYTES # BLD AUTO: 0.6 K/UL — SIGNIFICANT CHANGE UP (ref 0–0.9)
MONOCYTES NFR BLD AUTO: 11.4 % — SIGNIFICANT CHANGE UP (ref 2–14)
NEUTROPHILS # BLD AUTO: 3.7 K/UL — SIGNIFICANT CHANGE UP (ref 1.8–7.4)
NEUTROPHILS NFR BLD AUTO: 66.5 % — SIGNIFICANT CHANGE UP (ref 43–77)
PHOSPHATE SERPL-MCNC: 3.4 MG/DL — SIGNIFICANT CHANGE UP (ref 2.5–4.5)
PLATELET # BLD AUTO: 113 K/UL — LOW (ref 150–400)
POTASSIUM SERPL-MCNC: 3.8 MMOL/L — SIGNIFICANT CHANGE UP (ref 3.5–5.3)
POTASSIUM SERPL-SCNC: 3.8 MMOL/L — SIGNIFICANT CHANGE UP (ref 3.5–5.3)
PROT SERPL-MCNC: 5.2 G/DL — LOW (ref 6–8.3)
PROTHROM AB SERPL-ACNC: 13.4 SEC — HIGH (ref 10–12.9)
RBC # BLD: 2.52 M/UL — LOW (ref 4.2–5.8)
RBC # FLD: 14.1 % — SIGNIFICANT CHANGE UP (ref 10.3–14.5)
RH IG SCN BLD-IMP: POSITIVE — SIGNIFICANT CHANGE UP
SODIUM SERPL-SCNC: 139 MMOL/L — SIGNIFICANT CHANGE UP (ref 135–145)
WBC # BLD: 5.6 K/UL — SIGNIFICANT CHANGE UP (ref 3.8–10.5)
WBC # FLD AUTO: 5.6 K/UL — SIGNIFICANT CHANGE UP (ref 3.8–10.5)

## 2019-09-15 PROCEDURE — 71045 X-RAY EXAM CHEST 1 VIEW: CPT | Mod: 26

## 2019-09-15 RX ADMIN — Medication 5 MILLIGRAM(S): at 22:16

## 2019-09-15 RX ADMIN — Medication 1 SPRAY(S): at 06:17

## 2019-09-15 RX ADMIN — ENTACAPONE 200 MILLIGRAM(S): 200 TABLET, FILM COATED ORAL at 22:16

## 2019-09-15 RX ADMIN — POLYETHYLENE GLYCOL 3350 17 GRAM(S): 17 POWDER, FOR SOLUTION ORAL at 11:00

## 2019-09-15 RX ADMIN — Medication 100 MILLIGRAM(S): at 13:54

## 2019-09-15 RX ADMIN — Medication 1 TABLET(S): at 11:02

## 2019-09-15 RX ADMIN — ENTACAPONE 200 MILLIGRAM(S): 200 TABLET, FILM COATED ORAL at 10:59

## 2019-09-15 RX ADMIN — CARBIDOPA AND LEVODOPA 1 TABLET(S): 25; 100 TABLET ORAL at 17:15

## 2019-09-15 RX ADMIN — Medication 1 SPRAY(S): at 17:15

## 2019-09-15 RX ADMIN — Medication 100 MILLIGRAM(S): at 22:15

## 2019-09-15 RX ADMIN — CARBIDOPA AND LEVODOPA 1 TABLET(S): 25; 100 TABLET ORAL at 22:16

## 2019-09-15 RX ADMIN — TAMSULOSIN HYDROCHLORIDE 0.4 MILLIGRAM(S): 0.4 CAPSULE ORAL at 11:01

## 2019-09-15 RX ADMIN — ENTACAPONE 200 MILLIGRAM(S): 200 TABLET, FILM COATED ORAL at 17:14

## 2019-09-15 RX ADMIN — Medication 40 MILLIGRAM(S): at 06:18

## 2019-09-15 RX ADMIN — CARBIDOPA AND LEVODOPA 1 TABLET(S): 25; 100 TABLET ORAL at 13:54

## 2019-09-15 RX ADMIN — ATORVASTATIN CALCIUM 80 MILLIGRAM(S): 80 TABLET, FILM COATED ORAL at 22:16

## 2019-09-15 RX ADMIN — CHLORHEXIDINE GLUCONATE 1 APPLICATION(S): 213 SOLUTION TOPICAL at 06:18

## 2019-09-15 RX ADMIN — SENNA PLUS 2 TABLET(S): 8.6 TABLET ORAL at 22:15

## 2019-09-15 RX ADMIN — PANTOPRAZOLE SODIUM 40 MILLIGRAM(S): 20 TABLET, DELAYED RELEASE ORAL at 06:18

## 2019-09-15 RX ADMIN — ENTACAPONE 200 MILLIGRAM(S): 200 TABLET, FILM COATED ORAL at 13:54

## 2019-09-15 RX ADMIN — CARBIDOPA AND LEVODOPA 1 TABLET(S): 25; 100 TABLET ORAL at 10:59

## 2019-09-15 RX ADMIN — Medication 100 MILLIGRAM(S): at 06:18

## 2019-09-15 RX ADMIN — Medication 50 MICROGRAM(S): at 06:18

## 2019-09-15 NOTE — PROGRESS NOTE ADULT - ASSESSMENT
88yo male  POD3 s/p EUA and suture ligation of bleeding rectal ulcer after rapid response called (9/11/2019) for active lower GI bleed (BRBPR). Rectal packing removed without BRBPR. No further episodes.    - continue primary team management  - patient not requiring packing  - no episodes of rectal bleeding therefore not requiring surgical intervention  - please page for new episodes of bleeding    [facility] [phone]

## 2019-09-15 NOTE — PROGRESS NOTE ADULT - PROBLEM SELECTOR PLAN 1
Multiple large rectal ulcers - bleeding has since resolved. Will continue to monitor  on 9/11 had an RRT 2/2 profuse rectal bleed  POD3, post emergent EUA w visible vessel ligation and rectal packing,    H/H now stable, rectal packing removed,    concerned re having productive BMs 2/2 is not allowed to strain . no further LGI bleed. consider tap water enemas prn

## 2019-09-15 NOTE — PROGRESS NOTE ADULT - ASSESSMENT
89 yo male with CAD s/p CABG, Afib, CKD3, Parkinsons disease, found to have BRBPR emergently went to OR for ligation and packing and transferred to SICU for management now transitioned to floors.

## 2019-09-15 NOTE — PROGRESS NOTE ADULT - SUBJECTIVE AND OBJECTIVE BOX
Patient seen and examined in bed. No new complaints.       MEDICATIONS  (STANDING):  atorvastatin 80 milliGRAM(s) Oral at bedtime  carbidopa/levodopa CR 50/200 1 Tablet(s) Oral <User Schedule>  chlorhexidine 2% Cloths 1 Application(s) Topical <User Schedule>  docusate sodium 100 milliGRAM(s) Oral three times a day  entacapone 200 milliGRAM(s) Oral <User Schedule>  fluticasone propionate 50 MICROgram(s)/spray Nasal Spray 1 Spray(s) Both Nostrils two times a day  furosemide    Tablet 40 milliGRAM(s) Oral daily  levothyroxine 50 MICROGram(s) Oral daily  melatonin 5 milliGRAM(s) Oral at bedtime  multivitamin 1 Tablet(s) Oral daily  pantoprazole    Tablet 40 milliGRAM(s) Oral before breakfast  polyethylene glycol 3350 17 Gram(s) Oral daily  senna 2 Tablet(s) Oral at bedtime  tamsulosin 0.4 milliGRAM(s) Oral daily      VITAL:  T(C): , Max: 36.7 (09-15-19 @ 05:00)  T(F): , Max: 98.1 (09-15-19 @ 05:00)  HR: 69 (09-15-19 @ 14:19)  BP: 135/74 (09-15-19 @ 14:19)  RR: 18 (09-15-19 @ 14:19)  SpO2: 97% (09-15-19 @ 14:19)    I and O's:    09-14 @ 07:01  -  09-15 @ 07:00  --------------------------------------------------------  IN: 0 mL / OUT: 1125 mL / NET: -1125 mL    09-15 @ 07:01  -  09-15 @ 15:54  --------------------------------------------------------  IN: 0 mL / OUT: 600 mL / NET: -600 mL          PHYSICAL EXAM:    Constitutional: NAD  Neck:  No JVD  Respiratory: CTAB/L  Cardiovascular: S1 and S2  Gastrointestinal: BS+, soft, NT/ND  Extremities: No peripheral edema  Neurological: A/O x 3, no focal deficits  Psychiatric: Normal mood, normal affect  : + Cortés  Skin: No rashes    LABS:                        8.1    5.6   )-----------( 113      ( 15 Sep 2019 06:55 )             24.0     09-15    139  |  105  |  30<H>  ----------------------------<  108<H>  3.8   |  22  |  1.55<H>    Ca    7.3<L>      15 Sep 2019 06:55  Phos  3.4     09-15  Mg     2.2     09-15    TPro  5.2<L>  /  Alb  2.5<L>  /  TBili  0.5  /  DBili  x   /  AST  20  /  ALT  <5<L>  /  AlkPhos  55  09-15      IMPRESSION: 89M w/ HTN, DVT-IVCF, Parkinson's disease, CAD-CABG, and CKD3, 8/28/19 a/w urinary retention/STEVEN    (1)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 At least in part due to past obstructive nephropathy, with resultant atrophy of his right kidney. Renal function improved/stable    (2)Surgery - POD#4 s/p surgical repair of bleeding rectal ulcer    RECOMMEND:  (1)Dose new meds for GFR 35-40ml/min  (2)BMP daily  (3)Lasix as ordered        TIFFANIE Napoles  [facility]  [phone]

## 2019-09-15 NOTE — PROGRESS NOTE ADULT - SUBJECTIVE AND OBJECTIVE BOX
SUMMARY: POD4 s/p EUA and suture ligation of bleeding rectal ulcer. On 9/11/2019 RR called for profuse rectal bleeding, patient transfused, BP appropriate. Emergently to OR.    SUBJECTIVE / 24H EVENTS  Patient seen and examined on morning rounds. No acute events overnight.  H/H stable. Patient has not had additional episodes of rectal bleeding. Off pressors Transferred from SICU to medicine    OBJECTIVE:  Vital Signs Last 24 Hrs  T(C): 36.6 (14 Sep 2019 22:00), Max: 37 (14 Sep 2019 03:00)  T(F): 97.8 (14 Sep 2019 22:00), Max: 98.6 (14 Sep 2019 03:00)  HR: 72 (14 Sep 2019 22:00) (54 - 77)  BP: 115/62 (14 Sep 2019 22:00) (100/57 - 128/65)  BP(mean): 83 (14 Sep 2019 11:00) (73 - 90)  RR: 18 (14 Sep 2019 22:00) (14 - 20)  SpO2: 96% (14 Sep 2019 22:00) (96% - 100%)    I&O's Detail    13 Sep 2019 07:01  -  14 Sep 2019 07:00  --------------------------------------------------------  IN:    multiple electrolytes Injection Type 1multiple electrolytes Injection Type 1: 450 mL    Oral Fluid: 250 mL    Packed Red Blood Cells: 300 mL  Total IN: 1000 mL    OUT:    Indwelling Catheter - Urethral: 1230 mL  Total OUT: 1230 mL    Total NET: -230 mL      14 Sep 2019 07:01  -  15 Sep 2019 02:04  --------------------------------------------------------  IN:  Total IN: 0 mL    OUT:    Indwelling Catheter - Urethral: 800 mL  Total OUT: 800 mL    Total NET: -800 mL          Inpatient Medications:  MEDICATIONS  (STANDING):  atorvastatin 80 milliGRAM(s) Oral at bedtime  carbidopa/levodopa CR 50/200 1 Tablet(s) Oral <User Schedule>  chlorhexidine 2% Cloths 1 Application(s) Topical <User Schedule>  docusate sodium 100 milliGRAM(s) Oral three times a day  entacapone 200 milliGRAM(s) Oral <User Schedule>  fluticasone propionate 50 MICROgram(s)/spray Nasal Spray 1 Spray(s) Both Nostrils two times a day  furosemide    Tablet 40 milliGRAM(s) Oral daily  levothyroxine 50 MICROGram(s) Oral daily  melatonin 5 milliGRAM(s) Oral at bedtime  multivitamin 1 Tablet(s) Oral daily  pantoprazole    Tablet 40 milliGRAM(s) Oral before breakfast  polyethylene glycol 3350 17 Gram(s) Oral daily  senna 2 Tablet(s) Oral at bedtime  tamsulosin 0.4 milliGRAM(s) Oral daily    MEDICATIONS  (PRN):  acetaminophen   Tablet .. 650 milliGRAM(s) Oral every 6 hours PRN Mild Pain (1 - 3)  QUEtiapine 12.5 milliGRAM(s) Oral at bedtime PRN agitation      Physical Examination:  GEN: NAD, resting quietly  NEURO: AAOx3, CN II-XII grossly intact, no focal deficits  PULM: symmetric chest rise bilaterally, no increased WOB  ABD: soft, nontender, nondistended  EXTR: no cyanosis or edema, moving all extremities  LABS:                        9.2    7.4   )-----------( 119      ( 14 Sep 2019 00:27 )             26.6       09-14    137  |  104  |  30<H>  ----------------------------<  107<H>  4.4   |  22  |  1.85<H>    Ca    8.2<L>      14 Sep 2019 00:27  Phos  3.6     09-14  Mg     2.4     09-14        CULTURES:  .Blood  08-28 @ 17:55   No growth at 5 days.  --  --      .Urine  08-28 @ 17:52   No growth  --  --      .Urine  08-27 @ 21:50   No growth  --  --          IMAGING:  []

## 2019-09-15 NOTE — PROGRESS NOTE ADULT - SUBJECTIVE AND OBJECTIVE BOX
Nixon Islas, PGY-1  [phone]    Patient is a 89y old  Male who presents with a chief complaint of Sent in from [facility] for gross haematuria following Cortés placement.  Groin erythema, oedema (15 Sep 2019 02:04)      SUBJECTIVE/OVERNIGHT EVENTS: No acute events overnight. Patient had large BM yesterday and RN reports minimal amount of blood. Patient scared to have BM due to bleeding. Patient seen and examined at bedside this AM. Patient says he's feeling well. Patient denies CP, SOB, dyspnea, cough, hemoptysis, abd pain, diarrhea, constipation, fever, chills, nausea, vomiting.      MEDICATIONS  (STANDING):  atorvastatin 80 milliGRAM(s) Oral at bedtime  carbidopa/levodopa CR 50/200 1 Tablet(s) Oral <User Schedule>  chlorhexidine 2% Cloths 1 Application(s) Topical <User Schedule>  docusate sodium 100 milliGRAM(s) Oral three times a day  entacapone 200 milliGRAM(s) Oral <User Schedule>  fluticasone propionate 50 MICROgram(s)/spray Nasal Spray 1 Spray(s) Both Nostrils two times a day  furosemide    Tablet 40 milliGRAM(s) Oral daily  levothyroxine 50 MICROGram(s) Oral daily  melatonin 5 milliGRAM(s) Oral at bedtime  multivitamin 1 Tablet(s) Oral daily  pantoprazole    Tablet 40 milliGRAM(s) Oral before breakfast  polyethylene glycol 3350 17 Gram(s) Oral daily  senna 2 Tablet(s) Oral at bedtime  tamsulosin 0.4 milliGRAM(s) Oral daily    MEDICATIONS  (PRN):  acetaminophen   Tablet .. 650 milliGRAM(s) Oral every 6 hours PRN Mild Pain (1 - 3)  QUEtiapine 12.5 milliGRAM(s) Oral at bedtime PRN agitation    CAPILLARY BLOOD GLUCOSE        I&O's Summary    14 Sep 2019 07:01  -  15 Sep 2019 07:00  --------------------------------------------------------  IN: 0 mL / OUT: 1125 mL / NET: -1125 mL          Vital Signs Last 24 Hrs  T(C): 36.7 (15 Sep 2019 05:00), Max: 36.7 (15 Sep 2019 05:00)  T(F): 98.1 (15 Sep 2019 05:00), Max: 98.1 (15 Sep 2019 05:00)  HR: 61 (15 Sep 2019 05:00) (54 - 77)  BP: 115/55 (15 Sep 2019 05:00) (100/57 - 128/65)  BP(mean): 83 (14 Sep 2019 11:00) (73 - 83)  RR: 18 (15 Sep 2019 05:00) (16 - 20)  SpO2: 95% (15 Sep 2019 05:00) (95% - 100%)    PHYSICAL EXAM:  GENERAL: NAD, well-developed  HEAD:  Atraumatic, Normocephalic  EYES: EOMI, PERRLA, conjunctiva and sclera clear  NECK: Supple, No JVD  CHEST/LUNG: Clear to auscultation bilaterally; No wheeze  HEART: Regular rate and rhythm; No murmurs, rubs, or gallops  ABDOMEN: Soft, Nontender, Nondistended; Bowel sounds present  EXTREMITIES:  2+ Peripheral Pulses, No clubbing, cyanosis, or edema  PSYCH: AAOx3  NEUROLOGY: non-focal  SKIN: No rashes or lesions    LABS                        8.1    5.6   )-----------( 113      ( 15 Sep 2019 06:55 )             24.0                         9.2    7.4   )-----------( 119      ( 14 Sep 2019 00:27 )             26.6     09-15    139  |  105  |  30<H>  ----------------------------<  108<H>  3.8   |  22  |  1.55<H>  09-14    137  |  104  |  30<H>  ----------------------------<  107<H>  4.4   |  22  |  1.85<H>    Ca    7.3<L>      15 Sep 2019 06:55  Ca    8.2<L>      14 Sep 2019 00:27  Phos  3.4     09-15  Mg     2.2     09-15    TPro  5.2<L>  /  Alb  2.5<L>  /  TBili  0.5  /  DBili  x   /  AST  20  /  ALT  <5<L>  /  AlkPhos  55  09-15    PT/INR - ( 15 Sep 2019 06:55 )   PT: 13.4 sec;   INR: 1.16 ratio         PTT - ( 15 Sep 2019 06:55 )  PTT:28.9 sec          ( 11 Sep 2019 18:08 ) pH: 7.44  /  pCO2: 37    /  pO2: 67    / HCO3: 25    / Base Excess: 1.5   /  SaO2: 95                    RADIOLOGY & ADDITIONAL TESTS:    Imaging Personally Reviewed:    Consultant(s) Notes Reviewed:      Care Discussed with Consultants/Other Providers:

## 2019-09-15 NOTE — PROGRESS NOTE ADULT - SUBJECTIVE AND OBJECTIVE BOX
Subjective: Patient seen and examined. No new events except as noted.     REVIEW OF SYSTEMS:    CONSTITUTIONAL: + weakness, fevers or chills  EYES/ENT: No visual changes;  No vertigo or throat pain   NECK: No pain or stiffness  RESPIRATORY: No cough, wheezing, hemoptysis; No shortness of breath  CARDIOVASCULAR: No chest pain or palpitations  GASTROINTESTINAL: No abdominal or epigastric pain. No nausea, vomiting, or hematemesis; No diarrhea or constipation. No melena or hematochezia.  GENITOURINARY: No dysuria, frequency or hematuria  NEUROLOGICAL: No numbness or weakness  SKIN: No itching, burning, rashes, or lesions   All other review of systems is negative unless indicated above.    MEDICATIONS:  MEDICATIONS  (STANDING):  atorvastatin 80 milliGRAM(s) Oral at bedtime  carbidopa/levodopa CR 50/200 1 Tablet(s) Oral <User Schedule>  chlorhexidine 2% Cloths 1 Application(s) Topical <User Schedule>  docusate sodium 100 milliGRAM(s) Oral three times a day  entacapone 200 milliGRAM(s) Oral <User Schedule>  fluticasone propionate 50 MICROgram(s)/spray Nasal Spray 1 Spray(s) Both Nostrils two times a day  furosemide    Tablet 40 milliGRAM(s) Oral daily  levothyroxine 50 MICROGram(s) Oral daily  melatonin 5 milliGRAM(s) Oral at bedtime  multivitamin 1 Tablet(s) Oral daily  pantoprazole    Tablet 40 milliGRAM(s) Oral before breakfast  polyethylene glycol 3350 17 Gram(s) Oral daily  senna 2 Tablet(s) Oral at bedtime  tamsulosin 0.4 milliGRAM(s) Oral daily      PHYSICAL EXAM:  T(C): 36.7 (09-15-19 @ 05:00), Max: 36.7 (09-15-19 @ 05:00)  HR: 61 (09-15-19 @ 05:00) (61 - 77)  BP: 115/55 (09-15-19 @ 05:00) (115/55 - 128/65)  RR: 18 (09-15-19 @ 05:00) (18 - 18)  SpO2: 95% (09-15-19 @ 05:00) (95% - 97%)  Wt(kg): --  I&O's Summary    14 Sep 2019 07:01  -  15 Sep 2019 07:00  --------------------------------------------------------  IN: 0 mL / OUT: 1125 mL / NET: -1125 mL        Appearance: NAD  HEENT:   Irregular  oral mucosa, PERRL, EOMI	  Lymphatic: No lymphadenopathy , right arm/shoulder sling   Cardiovascular: Irregular rS1 S2, No JVD, No murmurs , Peripheral pulses palpable 2+ bilaterally  Respiratory: Decreased bs and effort   Gastrointestinal:  Soft, Non-tender, + BS	  Skin: No rashes, No ecchymoses, No cyanosis, warm to touch  Musculoskeletal: Decreased range of motion and  strength  Psychiatry:  Mood & affect appropriate  Ext: No edema  +lara       LABS:    CARDIAC MARKERS:                                8.1    5.6   )-----------( 113      ( 15 Sep 2019 06:55 )             24.0     09-15    139  |  105  |  30<H>  ----------------------------<  108<H>  3.8   |  22  |  1.55<H>    Ca    7.3<L>      15 Sep 2019 06:55  Phos  3.4     09-15  Mg     2.2     09-15    TPro  5.2<L>  /  Alb  2.5<L>  /  TBili  0.5  /  DBili  x   /  AST  20  /  ALT  <5<L>  /  AlkPhos  55  09-15    proBNP:   Lipid Profile:   HgA1c:   TSH:             TELEMETRY: 	    ECG:  	  RADIOLOGY:   DIAGNOSTIC TESTING:  [ ] Echocardiogram:  [ ]  Catheterization:  [ ] Stress Test:    OTHER:

## 2019-09-16 DIAGNOSIS — K92.2 GASTROINTESTINAL HEMORRHAGE, UNSPECIFIED: ICD-10-CM

## 2019-09-16 DIAGNOSIS — Z29.9 ENCOUNTER FOR PROPHYLACTIC MEASURES, UNSPECIFIED: ICD-10-CM

## 2019-09-16 LAB
ANION GAP SERPL CALC-SCNC: 10 MMOL/L — SIGNIFICANT CHANGE UP (ref 5–17)
BUN SERPL-MCNC: 25 MG/DL — HIGH (ref 7–23)
CALCIUM SERPL-MCNC: 7.8 MG/DL — LOW (ref 8.4–10.5)
CHLORIDE SERPL-SCNC: 106 MMOL/L — SIGNIFICANT CHANGE UP (ref 96–108)
CO2 SERPL-SCNC: 23 MMOL/L — SIGNIFICANT CHANGE UP (ref 22–31)
CREAT SERPL-MCNC: 1.52 MG/DL — HIGH (ref 0.5–1.3)
GLUCOSE SERPL-MCNC: 110 MG/DL — HIGH (ref 70–99)
HCT VFR BLD CALC: 23.3 % — LOW (ref 39–50)
HGB BLD-MCNC: 7.9 G/DL — LOW (ref 13–17)
IRON SATN MFR SERPL: 69 UG/DL — SIGNIFICANT CHANGE UP (ref 45–165)
MAGNESIUM SERPL-MCNC: 2.1 MG/DL — SIGNIFICANT CHANGE UP (ref 1.6–2.6)
MCHC RBC-ENTMCNC: 32.6 PG — SIGNIFICANT CHANGE UP (ref 27–34)
MCHC RBC-ENTMCNC: 34 GM/DL — SIGNIFICANT CHANGE UP (ref 32–36)
MCV RBC AUTO: 95.8 FL — SIGNIFICANT CHANGE UP (ref 80–100)
PHOSPHATE SERPL-MCNC: 3.2 MG/DL — SIGNIFICANT CHANGE UP (ref 2.5–4.5)
PLATELET # BLD AUTO: 123 K/UL — LOW (ref 150–400)
POTASSIUM SERPL-MCNC: 3.7 MMOL/L — SIGNIFICANT CHANGE UP (ref 3.5–5.3)
POTASSIUM SERPL-SCNC: 3.7 MMOL/L — SIGNIFICANT CHANGE UP (ref 3.5–5.3)
RBC # BLD: 2.44 M/UL — LOW (ref 4.2–5.8)
RBC # FLD: 14.1 % — SIGNIFICANT CHANGE UP (ref 10.3–14.5)
SODIUM SERPL-SCNC: 139 MMOL/L — SIGNIFICANT CHANGE UP (ref 135–145)
WBC # BLD: 4.8 K/UL — SIGNIFICANT CHANGE UP (ref 3.8–10.5)
WBC # FLD AUTO: 4.8 K/UL — SIGNIFICANT CHANGE UP (ref 3.8–10.5)

## 2019-09-16 RX ORDER — IRON SUCROSE 20 MG/ML
100 INJECTION, SOLUTION INTRAVENOUS ONCE
Refills: 0 | Status: COMPLETED | OUTPATIENT
Start: 2019-09-16 | End: 2019-09-16

## 2019-09-16 RX ADMIN — CHLORHEXIDINE GLUCONATE 1 APPLICATION(S): 213 SOLUTION TOPICAL at 06:18

## 2019-09-16 RX ADMIN — Medication 100 MILLIGRAM(S): at 15:06

## 2019-09-16 RX ADMIN — TAMSULOSIN HYDROCHLORIDE 0.4 MILLIGRAM(S): 0.4 CAPSULE ORAL at 11:06

## 2019-09-16 RX ADMIN — ATORVASTATIN CALCIUM 80 MILLIGRAM(S): 80 TABLET, FILM COATED ORAL at 22:18

## 2019-09-16 RX ADMIN — ENTACAPONE 200 MILLIGRAM(S): 200 TABLET, FILM COATED ORAL at 11:02

## 2019-09-16 RX ADMIN — Medication 1 SPRAY(S): at 06:18

## 2019-09-16 RX ADMIN — ENTACAPONE 200 MILLIGRAM(S): 200 TABLET, FILM COATED ORAL at 18:13

## 2019-09-16 RX ADMIN — CARBIDOPA AND LEVODOPA 1 TABLET(S): 25; 100 TABLET ORAL at 18:13

## 2019-09-16 RX ADMIN — ENTACAPONE 200 MILLIGRAM(S): 200 TABLET, FILM COATED ORAL at 15:05

## 2019-09-16 RX ADMIN — Medication 50 MICROGRAM(S): at 06:18

## 2019-09-16 RX ADMIN — IRON SUCROSE 210 MILLIGRAM(S): 20 INJECTION, SOLUTION INTRAVENOUS at 15:06

## 2019-09-16 RX ADMIN — CARBIDOPA AND LEVODOPA 1 TABLET(S): 25; 100 TABLET ORAL at 11:03

## 2019-09-16 RX ADMIN — Medication 100 MILLIGRAM(S): at 06:18

## 2019-09-16 RX ADMIN — Medication 5 MILLIGRAM(S): at 22:17

## 2019-09-16 RX ADMIN — Medication 1 SPRAY(S): at 18:12

## 2019-09-16 RX ADMIN — PANTOPRAZOLE SODIUM 40 MILLIGRAM(S): 20 TABLET, DELAYED RELEASE ORAL at 06:18

## 2019-09-16 RX ADMIN — SENNA PLUS 2 TABLET(S): 8.6 TABLET ORAL at 22:18

## 2019-09-16 RX ADMIN — Medication 40 MILLIGRAM(S): at 06:18

## 2019-09-16 RX ADMIN — ENTACAPONE 200 MILLIGRAM(S): 200 TABLET, FILM COATED ORAL at 22:18

## 2019-09-16 RX ADMIN — Medication 100 MILLIGRAM(S): at 22:18

## 2019-09-16 RX ADMIN — Medication 1 TABLET(S): at 11:06

## 2019-09-16 RX ADMIN — CARBIDOPA AND LEVODOPA 1 TABLET(S): 25; 100 TABLET ORAL at 15:06

## 2019-09-16 RX ADMIN — CARBIDOPA AND LEVODOPA 1 TABLET(S): 25; 100 TABLET ORAL at 22:18

## 2019-09-16 NOTE — PROGRESS NOTE ADULT - PROBLEM SELECTOR PLAN 7
- DVT PPX: SCDs  - Diet: dysphagia 2 mechanical soft  - Dispo: likely to rehab - DVT PPX: SCDs  - Diet: dysphagia 2 mechanical soft  - Dispo: to rehab

## 2019-09-16 NOTE — PROGRESS NOTE ADULT - SUBJECTIVE AND OBJECTIVE BOX
Interval Events: No acute overnight events. Pt with one dark BM overnight. Pt with no complaints this morning. Denies abdominal pain, CP, SOB, n/v.     REVIEW OF SYSTEMS:    CONSTITUTIONAL: No weakness, fevers or chills  EYES/ENT: No visual changes;  No vertigo or throat pain   NECK: No pain or stiffness  RESPIRATORY: No cough, wheezing, hemoptysis; No shortness of breath  CARDIOVASCULAR: No chest pain or palpitations  GASTROINTESTINAL: No abdominal or epigastric pain. No nausea, vomiting, or hematemesis; No diarrhea or constipation. No melena or hematochezia.  GENITOURINARY: No dysuria, frequency or hematuria  NEUROLOGICAL: No numbness or weakness  SKIN: No itching, rashes    OBJECTIVE:  ICU Vital Signs Last 24 Hrs  T(C): 36.6 (16 Sep 2019 04:35), Max: 37.1 (15 Sep 2019 22:07)  T(F): 97.8 (16 Sep 2019 04:35), Max: 98.7 (15 Sep 2019 22:07)  HR: 61 (16 Sep 2019 04:35) (61 - 71)  BP: 128/62 (16 Sep 2019 04:35) (115/62 - 136/72)  BP(mean): --  ABP: --  ABP(mean): --  RR: 18 (16 Sep 2019 04:35) (18 - 18)  SpO2: 97% (16 Sep 2019 04:35) (96% - 100%)        09-15 @ 07:01  -  09-16 @ 07:00  --------------------------------------------------------  IN: 0 mL / OUT: 900 mL / NET: -900 mL      CAPILLARY BLOOD GLUCOSE          PHYSICAL EXAM:  GEN: NAD, resting quietly  NEURO: AAOx3, CN II-XII grossly intact, no focal deficits  PULM: symmetric chest rise bilaterally, no increased WOB  ABD: soft, nontender, nondistended  EXTR: no cyanosis or edema, moving all extremities  : lara in place    HOSPITAL MEDICATIONS:      furosemide    Tablet 40 milliGRAM(s) Oral daily  tamsulosin 0.4 milliGRAM(s) Oral daily    atorvastatin 80 milliGRAM(s) Oral at bedtime  levothyroxine 50 MICROGram(s) Oral daily      acetaminophen   Tablet .. 650 milliGRAM(s) Oral every 6 hours PRN  carbidopa/levodopa CR 50/200 1 Tablet(s) Oral <User Schedule>  entacapone 200 milliGRAM(s) Oral <User Schedule>  melatonin 5 milliGRAM(s) Oral at bedtime  QUEtiapine 12.5 milliGRAM(s) Oral at bedtime PRN    docusate sodium 100 milliGRAM(s) Oral three times a day  pantoprazole    Tablet 40 milliGRAM(s) Oral before breakfast  polyethylene glycol 3350 17 Gram(s) Oral daily  senna 2 Tablet(s) Oral at bedtime        multivitamin 1 Tablet(s) Oral daily      chlorhexidine 2% Cloths 1 Application(s) Topical <User Schedule>  fluticasone propionate 50 MICROgram(s)/spray Nasal Spray 1 Spray(s) Both Nostrils two times a day        LABS:                        8.1    5.6   )-----------( 113      ( 15 Sep 2019 06:55 )             24.0     Hgb Trend: 8.1<--, 9.2<--, 9.0<--, 7.6<--, 8.0<--  09-15    139  |  105  |  30<H>  ----------------------------<  108<H>  3.8   |  22  |  1.55<H>    Ca    7.3<L>      15 Sep 2019 06:55  Phos  3.4     09-15  Mg     2.2     09-15    TPro  5.2<L>  /  Alb  2.5<L>  /  TBili  0.5  /  DBili  x   /  AST  20  /  ALT  <5<L>  /  AlkPhos  55  09-15    Creatinine Trend: 1.55<--, 1.85<--, 1.70<--, 1.75<--, 1.76<--, 1.69<--  PT/INR - ( 15 Sep 2019 06:55 )   PT: 13.4 sec;   INR: 1.16 ratio         PTT - ( 15 Sep 2019 06:55 )  PTT:28.9 sec

## 2019-09-16 NOTE — PROGRESS NOTE ADULT - ASSESSMENT
88 yo male with CAD s/p CABG, Afib, CKD3, Parkinsons disease, found to have BRBPR emergently went to OR for ligation and packing and transferred to SICU for management now transitioned to floors.

## 2019-09-16 NOTE — PROGRESS NOTE ADULT - ASSESSMENT
89yo male POD 4 s/p EUA and suture ligation of bleeding rectal ulcer after rapid response called (9/11/2019) for active lower GI bleed (BRBPR). Rectal packing removed without BRBPR. No further episodes.    - continue primary team management  - patient not requiring packing  - no episodes of rectal bleeding therefore not requiring surgical intervention  - please page for new episodes of bleeding    [facility] [phone]

## 2019-09-16 NOTE — PROGRESS NOTE ADULT - SUBJECTIVE AND OBJECTIVE BOX
No pain, no shortness of breath      VITAL:  T(C): , Max: 37.1 (09-15-19 @ 22:07)  T(F): , Max: 98.7 (09-15-19 @ 22:07)  HR: 61 (09-16-19 @ 04:35)  BP: 128/62 (09-16-19 @ 04:35)  BP(mean): --  RR: 18 (09-16-19 @ 04:35)  SpO2: 97% (09-16-19 @ 04:35)  Wt(kg): --      PHYSICAL EXAM:  Constitutional: NAD; Alert  HEENT:  NCAT; DMM  Neck: No JVD; supple  Respiratory: CTA-b/l  Cardiac: RRR s1s2  Gastrointestinal: BS+, soft, NT/ND  Urologic: (+)lara  Extremities: No peripheral edema  Back: No CVAT b/l    LABS:                        7.9    4.8   )-----------( 123      ( 16 Sep 2019 07:22 )             23.3     Na(139)/K(3.7)/Cl(106)/HCO3(23)/BUN(25)/Cr(1.52)Glu(110)/Ca(7.8)/Mg(2.1)/PO4(3.2)    09-16 @ 07:12  Na(139)/K(3.8)/Cl(105)/HCO3(22)/BUN(30)/Cr(1.55)Glu(108)/Ca(7.3)/Mg(2.2)/PO4(3.4)    09-15 @ 06:55  Na(137)/K(4.4)/Cl(104)/HCO3(22)/BUN(30)/Cr(1.85)Glu(107)/Ca(8.2)/Mg(2.4)/PO4(3.6)    09-14 @ 00:27      IMPRESSION: 89M w/ HTN, DVT-IVCF, Parkinson's disease, CAD-CABG, and CKD3, 8/28/19 a/w urinary retention/STEVEN    (1)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 At least in part due to past obstructive nephropathy, with resultant atrophy of his right kidney. Resolved superimposed STEVEN from obstruction    (2)Surgery - POD#5 s/p surgical repair of bleeding rectal ulcer      RECOMMEND:  (1)Dose new meds for GFR 30-40ml/min (present dosing is acceptable)  (2)Management of anemia per SICU/GI  (3)Diet as tolerated            Abdi Vieira MD  [facility]  [phone]

## 2019-09-16 NOTE — PROGRESS NOTE ADULT - PROBLEM SELECTOR PLAN 1
Multiple large rectal ulcers - bleeding has since resolved. Will continue to monitor  on 9/11 had an RRT 2/2 profuse rectal bleed  POD3, post emergent EUA w visible vessel ligation and rectal packing,    H/H now stable, rectal packing removed,    concerned re having productive BMs 2/2 is not allowed to strain . no further LGI bleed. consider tap water enemas prn Multiple large rectal ulcers - bleeding has since resolved. Will continue to monitor  on 9/11 had an RRT 2/2 profuse rectal bleed  POD3, post emergent EUA w visible vessel ligation and rectal packing,    H/H now stable, rectal packing removed,    concerned re having productive BMs 2/2 is not allowed to strain . no further LGI bleed. consider tap water enemas prn  checking iron level to administer venofer if levels low

## 2019-09-16 NOTE — PROGRESS NOTE ADULT - SUBJECTIVE AND OBJECTIVE BOX
Subjective: Patient seen and examined. No new events except as noted.   resting comfortably     REVIEW OF SYSTEMS:    CONSTITUTIONAL: +weakness, fevers or chills  EYES/ENT: No visual changes;  No vertigo or throat pain   NECK: No pain or stiffness  RESPIRATORY: No cough, wheezing, hemoptysis; No shortness of breath  CARDIOVASCULAR: No chest pain or palpitations  GASTROINTESTINAL: No abdominal or epigastric pain. No nausea, vomiting, or hematemesis; No diarrhea or constipation. No melena or hematochezia.  GENITOURINARY: No dysuria, frequency or hematuria  NEUROLOGICAL: No numbness or weakness  SKIN: No itching, burning, rashes, or lesions   All other review of systems is negative unless indicated above.    MEDICATIONS:  MEDICATIONS  (STANDING):  atorvastatin 80 milliGRAM(s) Oral at bedtime  carbidopa/levodopa CR 50/200 1 Tablet(s) Oral <User Schedule>  chlorhexidine 2% Cloths 1 Application(s) Topical <User Schedule>  docusate sodium 100 milliGRAM(s) Oral three times a day  entacapone 200 milliGRAM(s) Oral <User Schedule>  fluticasone propionate 50 MICROgram(s)/spray Nasal Spray 1 Spray(s) Both Nostrils two times a day  furosemide    Tablet 40 milliGRAM(s) Oral daily  levothyroxine 50 MICROGram(s) Oral daily  melatonin 5 milliGRAM(s) Oral at bedtime  multivitamin 1 Tablet(s) Oral daily  pantoprazole    Tablet 40 milliGRAM(s) Oral before breakfast  polyethylene glycol 3350 17 Gram(s) Oral daily  senna 2 Tablet(s) Oral at bedtime  tamsulosin 0.4 milliGRAM(s) Oral daily      PHYSICAL EXAM:  T(C): 36.6 (09-16-19 @ 04:35), Max: 37.1 (09-15-19 @ 22:07)  HR: 61 (09-16-19 @ 04:35) (61 - 69)  BP: 128/62 (09-16-19 @ 04:35) (115/62 - 136/72)  RR: 18 (09-16-19 @ 04:35) (18 - 18)  SpO2: 97% (09-16-19 @ 04:35) (97% - 100%)  Wt(kg): --  I&O's Summary    15 Sep 2019 07:01  -  16 Sep 2019 07:00  --------------------------------------------------------  IN: 0 mL / OUT: 900 mL / NET: -900 mL          Appearance: NAD  HEENT:   Irregular  oral mucosa, PERRL, EOMI	  Lymphatic: No lymphadenopathy , right arm/shoulder sling   Cardiovascular: Irregular rS1 S2, No JVD, No murmurs , Peripheral pulses palpable 2+ bilaterally  Respiratory: Decreased bs and effort   Gastrointestinal:  Soft, Non-tender, + BS	  Skin: No rashes, No ecchymoses, No cyanosis, warm to touch  Musculoskeletal: Decreased range of motion and  strength  Psychiatry:  Mood & affect appropriate  Ext: No edema  +lara     LABS:    CARDIAC MARKERS:                                7.9    4.8   )-----------( 123      ( 16 Sep 2019 07:22 )             23.3     09-16    139  |  106  |  25<H>  ----------------------------<  110<H>  3.7   |  23  |  1.52<H>    Ca    7.8<L>      16 Sep 2019 07:12  Phos  3.2     09-16  Mg     2.1     09-16    TPro  5.2<L>  /  Alb  2.5<L>  /  TBili  0.5  /  DBili  x   /  AST  20  /  ALT  <5<L>  /  AlkPhos  55  09-15    proBNP:   Lipid Profile:   HgA1c:   TSH:             TELEMETRY: 	    ECG:  	  RADIOLOGY:   DIAGNOSTIC TESTING:  [ ] Echocardiogram:  [ ]  Catheterization:  [ ] Stress Test:    OTHER:

## 2019-09-16 NOTE — PROGRESS NOTE ADULT - SUBJECTIVE AND OBJECTIVE BOX
[facility]      Seen earlier today, and examined.  - Today, patient is without complaints.           *****MEDICATIONS: Current medication reviewed and documented.    MEDICATIONS  (STANDING):  atorvastatin 80 milliGRAM(s) Oral at bedtime  carbidopa/levodopa CR 50/200 1 Tablet(s) Oral <User Schedule>  chlorhexidine 2% Cloths 1 Application(s) Topical <User Schedule>  docusate sodium 100 milliGRAM(s) Oral three times a day  entacapone 200 milliGRAM(s) Oral <User Schedule>  fluticasone propionate 50 MICROgram(s)/spray Nasal Spray 1 Spray(s) Both Nostrils two times a day  furosemide    Tablet 40 milliGRAM(s) Oral daily  levothyroxine 50 MICROGram(s) Oral daily  melatonin 5 milliGRAM(s) Oral at bedtime  multivitamin 1 Tablet(s) Oral daily  pantoprazole    Tablet 40 milliGRAM(s) Oral before breakfast  polyethylene glycol 3350 17 Gram(s) Oral daily  senna 2 Tablet(s) Oral at bedtime  tamsulosin 0.4 milliGRAM(s) Oral daily    MEDICATIONS  (PRN):  acetaminophen   Tablet .. 650 milliGRAM(s) Oral every 6 hours PRN Mild Pain (1 - 3)  QUEtiapine 12.5 milliGRAM(s) Oral at bedtime PRN agitation          ***** VITAL SIGNS:  T(F): 97.8 (19 @ 04:35), Max: 98.7 (09-15-19 @ 22:07)  HR: 61 (19 @ 04:35) (61 - 69)  BP: 128/62 (19 @ 04:35) (115/62 - 136/72)  RR: 18 (19 @ 04:35) (18 - 18)  SpO2: 97% (19 @ 04:35) (97% - 100%)  Wt(kg): --  ,   I&O's Summary    15 Sep 2019 07:01  -  16 Sep 2019 07:00  --------------------------------------------------------  IN: 0 mL / OUT: 900 mL / NET: -900 mL             *****PHYSICAL EXAM: Alert oriented x 2  Attention comprehension are fair. Able to name, repeat  without any difficulty.   Able to follow 1-2  step commands.     EOMI fundi not visualized,  VFF to confrontration  No facial asymmetry   Tongue is midline   Palate elevates symmetrically   Moving all 4 ext symmetrically no pronator drift  limited eval of rue due to sling      sensation is grossly symmetric  Gait not assessed.              *****LAB AND IMAGIN.9    4.8   )-----------( 123      ( 16 Sep 2019 07:22 )             23.3               09-    139  |  106  |  25<H>  ----------------------------<  110<H>  3.7   |  23  |  1.52<H>    Ca    7.8<L>      16 Sep 2019 07:12  Phos  3.2       Mg     2.1         TPro  5.2<L>  /  Alb  2.5<L>  /  TBili  0.5  /  DBili  x   /  AST  20  /  ALT  <5<L>  /  AlkPhos  55  09-15    PT/INR - ( 15 Sep 2019 06:55 )   PT: 13.4 sec;   INR: 1.16 ratio         PTT - ( 15 Sep 2019 06:55 )  PTT:28.9 sec                     [All pertinent recent Imaging/Reports reviewed]           *****A S S E S S M E N T   A N D   P L A N :        Excerpt from H&P, 88 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  Patient reported that he had local trauma to the skin of his penis following an attempt to use a hand held urinal.   Patient also notes that patient was on a ? commode and was noted to have increased scrotal oedema and redness. called to manage his parkinsons dementia. unable to tell me the name of his neurologist.  He reports having hallucinations, reports hearing his daughter and friend planning to get him into rehab, however it appears that they both were not there.   He is not sure if these are dreams.   He doesn't know if he act out his dreams.          Problem/Recommendations 1: Parkinson's   continue sinemet/entacapone  ? hallucinations related to parkinson's vs. metabolic encephalopathy due to STEVEN   will continue to monitor closely   regulate sleep wake cycle.   consider melatonin for sleep augmentation.   seroquel 12.5 qhs bedtime due to agitation    Problem/Recommendations 2:  Falls likely related to parkinsons +/- deconditioning +/- microvascular disease.   fall precautions.   pt consult  appreciated recommend jeannie     Problem/Recommendations 3: anemia   defer to gi  Thank you for allowing me to participate in the care of this patient. Please do not hesitate to call me if you have any  questions.        ________________  Farideh Irvin MD  [facility] ([facility]  [phone]      30 minutes spent on total encounter; more than 50 % of the visit was  spent counseling about plan of care, compliance to diet/exercise and medication regimen and or  coordinating care by the attending physician.      It is advised that stroke patients follow up with GUADALUPE Viera @ [phone] in 1- 2 weeks.   Others please follow up with Dr. Michael Nissenbaum [phone]

## 2019-09-16 NOTE — PROGRESS NOTE ADULT - SUBJECTIVE AND OBJECTIVE BOX
SUMMARY: POD5 s/p EUA and suture ligation of bleeding rectal ulcer. On 9/11/2019 RR called for profuse rectal bleeding, patient transfused, BP appropriate. Emergently to OR.    SUBJECTIVE / 24H EVENTS  Patient seen and examined on morning rounds. Clinically stable with no acute events overnight. Patient had 1 dark BM yesterday evening, H/H stable.     OBJECTIVE:  Vital Signs Last 24 Hrs  T(C): 36.6 (16 Sep 2019 04:35), Max: 37.1 (15 Sep 2019 22:07)  T(F): 97.8 (16 Sep 2019 04:35), Max: 98.7 (15 Sep 2019 22:07)  HR: 61 (16 Sep 2019 04:35) (61 - 71)  BP: 128/62 (16 Sep 2019 04:35) (115/62 - 136/72)  BP(mean): --  RR: 18 (16 Sep 2019 04:35) (18 - 18)  SpO2: 97% (16 Sep 2019 04:35) (96% - 100%)    I&O's Detail    13 Sep 2019 07:01  -  14 Sep 2019 07:00  --------------------------------------------------------  IN:    multiple electrolytes Injection Type 1multiple electrolytes Injection Type 1: 450 mL    Oral Fluid: 250 mL    Packed Red Blood Cells: 300 mL  Total IN: 1000 mL    OUT:    Indwelling Catheter - Urethral: 1230 mL  Total OUT: 1230 mL    Total NET: -230 mL      14 Sep 2019 07:01  -  15 Sep 2019 02:04  --------------------------------------------------------  IN:  Total IN: 0 mL    OUT:    Indwelling Catheter - Urethral: 800 mL  Total OUT: 800 mL    Total NET: -800 mL          Inpatient Medications:  MEDICATIONS  (STANDING):  atorvastatin 80 milliGRAM(s) Oral at bedtime  carbidopa/levodopa CR 50/200 1 Tablet(s) Oral <User Schedule>  chlorhexidine 2% Cloths 1 Application(s) Topical <User Schedule>  docusate sodium 100 milliGRAM(s) Oral three times a day  entacapone 200 milliGRAM(s) Oral <User Schedule>  fluticasone propionate 50 MICROgram(s)/spray Nasal Spray 1 Spray(s) Both Nostrils two times a day  furosemide    Tablet 40 milliGRAM(s) Oral daily  levothyroxine 50 MICROGram(s) Oral daily  melatonin 5 milliGRAM(s) Oral at bedtime  multivitamin 1 Tablet(s) Oral daily  pantoprazole    Tablet 40 milliGRAM(s) Oral before breakfast  polyethylene glycol 3350 17 Gram(s) Oral daily  senna 2 Tablet(s) Oral at bedtime  tamsulosin 0.4 milliGRAM(s) Oral daily    MEDICATIONS  (PRN):  acetaminophen   Tablet .. 650 milliGRAM(s) Oral every 6 hours PRN Mild Pain (1 - 3)  QUEtiapine 12.5 milliGRAM(s) Oral at bedtime PRN agitation      Physical Examination:  GEN: NAD, resting quietly  NEURO: AAOx3, CN II-XII grossly intact, no focal deficits  PULM: symmetric chest rise bilaterally, no increased WOB  ABD: soft, nontender, nondistended  EXTR: no cyanosis or edema, moving all extremities  LABS:                        9.2    7.4   )-----------( 119      ( 14 Sep 2019 00:27 )             26.6       09-14    137  |  104  |  30<H>  ----------------------------<  107<H>  4.4   |  22  |  1.85<H>    Ca    8.2<L>      14 Sep 2019 00:27  Phos  3.6     09-14  Mg     2.4     09-14        CULTURES:  .Blood  08-28 @ 17:55   No growth at 5 days.  --  --      .Urine  08-28 @ 17:52   No growth  --  --      .Urine  08-27 @ 21:50   No growth  --  --

## 2019-09-17 VITALS
TEMPERATURE: 98 F | DIASTOLIC BLOOD PRESSURE: 64 MMHG | SYSTOLIC BLOOD PRESSURE: 112 MMHG | RESPIRATION RATE: 18 BRPM | HEART RATE: 67 BPM | OXYGEN SATURATION: 97 %

## 2019-09-17 LAB
ANION GAP SERPL CALC-SCNC: 10 MMOL/L — SIGNIFICANT CHANGE UP (ref 5–17)
BUN SERPL-MCNC: 26 MG/DL — HIGH (ref 7–23)
CALCIUM SERPL-MCNC: 7.9 MG/DL — LOW (ref 8.4–10.5)
CHLORIDE SERPL-SCNC: 105 MMOL/L — SIGNIFICANT CHANGE UP (ref 96–108)
CO2 SERPL-SCNC: 23 MMOL/L — SIGNIFICANT CHANGE UP (ref 22–31)
CREAT SERPL-MCNC: 1.35 MG/DL — HIGH (ref 0.5–1.3)
GLUCOSE SERPL-MCNC: 103 MG/DL — HIGH (ref 70–99)
HCT VFR BLD CALC: 24.1 % — LOW (ref 39–50)
HGB BLD-MCNC: 8.3 G/DL — LOW (ref 13–17)
MCHC RBC-ENTMCNC: 33.1 PG — SIGNIFICANT CHANGE UP (ref 27–34)
MCHC RBC-ENTMCNC: 34.4 GM/DL — SIGNIFICANT CHANGE UP (ref 32–36)
MCV RBC AUTO: 96.2 FL — SIGNIFICANT CHANGE UP (ref 80–100)
PLATELET # BLD AUTO: 132 K/UL — LOW (ref 150–400)
POTASSIUM SERPL-MCNC: 3.6 MMOL/L — SIGNIFICANT CHANGE UP (ref 3.5–5.3)
POTASSIUM SERPL-SCNC: 3.6 MMOL/L — SIGNIFICANT CHANGE UP (ref 3.5–5.3)
RBC # BLD: 2.51 M/UL — LOW (ref 4.2–5.8)
RBC # FLD: 14 % — SIGNIFICANT CHANGE UP (ref 10.3–14.5)
SODIUM SERPL-SCNC: 138 MMOL/L — SIGNIFICANT CHANGE UP (ref 135–145)
WBC # BLD: 5.9 K/UL — SIGNIFICANT CHANGE UP (ref 3.8–10.5)
WBC # FLD AUTO: 5.9 K/UL — SIGNIFICANT CHANGE UP (ref 3.8–10.5)

## 2019-09-17 RX ORDER — BACITRACIN ZINC 500 UNIT/G
1 OINTMENT IN PACKET (EA) TOPICAL
Qty: 0 | Refills: 0 | DISCHARGE

## 2019-09-17 RX ORDER — QUETIAPINE FUMARATE 200 MG/1
12.5 TABLET, FILM COATED ORAL
Qty: 0 | Refills: 0 | DISCHARGE
Start: 2019-09-17

## 2019-09-17 RX ORDER — FLUTICASONE PROPIONATE 50 MCG
1 SPRAY, SUSPENSION NASAL
Qty: 0 | Refills: 0 | DISCHARGE
Start: 2019-09-17

## 2019-09-17 RX ORDER — DOCUSATE SODIUM 100 MG
1 CAPSULE ORAL
Qty: 0 | Refills: 0 | DISCHARGE
Start: 2019-09-17

## 2019-09-17 RX ORDER — TAMSULOSIN HYDROCHLORIDE 0.4 MG/1
1 CAPSULE ORAL
Qty: 0 | Refills: 0 | DISCHARGE
Start: 2019-09-17

## 2019-09-17 RX ORDER — LEVOTHYROXINE SODIUM 125 MCG
1 TABLET ORAL
Qty: 0 | Refills: 0 | DISCHARGE
Start: 2019-09-17

## 2019-09-17 RX ORDER — PANTOPRAZOLE SODIUM 20 MG/1
1 TABLET, DELAYED RELEASE ORAL
Qty: 0 | Refills: 0 | DISCHARGE
Start: 2019-09-17

## 2019-09-17 RX ORDER — FUROSEMIDE 40 MG
1 TABLET ORAL
Qty: 0 | Refills: 0 | DISCHARGE

## 2019-09-17 RX ORDER — ASPIRIN/CALCIUM CARB/MAGNESIUM 324 MG
1 TABLET ORAL
Qty: 0 | Refills: 0 | DISCHARGE

## 2019-09-17 RX ORDER — FUROSEMIDE 40 MG
1 TABLET ORAL
Qty: 0 | Refills: 0 | DISCHARGE
Start: 2019-09-17

## 2019-09-17 RX ORDER — POTASSIUM CHLORIDE 20 MEQ
2 PACKET (EA) ORAL
Qty: 0 | Refills: 0 | DISCHARGE

## 2019-09-17 RX ADMIN — CHLORHEXIDINE GLUCONATE 1 APPLICATION(S): 213 SOLUTION TOPICAL at 06:11

## 2019-09-17 RX ADMIN — Medication 100 MILLIGRAM(S): at 13:09

## 2019-09-17 RX ADMIN — CARBIDOPA AND LEVODOPA 1 TABLET(S): 25; 100 TABLET ORAL at 09:55

## 2019-09-17 RX ADMIN — Medication 1 TABLET(S): at 13:10

## 2019-09-17 RX ADMIN — Medication 50 MICROGRAM(S): at 06:10

## 2019-09-17 RX ADMIN — CARBIDOPA AND LEVODOPA 1 TABLET(S): 25; 100 TABLET ORAL at 13:09

## 2019-09-17 RX ADMIN — PANTOPRAZOLE SODIUM 40 MILLIGRAM(S): 20 TABLET, DELAYED RELEASE ORAL at 06:10

## 2019-09-17 RX ADMIN — ENTACAPONE 200 MILLIGRAM(S): 200 TABLET, FILM COATED ORAL at 10:12

## 2019-09-17 RX ADMIN — ENTACAPONE 200 MILLIGRAM(S): 200 TABLET, FILM COATED ORAL at 13:09

## 2019-09-17 RX ADMIN — Medication 40 MILLIGRAM(S): at 06:10

## 2019-09-17 RX ADMIN — TAMSULOSIN HYDROCHLORIDE 0.4 MILLIGRAM(S): 0.4 CAPSULE ORAL at 13:09

## 2019-09-17 RX ADMIN — Medication 1 SPRAY(S): at 06:10

## 2019-09-17 RX ADMIN — Medication 100 MILLIGRAM(S): at 06:11

## 2019-09-17 NOTE — PROGRESS NOTE ADULT - PROBLEM SELECTOR PROBLEM 5
Urinary retention
Acute kidney injury superimposed on chronic kidney disease
Urinary retention

## 2019-09-17 NOTE — PROGRESS NOTE ADULT - PROBLEM SELECTOR PLAN 2
s/p CABG   Normal LVEF   Restart  ASA when ok with GI
On meds   chronic
s/p CABG   Normal LVEF     Restart  ASA when ok with GI
s/p CABG   Normal LVEF   Cont with Lasix   Monitor cr   Restart  ASA when ok with GI
s/p CABG   Normal LVEF   Restart  ASA when cleared by GI
s/p CABG   Normal LVEF   Restart  ASA when ok with GI
On meds   chronic
On meds   chroniuc
s/p CABG   Normal LVEF     Restart  ASA when ok with GI
s/p CABG   Normal LVEF   Cont with Lasix   Monitor cr   Restart  ASA when ok with GI
s/p CABG   Normal LVEF   Off ASA for now
s/p CABG   Normal LVEF   Restart  ASA when cleared by GI
s/p CABG   Normal LVEF   Restart  ASA when ok with GI
s/p CABG   Normal LVEF   restart IV lasix   Restart  ASA when ok with GI
On meds   chroniuc
s/p CABG   Normal LVEF   Restart  ASA when cleared by GI

## 2019-09-17 NOTE — PROGRESS NOTE ADULT - REASON FOR ADMISSION
[facility].  Groin erythema, oedema
[facility].  Groin erythema, oedema
[facility].  Groin erythema, oedema
Hematuria
[facility] Cortés [facility].  Groin erythema, oedema
[facility].  Groin erythema, oedema
[facility].  Groin erythema, oedema
[facility] Cortés placement.  Groin erythema, oedema
[facility] Cortés [facility].  Groin erythema, oedema
[facility].  Groin erythema, oedema
[facility].  Groin erythema, oedema
[facility] Cortés [facility].  Groin erythema, oedema
[facility] Cortés placement.  Groin erythema, oedema
[facility] Cortés [facility].  Groin erythema, oedema
[facility].  Groin erythema, oedema
[facility] following Cortés placement.  Groin erythema, oedema
[facility] Cortés placement.  Groin erythema, oedema
[facility] following Cortés placement.  Groin erythema, oedema
[facility] following Cortés placement.  Groin erythema, oedema
[facility] Cortés [facility].  Groin erythema, oedema
[facility] Cortés placement.  Groin erythema, oedema
[facility] Cortés placement.  Groin erythema, oedema
[facility].  Groin erythema, oedema
[facility] Cortés placement.  Groin erythema, oedema
[facility] following Cortés placement.  Groin erythema, oedema
[facility] Cortés placement.  Groin erythema, oedema
[facility].  Groin erythema, oedema
Hematuria
[facility].  Groin erythema, oedema
[facility] Cortés [facility].  Groin erythema, oedema
[facility].  Groin erythema, oedema
[facility].  Groin erythema, oedema
[facility].  Groin erythema, oedema
[facility].  Groin erythema, oedema
[facility] Cortés placement.  Groin erythema, oedema
[facility]és placement.  Groin erythema, oedema
[facility] Cortés placement.  Groin erythema, oedema
[facility] following Cortés placement.  Groin erythema, oedema
[facility].  Groin erythema, oedema
[facility] following Cortés placement.  Groin erythema, oedema
[facility].  Groin erythema, oedema
[facility].  Groin erythema, oedema
[facility] Cortés [facility].  Groin erythema, oedema
[facility].  Groin erythema, oedema
[facility] Cortés placement.  Groin erythema, oedema
[facility].  Groin erythema, oedema
[facility].  Groin erythema, oedema
[facility]és [facility].  Groin erythema, oedema
[facility].  Groin erythema, oedema
[facility].  Groin erythema, edema
[facility] Cortés placement.  Groin erythema, oedema
[facility] Cortés placement.  Groin erythema, oedema
[facility] Cortés placement.  Groin erythema, oedema
[facility] Cortés placement.  Groin erythema, oedema
[facility] Cortés placement.  Groin erythema, oedema
[facility] following Cortés placement.  Groin erythema, oedema
[facility].  Groin erythema, oedema
[facility] Cortés placement.  Groin erythema, oedema
[facility] following Cortés placement.  Groin erythema, oedema
[facility] Cortés placement.  Groin erythema, oedema
[facility] Cortés placement.  Groin erythema, oedema
[facility] following Cortés placement.  Groin erythema, oedema
[facility].  Groin erythema, oedema
[facility] following Cortés placement.  Groin erythema, oedema
[facility] Cortés placement.  Groin erythema, oedema
[facility] following Cortés placement.  Groin erythema, oedema
[facility] following Cortés placement.  Groin erythema, oedema
[facility] following Cortés placement.  Groin erythema, oedema
[facility]uria following Cortés placement.  Groin erythema, edema
[facility] Cortés placement.  Groin erythema, oedema
[facility] Cortés placement.  Groin erythema, oedema
[facility] following Cortés placement.  Groin erythema, oedema
[facility] Cortés placement.  Groin erythema, oedema
[facility] following Cortés placement.  Groin erythema, oedema
[facility].  Groin erythema, oedema
[facility] Cortés placement.  Groin erythema, oedema
[facility] following Cortés placement.  Groin erythema, oedema

## 2019-09-17 NOTE — CHART NOTE - NSCHARTNOTEFT_GEN_A_CORE
Wife concerned regarding chronic R humeral fracture.  Patient had it fractured in 2018 2/2 mechanical fall and had an acute on chronic fracture in 07/19.  Ortho saw the patient at that time and advised nonbearing RUE and no surgical intervention and outpatient follow up.  Per wife, patient saw orthopedics as outpatient and recommended to continue to keep it non-bearing.  Wife insisted on having the patient seen by orthopedics.  Advised the patient that X-rays were done on this admission and the fractures were stable and nothing more can be done inpatient and advised the patient to follow up with outpatient orthopedics. Patient in agreement.    Nadia Kendall, PGY 2  [phone]

## 2019-09-17 NOTE — PROGRESS NOTE ADULT - ATTENDING COMMENTS
post hemorrhagic monitoring  the patient is off Phenylphrine.   likely still small amount of bleeding per rectum with melanotic stool  hematocrit down to 21.9 so will give addition one unit of PRBC  will start oral diet   resume diuretic per cardiology  encourage mobilization  Parkinson medications
I have seen and examined the patient. I agree with the above surgery resident's note.
Pt seen and examined  Agree with note which was reviewed and edited where appropriate.  D/W patient, RN, residents and Fellow
monitoring post hemorrhagic  hematocrit to 21 then got one unit PRBC now is 23  Hematocrit: 23.2 % (09.12.19 @ 10:14)  the rectal packing is due to be removed tomorrow  is on Phenylephrine at .2 micrograms per kilogram per minute which a low dose  is on Parkinson medication  holding VTE prophylaxis with recent hemorrhage
seen and examined 09-04-19 @ 0555    Admitted 7/12-7/15/2019 for refracture of prior displaced right proximal humerus fracture secondary to fall from standing. Discharged to [facility] for recurrent falls. Readmitted 8/28 for urinary retention and gross hematuria after traumatic Cortés.    He was transferred to the MICU overnight for BRBPR.    A+Ox3  median sternotomy scar  soft / NT / ND  RLQ scar  right inguinal scar  KARINA - dilated empty rectal vault, anterior internal hemorrhoid    hgb = 7  HCO3 = 24  Cr = 2.5    CTA abd/pelvis - hemorrhoid at posterior rectum without pooling of contrast on venous phase    LGIB, suspicious for hemorrhoidal bleed  -transfused 2u RBC, 1 pack platelets and 1u FFP  -no evidence of ongoing bleeding  -no indication for urgent surgery  -currently getting bowel prep via NG for colonoscopy
I have seen and evaluated patient and discussed with team.  Chart and data reviewed.  Maintain aggressive bowel regimen to maintain soft BMs daily.  Patient should follow up with me in my office for re-evaluation of anorectal ulcers.  Re-evaluation in hospital if severe bleeding despite soft BMs    Baldo Ace MD  [phone]
Patient seen and examined. Agree with above. Discussed with colorectal surgery at bedside. The patient had drop in H/H, but only a small amount of blood tinged stool. Would monitor for now - he will likely has some small amounts of blood as the rectal ulcers heal over time. If bleeding continues, can consider repeat flexible sigmoidoscopy. Monitor blood counts.
Patient seen and examined. Agree with above. Discussed with surgical and ICU teams at bedside. Patient is go to OR for treatment of bleeding from rectal ulcers. We can potentially consider repeat endoscopic evaluation/therapy including hemospray, if bleeding continues despite surgical treatment.
Patient seen and examined. Agree with above. No signs of bleeding today, patient had brown stools, H/H stable. Would continue stool softeners - small amounts of blood can be seen even as the ulcers start to heal. Continue to monitor bowel movements. Repeat exam of the rectum in a few weeks as outpatient to ensure healing.
I have seen and examined the patient. I agree with the above surgery resident's note.  no rectal bleeding  f/u h/h- if stable and no more bleeding- tx to floor and back to medical svc
agree w above A/P, DC planning to [facility] tomorrow
agree w above A/P, DC to [facility] w GI F/U
agree w above A/P. ptn is s/p large volume LGI bleed, ongoing anemia, HH stable, had received multiple blood transfusions, would check iron level and treat w IV Venofer while still inptn. DC planning to [facility]

## 2019-09-17 NOTE — PROGRESS NOTE ADULT - NSHPATTENDINGPLANDISCUSS_GEN_ALL_CORE
ptn, card, ID
ptn, card, ID, pulm, NP, neuro, renal
MICU team
ptn, RN, renal, GI
ptn, RN, renal, GI, wife
ptn, wife
ptn, wife, resident and intern
ptn, wife, team

## 2019-09-17 NOTE — PROGRESS NOTE ADULT - SUBJECTIVE AND OBJECTIVE BOX
[facility]      Seen earlier today, and examined.  - Today, patient is without complaints.           *****MEDICATIONS: Current medication reviewed and documented.    MEDICATIONS  (STANDING):  atorvastatin 80 milliGRAM(s) Oral at bedtime  carbidopa/levodopa CR 50/200 1 Tablet(s) Oral <User Schedule>  chlorhexidine 2% Cloths 1 Application(s) Topical <User Schedule>  docusate sodium 100 milliGRAM(s) Oral three times a day  entacapone 200 milliGRAM(s) Oral <User Schedule>  fluticasone propionate 50 MICROgram(s)/spray Nasal Spray 1 Spray(s) Both Nostrils two times a day  furosemide    Tablet 40 milliGRAM(s) Oral daily  levothyroxine 50 MICROGram(s) Oral daily  melatonin 5 milliGRAM(s) Oral at bedtime  multivitamin 1 Tablet(s) Oral daily  pantoprazole    Tablet 40 milliGRAM(s) Oral before breakfast  polyethylene glycol 3350 17 Gram(s) Oral daily  senna 2 Tablet(s) Oral at bedtime  tamsulosin 0.4 milliGRAM(s) Oral daily    MEDICATIONS  (PRN):  acetaminophen   Tablet .. 650 milliGRAM(s) Oral every 6 hours PRN Mild Pain (1 - 3)  QUEtiapine 12.5 milliGRAM(s) Oral at bedtime PRN agitation          ***** VITAL SIGNS:  T(F): 98.5 (19 @ 05:00), Max: 98.5 (19 @ 21:48)  HR: 62 (19 @ 05:00) (58 - 70)  BP: 124/61 (19 @ 05:00) (116/66 - 137/70)  RR: 18 (19 @ 05:00) (18 - 18)  SpO2: 96% (19 @ 05:00) (96% - 97%)  Wt(kg): --  ,   I&O's Summary    16 Sep 2019 07:01  -  17 Sep 2019 07:00  --------------------------------------------------------  IN: 580 mL / OUT: 1100 mL / NET: -520 mL             *****PHYSICAL EXAM: Alert oriented x 2  Attention comprehension are fair. Able to name, repeat  without any difficulty.   Able to follow 1-2  step commands.     EOMI fundi not visualized,  VFF to confrontration  No facial asymmetry   Tongue is midline   Palate elevates symmetrically   Moving all 4 ext symmetrically no pronator drift  limited eval of rue due to sling      sensation is grossly symmetric  Gait not assessed.          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  Patient reported that he had local trauma to the skin of his penis following an attempt to use a hand held urinal.   Patient also notes that patient was on a ? commode and was noted to have increased scrotal oedema and redness. called to manage his parkinsons dementia. unable to tell me the name of his neurologist.  He reports having hallucinations, reports hearing his daughter and friend planning to get him into rehab, however it appears that they both were not there.   He is not sure if these are dreams.   He doesn't know if he act out his dreams.          Problem/Recommendations 1: Parkinson's   continue sinemet/entacapone  ? hallucinations related to parkinson's vs. metabolic encephalopathy due to STEVEN   will continue to monitor closely   regulate sleep wake cycle.   consider melatonin for sleep augmentation.   seroquel 12.5 qhs bedtime due to agitation    Problem/Recommendations 2:  Falls likely related to parkinsons +/- deconditioning +/- microvascular disease.   fall precautions.   pt consult  appreciated recommend jeannie   repeat s/s eval as pt is requesting d/w house staff     Problem/Recommendations 3: anemia   defer to gi    Thank you for allowing me to participate in the care of this patient. Please do not hesitate to call me if you have any  questions.        ________________  Farideh Irvin MD  [facility]  [phone]      30 minutes spent on total encounter; more than 50 % of the visit was  spent counseling about plan of care, compliance to diet/exercise and medication regimen and or  coordinating care by the attending physician.      It is advised that stroke patients follow up with GUADALUPE Viera @ [phone] in 1- 2 weeks.   Others please follow up with Dr. Michael Nissenbaum [phone]

## 2019-09-17 NOTE — CHART NOTE - NSCHARTNOTESELECT_GEN_ALL_CORE
Event Note
Acceptance note/Transfer Note
Critical value/Event Note
Event Note
Transfer Note
Transfer Note
Urinary Retention/Event Note

## 2019-09-17 NOTE — PROGRESS NOTE ADULT - PROBLEM SELECTOR PROBLEM 1
BRBPR (bright red blood per rectum)
Aspiration pneumonia
BRBPR (bright red blood per rectum)
Aspiration pneumonia
Aspiration pneumonia
BRBPR (bright red blood per rectum)
Aspiration pneumonia
BRBPR (bright red blood per rectum)

## 2019-09-17 NOTE — PROGRESS NOTE ADULT - SUBJECTIVE AND OBJECTIVE BOX
Interval Events: No acute overnight events. Denies CP, SOB, n/v, melena/hematochezia.    REVIEW OF SYSTEMS:    CONSTITUTIONAL: No weakness, fevers or chills  EYES/ENT: No visual changes;  No vertigo or throat pain   NECK: No pain or stiffness  RESPIRATORY: No cough, wheezing, hemoptysis; No shortness of breath  CARDIOVASCULAR: No chest pain or palpitations  GASTROINTESTINAL: No abdominal or epigastric pain. No nausea, vomiting, or hematemesis; No diarrhea or constipation. No melena or hematochezia.  GENITOURINARY: No dysuria, frequency or hematuria  NEUROLOGICAL: No numbness or weakness  SKIN: No itching, rashes    OBJECTIVE:  ICU Vital Signs Last 24 Hrs  T(C): 36.9 (17 Sep 2019 05:00), Max: 36.9 (16 Sep 2019 21:48)  T(F): 98.5 (17 Sep 2019 05:00), Max: 98.5 (16 Sep 2019 21:48)  HR: 62 (17 Sep 2019 05:00) (58 - 70)  BP: 124/61 (17 Sep 2019 05:00) (116/66 - 137/70)  BP(mean): --  ABP: --  ABP(mean): --  RR: 18 (17 Sep 2019 05:00) (18 - 18)  SpO2: 96% (17 Sep 2019 05:00) (96% - 97%)        09-16 @ 07:01  -  09-17 @ 07:00  --------------------------------------------------------  IN: 580 mL / OUT: 1100 mL / NET: -520 mL      CAPILLARY BLOOD GLUCOSE          PHYSICAL EXAM:  GEN: NAD, resting quietly  NEURO: AAOx3, CN II-XII grossly intact, no focal deficits  PULM: symmetric chest rise bilaterally, no increased WOB  ABD: soft, nontender, nondistended  EXTR: no cyanosis or edema, moving all extremities  : lara in place    HOSPITAL MEDICATIONS:      furosemide    Tablet 40 milliGRAM(s) Oral daily  tamsulosin 0.4 milliGRAM(s) Oral daily    atorvastatin 80 milliGRAM(s) Oral at bedtime  levothyroxine 50 MICROGram(s) Oral daily      acetaminophen   Tablet .. 650 milliGRAM(s) Oral every 6 hours PRN  carbidopa/levodopa CR 50/200 1 Tablet(s) Oral <User Schedule>  entacapone 200 milliGRAM(s) Oral <User Schedule>  melatonin 5 milliGRAM(s) Oral at bedtime  QUEtiapine 12.5 milliGRAM(s) Oral at bedtime PRN    docusate sodium 100 milliGRAM(s) Oral three times a day  pantoprazole    Tablet 40 milliGRAM(s) Oral before breakfast  polyethylene glycol 3350 17 Gram(s) Oral daily  senna 2 Tablet(s) Oral at bedtime        multivitamin 1 Tablet(s) Oral daily      chlorhexidine 2% Cloths 1 Application(s) Topical <User Schedule>  fluticasone propionate 50 MICROgram(s)/spray Nasal Spray 1 Spray(s) Both Nostrils two times a day        LABS:                        7.9    4.8   )-----------( 123      ( 16 Sep 2019 07:22 )             23.3     Hgb Trend: 7.9<--, 8.1<--, 9.2<--, 9.0<--, 7.6<--  09-16    139  |  106  |  25<H>  ----------------------------<  110<H>  3.7   |  23  |  1.52<H>    Ca    7.8<L>      16 Sep 2019 07:12  Phos  3.2     09-16  Mg     2.1     09-16      Creatinine Trend: 1.52<--, 1.55<--, 1.85<--, 1.70<--, 1.75<--, 1.76<-- Interval Events: No acute overnight events. Pt had one BM last night. Has no complaints this morning. Denies CP, SOB, n/v, melena/hematochezia.    REVIEW OF SYSTEMS:    CONSTITUTIONAL: No weakness, fevers or chills  EYES/ENT: No visual changes;  No vertigo or throat pain   NECK: No pain or stiffness  RESPIRATORY: No cough, wheezing, hemoptysis; No shortness of breath  CARDIOVASCULAR: No chest pain or palpitations  GASTROINTESTINAL: No abdominal or epigastric pain. No nausea, vomiting, or hematemesis; No diarrhea or constipation. No melena or hematochezia.  GENITOURINARY: No dysuria, frequency or hematuria  NEUROLOGICAL: No numbness or weakness  SKIN: No itching, rashes    OBJECTIVE:  ICU Vital Signs Last 24 Hrs  T(C): 36.9 (17 Sep 2019 05:00), Max: 36.9 (16 Sep 2019 21:48)  T(F): 98.5 (17 Sep 2019 05:00), Max: 98.5 (16 Sep 2019 21:48)  HR: 62 (17 Sep 2019 05:00) (58 - 70)  BP: 124/61 (17 Sep 2019 05:00) (116/66 - 137/70)  BP(mean): --  ABP: --  ABP(mean): --  RR: 18 (17 Sep 2019 05:00) (18 - 18)  SpO2: 96% (17 Sep 2019 05:00) (96% - 97%)        09-16 @ 07:01  -  09-17 @ 07:00  --------------------------------------------------------  IN: 580 mL / OUT: 1100 mL / NET: -520 mL      CAPILLARY BLOOD GLUCOSE          PHYSICAL EXAM:  GEN: NAD, resting quietly  NEURO: AAOx3, CN II-XII grossly intact, no focal deficits  PULM: symmetric chest rise bilaterally, no increased WOB  ABD: soft, nontender, nondistended  EXTR: no cyanosis or edema, moving all extremities  : lara in place    HOSPITAL MEDICATIONS:      furosemide    Tablet 40 milliGRAM(s) Oral daily  tamsulosin 0.4 milliGRAM(s) Oral daily    atorvastatin 80 milliGRAM(s) Oral at bedtime  levothyroxine 50 MICROGram(s) Oral daily      acetaminophen   Tablet .. 650 milliGRAM(s) Oral every 6 hours PRN  carbidopa/levodopa CR 50/200 1 Tablet(s) Oral <User Schedule>  entacapone 200 milliGRAM(s) Oral <User Schedule>  melatonin 5 milliGRAM(s) Oral at bedtime  QUEtiapine 12.5 milliGRAM(s) Oral at bedtime PRN    docusate sodium 100 milliGRAM(s) Oral three times a day  pantoprazole    Tablet 40 milliGRAM(s) Oral before breakfast  polyethylene glycol 3350 17 Gram(s) Oral daily  senna 2 Tablet(s) Oral at bedtime        multivitamin 1 Tablet(s) Oral daily      chlorhexidine 2% Cloths 1 Application(s) Topical <User Schedule>  fluticasone propionate 50 MICROgram(s)/spray Nasal Spray 1 Spray(s) Both Nostrils two times a day        LABS:                        7.9    4.8   )-----------( 123      ( 16 Sep 2019 07:22 )             23.3     Hgb Trend: 7.9<--, 8.1<--, 9.2<--, 9.0<--, 7.6<--  09-16    139  |  106  |  25<H>  ----------------------------<  110<H>  3.7   |  23  |  1.52<H>    Ca    7.8<L>      16 Sep 2019 07:12  Phos  3.2     09-16  Mg     2.1     09-16      Creatinine Trend: 1.52<--, 1.55<--, 1.85<--, 1.70<--, 1.75<--, 1.76<--

## 2019-09-17 NOTE — PROGRESS NOTE ADULT - PROBLEM SELECTOR PLAN 4
SCr Improving.
Stable
SCr Improving.
Stable
on ckd 3 prob 2/2 post obstr urorpathy 2/2 urinary retention, b/l Hydro on imaging, improving
on ckd 3 prob 2/2 post obstr urorpathy 2/2 urinary retention, b/l Hydro on imaging, improving
Improving.
Stable
Stable
on ckd 3 prob 2/2 post obstr urorpathy 2/2 urinary retention, b/l Hydro on imaging, improving

## 2019-09-17 NOTE — PROGRESS NOTE ADULT - PROBLEM SELECTOR PROBLEM 4
STEVEN (acute kidney injury)
Afib
Afib
STEVEN (acute kidney injury)
Afib
Afib
STEVEN (acute kidney injury)

## 2019-09-17 NOTE — PROGRESS NOTE ADULT - SUBJECTIVE AND OBJECTIVE BOX
Patient seen and examined in bed. No pain, no SOB.      MEDICATIONS  (STANDING):  atorvastatin 80 milliGRAM(s) Oral at bedtime  carbidopa/levodopa CR 50/200 1 Tablet(s) Oral <User Schedule>  chlorhexidine 2% Cloths 1 Application(s) Topical <User Schedule>  entacapone 200 milliGRAM(s) Oral <User Schedule>  fluticasone propionate 50 MICROgram(s)/spray Nasal Spray 1 Spray(s) Both Nostrils two times a day  furosemide    Tablet 40 milliGRAM(s) Oral daily  levothyroxine 50 MICROGram(s) Oral daily  melatonin 5 milliGRAM(s) Oral at bedtime  multivitamin 1 Tablet(s) Oral daily  pantoprazole    Tablet 40 milliGRAM(s) Oral before breakfast  polyethylene glycol 3350 17 Gram(s) Oral daily  senna 2 Tablet(s) Oral at bedtime  tamsulosin 0.4 milliGRAM(s) Oral daily      VITAL:  T(C): , Max: 36.9 (09-16-19 @ 21:48)  T(F): , Max: 98.5 (09-16-19 @ 21:48)  HR: 67 (09-17-19 @ 13:23)  BP: 112/64 (09-17-19 @ 13:23)  RR: 18 (09-17-19 @ 13:23)  SpO2: 97% (09-17-19 @ 13:23)    I and O's:    09-16 @ 07:01  -  09-17 @ 07:00  --------------------------------------------------------  IN: 580 mL / OUT: 1100 mL / NET: -520 mL    09-17 @ 07:01  -  09-17 @ 14:00  --------------------------------------------------------  IN: 720 mL / OUT: 800 mL / NET: -80 mL          PHYSICAL EXAM:    Constitutional: NAD  Neck:  No JVD  Respiratory: CTAB/L  Cardiovascular: S1 and S2  Gastrointestinal: BS+, soft, NT/ND  Extremities: No peripheral edema  Neurological: A/O x 3, no focal deficits  Psychiatric: Normal mood, normal affect  : + Cortés  Skin: No rashes    LABS:                        8.3    5.9   )-----------( 132      ( 17 Sep 2019 07:13 )             24.1     09-17    138  |  105  |  26<H>  ----------------------------<  103<H>  3.6   |  23  |  1.35<H>    Ca    7.9<L>      17 Sep 2019 07:05  Phos  3.2     09-16  Mg     2.1     09-16      IMPRESSION: 89M w/ HTN, DVT-IVCF, Parkinson's disease, CAD-CABG, and CKD3, 8/28/19 a/w urinary retention/STEVEN    (1)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 At least in part due to past obstructive nephropathy, with resultant atrophy of his right kidney. Resolved superimposed STEVEN from obstruction.     (2)Surgery - s/p surgical repair of bleeding rectal ulcer    RECOMMEND:  (1)Dose new meds for GFR ~45ml/min  (2)BMP daily  (3)Lasix as ordered        NNEKA NapolesC  [facility]  [phone]

## 2019-09-17 NOTE — PROGRESS NOTE ADULT - PROBLEM SELECTOR PLAN 5
has indwelling lara, failed TOV
Monitor Cr   Avoid nephrotoxins   Nephrology consulted
has indwelling lara, failed TOV

## 2019-09-17 NOTE — PROGRESS NOTE ADULT - PROBLEM SELECTOR PLAN 6
of the groin, completed ABX, keep area dry

## 2019-09-17 NOTE — PROGRESS NOTE ADULT - SUBJECTIVE AND OBJECTIVE BOX
Subjective: Patient seen and examined. No new events except as noted.     REVIEW OF SYSTEMS:    CONSTITUTIONAL: + weakness, fevers or chills  EYES/ENT: No visual changes;  No vertigo or throat pain   NECK: No pain or stiffness  RESPIRATORY: No cough, wheezing, hemoptysis; No shortness of breath  CARDIOVASCULAR: No chest pain or palpitations  GASTROINTESTINAL: No abdominal or epigastric pain. No nausea, vomiting, or hematemesis; No diarrhea or constipation. No melena or hematochezia.  GENITOURINARY: No dysuria, frequency or hematuria  NEUROLOGICAL: No numbness or weakness  SKIN: No itching, burning, rashes, or lesions   All other review of systems is negative unless indicated above.    MEDICATIONS:  MEDICATIONS  (STANDING):  atorvastatin 80 milliGRAM(s) Oral at bedtime  carbidopa/levodopa CR 50/200 1 Tablet(s) Oral <User Schedule>  chlorhexidine 2% Cloths 1 Application(s) Topical <User Schedule>  docusate sodium 100 milliGRAM(s) Oral three times a day  entacapone 200 milliGRAM(s) Oral <User Schedule>  fluticasone propionate 50 MICROgram(s)/spray Nasal Spray 1 Spray(s) Both Nostrils two times a day  furosemide    Tablet 40 milliGRAM(s) Oral daily  levothyroxine 50 MICROGram(s) Oral daily  melatonin 5 milliGRAM(s) Oral at bedtime  multivitamin 1 Tablet(s) Oral daily  pantoprazole    Tablet 40 milliGRAM(s) Oral before breakfast  polyethylene glycol 3350 17 Gram(s) Oral daily  senna 2 Tablet(s) Oral at bedtime  tamsulosin 0.4 milliGRAM(s) Oral daily      PHYSICAL EXAM:  T(C): 36.9 (09-17-19 @ 05:00), Max: 36.9 (09-16-19 @ 21:48)  HR: 62 (09-17-19 @ 05:00) (58 - 70)  BP: 124/61 (09-17-19 @ 05:00) (116/66 - 137/70)  RR: 18 (09-17-19 @ 05:00) (18 - 18)  SpO2: 96% (09-17-19 @ 05:00) (96% - 97%)  Wt(kg): --  I&O's Summary    16 Sep 2019 07:01  -  17 Sep 2019 07:00  --------------------------------------------------------  IN: 580 mL / OUT: 1100 mL / NET: -520 mL          Appearance: NAD	  HEENT:   Dry  oral mucosa, PERRL, EOMI	  Lymphatic: No lymphadenopathy , no edema  Cardiovascular: Normal S1 S2, No JVD, No murmurs , Peripheral pulses palpable 2+ bilaterally  Respiratory: Lungs clear to auscultation, normal effort 	  Gastrointestinal:  Soft, Non-tender, + BS	  Skin: No rashes, No ecchymoses, No cyanosis, warm to touch  Musculoskeletal: Normal range of motion, normal strength  Psychiatry:  Mood & affect appropriate  Ext: No edema      LABS:    CARDIAC MARKERS:                                8.3    5.9   )-----------( 132      ( 17 Sep 2019 07:13 )             24.1     09-17    138  |  105  |  26<H>  ----------------------------<  103<H>  3.6   |  23  |  1.35<H>    Ca    7.9<L>      17 Sep 2019 07:05  Phos  3.2     09-16  Mg     2.1     09-16      proBNP:   Lipid Profile:   HgA1c:   TSH:             TELEMETRY: 	    ECG:  	  RADIOLOGY:   DIAGNOSTIC TESTING:  [ ] Echocardiogram:  [ ]  Catheterization:  [ ] Stress Test:    OTHER:

## 2019-09-17 NOTE — PROGRESS NOTE ADULT - PROBLEM SELECTOR PLAN 1
Multiple large rectal ulcers - bleeding has since resolved. Will continue to monitor  on 9/11 had an RRT 2/2 profuse rectal bleed  POD3, post emergent EUA w visible vessel ligation and rectal packing,    H/H now stable, rectal packing removed,    concerned re having productive BMs 2/2 is not allowed to strain . no further LGI bleed. consider tap water enemas prn  iron level WNL, hold off on venofer for now. Multiple large rectal ulcers - bleeding has since resolved. Will continue to monitor  on 9/11 had an RRT 2/2 profuse rectal bleed  POD6, post emergent EUA w visible vessel ligation and rectal packing,    H/H now stable, rectal packing removed,    concerned re having productive BMs 2/2 is not allowed to strain . no further LGI bleed. consider tap water enemas prn  iron level WNL, hold off on venofer for now.

## 2019-09-17 NOTE — PROGRESS NOTE ADULT - PROBLEM SELECTOR PROBLEM 6
Cellulitis of other specified site

## 2019-09-17 NOTE — PROGRESS NOTE ADULT - PROBLEM SELECTOR PLAN 3
Stable   ASA
rate controlled   Obviously off AC
Stable   ASA
rate controlled   Obviously off AC
rate controlled   off AC 2/2 GIB
Stable   ASA
Stable   ASA
rate controlled   Obviously off AC
rate controlled   off AC 2/2 GIB

## 2019-09-17 NOTE — PROGRESS NOTE ADULT - PROVIDER SPECIALTY LIST ADULT
CCU
Cardiology
Colorectal Surgery
Gastroenterology
Infectious Disease
Internal Medicine
MICU
Nephrology
Neurology
Pulmonology
SICU
SICU
Surgery
Urology
Urology
Neurology
Pulmonology
Pulmonology
SICU
Gastroenterology
Surgery
Cardiology
Internal Medicine
Cardiology
Internal Medicine

## 2019-09-17 NOTE — PROGRESS NOTE ADULT - PROBLEM SELECTOR PROBLEM 2
CAD (Coronary Artery Disease)
Parkinsons Disease
CAD (Coronary Artery Disease)
Parkinsons Disease
CAD (Coronary Artery Disease)

## 2019-09-17 NOTE — PROGRESS NOTE ADULT - PROBLEM SELECTOR PROBLEM 3
Afib
CAD (Coronary Artery Disease)
Afib
CAD (Coronary Artery Disease)
Afib

## 2019-10-01 PROCEDURE — 73060 X-RAY EXAM OF HUMERUS: CPT

## 2019-10-01 PROCEDURE — 82803 BLOOD GASES ANY COMBINATION: CPT

## 2019-10-01 PROCEDURE — 97110 THERAPEUTIC EXERCISES: CPT

## 2019-10-01 PROCEDURE — 93306 TTE W/DOPPLER COMPLETE: CPT

## 2019-10-01 PROCEDURE — 87040 BLOOD CULTURE FOR BACTERIA: CPT

## 2019-10-01 PROCEDURE — 85027 COMPLETE CBC AUTOMATED: CPT

## 2019-10-01 PROCEDURE — 86900 BLOOD TYPING SEROLOGIC ABO: CPT

## 2019-10-01 PROCEDURE — 82962 GLUCOSE BLOOD TEST: CPT

## 2019-10-01 PROCEDURE — 99285 EMERGENCY DEPT VISIT HI MDM: CPT | Mod: 25

## 2019-10-01 PROCEDURE — 73030 X-RAY EXAM OF SHOULDER: CPT

## 2019-10-01 PROCEDURE — 82435 ASSAY OF BLOOD CHLORIDE: CPT

## 2019-10-01 PROCEDURE — 83605 ASSAY OF LACTIC ACID: CPT

## 2019-10-01 PROCEDURE — 86901 BLOOD TYPING SEROLOGIC RH(D): CPT

## 2019-10-01 PROCEDURE — 85610 PROTHROMBIN TIME: CPT

## 2019-10-01 PROCEDURE — 82947 ASSAY GLUCOSE BLOOD QUANT: CPT

## 2019-10-01 PROCEDURE — 85670 THROMBIN TIME PLASMA: CPT

## 2019-10-01 PROCEDURE — 82330 ASSAY OF CALCIUM: CPT

## 2019-10-01 PROCEDURE — P9037: CPT

## 2019-10-01 PROCEDURE — 81001 URINALYSIS AUTO W/SCOPE: CPT

## 2019-10-01 PROCEDURE — 85014 HEMATOCRIT: CPT

## 2019-10-01 PROCEDURE — 74176 CT ABD & PELVIS W/O CONTRAST: CPT

## 2019-10-01 PROCEDURE — 92610 EVALUATE SWALLOWING FUNCTION: CPT

## 2019-10-01 PROCEDURE — 86923 COMPATIBILITY TEST ELECTRIC: CPT

## 2019-10-01 PROCEDURE — 84132 ASSAY OF SERUM POTASSIUM: CPT

## 2019-10-01 PROCEDURE — 86850 RBC ANTIBODY SCREEN: CPT

## 2019-10-01 PROCEDURE — 84443 ASSAY THYROID STIM HORMONE: CPT

## 2019-10-01 PROCEDURE — 74177 CT ABD & PELVIS W/CONTRAST: CPT

## 2019-10-01 PROCEDURE — 83540 ASSAY OF IRON: CPT

## 2019-10-01 PROCEDURE — 96375 TX/PRO/DX INJ NEW DRUG ADDON: CPT

## 2019-10-01 PROCEDURE — 85635 REPTILASE TEST: CPT

## 2019-10-01 PROCEDURE — 82565 ASSAY OF CREATININE: CPT

## 2019-10-01 PROCEDURE — 76770 US EXAM ABDO BACK WALL COMP: CPT

## 2019-10-01 PROCEDURE — P9016: CPT

## 2019-10-01 PROCEDURE — 80048 BASIC METABOLIC PNL TOTAL CA: CPT

## 2019-10-01 PROCEDURE — 97166 OT EVAL MOD COMPLEX 45 MIN: CPT

## 2019-10-01 PROCEDURE — 80053 COMPREHEN METABOLIC PANEL: CPT

## 2019-10-01 PROCEDURE — 97116 GAIT TRAINING THERAPY: CPT

## 2019-10-01 PROCEDURE — 84100 ASSAY OF PHOSPHORUS: CPT

## 2019-10-01 PROCEDURE — 97530 THERAPEUTIC ACTIVITIES: CPT

## 2019-10-01 PROCEDURE — C1889: CPT

## 2019-10-01 PROCEDURE — 84295 ASSAY OF SERUM SODIUM: CPT

## 2019-10-01 PROCEDURE — 93005 ELECTROCARDIOGRAM TRACING: CPT

## 2019-10-01 PROCEDURE — 85730 THROMBOPLASTIN TIME PARTIAL: CPT

## 2019-10-01 PROCEDURE — 85732 THROMBOPLASTIN TIME PARTIAL: CPT

## 2019-10-01 PROCEDURE — 71045 X-RAY EXAM CHEST 1 VIEW: CPT

## 2019-10-01 PROCEDURE — 94640 AIRWAY INHALATION TREATMENT: CPT

## 2019-10-01 PROCEDURE — 71250 CT THORAX DX C-: CPT

## 2019-10-01 PROCEDURE — 83735 ASSAY OF MAGNESIUM: CPT

## 2019-10-01 PROCEDURE — 85611 PROTHROMBIN TEST: CPT

## 2019-10-01 PROCEDURE — 96365 THER/PROPH/DIAG IV INF INIT: CPT

## 2019-10-01 PROCEDURE — 72192 CT PELVIS W/O DYE: CPT

## 2019-10-01 PROCEDURE — 36430 TRANSFUSION BLD/BLD COMPNT: CPT

## 2019-10-01 PROCEDURE — 82272 OCCULT BLD FECES 1-3 TESTS: CPT

## 2019-10-01 PROCEDURE — 97161 PT EVAL LOW COMPLEX 20 MIN: CPT

## 2019-10-01 PROCEDURE — 87086 URINE CULTURE/COLONY COUNT: CPT

## 2019-10-01 PROCEDURE — P9059: CPT

## 2019-10-21 ENCOUNTER — APPOINTMENT (OUTPATIENT)
Dept: DERMATOLOGY | Facility: CLINIC | Age: 84
End: 2019-10-21

## 2019-11-05 ENCOUNTER — FORM ENCOUNTER (OUTPATIENT)
Age: 84
End: 2019-11-05

## 2020-11-08 NOTE — PROGRESS NOTE ADULT - SUBJECTIVE AND OBJECTIVE BOX
Chief Complaint: left arm discomfort, life threats cleared per protocol.     Onset: \"a couple weeks ago\"  Location / description: left upper forearm about an inch below the arms crease  Precipitating Factors: denies  Pain Scale (1 - 10), 10 highest: CLP-4  Associated Symptoms: tender to touch, light red/purplish bruising about a half an inch circumference, denies itching.  What improves/worsens symptoms: touch irritates it  Symptom specific medications: none taken  LMP : Patient's last menstrual period was 10/24/2020.  Are you pregnant or breast feeding: no  Recent Care: Marti Gonzalez 10/26/2020    Reason for Disposition  • Caller cannot describe it clearly    Protocols used: SKIN LESION - MOLES OR GROWTHS-A-AH    Message send to PCP's office to get patient in within 3 days. Patient educated on when to call back and understands and agreed to plan of care. No further needs at this time.    HPI:  No complaints, denies dyspnea. Worries about his unsteadiness and kidney function.    Medications:  atorvastatin 80 milliGRAM(s) Oral at bedtime  carbidopa/levodopa CR 50/200 1 Tablet(s) Oral <User Schedule>  cholecalciferol 1000 Unit(s) Oral daily  docusate sodium 100 milliGRAM(s) Oral two times a day  entacapone 200 milliGRAM(s) Oral four times a day  furosemide    Tablet 40 milliGRAM(s) Oral daily  isosorbide   mononitrate ER Tablet (IMDUR) 60 milliGRAM(s) Oral daily  levothyroxine 25 MICROGram(s) Oral daily  polyethylene glycol 3350 17 Gram(s) Oral daily PRN  ranolazine 1000 milliGRAM(s) Oral two times a day  senna 1 Tablet(s) Oral daily    PMH/PSH/FH/SH:  Unchanged    ROS:  Constitutional: No Fever, Chills. + Fatigue, Weight loss.  No snoring.  HEENT:  No blurred vision, epistaxis  Respiratory: No cough, wheezing, hemoptysis  Cardiovascular: see HPI  Gastrointestinal: No abdominal pain,  diarrhea, constipation, nausea, vomiting  Genitourinary:  Nocturia X 3.  No dysuria. + incontinence  Extremities: No swelling.+ joint Pain.  Right upper rib pain anteriorly.  Neurologic: No focal deficit or speech disturbance  Lymphatic: No swollen glands  Skin: No rash, easy bruising. + ecchymoses  Psychiatry: No depression, suicidal ideation.  No anxiety.  No sleep disturbance.      Vitals:  T(C): 36.6 (19 @ 04:32), Max: 36.8 (19 @ 21:25)  HR: 63 (19 @ 04:32) (47 - 65)  BP: 155/79 (19 @ 04:32) (108/55 - 155/79)  BP(mean): --  RR: 18 (19 @ 04:32) (18 - 18)  SpO2: 96% (19 @ 04:32) (96% - 99%)  Wt(kg): --  Daily     Daily Weight in k.3 (12 Mar 2019 02:17)    Physical exam:  GENERAL: NAD. No ictrerus or pallor. Parkinsonian. c/o some constipation but bm yesterday.  Eyes: PERRL.  EOMI  HENT:  NC/AT.  Normal oral muscosa.  Cardiovascular: No JVD.  PMI not palpable.  S1, S2 normal. Soft II/VI early basal MANA w/o radiation.  II/VI ap decr SM. No gallop/click/ rub.  Respiratory:  Clear to percussion and auscultation.  Gastrointestinal: Soft, non-tender.  No hepatomegally. Normal BS.  No bruits.  Extremities: No cyanosis, clubbing or edema   Neurologic: A & O X 3. No speech disturbance.  No focal weakness.  Psychiatry:  Mood & affect appropriate  Skin:  No rash. No ecchymoses.    I&O's Summary    11 Mar 2019 07:01  -  12 Mar 2019 07:00  --------------------------------------------------------  IN: 540 mL / OUT: 900 mL / NET: -360 mL                              8.9    6.7   )-----------( 140      ( 12 Mar 2019 06:29 )             24.9     03-12    135  |  97  |  34<H>  ----------------------------<  96  4.0   |  26  |  1.36<H>    Ca    8.5      12 Mar 2019 06:29  Phos  3.5     03-11  Mg     2.5     03-11      Interpretation of Telemetry: atrial fibrillation, no profound bradycardia, no pauses.

## 2021-02-03 NOTE — DIETITIAN INITIAL EVALUATION ADULT. - PROBLEM SELECTOR PROBLEM 3
Atrial fibrillation, unspecified type Cheiloplasty (Complex) Text: A decision was made to reconstruct the defect with a  cheiloplasty.  The defect was undermined extensively.  Additional obicularis oris muscle was excised with a 15 blade scalpel.  The defect was converted into a full thickness wedge to facilite a better cosmetic result.  Small vessels were then tied off with 5-0 monocyrl. The obicularis oris, superficial fascia, adipose and dermis were then reapproximated.  After the deeper layers were approximated the epidermis was reapproximated with particular care given to realign the vermilion border.

## 2021-02-15 NOTE — PATIENT PROFILE ADULT. - NS PRO CONTRA FLU 1
Patient is a 3year-old who presents with nonbilious vomiting that started this morning. Patient has had 4-5 episodes of nonbilious emesis. No fever. No diarrhea. No URI symptoms. No known sick contact. No  or . Patient has no past medical history and takes no daily medications. Patient presents with mom. No urinary complaints. Normal p.o. and normal urine output. Currently patient says she feels all better and has no complaints of pain.         Pediatric Social History:         Past Medical History:   Diagnosis Date    Ear disorder     frequent ear infections so bilateral tubes placed       Past Surgical History:   Procedure Laterality Date    HX HEENT      ear tubes         Family History:   Problem Relation Age of Onset    No Known Problems Mother        Social History     Socioeconomic History    Marital status: SINGLE     Spouse name: Not on file    Number of children: Not on file    Years of education: Not on file    Highest education level: Not on file   Occupational History    Not on file   Social Needs    Financial resource strain: Not on file    Food insecurity     Worry: Not on file     Inability: Not on file    Transportation needs     Medical: Not on file     Non-medical: Not on file   Tobacco Use    Smoking status: Never Smoker    Smokeless tobacco: Never Used   Substance and Sexual Activity    Alcohol use: No     Frequency: Never    Drug use: No    Sexual activity: Never   Lifestyle    Physical activity     Days per week: Not on file     Minutes per session: Not on file    Stress: Not on file   Relationships    Social connections     Talks on phone: Not on file     Gets together: Not on file     Attends Mandaeism service: Not on file     Active member of club or organization: Not on file     Attends meetings of clubs or organizations: Not on file     Relationship status: Not on file    Intimate partner violence     Fear of current or ex partner: Not on file     Emotionally abused: Not on file     Physically abused: Not on file     Forced sexual activity: Not on file   Other Topics Concern    Not on file   Social History Narrative    Not on file         ALLERGIES: Amoxicillin    Review of Systems   Constitutional: Negative for activity change, appetite change, fatigue and fever. HENT: Negative for congestion, ear pain, rhinorrhea and sore throat. Eyes: Negative for discharge and redness. Respiratory: Negative for cough and wheezing. Cardiovascular: Negative for chest pain and cyanosis. Gastrointestinal: Positive for vomiting. Negative for abdominal pain, constipation, diarrhea and nausea. Genitourinary: Negative for decreased urine volume. Musculoskeletal: Negative for arthralgias, gait problem and myalgias. Skin: Negative for rash. Neurological: Negative for weakness. Psychiatric/Behavioral: Negative for agitation. Vitals:    02/15/21 1415 02/15/21 1423   Pulse:  106   Resp:  32   Temp:  98.7 °F (37.1 °C)   SpO2:  99%   Weight: 15.9 kg             Physical Exam  Vitals signs and nursing note reviewed. Constitutional:       General: She is active. Appearance: She is well-developed. HENT:      Right Ear: Tympanic membrane and ear canal normal.      Left Ear: Tympanic membrane and ear canal normal.      Mouth/Throat:      Mouth: Mucous membranes are moist.      Pharynx: Oropharynx is clear. Eyes:      Conjunctiva/sclera: Conjunctivae normal.   Neck:      Musculoskeletal: Normal range of motion and neck supple. Cardiovascular:      Rate and Rhythm: Normal rate and regular rhythm. Pulmonary:      Effort: Pulmonary effort is normal. No respiratory distress, nasal flaring or retractions. Breath sounds: Normal breath sounds. No stridor. No wheezing. Abdominal:      General: There is no distension. Palpations: Abdomen is soft. Tenderness: There is no abdominal tenderness. There is no guarding or rebound. Musculoskeletal: Normal range of motion. Skin:     General: Skin is warm and dry. Capillary Refill: Capillary refill takes less than 2 seconds. Findings: No rash. Neurological:      Mental Status: She is alert. MDM  Number of Diagnoses or Management Options  Diagnosis management comments: 3year-old with 4-5 episodes of nonbilious emesis over the past 12 hours. No fever or diarrhea. Patient has no tenderness on exam.  There is no right lower quadrant tenderness to suggest appendicitis. Vomiting is not bilious and does not suggest obstruction. Differential includes viral process as well as UTI. Urinalysis sent and if negative, will check for Covid at discharge. Also plan to DC with Zofran. Risk of Complications, Morbidity, and/or Mortality  Presenting problems: moderate  Diagnostic procedures: moderate  Management options: moderate           Procedures        2:55 PM  Change of shift. Care of patient signed over [place]   Bedside handoff complete. Awaiting urine results and p.o. challenge. Rhiannon Esparza was evaluated in the Emergency Department on 2/15/2021 for the symptoms described in the history of present illness. He/she was evaluated in the context of the global COVID-19 pandemic, which necessitated consideration that the patient might be at risk for infection with the SARS-CoV-2 virus that causes COVID-19. Institutional protocols and algorithms that pertain to the evaluation of patients at risk for COVID-19 are in a state of rapid change based on information released by regulatory bodies including the CDC and federal and state organizations. These policies and algorithms were followed during the patient's care in the ED.     Surrogate Decision Maker (Who do you want to make decisions for you in the event you are not able to?): Extended Emergency Contact Information  Primary Emergency Contact: Nichelle Salas, [address] [facility] Phone: [phone]  Relation: Grandparent out of season (available sept 1 thru apr 2 only)

## 2021-06-14 NOTE — CONSULT NOTE ADULT - SUBJECTIVE AND OBJECTIVE BOX
[facility] - [facility] - CONSULT NOTE    HPI: 89 year old gentleman, former smoker, with history of HTN, HLD, CAD/MI s/p CABG/PCI with decreased LVEF, Parkinson's disease with progressive functional impairment and multiple falls, DVT s/p IVC placement, CKD and GI bleeds. The patient was recently admitted to [place] following a fall and was found to have an acute on chronic right proximal humeral fracture treated conservatively. The patient has been at [facility] rehab where the patient developed gross hematuria following lara placement for urinary incontinence.    PMHX:  HTN (Hypertension)  HLD (hyperlipidemia)  CAD (Coronary Artery Disease)  Myocardial infarction  Parkinson's Disease  Pneumonia  Unilateral inguinal hernia  Upper GI bleed  Clostridium difficile colitis  UTI (urinary tract infection)  BPH (benign prostatic hyperplasia)    PSHX:  IVC filter  Prostate surgery  Appendectomy  CABG/PCI    FAMILY HISTORY:  father - CAD/MI  at 55  mother - breast cancer    SOCIAL HISTORY:  former smoker; retired businessman    Pulmonary Medications:       Antimicrobials:  piperacillin/tazobactam IVPB.. 3.375 Gram(s) IV Intermittent every 12 hours  vancomycin  IVPB 1000 milliGRAM(s) IV Intermittent every 24 hours      Cardiology:  furosemide    Tablet 40 milliGRAM(s) Oral two times a day  isosorbide   mononitrate ER Tablet (IMDUR) 60 milliGRAM(s) Oral daily  ranolazine 1000 milliGRAM(s) Oral two times a day      Other:  aspirin enteric coated 81 milliGRAM(s) Oral daily  atorvastatin 80 milliGRAM(s) Oral at bedtime  calcium carbonate 1250 mG  + Vitamin D (OsCal 500 + D) 1 Tablet(s) Oral daily  carbidopa/levodopa CR 50/200 1 Tablet(s) Oral <User Schedule>  cholecalciferol 1000 Unit(s) Oral two times a day  docusate sodium 100 milliGRAM(s) Oral two times a day  entacapone 200 milliGRAM(s) Oral <User Schedule>  levothyroxine 25 MICROGram(s) Oral daily  multivitamin 1 Tablet(s) Oral daily  nystatin Ointment 1 Application(s) Topical two times a day  polyethylene glycol 3350 17 Gram(s) Oral daily  potassium chloride    Tablet ER 20 milliEquivalent(s) Oral once  senna 2 Tablet(s) Oral at bedtime      Prn:  MEDICATIONS  (PRN):  acetaminophen   Tablet .. 650 milliGRAM(s) Oral every 6 hours PRN Temp greater or equal to 38C (100.4F), Mild Pain (1 - 3)  melatonin 5 milliGRAM(s) Oral at bedtime PRN Insomnia      Allergies    Cipro (Rash)    Intolerances        HOME MEDICATIONS: see  H & P    REVIEW OF SYSTEMS:  Constitutional: As per HPI  HEENT: Within normal limits  CV: As per HPI  Resp: As per HPI  GI: Within normal limits   : Within normal limits  Musculoskeletal: Within normal limits  Skin: Within normal limits  Neurological: Within normal limits  Psychiatric: Within normal limits  Endocrine: Within normal limits  Hematologic/Lymphatic: Within normal limits  Allergic/Immunologic: Within normal limits    [x] All other systems negative    OBJECTIVE:    I&O's Detail    28 Aug 2019 07:  -  29 Aug 2019 07:00  --------------------------------------------------------  IN:  Total IN: 0 mL    OUT:    Indwelling Catheter - Urethral: 1500 mL  Total OUT: 1500 mL    Total NET: -1500 mL      29 Aug 2019 07:  -  29 Aug 2019 15:45  --------------------------------------------------------  IN:    IV PiggyBack: 250 mL    Oral Fluid: 860 mL  Total IN: 1110 mL    OUT:    Indwelling Catheter - Urethral: 700 mL  Total OUT: 700 mL    Total NET: 410 mL        Daily Height in cm: 172.72 (28 Aug 2019 21:11)    Daily       PHYSICAL EXAM:  ICU Vital Signs Last 24 Hrs  T(C): 36.4 (29 Aug 2019 11:00), Max: 36.6 (28 Aug 2019 21:11)  T(F): 97.5 (29 Aug 2019 11:00), Max: 97.8 (28 Aug 2019 21:11)  HR: 66 (29 Aug 2019 11:00) (60 - 71)  BP: 122/70 (29 Aug 2019 11:00) (121/72 - 132/78)  BP(mean): --  ABP: --  ABP(mean): --  RR: 18 (29 Aug 2019 11:00) (16 - 18)  SpO2: 96% (29 Aug 2019 11:00) (96% - 98%)    General: Awake. Alert. Cooperative. No distress. Appears stated age 	  HEENT:   Atraumatic. Normocephalic. Anicteric. Normal oral mucosa. PERRL. EOMI.  Neck: Supple. Trachea midline. Thyroid without enlargement/tenderness/nodules. No carotid bruit. No JVD.	  Cardiovascular: Regular rate and rhythm. S1 S2 normal. No murmurs, rubs or gallops.  Respiratory: Respirations unlabored. Clear to auscultation and percussion bilaterally. No curvature.  Abdomen: Soft. Non-tender. Non-distended. No organomegaly. No masses. Normal bowel sounds.  Extremities: Warm to touch. No clubbing or cyanosis. No pedal edema.  Pulses: 2+ peripheral pulses all extremities.	  Skin: Normal skin color. No rashes or lesions. No ecchymoses. No cyanosis. Warm to touch.  Lymph Nodes: Cervical, supraclavicular and axillary nodes normal  Neurological: Motor and sensory examination equal and normal. A and O x 3  Psychiatry: Appropriate mood and affect.      LABS:                          7.9    7.39  )-----------( 129      ( 29 Aug 2019 08:14 )             24.7     CBC    WBC  7.39 <==, 7.9 <==, 8.5 <==, 7.29 <==, 8.51 <==    Hemoglobin  7.9 <<==, 8.6 <<==, 8.5 <<==, 6.9 <<==, 7.9 <<==    Hematocrit  24.7 <==, 26.3 <==, 25.2 <==, 20.9 <==, 23.9 <==    Platelets  129 <==, 130 <==, 112 <==, 101 <==, 108 <==      135  |  99  |  85<H>  ----------------------------<  118<H>      3.6   |  21<L>  |  3.49<H>    LYTES    sodium  135 <==, 136 <==, 136 <==, 132 <==, 130 <==    potassium   3.6 <==, 3.8 <==, 3.2 <==, 4.4 <==, 4.5 <==    chloride  99 <==, 96 <==, 102 <==, 93 <==, 94 <==    carbon dioxide  21 <==, 25 <==, 18 <==, 23 <==, 21 <==    =============================================================================================  RENAL FUNCTION:    Creatinine:   3.49  <<==, 4.20  <<==, 3.37  <<==, 4.50  <<==, 4.61  <<==    BUN:   85 <==, 89 <==, 78 <==, 93 <==, 85 <==    ============================================================================================    calcium   8.0 <==, 8.5 <==, 6.8 <==, 8.3 <==, 8.8 <==    ============================================================================================  LFTs    AST:   10 <==     ALT:  <5  <==     AP:  68  <=    Bili:  0.6  <=    PT/INR - ( 28 Aug 2019 13:34 )   PT: 14.0 sec;   INR: 1.21 ratio       PTT - ( 28 Aug 2019 13:34 )  PTT:30.3 sec                  MICROBIOLOGY:   Urinalysis Basic - ( 28 Aug 2019 13:40 )    Color: Red / Appearance: Turbid / S.018 / pH: x  Gluc: x / Ketone: Trace  / Bili: Moderate / Urobili: Negative   Blood: x / Protein: 300 mg/dL / Nitrite: Positive   Leuk Esterase: Large / RBC: >50 /hpf / WBC 6-10   Sq Epi: x / Non Sq Epi: Few / Bacteria: Few        RADIOLOGY:  [x ] Chest radiographs reviewed and interpreted by me [facility] - [facility] - CONSULT NOTE    HPI: 89 year old gentleman, lifelong non smoker, with history of HTN, HLD, CAD/MI s/p CABG/PCI with decreased LVEF, atrial fibrillation, Parkinson's disease with progressive functional impairment, dysphagia and multiple falls, DVT s/p IVC placement, CKD and GI bleeds. The patient was recently admitted to [place] following a fall and was found to have an acute on chronic right proximal humeral fracture being treated conservatively. The patient has been at [facility] where the patient developed gross hematuria following lara placement for urinary incontinence. He lacerated his penis while using a commode which is no red and swollen. The patient reports shortness of breath with exertion and with extended conversations. His legs are swollen. He has a chronic cough productive of clear mucous which is worse in the morning and after eating. He has no chest congestion or wheeze. He has no fevers, chills or sweats. No chest pain/pressure or palpitations. Asked to evaluate.    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 3.375 Gram(s) IV Intermittent every 12 hours  vancomycin  IVPB 1000 milliGRAM(s) IV Intermittent every 24 hours      Cardiology:  furosemide    Tablet 40 milliGRAM(s) Oral two times a day  isosorbide   mononitrate ER Tablet (IMDUR) 60 milliGRAM(s) Oral daily  ranolazine 1000 milliGRAM(s) Oral two times a day      Other:  aspirin enteric coated 81 milliGRAM(s) Oral daily  atorvastatin 80 milliGRAM(s) Oral at bedtime  calcium carbonate 1250 mG  + Vitamin D (OsCal 500 + D) 1 Tablet(s) Oral daily  carbidopa/levodopa CR 50/200 1 Tablet(s) Oral <User Schedule>  cholecalciferol 1000 Unit(s) Oral two times a day  docusate sodium 100 milliGRAM(s) Oral two times a day  entacapone 200 milliGRAM(s) Oral <User Schedule>  levothyroxine 25 MICROGram(s) Oral daily  multivitamin 1 Tablet(s) Oral daily  nystatin Ointment 1 Application(s) Topical two times a day  polyethylene glycol 3350 17 Gram(s) Oral daily  potassium chloride    Tablet ER 20 milliEquivalent(s) Oral once  senna 2 Tablet(s) Oral at bedtime      Prn:  MEDICATIONS  (PRN):  acetaminophen   Tablet .. 650 milliGRAM(s) Oral every 6 hours PRN Temp greater or equal to 38C (100.4F), Mild Pain (1 - 3)  melatonin 5 milliGRAM(s) Oral at bedtime PRN Insomnia      Allergies    Cipro (Rash)    HOME MEDICATIONS: see  H & P    REVIEW OF SYSTEMS:  Constitutional: As per HPI  HEENT: Within normal limits  CV: As per HPI  Resp: As per HPI  GI: dysphagia  : Within normal limits  Musculoskeletal: Within normal limits  Skin: As per HPI  Neurological: Parkinson's disease  Psychiatric: Within normal limits  Endocrine: Within normal limits  Hematologic/Lymphatic: Within normal limits  Allergic/Immunologic: Within normal limits    [x] All other systems negative    OBJECTIVE:    I&O's Detail    28 Aug 2019 07:  -  29 Aug 2019 07:00  --------------------------------------------------------  IN:  Total IN: 0 mL    OUT:    Indwelling Catheter - Urethral: 1500 mL  Total OUT: 1500 mL    Total NET: -1500 mL      29 Aug 2019 07:  -  29 Aug 2019 15:45  --------------------------------------------------------  IN:    IV PiggyBack: 250 mL    Oral Fluid: 860 mL  Total IN: 1110 mL    OUT:    Indwelling Catheter - Urethral: 700 mL  Total OUT: 700 mL    Total NET: 410 mL    Daily Height in cm: 172.72 (28 Aug 2019 21:11)      PHYSICAL EXAM:  ICU Vital Signs Last 24 Hrs  T(C): 36.4 (29 Aug 2019 11:00), Max: 36.6 (28 Aug 2019 21:11)  T(F): 97.5 (29 Aug 2019 11:00), Max: 97.8 (28 Aug 2019 21:11)  HR: 66 (29 Aug 2019 11:00) (60 - 71)  BP: 122/70 (29 Aug 2019 11:00) (121/72 - 132/78)  BP(mean): --  ABP: --  ABP(mean): --  RR: 18 (29 Aug 2019 11:00) (16 - 18)  SpO2: 96% (29 Aug 2019 11:00) (96% - 98%) on room air    General: Awake. Alert. Cooperative. No distress. Appears stated age 	  HEENT:  Atraumatic. Normocephalic. Anicteric. Normal oral mucosa. PERRL. EOMI.  Neck: Supple. Trachea midline. Thyroid without enlargement/tenderness/nodules. No carotid bruit. No JVD.	  Cardiovascular: Regular rate and rhythm. S1 S2 normal. III/VI systolic murmur  Respiratory: Respirations unlabored. Decreased breath sounds at the bases with dullness to percussion and rales. No curvature.  Abdomen: Soft. Non-tender. Non-distended. No organomegaly. No masses. Normal bowel sounds. Lara catheter with dark colored urine. Penile shaft is swollen and erythematous with lacerations   Extremities: Warm to touch. No clubbing or cyanosis. Moderate pretibial edema.  Pulses: 2+ peripheral pulses all extremities.	  Skin: Normal skin color. No rashes or lesions. No ecchymoses. No cyanosis. Warm to touch.  Lymph Nodes: Cervical, supraclavicular and axillary nodes normal  Neurological: Cogwheel rigidity. A and O x 3  Psychiatry: Appropriate mood and affect.      LABS:                          7.9    7.39  )-----------( 129      ( 29 Aug 2019 08:14 )             24.7     CBC    WBC  7.39 <==, 7.9 <==, 8.5 <==, 7.29 <==, 8.51 <==    Hemoglobin  7.9 <<==, 8.6 <<==, 8.5 <<==, 6.9 <<==, 7.9 <<==    Hematocrit  24.7 <==, 26.3 <==, 25.2 <==, 20.9 <==, 23.9 <==    Platelets  129 <==, 130 <==, 112 <==, 101 <==, 108 <==      135  |  99  |  85<H>  ----------------------------<  118<H>    08-29  3.6   |  21<L>  |  3.49<H>    LYTES    sodium  135 <==, 136 <==, 136 <==, 132 <==, 130 <==    potassium   3.6 <==, 3.8 <==, 3.2 <==, 4.4 <==, 4.5 <==    chloride  99 <==, 96 <==, 102 <==, 93 <==, 94 <==    carbon dioxide  21 <==, 25 <==, 18 <==, 23 <==, 21 <==    =============================================================================================  RENAL FUNCTION:    Creatinine:   3.49  <<==, 4.20  <<==, 3.37  <<==, 4.50  <<==, 4.61  <<==    BUN:   85 <==, 89 <==, 78 <==, 93 <==, 85 <==    ============================================================================================    calcium   8.0 <==, 8.5 <==, 6.8 <==, 8.3 <==, 8.8 <==    ============================================================================================  LFTs    AST:   10 <==     ALT:  <5  <==     AP:  68  <=    Bili:  0.6  <=    PT/INR - ( 28 Aug 2019 13:34 )   PT: 14.0 sec;   INR: 1.21 ratio       PTT - ( 28 Aug 2019 13:34 )  PTT:30.3 sec      Patient name: RYAN FITZPATRICK  YOB: 1930   Age: 85 (M)   MR#: [id number]  Study Date: 10/29/2015  [id number]: [facility]  Study quality: Technically good  Referring Physician: Freddie Hopkins  Blood Pressure: 122/66 mmHg  Height: 5ft 8in  Weight: 167 lb  BSA: 1.9 m2  ------------------------------------------------------------------------  PROCEDURE: Transthoracic echocardiogram with 2-D, M-Mode  and complete spectral and color flow Doppler.  INDICATION: Heart failure, unspecified (I50.9)  ------------------------------------------------------------------------  Dimensions:    Normal Values:  LA:     4.4    2.0 - 4.0 cm  Ao:     3.8    2.0 - 3.8 cm  SEPTUM: 1.0    0.6 - 1.2 cm  PWT:    0.9    0.6 - 1.1 cm  LVIDd:  5.3    3.0 - 5.6 cm  LVIDs:  4.4    1.8 - 4.0 cm  Derived variables:  LVMI: 99 g/m2  RWT: 0.33  EF (Visual Estimate): 45 %  Doppler Peak Velocity (m/sec): AoV=2.0  ------------------------------------------------------------------------  Observations:  Mitral Valve: Normal mitral valve. Mild mitral  regurgitation.  Aortic Valve/Aorta: Calcified trileaflet aortic valve with  normal opening. Peak left ventricular outflow tract  gradient equals 7 mm Hg, mean gradient is equal to 3mm Hg.a  Aortic Root: 3.8 cm.  Left Atrium: Normal left atrium.  Left Ventricle: Mild segmental left ventricular systolic  dysfunction.  Hypokinesis of the inferolateral wall. Normal  left ventricular internal dimensions and wall thicknesses.  Right Heart: Normal right atrium. Normal right ventricular  size and function. Normal tricuspid valve. Mild tricuspid  regurgitation. Normal pulmonic valve. Minimal pulmonic  regurgitation.  Pericardium/Pleura: Normal pericardium with no pericardial  effusion.  Hemodynamic: Estimated right atrial pressure is 8 mm Hg.  Estimated right ventricular systolic pressure equals 36 mm  Hg, assuming right atrial pressure equals 8 mm Hg,  consistent with borderline pulmonary hypertension.  ------------------------------------------------------------------------  Conclusions:  1. Normal left ventricular internal dimensions and wall  thicknesses.  2. Mild segmental left ventricular systolic dysfunction.  Hypokinesis of the inferolateral wall.  ------------------------------------------------------------------------  Confirmed on  10/29/2015 - 15:52:52 by Luciano Gandhi M.D.  ------------------------------------------------------------------------  ---------------------------------------------------------------------------------------------------------------  MICROBIOLOGY:   Urinalysis Basic - ( 28 Aug 2019 13:40 )    Color: Red / Appearance: Turbid / S.018 / pH: x  Gluc: x / Ketone: Trace  / Bili: Moderate / Urobili: Negative   Blood: x / Protein: 300 mg/dL / Nitrite: Positive   Leuk Esterase: Large / RBC: >50 /hpf / WBC 6-10   Sq Epi: x / Non Sq Epi: Few / Bacteria: Few    Culture - Urine (19 @ 21:50)    Specimen Source: .Urine    Culture Results:   No growth    RADIOLOGY:  [x ] Chest radiographs reviewed and interpreted by me      EXAM:  XR CHEST PORTABLE URGENT 1V                          PROCEDURE DATE:  2019      INTERPRETATION:    CLINICAL INDICATION: Sepsis.    TECHNIQUE: Portable frontal view of the chest.    COMPARISON: Frontal chest radiograph from 3/7/2019.    FINDINGS:     Median sternotomy wires.  Cardiac silhouette is not accurately evaluated in this projection.  Lungs are clear.  No pleural effusions or pneumothorax.  No acute osseous abnormality.      IMPRESSION:    Clear lungs.    DONOVAN SYLVESTER M.D., RADIOLOGY RESIDENT  This document has been electronically signed.  QUENTIN TEJEDA M.D., ATTENDING RADIOLOGIST  This document has been electronically signed. Aug 29 2019 12:26PM  ---------------------------------------------------------------------------------------------------------------    EXAM:  CT CHEST                          PROCEDURE DATE:  2019      INTERPRETATION:  CLINICAL INFORMATION: Chronic cough. Patient with   Parkinson's disease.     COMPARISON: Chest CT from 10/26/2015.    PROCEDURE:   CT of the Chest was performed without intravenous contrast.  Sagittal and coronal reformats were performed.      FINDINGS:    LUNGS AND AIRWAYS: Patent central airways. Left distal mucoid impacted   airways. Interlobular septal thickening with patchy bilateral ground   glass opacities. Bibasilar passive atelectasis.    PLEURA: Small bilateral pleural effusions.    MEDIASTINUM AND CRYSTAL: No lymphadenopathy.    VESSELS: Atherosclerotic calcifications of the aorta and coronary   arteries.    HEART: Heart size is enlarged. Aortic valve calcifications. No   pericardial effusion.    CHEST WALL AND LOWER NECK: Subcentimeter hypodense thyroid nodule,   unchanged from prior study in .    VISUALIZED UPPER ABDOMEN: Atrophic right kidney. Unchanged right   hydronephrosis. Left renal cysts.    BONES: Median sternotomy. Degenerative changes. Old healed left rib   fractures. Right humeral head fracture.    IMPRESSION:     Pulmonary edema with small bilateral pleural effusions.    DONOVAN SYLVESTER M.D.,RADIOLOGY RESIDENT  This document has been electronically signed.  QUENTIN TEJEDA M.D., ATTENDING RADIOLOGIST  This document has been electronically signed. Aug 29 2019 12:26PM  ---------------------------------------------------------------------------------------------------------------    EXAM:  US KIDNEYS AND BLADDER                          PROCEDURE DATE:  2019      INTERPRETATION:  CLINICAL INFORMATION: Hematuria. STEVEN.    COMPARISON: Correlation is made with abdominal CT dated 2016.    TECHNIQUE: Sonography of the kidneys and bladder. Study was technically   limited.    FINDINGS:    Right kidney:  Atrophic, measuring 8 cm. Increased cortical echogenicity.   Moderate hydronephrosis, with dilatation of the proximal ureter. A 1.5 cm   cortical cyst is noted in the lower pole.    Left kidney:  Limited evaluation. Measures approximately 10.8 cm. Mild   hydronephrosis. A 2.7 cm cyst is noted in the upper pole.    Urinary bladder: Collapsed, with a Lara catheter.    Miscellaneous: Bilateral pleural effusions.    IMPRESSION: Limited study.    Atrophic right kidney with increased renal cortical echogenicity.   Moderate right hydronephrosis, with dilatation of the proximal right   ureter.    Mild left hydronephrosis.    Bilateral pleural effusions.    AYAN MELCHOR M.D., ATTENDING RADIOLOGIST  This document has been electronically signed. Aug 29 2019 12:12PM  ---------------------------------------------------------------------------------------------------------------    EXAM:  CT PELVIS ONLY                          PROCEDURE DATE:  2019      INTERPRETATION:  CLINICAL INFORMATION: Buttock cellulitis. Evaluate for   Eliza's gangrene.    COMPARISON: CT pelvis 3/7/2019    PROCEDURE:   CT of the Pelvis was performed without intravenous contrast.   Intravenous contrast: None.  Oral contrast: None.  Sagittal and coronal reformats were performed.    FINDINGS:    BLADDER: Collapsed on a Lara catheter.  REPRODUCTIVE ORGANS: No pelvic mass.  LYMPH NODES: No pelvic lymphadenopathy.    VISUALIZED PORTIONS:    ABDOMINAL ORGANS: Partially visualized left kidney with suspicion of   hydronephrosis. Atrophic right kidney.  BOWEL: Rectum is distended to 9.7 cm with stool. Mild distal rectal wall   thickening suggesting mild stercoral colitis.  PERITONEUM: No ascites.  VESSELS: Atherosclerotic changes. IVC filter.  ABDOMINAL WALL: Small left inguinal hernia containing fluid. Mild   anasarca. No evidence of a buttocks abscess or subcutaneous gas.  BONES: Degenerative changes.    IMPRESSION:     No evidence of a buttocks abscess or subcutaneous gas.    Mild stercoral colitis.    GABBY GOODWIN M.D., ATTENDING RADIOLOGIST  This document has been electronically signed. Aug 28 2019  1:12PM  --------------------------------------------------------------------------------------------------------------- [LMP (date): ___] : LMP was on [unfilled] [GA =___ Weeks] : which calculates to a GA of [unfilled] weeks [GA= ___ Days] : and [unfilled] day(s) [RICHARD by LMP (date): ___] : The calculated RICHARD by LMP is [unfilled] [By LMP] : this is the final RICHARD

## 2021-07-17 NOTE — H&P ADULT - PROBLEM SELECTOR PLAN 1
Home
Large ill-defined hematoma within the right gluteus minimus and medius   musculature. No fracture.    Hemodynamically stable currently.    Repeat CBC q12 for now and continue to monitor.  If drops will order CTA

## 2022-01-01 NOTE — ED ADULT NURSE NOTE - NSIMPLEMENTINTERV_GEN_ALL_ED
Term  delivered by , current hospitalization Implemented All Fall with Harm Risk Interventions:  [place] to call system. Call bell, personal items and telephone within reach. Instruct patient to call for assistance. Room bathroom lighting operational. Non-slip footwear when patient is off stretcher. Physically safe environment: no spills, clutter or unnecessary equipment. Stretcher in lowest position, wheels locked, appropriate side rails in place. Provide visual cue, wrist band, yellow gown, etc. Monitor gait and stability. Monitor for mental status changes and reorient to person, place, and time. Review medications for side effects contributing to fall risk. Reinforce activity limits and safety measures with patient and family. Provide visual clues: red socks.

## 2022-07-11 NOTE — CHART NOTE - NSCHARTNOTEFT_GEN_A_CORE
"Spoke with  with Hilaria  Regarding the meds Eliquis  And oxycodone. He will approve meds to be filled at our pharmacy and will contact them to send his the script info and he will get future meds filled through [facility] pharmacy.He knows the first fill of the Eliquis will be free w coupon. He has the pharmacy number.  I called pts guest work visit id number to our pharmacy so they can fill his oxycodone guest visa number is [phone]. This info given to Darryl. [phone]   Also pt will need a PCP. I have called Dr Omar Ramirez (Tyler) [phone] and spoke with Talia . She confirms Veronica from [employer] had just called and also requested an appointment. Talia will as Dr Ramirez tomorrow when he returns to the office. She will update Veronica.Their office is located at [address] [address] in [place] [address]  Dr Nagy [facility] confirmed Dr Breen is not taking new pts and also recommended Rogelio Ramirez.   The above info sent to Alejo Yoder  with Hilaria with request he follow up regarding the PCP issue tomorrow.  " pt seen and examined- full consult to follow  passing large amt of bright red blood  colo last week showed distal rectal ulcer c/w strercoral ulcer (pt has parkinsons and has chronic constipation)  ct shows proctitis  rec rectal EUA for diagnosis and control of bleeding- r/b/c explained to pt and wife

## 2022-10-17 NOTE — H&P ADULT - BACK
Pt sleeping at this time, slept 7.5 hrs with no awakenings. NAD. Resp even and unlabored.Pathways clear,bed in low position. Q 15 min safety check ongoing.All precautions maintained.    No deformity or limitation of movement
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
## 2023-01-01 NOTE — OCCUPATIONAL THERAPY INITIAL EVALUATION ADULT - FINE MOTOR COORDINATION, LEFT HAND, FINGER TO NOSE, OT EVAL
Bedside and Verbal shift change report given to EDGARDO Ag RN (oncoming nurse) by ANGELICA Bailey RN (offgoing nurse). Report included the following information SBAR, Intake/Output, and MAR. moderate impairment

## 2023-04-04 NOTE — ED PROVIDER NOTE - DATE/TIME 1
bilateral lower extremity Active ROM was WNL (within normal limits)/bilateral  lower extremity Active ROM was WFL (within functional limits) 14-Aug-2018 20:18

## 2023-05-22 NOTE — DISCHARGE NOTE ADULT - REASON FOR REFUSAL (REFER PATIENT TO HEALTHCARE PROVIDER FOR FOLLOW-UP):
05/22/2023  Ryder Mendoza is a 46 y.o., male.      Pre-op Assessment    I have reviewed the Patient Summary Reports.     I have reviewed the Nursing Notes. I have reviewed the NPO Status.   I have reviewed the Medications.     Review of Systems  Anesthesia Hx:  No problems with previous Anesthesia  Denies Family Hx of Anesthesia complications.   Denies Personal Hx of Anesthesia complications.   Hematology/Oncology:  Hematology Normal   Oncology Normal     EENT/Dental:EENT/Dental Normal   Cardiovascular:  Cardiovascular Normal     Pulmonary:  Pulmonary Normal    Renal/:  Renal/ Normal     Hepatic/GI:  Hepatic/GI Normal    Musculoskeletal:   Arthritis     Neurological:  Neurology Normal    Endocrine:  Endocrine Normal    Dermatological:  Skin Normal    Psych:   Psychiatric History (recurrent episode of major depression) depression          Physical Exam  General: Well nourished, Cooperative, Alert and Oriented    Airway:  TM Distance: Normal  Tongue: Normal  Neck ROM: Normal ROM    Chest/Lungs:  Clear to auscultation    Heart:  Rate: Normal    Abdomen:  Normal        Anesthesia Plan  Type of Anesthesia, risks & benefits discussed:    Anesthesia Type: Gen Natural Airway  Intra-op Monitoring Plan: Standard ASA Monitors  Post Op Pain Control Plan: IV/PO Opioids PRN  Induction:  IV  Informed Consent: Informed consent signed with the Patient and all parties understand the risks and agree with anesthesia plan.  All questions answered. Patient consented to blood products? No  ASA Score: 2  Day of Surgery Review of History & Physical: H&P Update referred to the surgeon/provider.    Ready For Surgery From Anesthesia Perspective.     .       will follow up with PCP

## 2023-09-29 NOTE — DISCHARGE NOTE PROVIDER - HOSPITAL COURSE
89yoM with PMH of CAD s/p CABG, Parkinson's disease, htn, hLd, hx DVT s/p IVC filter, CKD stage III, hx GIB, R shoulder fracture who presents to the ED with complaint of right shoulder pain s/p mechanical fall, with concern for acute on chronic R proximal humeral fracture.     Acute pain of right shoulder.  concern for acute on chronic fracture of R proximal humerus without dislocation    seen by ortho;  no plan for acute surgical intervention.     Continue NWB RUE. Continue ice packs and tylenol for pain control    Patient was provided a sling.     seen by PT and advised rehab.    Patient encouraged to move fingers and elbow    Patient should follow up with Dr. Cohn 1 week after discharge.         cleared by MD for discharge to rehab 4 = No assist / stand by assistance

## 2023-11-28 NOTE — PATIENT PROFILE ADULT - HAS THE PATIENT BEEN ADMITTED FROM SHORT TERM REHAB?
Occupational Therapy Treatment Note  Name: Anurag Napoles MRN: [id number] :   1936   Date:  2023   Admission Date: 2023 Room:  [address] [place] [address]     Primary Problem:  rhinovirus, acute respiratory failure, with hypoxia    Restrictions/Precautions:  Restrictions/Precautions  Restrictions/Precautions: Isolation, Up as Tolerated  Required Braces or Orthoses?: No     Communication with other providers:   Cleared for treatment by Trena Montoya RN. Subjective:  Patient states:  \"I feel pretty good but I feel like I am getting an ear infection\"   Pain:   Location, Type, Intensity (0/10 to 10/10): Pt reports a little pain in ear but did not quantify    Objective:    Observation:  Pt awake sitting up in chair with 1L of O2 agreeable to therapy  Objective Measures:  Pt alert and oriented    Treatment, including education:  Transfers  Sit to stand :Stand By Assistance from chair  Stand to sit :Stand By Assistance to chair  Toilet:  Contact Guard Assistance from low toilet w/o rails      Self Care Training:   Activities performed today included the following:    Grooming:  Stand By Assistance as Pt stood at sink to brush dentures    Bathing   Stand By Assistance to complete sponge bath after s/u seated in chair for UE and in standing for LE, hubert/buttocks    UB Dress  Stand By Assistance to [employer]    LB Dress  Minimal Assistance to bertha/doff depends to thread through [address]. Pt able to pull up with SBA. Pt donned shoes with min A to bertha onto R foot    Toileting  Stand By Assistance    Therapeutic Activity Training:   Pt completed sit to stand from chair to RW and upon standing had incontinence and urinated through brief/purewick onto floor. Pt assisted to doff brief min A and Pt sat back in chair while floor was cleaned. Pt stood up, SBA to RW and pulled up brief. Pt then amb in blanchard ~30 sec then stopped to rest holding RW. Pt reported having min chest pain. Pt amb 30' back to room and trf to toilet, CGA. no

## 2023-12-23 NOTE — PROGRESS NOTE ADULT - ASSESSMENT
89yo male  POD3 s/p EUA and suture ligation of bleeding rectal ulcer after rapid response called (9/11/2019) for active lower GI bleed (BRBPR). Rectal packing removed without BRBPR.    - PRN Pain control  - Monitor respiratory status  - OOBTC daily, encourage use of incentive spirometer  - No longer requiring pressor support  - Monitor vitals   - continue dysphagia diet  - Continued critical care management    [facility] [phone] [facility]

## 2024-08-30 NOTE — PHYSICAL THERAPY INITIAL EVALUATION ADULT - FOLLOWS COMMANDS/ANSWERS QUESTIONS, REHAB EVAL
PA for zepbound was closed.    Close reason: Other  Payer: [employer] and [facility] Commercial Case ID: [id number]  Note from payer: This request cannot be processed due to the medication is not covered by the plan.   increased time to process commands/100% of the time

## 2024-09-29 NOTE — H&P ADULT - NSHPREVIEWOFSYSTEMS_GEN_ALL_CORE
REVIEW OF SYSTEMS  CONSTITUTIONAL: No fever, no chills,   EYES: No eye pain, no vision changes  ENMT:  No difficulty hearing, no throat pain  RESPIRATORY: No cough, no wheezing, no sputum production; No shortness of breath  CARDIOVASCULAR: No chest pain, no palpitations, no [place], no leg swelling  GASTROINTESTINAL: No abdominal pain, no nausea, no vomiting, no diarrhea, + chronic constipation  GENITOURINARY: No dysuria, no hematuria  NEUROLOGICAL: No headaches, no loss of strength, no numbness, + gait instability, +tremor  SKIN: No itching, no rashes, + bruises  MUSCULOSKELETAL: + R shoulder pain and swelling  HEME/LYMPH: + bruising, no bleeding
5

## 2025-01-25 NOTE — ED ADULT NURSE NOTE - NSHISCREENINGQ1_ED_A_ED
Goal Outcome Evaluation:    Alert to self and person.  Assist of one to transfer with belt and wheeled walker.  Restless at times.  Pain in her hips. Scheduled medications plus prn oxy x 1.  Tolerating diet.  assist with feeding.  Declined labs today per .  Iv cath out a little. Dressing changed to repositioned iv line.  Was able to save line but need to watch closely.  declined new start if one needed.                          No

## 2025-06-02 NOTE — DISCHARGE NOTE NURSING/CASE MANAGEMENT/SOCIAL WORK - NSTOBACCONEVERSMOKERY/N_GEN_A
"Subjective   Patient ID: Juaquin Gay is a 56 y.o. male who presents for Follow-up (HERE FOR FOLLOW UP AFTER BEING ADMITTED TO [facility] FOR A KIDNEY STONE, HE THINKS HE PASSED IT IN THE HOSPITAL).    HPI pt admitted 5/20-5/22 for right ureteral stone.   He had presented with right upper abdominal and flank pain.  He was found to have a right obstructing 4 mm stone with right hydronephrosis and DIEGO.  He was admitted and given IVF and he passed the stone spontaneously after which pain and DIEGO resolved.  He was dc and instructed to follow up with urology.  The stone was sent for analysis and noted to be uric acid.    Review of Systems   Constitutional: Negative.    HENT: Negative.     Respiratory: Negative.     Cardiovascular: Negative.    Gastrointestinal: Negative.    Genitourinary: Negative.    Musculoskeletal: Negative.    Neurological: Negative.        Objective   /70 (BP Location: Right arm, Patient Position: Sitting, BP Cuff Size: Adult)   Pulse 67   Temp 36.9 °C (98.5 °F) (Skin)   Resp 20   Ht 1.727 m (5' 8\")   Wt 79.4 kg (175 lb)   SpO2 99%   BMI 26.61 kg/m²     Physical Exam  Constitutional:       General: He is not in acute distress.     Appearance: Normal appearance.   Cardiovascular:      Rate and Rhythm: Normal rate and regular rhythm.      Heart sounds: No murmur heard.  Pulmonary:      Breath sounds: Normal breath sounds. No wheezing.   Neurological:      Mental Status: He is alert.         Assessment/Plan   Problem List Items Addressed This Visit           ICD-10-CM    Nephrolithiasis - Primary N20.0    Relevant Orders    Uric Acid     Continue fluids and follow up with urology as scheduled.      " No